# Patient Record
Sex: MALE | Race: WHITE | NOT HISPANIC OR LATINO | Employment: OTHER | URBAN - METROPOLITAN AREA
[De-identification: names, ages, dates, MRNs, and addresses within clinical notes are randomized per-mention and may not be internally consistent; named-entity substitution may affect disease eponyms.]

---

## 2018-01-01 ENCOUNTER — APPOINTMENT (OUTPATIENT)
Dept: GENERAL RADIOLOGY | Facility: HOSPITAL | Age: 63
End: 2018-01-01

## 2018-01-01 ENCOUNTER — APPOINTMENT (OUTPATIENT)
Dept: CT IMAGING | Facility: HOSPITAL | Age: 63
End: 2018-01-01

## 2018-01-01 ENCOUNTER — APPOINTMENT (OUTPATIENT)
Dept: GENERAL RADIOLOGY | Facility: HOSPITAL | Age: 63
End: 2018-01-01
Attending: INTERNAL MEDICINE

## 2018-01-01 ENCOUNTER — APPOINTMENT (OUTPATIENT)
Dept: CARDIOLOGY | Facility: HOSPITAL | Age: 63
End: 2018-01-01
Attending: INTERNAL MEDICINE

## 2018-01-01 ENCOUNTER — HOSPITAL ENCOUNTER (INPATIENT)
Facility: HOSPITAL | Age: 63
LOS: 12 days | Discharge: HOME HEALTH | End: 2018-04-25
Attending: EMERGENCY MEDICINE | Admitting: INTERNAL MEDICINE

## 2018-01-01 ENCOUNTER — HOSPITAL ENCOUNTER (INPATIENT)
Facility: HOSPITAL | Age: 63
LOS: 3 days | End: 2018-05-03
Attending: EMERGENCY MEDICINE | Admitting: INTERNAL MEDICINE

## 2018-01-01 VITALS
TEMPERATURE: 97.4 F | BODY MASS INDEX: 22.32 KG/M2 | SYSTOLIC BLOOD PRESSURE: 97 MMHG | HEIGHT: 67 IN | DIASTOLIC BLOOD PRESSURE: 48 MMHG | WEIGHT: 142.2 LBS

## 2018-01-01 VITALS
RESPIRATION RATE: 18 BRPM | BODY MASS INDEX: 19.15 KG/M2 | TEMPERATURE: 97.5 F | HEIGHT: 67 IN | WEIGHT: 122 LBS | DIASTOLIC BLOOD PRESSURE: 76 MMHG | SYSTOLIC BLOOD PRESSURE: 121 MMHG | HEART RATE: 89 BPM | OXYGEN SATURATION: 97 %

## 2018-01-01 DIAGNOSIS — I46.9 CARDIAC ARREST (HCC): Primary | ICD-10-CM

## 2018-01-01 DIAGNOSIS — T45.511A POISONING BY WARFARIN SODIUM, ACCIDENTAL OR UNINTENTIONAL, INITIAL ENCOUNTER: Primary | ICD-10-CM

## 2018-01-01 DIAGNOSIS — K66.1 RETROPERITONEAL HEMATOMA: ICD-10-CM

## 2018-01-01 DIAGNOSIS — I21.02 STEMI INVOLVING LEFT ANTERIOR DESCENDING CORONARY ARTERY (HCC): ICD-10-CM

## 2018-01-01 DIAGNOSIS — R26.2 DIFFICULTY WALKING: ICD-10-CM

## 2018-01-01 DIAGNOSIS — N17.9 ACUTE RENAL FAILURE, UNSPECIFIED ACUTE RENAL FAILURE TYPE (HCC): ICD-10-CM

## 2018-01-01 LAB
ABO + RH BLD: NORMAL
ABO GROUP BLD: NORMAL
ABO GROUP BLD: NORMAL
ACANTHOCYTES BLD QL SMEAR: ABNORMAL
ACT BLD: 257 SECONDS (ref 82–152)
ACT BLD: 340 SECONDS (ref 82–152)
ALBUMIN SERPL-MCNC: 2.4 G/DL (ref 3.5–5.2)
ALBUMIN SERPL-MCNC: 2.4 G/DL (ref 3.5–5.2)
ALBUMIN SERPL-MCNC: 2.6 G/DL (ref 3.5–5.2)
ALBUMIN SERPL-MCNC: 2.6 G/DL (ref 3.5–5.2)
ALBUMIN SERPL-MCNC: 2.7 G/DL (ref 3.5–5.2)
ALBUMIN SERPL-MCNC: 2.8 G/DL (ref 3.5–5.2)
ALBUMIN SERPL-MCNC: 2.9 G/DL (ref 3.5–5.2)
ALBUMIN SERPL-MCNC: 2.9 G/DL (ref 3.5–5.2)
ALBUMIN SERPL-MCNC: 3 G/DL (ref 3.5–5.2)
ALBUMIN SERPL-MCNC: 3.1 G/DL (ref 3.5–5.2)
ALBUMIN SERPL-MCNC: 3.2 G/DL (ref 3.5–5.2)
ALBUMIN SERPL-MCNC: 3.2 G/DL (ref 3.5–5.2)
ALBUMIN SERPL-MCNC: 3.3 G/DL (ref 3.5–5.2)
ALBUMIN SERPL-MCNC: 3.3 G/DL (ref 3.5–5.2)
ALBUMIN SERPL-MCNC: 3.4 G/DL (ref 3.5–5.2)
ALBUMIN/GLOB SERPL: 0.9 G/DL
ALBUMIN/GLOB SERPL: 1 G/DL
ALBUMIN/GLOB SERPL: 1.1 G/DL
ALBUMIN/GLOB SERPL: 1.2 G/DL
ALBUMIN/GLOB SERPL: 1.3 G/DL
ALBUMIN/GLOB SERPL: 1.4 G/DL
ALP SERPL-CCNC: 108 U/L (ref 39–117)
ALP SERPL-CCNC: 117 U/L (ref 39–117)
ALP SERPL-CCNC: 191 U/L (ref 39–117)
ALP SERPL-CCNC: 196 U/L (ref 39–117)
ALP SERPL-CCNC: 288 U/L (ref 39–117)
ALP SERPL-CCNC: 68 U/L (ref 39–117)
ALP SERPL-CCNC: 70 U/L (ref 39–117)
ALP SERPL-CCNC: 70 U/L (ref 39–117)
ALP SERPL-CCNC: 72 U/L (ref 39–117)
ALP SERPL-CCNC: 73 U/L (ref 39–117)
ALP SERPL-CCNC: 75 U/L (ref 39–117)
ALP SERPL-CCNC: 76 U/L (ref 39–117)
ALP SERPL-CCNC: 77 U/L (ref 39–117)
ALP SERPL-CCNC: 82 U/L (ref 39–117)
ALP SERPL-CCNC: 98 U/L (ref 39–117)
ALT SERPL W P-5'-P-CCNC: 1235 U/L (ref 1–41)
ALT SERPL W P-5'-P-CCNC: 153 U/L (ref 1–41)
ALT SERPL W P-5'-P-CCNC: 1842 U/L (ref 1–41)
ALT SERPL W P-5'-P-CCNC: 256 U/L (ref 1–41)
ALT SERPL W P-5'-P-CCNC: 2648 U/L (ref 1–41)
ALT SERPL W P-5'-P-CCNC: 291 U/L (ref 1–41)
ALT SERPL W P-5'-P-CCNC: 323 U/L (ref 1–41)
ALT SERPL W P-5'-P-CCNC: 3829 U/L (ref 1–41)
ALT SERPL W P-5'-P-CCNC: 4166 U/L (ref 1–41)
ALT SERPL W P-5'-P-CCNC: 4611 U/L (ref 1–41)
ALT SERPL W P-5'-P-CCNC: 4619 U/L (ref 1–41)
ALT SERPL W P-5'-P-CCNC: 4703 U/L (ref 1–41)
ALT SERPL W P-5'-P-CCNC: 481 U/L (ref 1–41)
ALT SERPL W P-5'-P-CCNC: 499 U/L (ref 1–41)
ALT SERPL W P-5'-P-CCNC: 5242 U/L (ref 1–41)
ALT SERPL W P-5'-P-CCNC: 5553 U/L (ref 1–41)
ALT SERPL W P-5'-P-CCNC: 740 U/L (ref 1–41)
AMMONIA BLD-SCNC: 45 UMOL/L (ref 16–60)
AMORPH URATE CRY URNS QL MICRO: ABNORMAL /HPF
ANION GAP SERPL CALCULATED.3IONS-SCNC: 10.9 MMOL/L
ANION GAP SERPL CALCULATED.3IONS-SCNC: 12.6 MMOL/L
ANION GAP SERPL CALCULATED.3IONS-SCNC: 12.6 MMOL/L
ANION GAP SERPL CALCULATED.3IONS-SCNC: 12.9 MMOL/L
ANION GAP SERPL CALCULATED.3IONS-SCNC: 13.2 MMOL/L
ANION GAP SERPL CALCULATED.3IONS-SCNC: 13.5 MMOL/L
ANION GAP SERPL CALCULATED.3IONS-SCNC: 15.5 MMOL/L
ANION GAP SERPL CALCULATED.3IONS-SCNC: 16.2 MMOL/L
ANION GAP SERPL CALCULATED.3IONS-SCNC: 17.1 MMOL/L
ANION GAP SERPL CALCULATED.3IONS-SCNC: 17.2 MMOL/L
ANION GAP SERPL CALCULATED.3IONS-SCNC: 17.4 MMOL/L
ANION GAP SERPL CALCULATED.3IONS-SCNC: 17.8 MMOL/L
ANION GAP SERPL CALCULATED.3IONS-SCNC: 17.8 MMOL/L
ANION GAP SERPL CALCULATED.3IONS-SCNC: 17.9 MMOL/L
ANION GAP SERPL CALCULATED.3IONS-SCNC: 19.2 MMOL/L
ANION GAP SERPL CALCULATED.3IONS-SCNC: 20.5 MMOL/L
ANION GAP SERPL CALCULATED.3IONS-SCNC: 21.4 MMOL/L
ANION GAP SERPL CALCULATED.3IONS-SCNC: 22.8 MMOL/L
ANION GAP SERPL CALCULATED.3IONS-SCNC: 24.2 MMOL/L
ANION GAP SERPL CALCULATED.3IONS-SCNC: 24.4 MMOL/L
ANION GAP SERPL CALCULATED.3IONS-SCNC: 24.9 MMOL/L
ANION GAP SERPL CALCULATED.3IONS-SCNC: 25.4 MMOL/L
ANION GAP SERPL CALCULATED.3IONS-SCNC: 25.9 MMOL/L
ANION GAP SERPL CALCULATED.3IONS-SCNC: 26.8 MMOL/L
ANION GAP SERPL CALCULATED.3IONS-SCNC: 31.9 MMOL/L
ANION GAP SERPL CALCULATED.3IONS-SCNC: 40.5 MMOL/L
ANION GAP SERPL CALCULATED.3IONS-SCNC: 42.1 MMOL/L
ANISOCYTOSIS BLD QL: ABNORMAL
ANISOCYTOSIS BLD QL: NORMAL
APTT PPP: 100 SECONDS (ref 22.7–35.4)
APTT PPP: 117.5 SECONDS (ref 22.7–35.4)
APTT PPP: 128.7 SECONDS (ref 22.7–35.4)
APTT PPP: 24.9 SECONDS (ref 22.7–35.4)
APTT PPP: 27.7 SECONDS (ref 22.7–35.4)
APTT PPP: 27.9 SECONDS (ref 22.7–35.4)
APTT PPP: 30.6 SECONDS (ref 22.7–35.4)
APTT PPP: 30.6 SECONDS (ref 22.7–35.4)
APTT PPP: 45.6 SECONDS (ref 22.7–35.4)
APTT PPP: 46.7 SECONDS (ref 22.7–35.4)
APTT PPP: 50.9 SECONDS (ref 22.7–35.4)
APTT PPP: 51.1 SECONDS (ref 22.7–35.4)
APTT PPP: 53.8 SECONDS (ref 22.7–35.4)
APTT PPP: 53.9 SECONDS (ref 22.7–35.4)
APTT PPP: 57.4 SECONDS (ref 22.7–35.4)
APTT PPP: 66.1 SECONDS (ref 22.7–35.4)
APTT PPP: 67.2 SECONDS (ref 22.7–35.4)
APTT PPP: 71.4 SECONDS (ref 22.7–35.4)
APTT PPP: 79 SECONDS (ref 22.7–35.4)
APTT PPP: 80.4 SECONDS (ref 22.7–35.4)
APTT PPP: 83.9 SECONDS (ref 22.7–35.4)
APTT PPP: 84.6 SECONDS (ref 22.7–35.4)
APTT PPP: 89.7 SECONDS (ref 22.7–35.4)
APTT PPP: >200 SECONDS (ref 22.7–35.4)
APTT PPP: >200 SECONDS (ref 22.7–35.4)
ARTERIAL PATENCY WRIST A: ABNORMAL
ARTERIAL PATENCY WRIST A: POSITIVE
AST SERPL-CCNC: 1101 U/L (ref 1–40)
AST SERPL-CCNC: 128 U/L (ref 1–40)
AST SERPL-CCNC: 144 U/L (ref 1–40)
AST SERPL-CCNC: 168 U/L (ref 1–40)
AST SERPL-CCNC: 1849 U/L (ref 1–40)
AST SERPL-CCNC: 3002 U/L (ref 1–40)
AST SERPL-CCNC: 353 U/L (ref 1–40)
AST SERPL-CCNC: 4319 U/L (ref 1–40)
AST SERPL-CCNC: 4914 U/L (ref 1–40)
AST SERPL-CCNC: 59 U/L (ref 1–40)
AST SERPL-CCNC: 6093 U/L (ref 1–40)
AST SERPL-CCNC: 613 U/L (ref 1–40)
AST SERPL-CCNC: 6560 U/L (ref 1–40)
AST SERPL-CCNC: 73 U/L (ref 1–40)
AST SERPL-CCNC: 828 U/L (ref 1–40)
AST SERPL-CCNC: >7000 U/L (ref 1–40)
AST SERPL-CCNC: >7000 U/L (ref 1–40)
ATMOSPHERIC PRESS: 743.5 MMHG
ATMOSPHERIC PRESS: 744.7 MMHG
ATMOSPHERIC PRESS: 745.7 MMHG
ATMOSPHERIC PRESS: 747.2 MMHG
ATMOSPHERIC PRESS: 747.8 MMHG
ATMOSPHERIC PRESS: 748.4 MMHG
ATMOSPHERIC PRESS: 749 MMHG
ATMOSPHERIC PRESS: 749.1 MMHG
ATMOSPHERIC PRESS: 749.5 MMHG
ATMOSPHERIC PRESS: 749.8 MMHG
ATMOSPHERIC PRESS: 750.4 MMHG
ATMOSPHERIC PRESS: 752.4 MMHG
ATMOSPHERIC PRESS: 753.2 MMHG
ATMOSPHERIC PRESS: 754.3 MMHG
ATMOSPHERIC PRESS: 755.1 MMHG
ATMOSPHERIC PRESS: 755.7 MMHG
BACTERIA SPEC AEROBE CULT: NO GROWTH
BACTERIA SPEC RESP CULT: ABNORMAL
BACTERIA UR QL AUTO: ABNORMAL /HPF
BACTERIA UR QL AUTO: ABNORMAL /HPF
BASE EXCESS BLDA CALC-SCNC: -0.2 MMOL/L (ref 0–2)
BASE EXCESS BLDA CALC-SCNC: -1.4 MMOL/L (ref 0–2)
BASE EXCESS BLDA CALC-SCNC: -1.9 MMOL/L (ref 0–2)
BASE EXCESS BLDA CALC-SCNC: -12.6 MMOL/L (ref 0–2)
BASE EXCESS BLDA CALC-SCNC: -14.6 MMOL/L (ref 0–2)
BASE EXCESS BLDA CALC-SCNC: -14.9 MMOL/L (ref 0–2)
BASE EXCESS BLDA CALC-SCNC: -18.6 MMOL/L (ref 0–2)
BASE EXCESS BLDA CALC-SCNC: -27.2 MMOL/L (ref 0–2)
BASE EXCESS BLDA CALC-SCNC: -28.2 MMOL/L (ref 0–2)
BASE EXCESS BLDA CALC-SCNC: -3.3 MMOL/L (ref 0–2)
BASE EXCESS BLDA CALC-SCNC: -6 MMOL/L (ref 0–2)
BASE EXCESS BLDA CALC-SCNC: -7.5 MMOL/L (ref 0–2)
BASE EXCESS BLDA CALC-SCNC: -8.9 MMOL/L (ref 0–2)
BASE EXCESS BLDA CALC-SCNC: -9 MMOL/L (ref 0–2)
BASE EXCESS BLDA CALC-SCNC: -9 MMOL/L (ref 0–2)
BASE EXCESS BLDA CALC-SCNC: -9.3 MMOL/L (ref 0–2)
BASOPHILS # BLD AUTO: 0.01 10*3/MM3 (ref 0–0.2)
BASOPHILS # BLD AUTO: 0.01 10*3/MM3 (ref 0–0.2)
BASOPHILS # BLD AUTO: 0.02 10*3/MM3 (ref 0–0.2)
BASOPHILS # BLD AUTO: 0.03 10*3/MM3 (ref 0–0.2)
BASOPHILS # BLD AUTO: 0.03 10*3/MM3 (ref 0–0.2)
BASOPHILS # BLD AUTO: 0.04 10*3/MM3 (ref 0–0.2)
BASOPHILS # BLD AUTO: 0.04 10*3/MM3 (ref 0–0.2)
BASOPHILS # BLD AUTO: 0.06 10*3/MM3 (ref 0–0.2)
BASOPHILS # BLD AUTO: 0.07 10*3/MM3 (ref 0–0.2)
BASOPHILS # BLD AUTO: 0.11 10*3/MM3 (ref 0–0.2)
BASOPHILS NFR BLD AUTO: 0 % (ref 0–1.5)
BASOPHILS NFR BLD AUTO: 0 % (ref 0–1.5)
BASOPHILS NFR BLD AUTO: 0.1 % (ref 0–1.5)
BASOPHILS NFR BLD AUTO: 0.2 % (ref 0–1.5)
BASOPHILS NFR BLD AUTO: 0.6 % (ref 0–1.5)
BDY SITE: ABNORMAL
BH BB BLOOD EXPIRATION DATE: NORMAL
BH BB BLOOD TYPE BARCODE: 600
BH BB BLOOD TYPE BARCODE: 6200
BH BB DISPENSE STATUS: NORMAL
BH BB PRODUCT CODE: NORMAL
BH BB UNIT NUMBER: NORMAL
BH CV ECHO MEAS - ACS: 1.5 CM
BH CV ECHO MEAS - ACS: 1.9 CM
BH CV ECHO MEAS - AO MAX PG (FULL): 1.8 MMHG
BH CV ECHO MEAS - AO MAX PG: 2.5 MMHG
BH CV ECHO MEAS - AO MEAN PG (FULL): 1 MMHG
BH CV ECHO MEAS - AO MEAN PG: 1 MMHG
BH CV ECHO MEAS - AO ROOT AREA (BSA CORRECTED): 2
BH CV ECHO MEAS - AO ROOT AREA (BSA CORRECTED): 2
BH CV ECHO MEAS - AO ROOT AREA: 9.1 CM^2
BH CV ECHO MEAS - AO ROOT AREA: 9.6 CM^2
BH CV ECHO MEAS - AO ROOT DIAM: 3.4 CM
BH CV ECHO MEAS - AO ROOT DIAM: 3.5 CM
BH CV ECHO MEAS - AO V2 MAX: 79.3 CM/SEC
BH CV ECHO MEAS - AO V2 MEAN: 55.1 CM/SEC
BH CV ECHO MEAS - AO V2 VTI: 13 CM
BH CV ECHO MEAS - AVA(I,A): 1.8 CM^2
BH CV ECHO MEAS - AVA(I,D): 1.8 CM^2
BH CV ECHO MEAS - AVA(V,A): 1.7 CM^2
BH CV ECHO MEAS - AVA(V,D): 1.7 CM^2
BH CV ECHO MEAS - BSA(HAYCOCK): 1.7 M^2
BH CV ECHO MEAS - BSA(HAYCOCK): 1.7 M^2
BH CV ECHO MEAS - BSA(HAYCOCK): 1.8 M^2
BH CV ECHO MEAS - BSA: 1.7 M^2
BH CV ECHO MEAS - BSA: 1.7 M^2
BH CV ECHO MEAS - BSA: 1.8 M^2
BH CV ECHO MEAS - BZI_BMI: 21 KILOGRAMS/M^2
BH CV ECHO MEAS - BZI_BMI: 21.6 KILOGRAMS/M^2
BH CV ECHO MEAS - BZI_BMI: 23 KILOGRAMS/M^2
BH CV ECHO MEAS - BZI_METRIC_HEIGHT: 170.2 CM
BH CV ECHO MEAS - BZI_METRIC_WEIGHT: 60.8 KG
BH CV ECHO MEAS - BZI_METRIC_WEIGHT: 62.6 KG
BH CV ECHO MEAS - BZI_METRIC_WEIGHT: 66.7 KG
BH CV ECHO MEAS - CONTRAST EF (2CH): 28.9 ML/M^2
BH CV ECHO MEAS - CONTRAST EF (2CH): 32.3 ML/M^2
BH CV ECHO MEAS - CONTRAST EF 4CH: 25.7 ML/M^2
BH CV ECHO MEAS - CONTRAST EF 4CH: 29.6 ML/M^2
BH CV ECHO MEAS - CONTRAST EF 4CH: 34.5 ML/M^2
BH CV ECHO MEAS - EDV(CUBED): 117.6 ML
BH CV ECHO MEAS - EDV(CUBED): 74.1 ML
BH CV ECHO MEAS - EDV(MOD-SP2): 114 ML
BH CV ECHO MEAS - EDV(MOD-SP2): 62 ML
BH CV ECHO MEAS - EDV(MOD-SP4): 108 ML
BH CV ECHO MEAS - EDV(MOD-SP4): 109 ML
BH CV ECHO MEAS - EDV(MOD-SP4): 139 ML
BH CV ECHO MEAS - EDV(TEICH): 112.8 ML
BH CV ECHO MEAS - EDV(TEICH): 78.6 ML
BH CV ECHO MEAS - EF(CUBED): 37 %
BH CV ECHO MEAS - EF(CUBED): 49.6 %
BH CV ECHO MEAS - EF(MOD-BP): 25 %
BH CV ECHO MEAS - EF(MOD-SP2): 28.9 %
BH CV ECHO MEAS - EF(MOD-SP2): 32.3 %
BH CV ECHO MEAS - EF(MOD-SP4): 25 %
BH CV ECHO MEAS - EF(MOD-SP4): 29.6 %
BH CV ECHO MEAS - EF(MOD-SP4): 34.5 %
BH CV ECHO MEAS - EF(TEICH): 30.7 %
BH CV ECHO MEAS - EF(TEICH): 41.6 %
BH CV ECHO MEAS - ESV(CUBED): 46.7 ML
BH CV ECHO MEAS - ESV(CUBED): 59.3 ML
BH CV ECHO MEAS - ESV(MOD-SP2): 42 ML
BH CV ECHO MEAS - ESV(MOD-SP2): 81 ML
BH CV ECHO MEAS - ESV(MOD-SP4): 76 ML
BH CV ECHO MEAS - ESV(MOD-SP4): 81 ML
BH CV ECHO MEAS - ESV(MOD-SP4): 91 ML
BH CV ECHO MEAS - ESV(TEICH): 54.4 ML
BH CV ECHO MEAS - ESV(TEICH): 65.9 ML
BH CV ECHO MEAS - FS: 14.3 %
BH CV ECHO MEAS - FS: 20.4 %
BH CV ECHO MEAS - IVS/LVPW: 1
BH CV ECHO MEAS - IVS/LVPW: 1
BH CV ECHO MEAS - IVSD: 1 CM
BH CV ECHO MEAS - IVSD: 1.2 CM
BH CV ECHO MEAS - LAT PEAK E' VEL: 7 CM/SEC
BH CV ECHO MEAS - LAT PEAK E' VEL: 8 CM/SEC
BH CV ECHO MEAS - LV DIASTOLIC VOL/BSA (35-75): 62.5 ML/M^2
BH CV ECHO MEAS - LV DIASTOLIC VOL/BSA (35-75): 63.9 ML/M^2
BH CV ECHO MEAS - LV DIASTOLIC VOL/BSA (35-75): 78.4 ML/M^2
BH CV ECHO MEAS - LV MASS(C)D: 176 GRAMS
BH CV ECHO MEAS - LV MASS(C)D: 178.2 GRAMS
BH CV ECHO MEAS - LV MASS(C)DI: 103.2 GRAMS/M^2
BH CV ECHO MEAS - LV MASS(C)DI: 103.2 GRAMS/M^2
BH CV ECHO MEAS - LV MAX PG: 0.76 MMHG
BH CV ECHO MEAS - LV MEAN PG: 0 MMHG
BH CV ECHO MEAS - LV SYSTOLIC VOL/BSA (12-30): 44 ML/M^2
BH CV ECHO MEAS - LV SYSTOLIC VOL/BSA (12-30): 47.5 ML/M^2
BH CV ECHO MEAS - LV SYSTOLIC VOL/BSA (12-30): 51.3 ML/M^2
BH CV ECHO MEAS - LV V1 MAX: 43.6 CM/SEC
BH CV ECHO MEAS - LV V1 MEAN: 30.1 CM/SEC
BH CV ECHO MEAS - LV V1 VTI: 7.6 CM
BH CV ECHO MEAS - LVIDD: 4.2 CM
BH CV ECHO MEAS - LVIDD: 4.9 CM
BH CV ECHO MEAS - LVIDS: 3.6 CM
BH CV ECHO MEAS - LVIDS: 3.9 CM
BH CV ECHO MEAS - LVLD AP2: 7.5 CM
BH CV ECHO MEAS - LVLD AP2: 9 CM
BH CV ECHO MEAS - LVLD AP4: 6.8 CM
BH CV ECHO MEAS - LVLD AP4: 8.2 CM
BH CV ECHO MEAS - LVLD AP4: 8.8 CM
BH CV ECHO MEAS - LVLS AP2: 6.3 CM
BH CV ECHO MEAS - LVLS AP2: 8.1 CM
BH CV ECHO MEAS - LVLS AP4: 6.7 CM
BH CV ECHO MEAS - LVLS AP4: 7.8 CM
BH CV ECHO MEAS - LVLS AP4: 8.2 CM
BH CV ECHO MEAS - LVOT AREA (M): 2.8 CM^2
BH CV ECHO MEAS - LVOT AREA (M): 3.1 CM^2
BH CV ECHO MEAS - LVOT AREA: 2.8 CM^2
BH CV ECHO MEAS - LVOT AREA: 3.1 CM^2
BH CV ECHO MEAS - LVOT DIAM: 1.9 CM
BH CV ECHO MEAS - LVOT DIAM: 2 CM
BH CV ECHO MEAS - LVPWD: 1 CM
BH CV ECHO MEAS - LVPWD: 1.2 CM
BH CV ECHO MEAS - MED PEAK E' VEL: 4 CM/SEC
BH CV ECHO MEAS - MED PEAK E' VEL: 5 CM/SEC
BH CV ECHO MEAS - MR MAX PG: 42.5 MMHG
BH CV ECHO MEAS - MR MAX PG: 71.2 MMHG
BH CV ECHO MEAS - MR MAX VEL: 326 CM/SEC
BH CV ECHO MEAS - MR MAX VEL: 422 CM/SEC
BH CV ECHO MEAS - MR MEAN PG: 48 MMHG
BH CV ECHO MEAS - MR MEAN VEL: 330 CM/SEC
BH CV ECHO MEAS - MR VTI: 102 CM
BH CV ECHO MEAS - MV A DUR: 0.13 SEC
BH CV ECHO MEAS - MV A DUR: 0.17 SEC
BH CV ECHO MEAS - MV A MAX VEL: 32.5 CM/SEC
BH CV ECHO MEAS - MV A MAX VEL: 42.2 CM/SEC
BH CV ECHO MEAS - MV DEC SLOPE: 158 CM/SEC^2
BH CV ECHO MEAS - MV DEC SLOPE: 436 CM/SEC^2
BH CV ECHO MEAS - MV DEC TIME: 0.16 SEC
BH CV ECHO MEAS - MV DEC TIME: 0.19 SEC
BH CV ECHO MEAS - MV E MAX VEL: 103 CM/SEC
BH CV ECHO MEAS - MV E MAX VEL: 56.8 CM/SEC
BH CV ECHO MEAS - MV E/A: 1.3
BH CV ECHO MEAS - MV E/A: 3.2
BH CV ECHO MEAS - MV MAX PG: 1.2 MMHG
BH CV ECHO MEAS - MV MAX PG: 4.8 MMHG
BH CV ECHO MEAS - MV MEAN PG: 1 MMHG
BH CV ECHO MEAS - MV MEAN PG: 2 MMHG
BH CV ECHO MEAS - MV P1/2T MAX VEL: 110 CM/SEC
BH CV ECHO MEAS - MV P1/2T MAX VEL: 58.2 CM/SEC
BH CV ECHO MEAS - MV P1/2T: 107.9 MSEC
BH CV ECHO MEAS - MV P1/2T: 73.9 MSEC
BH CV ECHO MEAS - MV V2 MAX: 110 CM/SEC
BH CV ECHO MEAS - MV V2 MAX: 54.3 CM/SEC
BH CV ECHO MEAS - MV V2 MEAN: 35.8 CM/SEC
BH CV ECHO MEAS - MV V2 MEAN: 57.3 CM/SEC
BH CV ECHO MEAS - MV V2 VTI: 14.4 CM
BH CV ECHO MEAS - MV V2 VTI: 26.8 CM
BH CV ECHO MEAS - MVA P1/2T LCG: 2 CM^2
BH CV ECHO MEAS - MVA P1/2T LCG: 3.8 CM^2
BH CV ECHO MEAS - MVA(P1/2T): 2 CM^2
BH CV ECHO MEAS - MVA(P1/2T): 3 CM^2
BH CV ECHO MEAS - MVA(VTI): 1.7 CM^2
BH CV ECHO MEAS - PA ACC TIME: 0.09 SEC
BH CV ECHO MEAS - PA ACC TIME: 0.13 SEC
BH CV ECHO MEAS - PA MAX PG (FULL): 1 MMHG
BH CV ECHO MEAS - PA MAX PG (FULL): 1.2 MMHG
BH CV ECHO MEAS - PA MAX PG: 1.6 MMHG
BH CV ECHO MEAS - PA MAX PG: 1.8 MMHG
BH CV ECHO MEAS - PA PR(ACCEL): 21.9 MMHG
BH CV ECHO MEAS - PA PR(ACCEL): 40.8 MMHG
BH CV ECHO MEAS - PA V2 MAX: 62.5 CM/SEC
BH CV ECHO MEAS - PA V2 MAX: 67.9 CM/SEC
BH CV ECHO MEAS - PULM A REVS DUR: 0.14 SEC
BH CV ECHO MEAS - PULM A REVS VEL: 25.7 CM/SEC
BH CV ECHO MEAS - PULM DIAS VEL: 24.7 CM/SEC
BH CV ECHO MEAS - PULM S/D: 1.3
BH CV ECHO MEAS - PULM SYS VEL: 32.5 CM/SEC
BH CV ECHO MEAS - PVA(V,A): 2.5 CM^2
BH CV ECHO MEAS - PVA(V,D): 2.5 CM^2
BH CV ECHO MEAS - RV MAX PG: 0.33 MMHG
BH CV ECHO MEAS - RV MAX PG: 0.81 MMHG
BH CV ECHO MEAS - RV MEAN PG: 0 MMHG
BH CV ECHO MEAS - RV MEAN PG: 0 MMHG
BH CV ECHO MEAS - RV V1 MAX: 28.8 CM/SEC
BH CV ECHO MEAS - RV V1 MAX: 45.1 CM/SEC
BH CV ECHO MEAS - RV V1 MEAN: 19.6 CM/SEC
BH CV ECHO MEAS - RV V1 MEAN: 32 CM/SEC
BH CV ECHO MEAS - RV V1 VTI: 4.6 CM
BH CV ECHO MEAS - RV V1 VTI: 6.5 CM
BH CV ECHO MEAS - RVOT AREA: 3.8 CM^2
BH CV ECHO MEAS - RVOT DIAM: 2.2 CM
BH CV ECHO MEAS - SI(AO): 72.4 ML/M^2
BH CV ECHO MEAS - SI(CUBED): 15.9 ML/M^2
BH CV ECHO MEAS - SI(CUBED): 34.2 ML/M^2
BH CV ECHO MEAS - SI(LVOT): 13.8 ML/M^2
BH CV ECHO MEAS - SI(MOD-SP2): 11.6 ML/M^2
BH CV ECHO MEAS - SI(MOD-SP2): 18.6 ML/M^2
BH CV ECHO MEAS - SI(MOD-SP4): 16.4 ML/M^2
BH CV ECHO MEAS - SI(MOD-SP4): 18.5 ML/M^2
BH CV ECHO MEAS - SI(MOD-SP4): 27.1 ML/M^2
BH CV ECHO MEAS - SI(TEICH): 14 ML/M^2
BH CV ECHO MEAS - SI(TEICH): 27.5 ML/M^2
BH CV ECHO MEAS - SV(AO): 125.1 ML
BH CV ECHO MEAS - SV(CUBED): 27.4 ML
BH CV ECHO MEAS - SV(CUBED): 58.3 ML
BH CV ECHO MEAS - SV(LVOT): 23.9 ML
BH CV ECHO MEAS - SV(MOD-SP2): 20 ML
BH CV ECHO MEAS - SV(MOD-SP2): 33 ML
BH CV ECHO MEAS - SV(MOD-SP4): 28 ML
BH CV ECHO MEAS - SV(MOD-SP4): 32 ML
BH CV ECHO MEAS - SV(MOD-SP4): 48 ML
BH CV ECHO MEAS - SV(RVOT): 24.7 ML
BH CV ECHO MEAS - SV(TEICH): 24.1 ML
BH CV ECHO MEAS - SV(TEICH): 46.9 ML
BH CV ECHO MEAS - TAPSE (>1.6): 1.4 CM2
BH CV ECHO MEAS - TAPSE (>1.6): 1.4 CM2
BH CV ECHO MEAS - TR MAX VEL: 239 CM/SEC
BH CV ECHO MEASUREMENTS AVERAGE E/E' RATIO: 17.17
BH CV LOWER ARTERIAL LEFT 4TH DIGIT SYS MAX: 52 MMHG
BH CV LOWER ARTERIAL LEFT 5TH DIGIT SYS MAX: 56 MMHG
BH CV LOWER ARTERIAL LEFT ABI RATIO: 1.03
BH CV LOWER ARTERIAL LEFT DORSALIS PEDIS SYS MAX: 97 MMHG
BH CV LOWER ARTERIAL LEFT GREAT TOE SYS MAX: 99 MMHG
BH CV LOWER ARTERIAL LEFT POST TIBIAL SYS MAX: 97 MMHG
BH CV LOWER ARTERIAL LEFT TBI RATIO: 1.05
BH CV LOWER ARTERIAL RIGHT ABI RATIO: 1.18
BH CV LOWER ARTERIAL RIGHT DORSALIS PEDIS SYS MAX: 111 MMHG
BH CV LOWER ARTERIAL RIGHT POST TIBIAL SYS MAX: 93 MMHG
BH CV LOWER ARTERIAL RIGHT TBI RATIO: 0
BH CV VAS BP RIGHT ARM: NORMAL MMHG
BH CV VAS BP RIGHT ARM: NORMAL MMHG
BH CV XLRA - RV BASE: 2.7 CM
BH CV XLRA - RV BASE: 4 CM
BH CV XLRA - RV LENGTH: 7.3 CM
BH CV XLRA - RV MID: 2.4 CM
BH CV XLRA - TDI S': 10 CM/SEC
BH CV XLRA - TDI S': 9 CM/SEC
BILIRUB CONJ SERPL-MCNC: 4.3 MG/DL (ref 0–0.3)
BILIRUB INDIRECT SERPL-MCNC: 2.8 MG/DL
BILIRUB SERPL-MCNC: 0.3 MG/DL (ref 0.1–1.2)
BILIRUB SERPL-MCNC: 0.5 MG/DL (ref 0.1–1.2)
BILIRUB SERPL-MCNC: 0.6 MG/DL (ref 0.1–1.2)
BILIRUB SERPL-MCNC: 0.7 MG/DL (ref 0.1–1.2)
BILIRUB SERPL-MCNC: 0.7 MG/DL (ref 0.1–1.2)
BILIRUB SERPL-MCNC: 0.8 MG/DL (ref 0.1–1.2)
BILIRUB SERPL-MCNC: 0.9 MG/DL (ref 0.1–1.2)
BILIRUB SERPL-MCNC: 0.9 MG/DL (ref 0.1–1.2)
BILIRUB SERPL-MCNC: 1 MG/DL (ref 0.1–1.2)
BILIRUB SERPL-MCNC: 1.6 MG/DL (ref 0.1–1.2)
BILIRUB SERPL-MCNC: 3.7 MG/DL (ref 0.1–1.2)
BILIRUB SERPL-MCNC: 7.1 MG/DL (ref 0.1–1.2)
BILIRUB SERPL-MCNC: <0.2 MG/DL (ref 0.1–1.2)
BILIRUB UR QL STRIP: NEGATIVE
BILIRUB UR QL STRIP: NEGATIVE
BLD GP AB SCN SERPL QL: NEGATIVE
BUN BLD-MCNC: 20 MG/DL (ref 8–23)
BUN BLD-MCNC: 23 MG/DL (ref 8–23)
BUN BLD-MCNC: 25 MG/DL (ref 8–23)
BUN BLD-MCNC: 26 MG/DL (ref 8–23)
BUN BLD-MCNC: 27 MG/DL (ref 8–23)
BUN BLD-MCNC: 29 MG/DL (ref 8–23)
BUN BLD-MCNC: 30 MG/DL (ref 8–23)
BUN BLD-MCNC: 31 MG/DL (ref 8–23)
BUN BLD-MCNC: 31 MG/DL (ref 8–23)
BUN BLD-MCNC: 32 MG/DL (ref 8–23)
BUN BLD-MCNC: 32 MG/DL (ref 8–23)
BUN BLD-MCNC: 36 MG/DL (ref 8–23)
BUN BLD-MCNC: 37 MG/DL (ref 8–23)
BUN BLD-MCNC: 39 MG/DL (ref 8–23)
BUN BLD-MCNC: 43 MG/DL (ref 8–23)
BUN BLD-MCNC: 43 MG/DL (ref 8–23)
BUN BLD-MCNC: 50 MG/DL (ref 8–23)
BUN BLD-MCNC: 57 MG/DL (ref 8–23)
BUN BLD-MCNC: 61 MG/DL (ref 8–23)
BUN BLD-MCNC: 62 MG/DL (ref 8–23)
BUN BLD-MCNC: 63 MG/DL (ref 8–23)
BUN BLD-MCNC: 67 MG/DL (ref 8–23)
BUN BLD-MCNC: 79 MG/DL (ref 8–23)
BUN BLD-MCNC: 86 MG/DL (ref 8–23)
BUN BLD-MCNC: 91 MG/DL (ref 8–23)
BUN/CREAT SERPL: 11.7 (ref 7–25)
BUN/CREAT SERPL: 14.9 (ref 7–25)
BUN/CREAT SERPL: 15.2 (ref 7–25)
BUN/CREAT SERPL: 15.4 (ref 7–25)
BUN/CREAT SERPL: 15.7 (ref 7–25)
BUN/CREAT SERPL: 15.7 (ref 7–25)
BUN/CREAT SERPL: 16.4 (ref 7–25)
BUN/CREAT SERPL: 16.5 (ref 7–25)
BUN/CREAT SERPL: 17.3 (ref 7–25)
BUN/CREAT SERPL: 18.7 (ref 7–25)
BUN/CREAT SERPL: 20.7 (ref 7–25)
BUN/CREAT SERPL: 22 (ref 7–25)
BUN/CREAT SERPL: 22 (ref 7–25)
BUN/CREAT SERPL: 22.2 (ref 7–25)
BUN/CREAT SERPL: 22.3 (ref 7–25)
BUN/CREAT SERPL: 23.6 (ref 7–25)
BUN/CREAT SERPL: 23.9 (ref 7–25)
BUN/CREAT SERPL: 24.6 (ref 7–25)
BUN/CREAT SERPL: 24.8 (ref 7–25)
BUN/CREAT SERPL: 27.6 (ref 7–25)
BUN/CREAT SERPL: 29.9 (ref 7–25)
BUN/CREAT SERPL: 29.9 (ref 7–25)
BUN/CREAT SERPL: 31.6 (ref 7–25)
BUN/CREAT SERPL: 34.2 (ref 7–25)
BUN/CREAT SERPL: 35.4 (ref 7–25)
BUN/CREAT SERPL: 41 (ref 7–25)
BUN/CREAT SERPL: 41.3 (ref 7–25)
CA-I BLD-MCNC: 3.4 MG/DL (ref 4.6–5.4)
CA-I BLD-MCNC: 3.7 MG/DL (ref 4.6–5.4)
CA-I BLD-MCNC: 4.3 MG/DL (ref 4.6–5.4)
CA-I BLD-MCNC: 4.4 MG/DL (ref 4.6–5.4)
CA-I BLD-MCNC: 4.4 MG/DL (ref 4.6–5.4)
CA-I BLD-MCNC: 4.5 MG/DL (ref 4.6–5.4)
CA-I BLD-MCNC: 4.6 MG/DL (ref 4.6–5.4)
CA-I BLD-MCNC: 4.6 MG/DL (ref 4.6–5.4)
CA-I SERPL ISE-MCNC: 0.84 MMOL/L (ref 1.15–1.35)
CA-I SERPL ISE-MCNC: 0.93 MMOL/L (ref 1.15–1.35)
CA-I SERPL ISE-MCNC: 1.07 MMOL/L (ref 1.15–1.35)
CA-I SERPL ISE-MCNC: 1.09 MMOL/L (ref 1.15–1.35)
CA-I SERPL ISE-MCNC: 1.09 MMOL/L (ref 1.15–1.35)
CA-I SERPL ISE-MCNC: 1.12 MMOL/L (ref 1.15–1.35)
CA-I SERPL ISE-MCNC: 1.13 MMOL/L (ref 1.15–1.35)
CA-I SERPL ISE-MCNC: 1.14 MMOL/L (ref 1.15–1.35)
CA-I SERPL ISE-MCNC: 1.14 MMOL/L (ref 1.15–1.35)
CALCIUM SPEC-SCNC: 6.9 MG/DL (ref 8.6–10.5)
CALCIUM SPEC-SCNC: 7.2 MG/DL (ref 8.6–10.5)
CALCIUM SPEC-SCNC: 7.3 MG/DL (ref 8.6–10.5)
CALCIUM SPEC-SCNC: 7.4 MG/DL (ref 8.6–10.5)
CALCIUM SPEC-SCNC: 7.5 MG/DL (ref 8.6–10.5)
CALCIUM SPEC-SCNC: 7.6 MG/DL (ref 8.6–10.5)
CALCIUM SPEC-SCNC: 7.6 MG/DL (ref 8.6–10.5)
CALCIUM SPEC-SCNC: 7.7 MG/DL (ref 8.6–10.5)
CALCIUM SPEC-SCNC: 7.8 MG/DL (ref 8.6–10.5)
CALCIUM SPEC-SCNC: 7.9 MG/DL (ref 8.6–10.5)
CALCIUM SPEC-SCNC: 8.1 MG/DL (ref 8.6–10.5)
CALCIUM SPEC-SCNC: 8.2 MG/DL (ref 8.6–10.5)
CALCIUM SPEC-SCNC: 8.2 MG/DL (ref 8.6–10.5)
CALCIUM SPEC-SCNC: 8.3 MG/DL (ref 8.6–10.5)
CHLORIDE SERPL-SCNC: 103 MMOL/L (ref 98–107)
CHLORIDE SERPL-SCNC: 104 MMOL/L (ref 98–107)
CHLORIDE SERPL-SCNC: 104 MMOL/L (ref 98–107)
CHLORIDE SERPL-SCNC: 105 MMOL/L (ref 98–107)
CHLORIDE SERPL-SCNC: 107 MMOL/L (ref 98–107)
CHLORIDE SERPL-SCNC: 108 MMOL/L (ref 98–107)
CHLORIDE SERPL-SCNC: 109 MMOL/L (ref 98–107)
CHLORIDE SERPL-SCNC: 110 MMOL/L (ref 98–107)
CHLORIDE SERPL-SCNC: 110 MMOL/L (ref 98–107)
CHLORIDE SERPL-SCNC: 111 MMOL/L (ref 98–107)
CHLORIDE SERPL-SCNC: 116 MMOL/L (ref 98–107)
CHLORIDE SERPL-SCNC: 87 MMOL/L (ref 98–107)
CHLORIDE SERPL-SCNC: 89 MMOL/L (ref 98–107)
CHLORIDE SERPL-SCNC: 89 MMOL/L (ref 98–107)
CHLORIDE SERPL-SCNC: 92 MMOL/L (ref 98–107)
CHLORIDE SERPL-SCNC: 94 MMOL/L (ref 98–107)
CHLORIDE SERPL-SCNC: 95 MMOL/L (ref 98–107)
CHLORIDE SERPL-SCNC: 95 MMOL/L (ref 98–107)
CHLORIDE SERPL-SCNC: 97 MMOL/L (ref 98–107)
CHLORIDE SERPL-SCNC: 99 MMOL/L (ref 98–107)
CHLORIDE UR-SCNC: <20 MMOL/L
CHOLEST SERPL-MCNC: 150 MG/DL (ref 0–200)
CK MB SERPL-CCNC: 3.55 NG/ML
CLARITY UR: ABNORMAL
CLARITY UR: CLEAR
CO2 SERPL-SCNC: 11.2 MMOL/L (ref 22–29)
CO2 SERPL-SCNC: 12.1 MMOL/L (ref 22–29)
CO2 SERPL-SCNC: 12.8 MMOL/L (ref 22–29)
CO2 SERPL-SCNC: 13.2 MMOL/L (ref 22–29)
CO2 SERPL-SCNC: 14.6 MMOL/L (ref 22–29)
CO2 SERPL-SCNC: 14.6 MMOL/L (ref 22–29)
CO2 SERPL-SCNC: 15.5 MMOL/L (ref 22–29)
CO2 SERPL-SCNC: 16.1 MMOL/L (ref 22–29)
CO2 SERPL-SCNC: 16.8 MMOL/L (ref 22–29)
CO2 SERPL-SCNC: 17.2 MMOL/L (ref 22–29)
CO2 SERPL-SCNC: 17.9 MMOL/L (ref 22–29)
CO2 SERPL-SCNC: 18.2 MMOL/L (ref 22–29)
CO2 SERPL-SCNC: 20.8 MMOL/L (ref 22–29)
CO2 SERPL-SCNC: 21.1 MMOL/L (ref 22–29)
CO2 SERPL-SCNC: 22.1 MMOL/L (ref 22–29)
CO2 SERPL-SCNC: 23.5 MMOL/L (ref 22–29)
CO2 SERPL-SCNC: 23.6 MMOL/L (ref 22–29)
CO2 SERPL-SCNC: 23.8 MMOL/L (ref 22–29)
CO2 SERPL-SCNC: 23.8 MMOL/L (ref 22–29)
CO2 SERPL-SCNC: 24.1 MMOL/L (ref 22–29)
CO2 SERPL-SCNC: 24.6 MMOL/L (ref 22–29)
CO2 SERPL-SCNC: 26.1 MMOL/L (ref 22–29)
CO2 SERPL-SCNC: 27.5 MMOL/L (ref 22–29)
CO2 SERPL-SCNC: 28.4 MMOL/L (ref 22–29)
CO2 SERPL-SCNC: 28.4 MMOL/L (ref 22–29)
CO2 SERPL-SCNC: 7.5 MMOL/L (ref 22–29)
CO2 SERPL-SCNC: 7.9 MMOL/L (ref 22–29)
COARSE GRAN CASTS URNS QL MICRO: ABNORMAL /LPF
COLOR UR: ABNORMAL
COLOR UR: YELLOW
CREAT BLD-MCNC: 0.78 MG/DL (ref 0.76–1.27)
CREAT BLD-MCNC: 0.97 MG/DL (ref 0.76–1.27)
CREAT BLD-MCNC: 0.97 MG/DL (ref 0.76–1.27)
CREAT BLD-MCNC: 1.03 MG/DL (ref 0.76–1.27)
CREAT BLD-MCNC: 1.04 MG/DL (ref 0.76–1.27)
CREAT BLD-MCNC: 1.09 MG/DL (ref 0.76–1.27)
CREAT BLD-MCNC: 1.14 MG/DL (ref 0.76–1.27)
CREAT BLD-MCNC: 1.18 MG/DL (ref 0.76–1.27)
CREAT BLD-MCNC: 1.23 MG/DL (ref 0.76–1.27)
CREAT BLD-MCNC: 1.27 MG/DL (ref 0.76–1.27)
CREAT BLD-MCNC: 1.34 MG/DL (ref 0.76–1.27)
CREAT BLD-MCNC: 1.66 MG/DL (ref 0.76–1.27)
CREAT BLD-MCNC: 1.83 MG/DL (ref 0.76–1.27)
CREAT BLD-MCNC: 1.89 MG/DL (ref 0.76–1.27)
CREAT BLD-MCNC: 2.08 MG/DL (ref 0.76–1.27)
CREAT BLD-MCNC: 2.11 MG/DL (ref 0.76–1.27)
CREAT BLD-MCNC: 2.14 MG/DL (ref 0.76–1.27)
CREAT BLD-MCNC: 2.3 MG/DL (ref 0.76–1.27)
CREAT BLD-MCNC: 2.5 MG/DL (ref 0.76–1.27)
CREAT BLD-MCNC: 2.5 MG/DL (ref 0.76–1.27)
CREAT BLD-MCNC: 2.61 MG/DL (ref 0.76–1.27)
CREAT BLD-MCNC: 2.87 MG/DL (ref 0.76–1.27)
CREAT BLD-MCNC: 2.95 MG/DL (ref 0.76–1.27)
CREAT BLD-MCNC: 3.25 MG/DL (ref 0.76–1.27)
CREAT BLD-MCNC: 3.3 MG/DL (ref 0.76–1.27)
CREAT BLD-MCNC: 3.85 MG/DL (ref 0.76–1.27)
CREAT BLD-MCNC: 3.87 MG/DL (ref 0.76–1.27)
CREAT UR-MCNC: 157.7 MG/DL
CREAT UR-MCNC: 66.6 MG/DL
D-LACTATE SERPL-SCNC: 1.5 MMOL/L (ref 0.5–2)
D-LACTATE SERPL-SCNC: 1.9 MMOL/L (ref 0.5–2)
D-LACTATE SERPL-SCNC: 11.1 MMOL/L (ref 0.5–2)
D-LACTATE SERPL-SCNC: 19.8 MMOL/L (ref 0.5–2)
D-LACTATE SERPL-SCNC: 2.3 MMOL/L (ref 0.5–2)
D-LACTATE SERPL-SCNC: 2.3 MMOL/L (ref 0.5–2)
D-LACTATE SERPL-SCNC: 21.8 MMOL/L (ref 0.5–2)
D-LACTATE SERPL-SCNC: 21.8 MMOL/L (ref 0.5–2)
D-LACTATE SERPL-SCNC: 3.9 MMOL/L (ref 0.5–2)
D-LACTATE SERPL-SCNC: 4.2 MMOL/L (ref 0.5–2)
D-LACTATE SERPL-SCNC: 4.4 MMOL/L (ref 0.5–2)
D-LACTATE SERPL-SCNC: 5 MMOL/L (ref 0.5–2)
D-LACTATE SERPL-SCNC: 6.2 MMOL/L (ref 0.5–2)
D-LACTATE SERPL-SCNC: 6.7 MMOL/L (ref 0.5–2)
D-LACTATE SERPL-SCNC: 8.6 MMOL/L (ref 0.5–2)
DACRYOCYTES BLD QL SMEAR: ABNORMAL
DACRYOCYTES BLD QL SMEAR: ABNORMAL
DEPRECATED RDW RBC AUTO: 46.1 FL (ref 37–54)
DEPRECATED RDW RBC AUTO: 46.3 FL (ref 37–54)
DEPRECATED RDW RBC AUTO: 46.5 FL (ref 37–54)
DEPRECATED RDW RBC AUTO: 46.6 FL (ref 37–54)
DEPRECATED RDW RBC AUTO: 47.7 FL (ref 37–54)
DEPRECATED RDW RBC AUTO: 48.4 FL (ref 37–54)
DEPRECATED RDW RBC AUTO: 48.7 FL (ref 37–54)
DEPRECATED RDW RBC AUTO: 48.9 FL (ref 37–54)
DEPRECATED RDW RBC AUTO: 49 FL (ref 37–54)
DEPRECATED RDW RBC AUTO: 49.1 FL (ref 37–54)
DEPRECATED RDW RBC AUTO: 49.1 FL (ref 37–54)
DEPRECATED RDW RBC AUTO: 49.4 FL (ref 37–54)
DEPRECATED RDW RBC AUTO: 49.4 FL (ref 37–54)
DEPRECATED RDW RBC AUTO: 50.3 FL (ref 37–54)
DEPRECATED RDW RBC AUTO: 50.4 FL (ref 37–54)
DEPRECATED RDW RBC AUTO: 51.1 FL (ref 37–54)
DEPRECATED RDW RBC AUTO: 53.2 FL (ref 37–54)
DEPRECATED RDW RBC AUTO: 56.3 FL (ref 37–54)
DEPRECATED RDW RBC AUTO: 59.9 FL (ref 37–54)
DEPRECATED RDW RBC AUTO: 60.7 FL (ref 37–54)
DEPRECATED RDW RBC AUTO: 68.1 FL (ref 37–54)
DEPRECATED RDW RBC AUTO: 69.1 FL (ref 37–54)
E/E' RATIO: 11
EOSINOPHIL # BLD AUTO: 0 10*3/MM3 (ref 0–0.7)
EOSINOPHIL # BLD AUTO: 0.01 10*3/MM3 (ref 0–0.7)
EOSINOPHIL # BLD AUTO: 0.02 10*3/MM3 (ref 0–0.7)
EOSINOPHIL # BLD AUTO: 0.03 10*3/MM3 (ref 0–0.7)
EOSINOPHIL # BLD AUTO: 0.04 10*3/MM3 (ref 0–0.7)
EOSINOPHIL # BLD AUTO: 0.18 10*3/MM3 (ref 0–0.7)
EOSINOPHIL # BLD MANUAL: 0.3 10*3/MM3 (ref 0–0.7)
EOSINOPHIL NFR BLD AUTO: 0 % (ref 0.3–6.2)
EOSINOPHIL NFR BLD AUTO: 0.1 % (ref 0.3–6.2)
EOSINOPHIL NFR BLD AUTO: 0.1 % (ref 0.3–6.2)
EOSINOPHIL NFR BLD AUTO: 0.2 % (ref 0.3–6.2)
EOSINOPHIL NFR BLD AUTO: 1 % (ref 0.3–6.2)
EOSINOPHIL NFR BLD MANUAL: 1 % (ref 0.3–6.2)
ERYTHROCYTE [DISTWIDTH] IN BLOOD BY AUTOMATED COUNT: 13.2 % (ref 11.5–14.5)
ERYTHROCYTE [DISTWIDTH] IN BLOOD BY AUTOMATED COUNT: 13.2 % (ref 11.5–14.5)
ERYTHROCYTE [DISTWIDTH] IN BLOOD BY AUTOMATED COUNT: 13.4 % (ref 11.5–14.5)
ERYTHROCYTE [DISTWIDTH] IN BLOOD BY AUTOMATED COUNT: 13.4 % (ref 11.5–14.5)
ERYTHROCYTE [DISTWIDTH] IN BLOOD BY AUTOMATED COUNT: 13.5 % (ref 11.5–14.5)
ERYTHROCYTE [DISTWIDTH] IN BLOOD BY AUTOMATED COUNT: 13.5 % (ref 11.5–14.5)
ERYTHROCYTE [DISTWIDTH] IN BLOOD BY AUTOMATED COUNT: 13.7 % (ref 11.5–14.5)
ERYTHROCYTE [DISTWIDTH] IN BLOOD BY AUTOMATED COUNT: 13.8 % (ref 11.5–14.5)
ERYTHROCYTE [DISTWIDTH] IN BLOOD BY AUTOMATED COUNT: 13.9 % (ref 11.5–14.5)
ERYTHROCYTE [DISTWIDTH] IN BLOOD BY AUTOMATED COUNT: 13.9 % (ref 11.5–14.5)
ERYTHROCYTE [DISTWIDTH] IN BLOOD BY AUTOMATED COUNT: 14.1 % (ref 11.5–14.5)
ERYTHROCYTE [DISTWIDTH] IN BLOOD BY AUTOMATED COUNT: 14.4 % (ref 11.5–14.5)
ERYTHROCYTE [DISTWIDTH] IN BLOOD BY AUTOMATED COUNT: 15.2 % (ref 11.5–14.5)
ERYTHROCYTE [DISTWIDTH] IN BLOOD BY AUTOMATED COUNT: 15.8 % (ref 11.5–14.5)
ERYTHROCYTE [DISTWIDTH] IN BLOOD BY AUTOMATED COUNT: 17.1 % (ref 11.5–14.5)
ERYTHROCYTE [DISTWIDTH] IN BLOOD BY AUTOMATED COUNT: 17.5 % (ref 11.5–14.5)
ERYTHROCYTE [DISTWIDTH] IN BLOOD BY AUTOMATED COUNT: 17.8 % (ref 11.5–14.5)
ERYTHROCYTE [DISTWIDTH] IN BLOOD BY AUTOMATED COUNT: 17.9 % (ref 11.5–14.5)
FERRITIN SERPL-MCNC: ABNORMAL NG/ML (ref 30–400)
GAS FLOW AIRWAY: 10 LPM
GAS FLOW AIRWAY: 15 LPM
GAS FLOW AIRWAY: 4 LPM
GAS FLOW AIRWAY: 6 LPM
GAS FLOW AIRWAY: 7 LPM
GAS FLOW AIRWAY: 8 LPM
GAS FLOW AIRWAY: 8 LPM
GFR SERPL CREATININE-BSD FRML MDRD: 100 ML/MIN/1.73
GFR SERPL CREATININE-BSD FRML MDRD: 16 ML/MIN/1.73
GFR SERPL CREATININE-BSD FRML MDRD: 16 ML/MIN/1.73
GFR SERPL CREATININE-BSD FRML MDRD: 19 ML/MIN/1.73
GFR SERPL CREATININE-BSD FRML MDRD: 19 ML/MIN/1.73
GFR SERPL CREATININE-BSD FRML MDRD: 22 ML/MIN/1.73
GFR SERPL CREATININE-BSD FRML MDRD: 22 ML/MIN/1.73
GFR SERPL CREATININE-BSD FRML MDRD: 25 ML/MIN/1.73
GFR SERPL CREATININE-BSD FRML MDRD: 26 ML/MIN/1.73
GFR SERPL CREATININE-BSD FRML MDRD: 26 ML/MIN/1.73
GFR SERPL CREATININE-BSD FRML MDRD: 29 ML/MIN/1.73
GFR SERPL CREATININE-BSD FRML MDRD: 31 ML/MIN/1.73
GFR SERPL CREATININE-BSD FRML MDRD: 32 ML/MIN/1.73
GFR SERPL CREATININE-BSD FRML MDRD: 32 ML/MIN/1.73
GFR SERPL CREATININE-BSD FRML MDRD: 36 ML/MIN/1.73
GFR SERPL CREATININE-BSD FRML MDRD: 37 ML/MIN/1.73
GFR SERPL CREATININE-BSD FRML MDRD: 42 ML/MIN/1.73
GFR SERPL CREATININE-BSD FRML MDRD: 54 ML/MIN/1.73
GFR SERPL CREATININE-BSD FRML MDRD: 57 ML/MIN/1.73
GFR SERPL CREATININE-BSD FRML MDRD: 59 ML/MIN/1.73
GFR SERPL CREATININE-BSD FRML MDRD: 62 ML/MIN/1.73
GFR SERPL CREATININE-BSD FRML MDRD: 65 ML/MIN/1.73
GFR SERPL CREATININE-BSD FRML MDRD: 68 ML/MIN/1.73
GFR SERPL CREATININE-BSD FRML MDRD: 72 ML/MIN/1.73
GFR SERPL CREATININE-BSD FRML MDRD: 73 ML/MIN/1.73
GFR SERPL CREATININE-BSD FRML MDRD: 78 ML/MIN/1.73
GFR SERPL CREATININE-BSD FRML MDRD: 78 ML/MIN/1.73
GLOBULIN UR ELPH-MCNC: 2.3 GM/DL
GLOBULIN UR ELPH-MCNC: 2.3 GM/DL
GLOBULIN UR ELPH-MCNC: 2.4 GM/DL
GLOBULIN UR ELPH-MCNC: 2.5 GM/DL
GLOBULIN UR ELPH-MCNC: 2.5 GM/DL
GLOBULIN UR ELPH-MCNC: 2.6 GM/DL
GLOBULIN UR ELPH-MCNC: 2.8 GM/DL
GLOBULIN UR ELPH-MCNC: 2.8 GM/DL
GLOBULIN UR ELPH-MCNC: 3 GM/DL
GLOBULIN UR ELPH-MCNC: 3.4 GM/DL
GLUCOSE BLD-MCNC: 100 MG/DL (ref 65–99)
GLUCOSE BLD-MCNC: 108 MG/DL (ref 65–99)
GLUCOSE BLD-MCNC: 111 MG/DL (ref 65–99)
GLUCOSE BLD-MCNC: 111 MG/DL (ref 65–99)
GLUCOSE BLD-MCNC: 115 MG/DL (ref 65–99)
GLUCOSE BLD-MCNC: 120 MG/DL (ref 65–99)
GLUCOSE BLD-MCNC: 120 MG/DL (ref 65–99)
GLUCOSE BLD-MCNC: 121 MG/DL (ref 65–99)
GLUCOSE BLD-MCNC: 124 MG/DL (ref 65–99)
GLUCOSE BLD-MCNC: 124 MG/DL (ref 65–99)
GLUCOSE BLD-MCNC: 126 MG/DL (ref 65–99)
GLUCOSE BLD-MCNC: 128 MG/DL (ref 65–99)
GLUCOSE BLD-MCNC: 128 MG/DL (ref 65–99)
GLUCOSE BLD-MCNC: 133 MG/DL (ref 65–99)
GLUCOSE BLD-MCNC: 144 MG/DL (ref 65–99)
GLUCOSE BLD-MCNC: 146 MG/DL (ref 65–99)
GLUCOSE BLD-MCNC: 155 MG/DL (ref 65–99)
GLUCOSE BLD-MCNC: 160 MG/DL (ref 65–99)
GLUCOSE BLD-MCNC: 171 MG/DL (ref 65–99)
GLUCOSE BLD-MCNC: 253 MG/DL (ref 65–99)
GLUCOSE BLD-MCNC: 361 MG/DL (ref 65–99)
GLUCOSE BLD-MCNC: 381 MG/DL (ref 65–99)
GLUCOSE BLD-MCNC: 82 MG/DL (ref 65–99)
GLUCOSE BLD-MCNC: 86 MG/DL (ref 65–99)
GLUCOSE BLD-MCNC: 88 MG/DL (ref 65–99)
GLUCOSE BLD-MCNC: 96 MG/DL (ref 65–99)
GLUCOSE BLD-MCNC: 99 MG/DL (ref 65–99)
GLUCOSE BLDC GLUCOMTR-MCNC: 101 MG/DL (ref 70–130)
GLUCOSE BLDC GLUCOMTR-MCNC: 101 MG/DL (ref 70–130)
GLUCOSE BLDC GLUCOMTR-MCNC: 103 MG/DL (ref 70–130)
GLUCOSE BLDC GLUCOMTR-MCNC: 104 MG/DL (ref 70–130)
GLUCOSE BLDC GLUCOMTR-MCNC: 105 MG/DL (ref 70–130)
GLUCOSE BLDC GLUCOMTR-MCNC: 109 MG/DL (ref 70–130)
GLUCOSE BLDC GLUCOMTR-MCNC: 110 MG/DL (ref 70–130)
GLUCOSE BLDC GLUCOMTR-MCNC: 110 MG/DL (ref 70–130)
GLUCOSE BLDC GLUCOMTR-MCNC: 114 MG/DL (ref 70–130)
GLUCOSE BLDC GLUCOMTR-MCNC: 115 MG/DL (ref 70–130)
GLUCOSE BLDC GLUCOMTR-MCNC: 116 MG/DL (ref 70–130)
GLUCOSE BLDC GLUCOMTR-MCNC: 117 MG/DL (ref 70–130)
GLUCOSE BLDC GLUCOMTR-MCNC: 117 MG/DL (ref 70–130)
GLUCOSE BLDC GLUCOMTR-MCNC: 119 MG/DL (ref 70–130)
GLUCOSE BLDC GLUCOMTR-MCNC: 124 MG/DL (ref 70–130)
GLUCOSE BLDC GLUCOMTR-MCNC: 130 MG/DL (ref 70–130)
GLUCOSE BLDC GLUCOMTR-MCNC: 133 MG/DL (ref 70–130)
GLUCOSE BLDC GLUCOMTR-MCNC: 135 MG/DL (ref 70–130)
GLUCOSE BLDC GLUCOMTR-MCNC: 137 MG/DL (ref 70–130)
GLUCOSE BLDC GLUCOMTR-MCNC: 139 MG/DL (ref 70–130)
GLUCOSE BLDC GLUCOMTR-MCNC: 139 MG/DL (ref 70–130)
GLUCOSE BLDC GLUCOMTR-MCNC: 140 MG/DL (ref 70–130)
GLUCOSE BLDC GLUCOMTR-MCNC: 148 MG/DL (ref 70–130)
GLUCOSE BLDC GLUCOMTR-MCNC: 154 MG/DL (ref 70–130)
GLUCOSE BLDC GLUCOMTR-MCNC: 155 MG/DL (ref 70–130)
GLUCOSE BLDC GLUCOMTR-MCNC: 158 MG/DL (ref 70–130)
GLUCOSE BLDC GLUCOMTR-MCNC: 166 MG/DL (ref 70–130)
GLUCOSE BLDC GLUCOMTR-MCNC: 169 MG/DL (ref 70–130)
GLUCOSE BLDC GLUCOMTR-MCNC: 171 MG/DL (ref 70–130)
GLUCOSE BLDC GLUCOMTR-MCNC: 177 MG/DL (ref 70–130)
GLUCOSE BLDC GLUCOMTR-MCNC: 179 MG/DL (ref 70–130)
GLUCOSE BLDC GLUCOMTR-MCNC: 183 MG/DL (ref 70–130)
GLUCOSE BLDC GLUCOMTR-MCNC: 188 MG/DL (ref 70–130)
GLUCOSE BLDC GLUCOMTR-MCNC: 193 MG/DL (ref 70–130)
GLUCOSE BLDC GLUCOMTR-MCNC: 247 MG/DL (ref 70–130)
GLUCOSE BLDC GLUCOMTR-MCNC: 25 MG/DL (ref 70–130)
GLUCOSE BLDC GLUCOMTR-MCNC: 26 MG/DL (ref 70–130)
GLUCOSE BLDC GLUCOMTR-MCNC: 267 MG/DL (ref 70–130)
GLUCOSE BLDC GLUCOMTR-MCNC: 269 MG/DL (ref 70–130)
GLUCOSE BLDC GLUCOMTR-MCNC: 293 MG/DL (ref 70–130)
GLUCOSE BLDC GLUCOMTR-MCNC: 44 MG/DL (ref 70–130)
GLUCOSE BLDC GLUCOMTR-MCNC: 46 MG/DL (ref 70–130)
GLUCOSE BLDC GLUCOMTR-MCNC: 56 MG/DL (ref 70–130)
GLUCOSE BLDC GLUCOMTR-MCNC: 64 MG/DL (ref 70–130)
GLUCOSE BLDC GLUCOMTR-MCNC: 69 MG/DL (ref 70–130)
GLUCOSE BLDC GLUCOMTR-MCNC: 75 MG/DL (ref 70–130)
GLUCOSE BLDC GLUCOMTR-MCNC: 76 MG/DL (ref 70–130)
GLUCOSE BLDC GLUCOMTR-MCNC: 77 MG/DL (ref 70–130)
GLUCOSE BLDC GLUCOMTR-MCNC: 82 MG/DL (ref 70–130)
GLUCOSE BLDC GLUCOMTR-MCNC: 94 MG/DL (ref 70–130)
GLUCOSE BLDC GLUCOMTR-MCNC: 95 MG/DL (ref 70–130)
GLUCOSE BLDC GLUCOMTR-MCNC: 97 MG/DL (ref 70–130)
GLUCOSE BLDC GLUCOMTR-MCNC: 98 MG/DL (ref 70–130)
GLUCOSE BLDC GLUCOMTR-MCNC: 99 MG/DL (ref 70–130)
GLUCOSE BLDC GLUCOMTR-MCNC: <10 MG/DL (ref 70–130)
GLUCOSE UR STRIP-MCNC: ABNORMAL MG/DL
GLUCOSE UR STRIP-MCNC: NEGATIVE MG/DL
GRAM STN SPEC: ABNORMAL
HAV IGM SERPL QL IA: NORMAL
HBA1C MFR BLD: 5.7 % (ref 4.8–5.6)
HBV CORE AB SER DONR QL IA: NEGATIVE
HBV CORE IGM SERPL QL IA: NORMAL
HBV SURFACE AB SER RIA-ACNC: REACTIVE
HBV SURFACE AG SERPL QL IA: NORMAL
HBV SURFACE AG SERPL QL IA: NORMAL
HCO3 BLDA-SCNC: 11.8 MMOL/L (ref 22–28)
HCO3 BLDA-SCNC: 12.4 MMOL/L (ref 22–28)
HCO3 BLDA-SCNC: 13.8 MMOL/L (ref 22–28)
HCO3 BLDA-SCNC: 14.3 MMOL/L (ref 22–28)
HCO3 BLDA-SCNC: 16 MMOL/L (ref 22–28)
HCO3 BLDA-SCNC: 16.9 MMOL/L (ref 22–28)
HCO3 BLDA-SCNC: 17 MMOL/L (ref 22–28)
HCO3 BLDA-SCNC: 18.7 MMOL/L (ref 22–28)
HCO3 BLDA-SCNC: 19.5 MMOL/L (ref 22–28)
HCO3 BLDA-SCNC: 20.2 MMOL/L (ref 22–28)
HCO3 BLDA-SCNC: 20.4 MMOL/L (ref 22–28)
HCO3 BLDA-SCNC: 20.6 MMOL/L (ref 22–28)
HCO3 BLDA-SCNC: 22.9 MMOL/L (ref 22–28)
HCO3 BLDA-SCNC: 3.8 MMOL/L (ref 22–28)
HCO3 BLDA-SCNC: 3.9 MMOL/L (ref 22–28)
HCO3 BLDA-SCNC: 9.7 MMOL/L (ref 22–28)
HCT VFR BLD AUTO: 24.5 % (ref 40.4–52.2)
HCT VFR BLD AUTO: 25.2 % (ref 40.4–52.2)
HCT VFR BLD AUTO: 25.2 % (ref 40.4–52.2)
HCT VFR BLD AUTO: 25.3 % (ref 40.4–52.2)
HCT VFR BLD AUTO: 25.5 % (ref 40.4–52.2)
HCT VFR BLD AUTO: 26.6 % (ref 40.4–52.2)
HCT VFR BLD AUTO: 27 % (ref 40.4–52.2)
HCT VFR BLD AUTO: 27.1 % (ref 40.4–52.2)
HCT VFR BLD AUTO: 27.3 % (ref 40.4–52.2)
HCT VFR BLD AUTO: 27.4 % (ref 40.4–52.2)
HCT VFR BLD AUTO: 27.8 % (ref 40.4–52.2)
HCT VFR BLD AUTO: 29.8 % (ref 40.4–52.2)
HCT VFR BLD AUTO: 31 % (ref 40.4–52.2)
HCT VFR BLD AUTO: 34.8 % (ref 40.4–52.2)
HCT VFR BLD AUTO: 34.9 % (ref 40.4–52.2)
HCT VFR BLD AUTO: 38.6 % (ref 40.4–52.2)
HCT VFR BLD AUTO: 39.1 % (ref 40.4–52.2)
HCT VFR BLD AUTO: 39.7 % (ref 40.4–52.2)
HCT VFR BLD AUTO: 40.9 % (ref 40.4–52.2)
HCT VFR BLD AUTO: 41.3 % (ref 40.4–52.2)
HCT VFR BLD AUTO: 43 % (ref 40.4–52.2)
HCT VFR BLD AUTO: 43.3 % (ref 40.4–52.2)
HCT VFR BLD AUTO: 44.4 % (ref 40.4–52.2)
HCT VFR BLD AUTO: 44.9 % (ref 40.4–52.2)
HCT VFR BLD AUTO: 45.1 % (ref 40.4–52.2)
HCT VFR BLD AUTO: 46.8 % (ref 40.4–52.2)
HCT VFR BLD AUTO: 46.8 % (ref 40.4–52.2)
HCT VFR BLD AUTO: 47.4 % (ref 40.4–52.2)
HCT VFR BLDA CALC: 39 % (ref 38–51)
HCV AB SER DONR QL: NORMAL
HDLC SERPL-MCNC: 40 MG/DL (ref 40–60)
HGB BLD-MCNC: 11.2 G/DL (ref 13.7–17.6)
HGB BLD-MCNC: 11.4 G/DL (ref 13.7–17.6)
HGB BLD-MCNC: 12.5 G/DL (ref 13.7–17.6)
HGB BLD-MCNC: 12.6 G/DL (ref 13.7–17.6)
HGB BLD-MCNC: 12.8 G/DL (ref 13.7–17.6)
HGB BLD-MCNC: 13.1 G/DL (ref 13.7–17.6)
HGB BLD-MCNC: 13.2 G/DL (ref 13.7–17.6)
HGB BLD-MCNC: 13.8 G/DL (ref 13.7–17.6)
HGB BLD-MCNC: 13.8 G/DL (ref 13.7–17.6)
HGB BLD-MCNC: 14 G/DL (ref 13.7–17.6)
HGB BLD-MCNC: 14.3 G/DL (ref 13.7–17.6)
HGB BLD-MCNC: 14.6 G/DL (ref 13.7–17.6)
HGB BLD-MCNC: 14.6 G/DL (ref 13.7–17.6)
HGB BLD-MCNC: 14.7 G/DL (ref 13.7–17.6)
HGB BLD-MCNC: 15 G/DL (ref 13.7–17.6)
HGB BLD-MCNC: 7.3 G/DL (ref 13.7–17.6)
HGB BLD-MCNC: 7.4 G/DL (ref 13.7–17.6)
HGB BLD-MCNC: 7.6 G/DL (ref 13.7–17.6)
HGB BLD-MCNC: 7.7 G/DL (ref 13.7–17.6)
HGB BLD-MCNC: 7.9 G/DL (ref 13.7–17.6)
HGB BLD-MCNC: 8 G/DL (ref 13.7–17.6)
HGB BLD-MCNC: 8.3 G/DL (ref 13.7–17.6)
HGB BLD-MCNC: 8.5 G/DL (ref 13.7–17.6)
HGB BLD-MCNC: 8.6 G/DL (ref 13.7–17.6)
HGB BLD-MCNC: 8.8 G/DL (ref 13.7–17.6)
HGB BLD-MCNC: 9.7 G/DL (ref 13.7–17.6)
HGB BLDA-MCNC: 13.3 G/DL (ref 12–17)
HGB UR QL STRIP.AUTO: ABNORMAL
HGB UR QL STRIP.AUTO: ABNORMAL
HOLD SPECIMEN: NORMAL
HOROWITZ INDEX BLD+IHG-RTO: 24 %
HOROWITZ INDEX BLD+IHG-RTO: 40 %
HOROWITZ INDEX BLD+IHG-RTO: 60 %
HOROWITZ INDEX BLD+IHG-RTO: 80 %
HYALINE CASTS UR QL AUTO: ABNORMAL /LPF
HYALINE CASTS UR QL AUTO: ABNORMAL /LPF
IMM GRANULOCYTES # BLD: 0.1 10*3/MM3 (ref 0–0.03)
IMM GRANULOCYTES # BLD: 0.13 10*3/MM3 (ref 0–0.03)
IMM GRANULOCYTES # BLD: 0.13 10*3/MM3 (ref 0–0.03)
IMM GRANULOCYTES # BLD: 0.14 10*3/MM3 (ref 0–0.03)
IMM GRANULOCYTES # BLD: 0.16 10*3/MM3 (ref 0–0.03)
IMM GRANULOCYTES # BLD: 0.19 10*3/MM3 (ref 0–0.03)
IMM GRANULOCYTES # BLD: 0.2 10*3/MM3 (ref 0–0.03)
IMM GRANULOCYTES # BLD: 0.34 10*3/MM3 (ref 0–0.03)
IMM GRANULOCYTES # BLD: 0.39 10*3/MM3 (ref 0–0.03)
IMM GRANULOCYTES # BLD: 0.45 10*3/MM3 (ref 0–0.03)
IMM GRANULOCYTES # BLD: 0.67 10*3/MM3 (ref 0–0.03)
IMM GRANULOCYTES # BLD: 0.75 10*3/MM3 (ref 0–0.03)
IMM GRANULOCYTES NFR BLD: 0.5 % (ref 0–0.5)
IMM GRANULOCYTES NFR BLD: 0.6 % (ref 0–0.5)
IMM GRANULOCYTES NFR BLD: 0.8 % (ref 0–0.5)
IMM GRANULOCYTES NFR BLD: 1 % (ref 0–0.5)
IMM GRANULOCYTES NFR BLD: 1.1 % (ref 0–0.5)
IMM GRANULOCYTES NFR BLD: 1.5 % (ref 0–0.5)
IMM GRANULOCYTES NFR BLD: 2.2 % (ref 0–0.5)
IMM GRANULOCYTES NFR BLD: 2.3 % (ref 0–0.5)
IMM GRANULOCYTES NFR BLD: 3.6 % (ref 0–0.5)
INR PPP: 1.19 (ref 0.9–1.1)
INR PPP: 1.25 (ref 0.9–1.1)
INR PPP: 1.29 (ref 0.9–1.1)
INR PPP: 1.3 (ref 0.9–1.1)
INR PPP: 1.49 (ref 0.9–1.1)
INR PPP: 1.61 (ref 0.9–1.1)
INR PPP: 1.76 (ref 0.9–1.1)
INR PPP: 2.03 (ref 0.9–1.1)
INR PPP: 2.17 (ref 0.9–1.1)
INR PPP: 2.33 (ref 0.9–1.1)
INR PPP: 2.53 (ref 0.9–1.1)
INR PPP: 2.55 (ref 0.9–1.1)
INR PPP: 3.15 (ref 0.9–1.1)
INR PPP: 4.3 (ref 0.9–1.1)
INR PPP: >10 (ref 0.9–1.1)
KETONES UR QL STRIP: ABNORMAL
KETONES UR QL STRIP: NEGATIVE
LARGE PLATELETS: ABNORMAL
LDLC SERPL CALC-MCNC: 88 MG/DL (ref 0–100)
LDLC/HDLC SERPL: 2.2 {RATIO}
LEFT ATRIUM VOLUME INDEX: 28 ML/M2
LEUKOCYTE ESTERASE UR QL STRIP.AUTO: ABNORMAL
LEUKOCYTE ESTERASE UR QL STRIP.AUTO: NEGATIVE
LIPASE SERPL-CCNC: 77 U/L (ref 13–60)
LV EF 2D ECHO EST: 25 %
LYMPHOCYTES # BLD AUTO: 0.74 10*3/MM3 (ref 0.9–4.8)
LYMPHOCYTES # BLD AUTO: 0.91 10*3/MM3 (ref 0.9–4.8)
LYMPHOCYTES # BLD AUTO: 0.97 10*3/MM3 (ref 0.9–4.8)
LYMPHOCYTES # BLD AUTO: 1.4 10*3/MM3 (ref 0.9–4.8)
LYMPHOCYTES # BLD AUTO: 1.4 10*3/MM3 (ref 0.9–4.8)
LYMPHOCYTES # BLD AUTO: 1.55 10*3/MM3 (ref 0.9–4.8)
LYMPHOCYTES # BLD AUTO: 1.6 10*3/MM3 (ref 0.9–4.8)
LYMPHOCYTES # BLD AUTO: 2.07 10*3/MM3 (ref 0.9–4.8)
LYMPHOCYTES # BLD AUTO: 2.36 10*3/MM3 (ref 0.9–4.8)
LYMPHOCYTES # BLD AUTO: 2.47 10*3/MM3 (ref 0.9–4.8)
LYMPHOCYTES # BLD AUTO: 2.91 10*3/MM3 (ref 0.9–4.8)
LYMPHOCYTES # BLD AUTO: 5.09 10*3/MM3 (ref 0.9–4.8)
LYMPHOCYTES # BLD MANUAL: 0.31 10*3/MM3 (ref 0.9–4.8)
LYMPHOCYTES # BLD MANUAL: 1.08 10*3/MM3 (ref 0.9–4.8)
LYMPHOCYTES # BLD MANUAL: 2.31 10*3/MM3 (ref 0.9–4.8)
LYMPHOCYTES # BLD MANUAL: 2.48 10*3/MM3 (ref 0.9–4.8)
LYMPHOCYTES # BLD MANUAL: 2.74 10*3/MM3 (ref 0.9–4.8)
LYMPHOCYTES NFR BLD AUTO: 10 % (ref 19.6–45.3)
LYMPHOCYTES NFR BLD AUTO: 29.2 % (ref 19.6–45.3)
LYMPHOCYTES NFR BLD AUTO: 3.8 % (ref 19.6–45.3)
LYMPHOCYTES NFR BLD AUTO: 4.7 % (ref 19.6–45.3)
LYMPHOCYTES NFR BLD AUTO: 4.8 % (ref 19.6–45.3)
LYMPHOCYTES NFR BLD AUTO: 5.4 % (ref 19.6–45.3)
LYMPHOCYTES NFR BLD AUTO: 6.9 % (ref 19.6–45.3)
LYMPHOCYTES NFR BLD AUTO: 7.1 % (ref 19.6–45.3)
LYMPHOCYTES NFR BLD AUTO: 7.2 % (ref 19.6–45.3)
LYMPHOCYTES NFR BLD AUTO: 7.4 % (ref 19.6–45.3)
LYMPHOCYTES NFR BLD AUTO: 8.4 % (ref 19.6–45.3)
LYMPHOCYTES NFR BLD AUTO: 9.8 % (ref 19.6–45.3)
LYMPHOCYTES NFR BLD MANUAL: 1 % (ref 19.6–45.3)
LYMPHOCYTES NFR BLD MANUAL: 3 % (ref 5–12)
LYMPHOCYTES NFR BLD MANUAL: 3 % (ref 5–12)
LYMPHOCYTES NFR BLD MANUAL: 4 % (ref 19.6–45.3)
LYMPHOCYTES NFR BLD MANUAL: 6 % (ref 19.6–45.3)
LYMPHOCYTES NFR BLD MANUAL: 7 % (ref 19.6–45.3)
LYMPHOCYTES NFR BLD MANUAL: 7 % (ref 5–12)
LYMPHOCYTES NFR BLD MANUAL: 9 % (ref 19.6–45.3)
MAGNESIUM SERPL-MCNC: 2 MG/DL (ref 1.6–2.4)
MAGNESIUM SERPL-MCNC: 2.1 MG/DL (ref 1.6–2.4)
MAGNESIUM SERPL-MCNC: 2.2 MG/DL (ref 1.6–2.4)
MAGNESIUM SERPL-MCNC: 2.3 MG/DL (ref 1.6–2.4)
MAGNESIUM SERPL-MCNC: 2.4 MG/DL (ref 1.6–2.4)
MAGNESIUM SERPL-MCNC: 2.4 MG/DL (ref 1.6–2.4)
MAGNESIUM SERPL-MCNC: 2.5 MG/DL (ref 1.6–2.4)
MAGNESIUM SERPL-MCNC: 2.6 MG/DL (ref 1.6–2.4)
MAGNESIUM SERPL-MCNC: 2.7 MG/DL (ref 1.6–2.4)
MAGNESIUM SERPL-MCNC: 3.2 MG/DL (ref 1.6–2.4)
MAXIMAL PREDICTED HEART RATE: 156 BPM
MAXIMAL PREDICTED HEART RATE: 156 BPM
MCH RBC QN AUTO: 30.1 PG (ref 27–32.7)
MCH RBC QN AUTO: 30.2 PG (ref 27–32.7)
MCH RBC QN AUTO: 30.5 PG (ref 27–32.7)
MCH RBC QN AUTO: 30.5 PG (ref 27–32.7)
MCH RBC QN AUTO: 30.7 PG (ref 27–32.7)
MCH RBC QN AUTO: 30.8 PG (ref 27–32.7)
MCH RBC QN AUTO: 30.9 PG (ref 27–32.7)
MCH RBC QN AUTO: 30.9 PG (ref 27–32.7)
MCH RBC QN AUTO: 31 PG (ref 27–32.7)
MCH RBC QN AUTO: 31 PG (ref 27–32.7)
MCH RBC QN AUTO: 31.1 PG (ref 27–32.7)
MCH RBC QN AUTO: 31.2 PG (ref 27–32.7)
MCH RBC QN AUTO: 31.4 PG (ref 27–32.7)
MCH RBC QN AUTO: 31.4 PG (ref 27–32.7)
MCH RBC QN AUTO: 31.5 PG (ref 27–32.7)
MCH RBC QN AUTO: 31.5 PG (ref 27–32.7)
MCH RBC QN AUTO: 31.7 PG (ref 27–32.7)
MCH RBC QN AUTO: 31.7 PG (ref 27–32.7)
MCH RBC QN AUTO: 31.9 PG (ref 27–32.7)
MCHC RBC AUTO-ENTMCNC: 28.8 G/DL (ref 32.6–36.4)
MCHC RBC AUTO-ENTMCNC: 29 G/DL (ref 32.6–36.4)
MCHC RBC AUTO-ENTMCNC: 30.4 G/DL (ref 32.6–36.4)
MCHC RBC AUTO-ENTMCNC: 30.8 G/DL (ref 32.6–36.4)
MCHC RBC AUTO-ENTMCNC: 31 G/DL (ref 32.6–36.4)
MCHC RBC AUTO-ENTMCNC: 31.1 G/DL (ref 32.6–36.4)
MCHC RBC AUTO-ENTMCNC: 31.2 G/DL (ref 32.6–36.4)
MCHC RBC AUTO-ENTMCNC: 31.2 G/DL (ref 32.6–36.4)
MCHC RBC AUTO-ENTMCNC: 31.3 G/DL (ref 32.6–36.4)
MCHC RBC AUTO-ENTMCNC: 31.8 G/DL (ref 32.6–36.4)
MCHC RBC AUTO-ENTMCNC: 31.9 G/DL (ref 32.6–36.4)
MCHC RBC AUTO-ENTMCNC: 31.9 G/DL (ref 32.6–36.4)
MCHC RBC AUTO-ENTMCNC: 32 G/DL (ref 32.6–36.4)
MCHC RBC AUTO-ENTMCNC: 32 G/DL (ref 32.6–36.4)
MCHC RBC AUTO-ENTMCNC: 32.1 G/DL (ref 32.6–36.4)
MCHC RBC AUTO-ENTMCNC: 32.1 G/DL (ref 32.6–36.4)
MCHC RBC AUTO-ENTMCNC: 32.2 G/DL (ref 32.6–36.4)
MCHC RBC AUTO-ENTMCNC: 32.2 G/DL (ref 32.6–36.4)
MCHC RBC AUTO-ENTMCNC: 32.4 G/DL (ref 32.6–36.4)
MCHC RBC AUTO-ENTMCNC: 32.6 G/DL (ref 32.6–36.4)
MCHC RBC AUTO-ENTMCNC: 32.6 G/DL (ref 32.6–36.4)
MCHC RBC AUTO-ENTMCNC: 32.8 G/DL (ref 32.6–36.4)
MCV RBC AUTO: 100 FL (ref 79.8–96.2)
MCV RBC AUTO: 100.2 FL (ref 79.8–96.2)
MCV RBC AUTO: 101.1 FL (ref 79.8–96.2)
MCV RBC AUTO: 105.4 FL (ref 79.8–96.2)
MCV RBC AUTO: 106.5 FL (ref 79.8–96.2)
MCV RBC AUTO: 94.1 FL (ref 79.8–96.2)
MCV RBC AUTO: 95.8 FL (ref 79.8–96.2)
MCV RBC AUTO: 96.1 FL (ref 79.8–96.2)
MCV RBC AUTO: 96.1 FL (ref 79.8–96.2)
MCV RBC AUTO: 96.2 FL (ref 79.8–96.2)
MCV RBC AUTO: 96.4 FL (ref 79.8–96.2)
MCV RBC AUTO: 96.8 FL (ref 79.8–96.2)
MCV RBC AUTO: 97.2 FL (ref 79.8–96.2)
MCV RBC AUTO: 97.4 FL (ref 79.8–96.2)
MCV RBC AUTO: 97.5 FL (ref 79.8–96.2)
MCV RBC AUTO: 98.1 FL (ref 79.8–96.2)
MCV RBC AUTO: 98.5 FL (ref 79.8–96.2)
MCV RBC AUTO: 99.3 FL (ref 79.8–96.2)
MCV RBC AUTO: 99.3 FL (ref 79.8–96.2)
MCV RBC AUTO: 99.4 FL (ref 79.8–96.2)
MCV RBC AUTO: 99.4 FL (ref 79.8–96.2)
MCV RBC AUTO: 99.6 FL (ref 79.8–96.2)
METAMYELOCYTES NFR BLD MANUAL: 13 % (ref 0–0)
METAMYELOCYTES NFR BLD MANUAL: 2 % (ref 0–0)
METAMYELOCYTES NFR BLD MANUAL: 2 % (ref 0–0)
METAMYELOCYTES NFR BLD MANUAL: 3 % (ref 0–0)
METAMYELOCYTES NFR BLD MANUAL: 3 % (ref 0–0)
MODALITY: ABNORMAL
MONOCYTES # BLD AUTO: 0.58 10*3/MM3 (ref 0.2–1.2)
MONOCYTES # BLD AUTO: 0.61 10*3/MM3 (ref 0.2–1.2)
MONOCYTES # BLD AUTO: 0.61 10*3/MM3 (ref 0.2–1.2)
MONOCYTES # BLD AUTO: 0.81 10*3/MM3 (ref 0.2–1.2)
MONOCYTES # BLD AUTO: 0.82 10*3/MM3 (ref 0.2–1.2)
MONOCYTES # BLD AUTO: 0.85 10*3/MM3 (ref 0.2–1.2)
MONOCYTES # BLD AUTO: 0.88 10*3/MM3 (ref 0.2–1.2)
MONOCYTES # BLD AUTO: 0.93 10*3/MM3 (ref 0.2–1.2)
MONOCYTES # BLD AUTO: 1.15 10*3/MM3 (ref 0.2–1.2)
MONOCYTES # BLD AUTO: 1.53 10*3/MM3 (ref 0.2–1.2)
MONOCYTES # BLD AUTO: 1.59 10*3/MM3 (ref 0.2–1.2)
MONOCYTES # BLD AUTO: 1.6 10*3/MM3 (ref 0.2–1.2)
MONOCYTES # BLD AUTO: 1.68 10*3/MM3 (ref 0.2–1.2)
MONOCYTES # BLD AUTO: 2.13 10*3/MM3 (ref 0.2–1.2)
MONOCYTES # BLD AUTO: 2.48 10*3/MM3 (ref 0.2–1.2)
MONOCYTES # BLD AUTO: 2.7 10*3/MM3 (ref 0.2–1.2)
MONOCYTES # BLD AUTO: 2.74 10*3/MM3 (ref 0.2–1.2)
MONOCYTES NFR BLD AUTO: 2.9 % (ref 5–12)
MONOCYTES NFR BLD AUTO: 3 % (ref 5–12)
MONOCYTES NFR BLD AUTO: 3.5 % (ref 5–12)
MONOCYTES NFR BLD AUTO: 3.7 % (ref 5–12)
MONOCYTES NFR BLD AUTO: 4 % (ref 5–12)
MONOCYTES NFR BLD AUTO: 4.7 % (ref 5–12)
MONOCYTES NFR BLD AUTO: 5.2 % (ref 5–12)
MONOCYTES NFR BLD AUTO: 5.4 % (ref 5–12)
MONOCYTES NFR BLD AUTO: 5.7 % (ref 5–12)
MONOCYTES NFR BLD AUTO: 6.3 % (ref 5–12)
MONOCYTES NFR BLD AUTO: 7.7 % (ref 5–12)
MONOCYTES NFR BLD AUTO: 8.2 % (ref 5–12)
MYELOCYTES NFR BLD MANUAL: 1 % (ref 0–0)
MYELOCYTES NFR BLD MANUAL: 2 % (ref 0–0)
MYELOCYTES NFR BLD MANUAL: 2 % (ref 0–0)
NEUTROPHILS # BLD AUTO: 11.06 10*3/MM3 (ref 1.9–8.1)
NEUTROPHILS # BLD AUTO: 14.3 10*3/MM3 (ref 1.9–8.1)
NEUTROPHILS # BLD AUTO: 15.94 10*3/MM3 (ref 1.9–8.1)
NEUTROPHILS # BLD AUTO: 16.12 10*3/MM3 (ref 1.9–8.1)
NEUTROPHILS # BLD AUTO: 18.01 10*3/MM3 (ref 1.9–8.1)
NEUTROPHILS # BLD AUTO: 18.69 10*3/MM3 (ref 1.9–8.1)
NEUTROPHILS # BLD AUTO: 19.54 10*3/MM3 (ref 1.9–8.1)
NEUTROPHILS # BLD AUTO: 22.33 10*3/MM3 (ref 1.9–8.1)
NEUTROPHILS # BLD AUTO: 24.37 10*3/MM3 (ref 1.9–8.1)
NEUTROPHILS # BLD AUTO: 24.59 10*3/MM3 (ref 1.9–8.1)
NEUTROPHILS # BLD AUTO: 24.61 10*3/MM3 (ref 1.9–8.1)
NEUTROPHILS # BLD AUTO: 24.67 10*3/MM3 (ref 1.9–8.1)
NEUTROPHILS # BLD AUTO: 26.09 10*3/MM3 (ref 1.9–8.1)
NEUTROPHILS # BLD AUTO: 27.74 10*3/MM3 (ref 1.9–8.1)
NEUTROPHILS # BLD AUTO: 27.82 10*3/MM3 (ref 1.9–8.1)
NEUTROPHILS # BLD AUTO: 28.71 10*3/MM3 (ref 1.9–8.1)
NEUTROPHILS # BLD AUTO: 28.9 10*3/MM3 (ref 1.9–8.1)
NEUTROPHILS NFR BLD AUTO: 63.4 % (ref 42.7–76)
NEUTROPHILS NFR BLD AUTO: 78.3 % (ref 42.7–76)
NEUTROPHILS NFR BLD AUTO: 83.2 % (ref 42.7–76)
NEUTROPHILS NFR BLD AUTO: 83.6 % (ref 42.7–76)
NEUTROPHILS NFR BLD AUTO: 83.6 % (ref 42.7–76)
NEUTROPHILS NFR BLD AUTO: 87.9 % (ref 42.7–76)
NEUTROPHILS NFR BLD AUTO: 88.1 % (ref 42.7–76)
NEUTROPHILS NFR BLD AUTO: 88.7 % (ref 42.7–76)
NEUTROPHILS NFR BLD AUTO: 88.9 % (ref 42.7–76)
NEUTROPHILS NFR BLD AUTO: 89.4 % (ref 42.7–76)
NEUTROPHILS NFR BLD AUTO: 90.7 % (ref 42.7–76)
NEUTROPHILS NFR BLD AUTO: 92.2 % (ref 42.7–76)
NEUTROPHILS NFR BLD MANUAL: 72 % (ref 42.7–76)
NEUTROPHILS NFR BLD MANUAL: 81 % (ref 42.7–76)
NEUTROPHILS NFR BLD MANUAL: 81 % (ref 42.7–76)
NEUTROPHILS NFR BLD MANUAL: 90 % (ref 42.7–76)
NEUTROPHILS NFR BLD MANUAL: 93 % (ref 42.7–76)
NITRITE UR QL STRIP: NEGATIVE
NITRITE UR QL STRIP: NEGATIVE
NRBC BLD MANUAL-RTO: 0 /100 WBC (ref 0–0)
NRBC BLD MANUAL-RTO: 0 /100 WBC (ref 0–0)
NRBC BLD MANUAL-RTO: 0.2 /100 WBC (ref 0–0)
NRBC BLD MANUAL-RTO: 0.6 /100 WBC (ref 0–0)
NRBC SPEC MANUAL: 1 /100 WBC (ref 0–0)
NRBC SPEC MANUAL: 5 /100 WBC (ref 0–0)
NRBC SPEC MANUAL: 5 /100 WBC (ref 0–0)
O2 A-A PPRESDIFF RESPIRATORY: 0.2 MMHG
O2 A-A PPRESDIFF RESPIRATORY: 0.3 MMHG
O2 A-A PPRESDIFF RESPIRATORY: 0.4 MMHG
O2 A-A PPRESDIFF RESPIRATORY: 0.5 MMHG
O2 A-A PPRESDIFF RESPIRATORY: 0.6 MMHG
O2 A-A PPRESDIFF RESPIRATORY: 0.7 MMHG
O2 A-A PPRESDIFF RESPIRATORY: 0.7 MMHG
PCO2 BLDA: 18.6 MM HG (ref 35–45)
PCO2 BLDA: 19.2 MM HG (ref 35–45)
PCO2 BLDA: 22.7 MM HG (ref 35–45)
PCO2 BLDA: 22.7 MM HG (ref 35–45)
PCO2 BLDA: 26.5 MM HG (ref 35–45)
PCO2 BLDA: 26.8 MM HG (ref 35–45)
PCO2 BLDA: 29.4 MM HG (ref 35–45)
PCO2 BLDA: 30.5 MM HG (ref 35–45)
PCO2 BLDA: 32.3 MM HG (ref 35–45)
PCO2 BLDA: 33 MM HG (ref 35–45)
PCO2 BLDA: 33.8 MM HG (ref 35–45)
PCO2 BLDA: 35.5 MM HG (ref 35–45)
PCO2 BLDA: 36.4 MM HG (ref 35–45)
PCO2 BLDA: 36.4 MM HG (ref 35–45)
PCO2 BLDA: 43.9 MM HG (ref 35–45)
PCO2 BLDA: 56.7 MM HG (ref 35–45)
PEEP RESPIRATORY: 5 CM[H2O]
PEEP RESPIRATORY: 7.5 CM[H2O]
PH BLDA: 6.84 PH UNITS (ref 7.35–7.45)
PH BLDA: 6.9 PH UNITS (ref 7.35–7.45)
PH BLDA: 6.95 PH UNITS (ref 7.35–7.45)
PH BLDA: 7.21 PH UNITS (ref 7.35–7.45)
PH BLDA: 7.21 PH UNITS (ref 7.35–7.45)
PH BLDA: 7.25 PH UNITS (ref 7.35–7.45)
PH BLDA: 7.28 PH UNITS (ref 7.35–7.45)
PH BLDA: 7.28 PH UNITS (ref 7.35–7.45)
PH BLDA: 7.29 PH UNITS (ref 7.35–7.45)
PH BLDA: 7.32 PH UNITS (ref 7.35–7.45)
PH BLDA: 7.35 PH UNITS (ref 7.35–7.45)
PH BLDA: 7.39 PH UNITS (ref 7.35–7.45)
PH BLDA: 7.41 PH UNITS (ref 7.35–7.45)
PH BLDA: 7.48 PH UNITS (ref 7.35–7.45)
PH BLDA: 7.49 PH UNITS (ref 7.35–7.45)
PH BLDA: 7.49 PH UNITS (ref 7.35–7.45)
PH UR STRIP.AUTO: 5.5 [PH] (ref 5–8)
PH UR STRIP.AUTO: <=5 [PH] (ref 5–8)
PHOSPHATE SERPL-MCNC: 1.4 MG/DL (ref 2.5–4.5)
PHOSPHATE SERPL-MCNC: 1.9 MG/DL (ref 2.5–4.5)
PHOSPHATE SERPL-MCNC: 2.9 MG/DL (ref 2.5–4.5)
PHOSPHATE SERPL-MCNC: 3 MG/DL (ref 2.5–4.5)
PHOSPHATE SERPL-MCNC: 3.3 MG/DL (ref 2.5–4.5)
PHOSPHATE SERPL-MCNC: 3.3 MG/DL (ref 2.5–4.5)
PHOSPHATE SERPL-MCNC: 3.6 MG/DL (ref 2.5–4.5)
PHOSPHATE SERPL-MCNC: 3.6 MG/DL (ref 2.5–4.5)
PHOSPHATE SERPL-MCNC: 3.7 MG/DL (ref 2.5–4.5)
PHOSPHATE SERPL-MCNC: 4.3 MG/DL (ref 2.5–4.5)
PHOSPHATE SERPL-MCNC: 4.8 MG/DL (ref 2.5–4.5)
PHOSPHATE SERPL-MCNC: 5 MG/DL (ref 2.5–4.5)
PHOSPHATE SERPL-MCNC: 5.5 MG/DL (ref 2.5–4.5)
PHOSPHATE SERPL-MCNC: 5.6 MG/DL (ref 2.5–4.5)
PHOSPHATE SERPL-MCNC: 6.2 MG/DL (ref 2.5–4.5)
PHOSPHATE SERPL-MCNC: 6.2 MG/DL (ref 2.5–4.5)
PHOSPHATE SERPL-MCNC: 6.3 MG/DL (ref 2.5–4.5)
PHOSPHATE SERPL-MCNC: 9.5 MG/DL (ref 2.5–4.5)
PLAT MORPH BLD: NORMAL
PLATELET # BLD AUTO: 107 10*3/MM3 (ref 140–500)
PLATELET # BLD AUTO: 121 10*3/MM3 (ref 140–500)
PLATELET # BLD AUTO: 130 10*3/MM3 (ref 140–500)
PLATELET # BLD AUTO: 135 10*3/MM3 (ref 140–500)
PLATELET # BLD AUTO: 141 10*3/MM3 (ref 140–500)
PLATELET # BLD AUTO: 142 10*3/MM3 (ref 140–500)
PLATELET # BLD AUTO: 142 10*3/MM3 (ref 140–500)
PLATELET # BLD AUTO: 145 10*3/MM3 (ref 140–500)
PLATELET # BLD AUTO: 147 10*3/MM3 (ref 140–500)
PLATELET # BLD AUTO: 170 10*3/MM3 (ref 140–500)
PLATELET # BLD AUTO: 171 10*3/MM3 (ref 140–500)
PLATELET # BLD AUTO: 175 10*3/MM3 (ref 140–500)
PLATELET # BLD AUTO: 185 10*3/MM3 (ref 140–500)
PLATELET # BLD AUTO: 197 10*3/MM3 (ref 140–500)
PLATELET # BLD AUTO: 200 10*3/MM3 (ref 140–500)
PLATELET # BLD AUTO: 205 10*3/MM3 (ref 140–500)
PLATELET # BLD AUTO: 217 10*3/MM3 (ref 140–500)
PLATELET # BLD AUTO: 284 10*3/MM3 (ref 140–500)
PLATELET # BLD AUTO: 344 10*3/MM3 (ref 140–500)
PLATELET # BLD AUTO: 48 10*3/MM3 (ref 140–500)
PLATELET # BLD AUTO: 517 10*3/MM3 (ref 140–500)
PLATELET # BLD AUTO: 97 10*3/MM3 (ref 140–500)
PMV BLD AUTO: 10.5 FL (ref 6–12)
PMV BLD AUTO: 10.5 FL (ref 6–12)
PMV BLD AUTO: 10.7 FL (ref 6–12)
PMV BLD AUTO: 10.7 FL (ref 6–12)
PMV BLD AUTO: 10.9 FL (ref 6–12)
PMV BLD AUTO: 11.1 FL (ref 6–12)
PMV BLD AUTO: 11.1 FL (ref 6–12)
PMV BLD AUTO: 11.2 FL (ref 6–12)
PMV BLD AUTO: 11.2 FL (ref 6–12)
PMV BLD AUTO: 11.3 FL (ref 6–12)
PMV BLD AUTO: 11.5 FL (ref 6–12)
PMV BLD AUTO: 11.7 FL (ref 6–12)
PMV BLD AUTO: 11.8 FL (ref 6–12)
PMV BLD AUTO: 12 FL (ref 6–12)
PMV BLD AUTO: 12.1 FL (ref 6–12)
PMV BLD AUTO: 9.8 FL (ref 6–12)
PO2 BLDA: 100.9 MM HG (ref 80–100)
PO2 BLDA: 102 MM HG (ref 80–100)
PO2 BLDA: 121.2 MM HG (ref 80–100)
PO2 BLDA: 151.9 MM HG (ref 80–100)
PO2 BLDA: 176.3 MM HG (ref 80–100)
PO2 BLDA: 234 MM HG (ref 80–100)
PO2 BLDA: 281.9 MM HG (ref 80–100)
PO2 BLDA: 295.4 MM HG (ref 80–100)
PO2 BLDA: 57.7 MM HG (ref 80–100)
PO2 BLDA: 74 MM HG (ref 80–100)
PO2 BLDA: 74.4 MM HG (ref 80–100)
PO2 BLDA: 75.6 MM HG (ref 80–100)
PO2 BLDA: 81.6 MM HG (ref 80–100)
PO2 BLDA: 87.5 MM HG (ref 80–100)
PO2 BLDA: 88.3 MM HG (ref 80–100)
PO2 BLDA: 96.2 MM HG (ref 80–100)
POLYCHROMASIA BLD QL SMEAR: ABNORMAL
POLYCHROMASIA BLD QL SMEAR: NORMAL
POTASSIUM BLD-SCNC: 3 MMOL/L (ref 3.5–5.2)
POTASSIUM BLD-SCNC: 3 MMOL/L (ref 3.5–5.2)
POTASSIUM BLD-SCNC: 3.1 MMOL/L (ref 3.5–5.2)
POTASSIUM BLD-SCNC: 3.1 MMOL/L (ref 3.5–5.2)
POTASSIUM BLD-SCNC: 3.2 MMOL/L (ref 3.5–5.2)
POTASSIUM BLD-SCNC: 3.2 MMOL/L (ref 3.5–5.2)
POTASSIUM BLD-SCNC: 3.3 MMOL/L (ref 3.5–5.2)
POTASSIUM BLD-SCNC: 3.6 MMOL/L (ref 3.5–5.2)
POTASSIUM BLD-SCNC: 3.6 MMOL/L (ref 3.5–5.2)
POTASSIUM BLD-SCNC: 3.8 MMOL/L (ref 3.5–5.2)
POTASSIUM BLD-SCNC: 3.9 MMOL/L (ref 3.5–5.2)
POTASSIUM BLD-SCNC: 4 MMOL/L (ref 3.5–5.2)
POTASSIUM BLD-SCNC: 4.1 MMOL/L (ref 3.5–5.2)
POTASSIUM BLD-SCNC: 4.2 MMOL/L (ref 3.5–5.2)
POTASSIUM BLD-SCNC: 4.3 MMOL/L (ref 3.5–5.2)
POTASSIUM BLD-SCNC: 4.4 MMOL/L (ref 3.5–5.2)
POTASSIUM BLD-SCNC: 4.5 MMOL/L (ref 3.5–5.2)
POTASSIUM BLD-SCNC: 4.6 MMOL/L (ref 3.5–5.2)
POTASSIUM BLD-SCNC: 4.6 MMOL/L (ref 3.5–5.2)
POTASSIUM BLD-SCNC: 4.7 MMOL/L (ref 3.5–5.2)
POTASSIUM BLD-SCNC: 4.7 MMOL/L (ref 3.5–5.2)
POTASSIUM BLD-SCNC: 5.4 MMOL/L (ref 3.5–5.2)
POTASSIUM BLD-SCNC: 6.2 MMOL/L (ref 3.5–5.2)
POTASSIUM BLD-SCNC: 6.7 MMOL/L (ref 3.5–5.2)
PROCALCITONIN SERPL-MCNC: 1.24 NG/ML (ref 0.1–0.25)
PROCALCITONIN SERPL-MCNC: 1.31 NG/ML (ref 0.1–0.25)
PROT SERPL-MCNC: 5.2 G/DL (ref 6–8.5)
PROT SERPL-MCNC: 5.2 G/DL (ref 6–8.5)
PROT SERPL-MCNC: 5.3 G/DL (ref 6–8.5)
PROT SERPL-MCNC: 5.4 G/DL (ref 6–8.5)
PROT SERPL-MCNC: 5.5 G/DL (ref 6–8.5)
PROT SERPL-MCNC: 5.6 G/DL (ref 6–8.5)
PROT SERPL-MCNC: 5.8 G/DL (ref 6–8.5)
PROT SERPL-MCNC: 5.8 G/DL (ref 6–8.5)
PROT SERPL-MCNC: 5.9 G/DL (ref 6–8.5)
PROT SERPL-MCNC: 6 G/DL (ref 6–8.5)
PROT SERPL-MCNC: 6.1 G/DL (ref 6–8.5)
PROT SERPL-MCNC: 6.4 G/DL (ref 6–8.5)
PROT SERPL-MCNC: 6.5 G/DL (ref 6–8.5)
PROT UR QL STRIP: ABNORMAL
PROT UR QL STRIP: ABNORMAL
PROT UR-MCNC: 155 MG/DL
PROT/CREAT UR: 982.9 MG/G CREA (ref 0–200)
PROTHROMBIN TIME: 14.9 SECONDS (ref 11.7–14.2)
PROTHROMBIN TIME: 15.5 SECONDS (ref 11.7–14.2)
PROTHROMBIN TIME: 15.8 SECONDS (ref 11.7–14.2)
PROTHROMBIN TIME: 16 SECONDS (ref 11.7–14.2)
PROTHROMBIN TIME: 17.7 SECONDS (ref 11.7–14.2)
PROTHROMBIN TIME: 18.9 SECONDS (ref 11.7–14.2)
PROTHROMBIN TIME: 20.2 SECONDS (ref 11.7–14.2)
PROTHROMBIN TIME: 22.6 SECONDS (ref 11.7–14.2)
PROTHROMBIN TIME: 23.8 SECONDS (ref 11.7–14.2)
PROTHROMBIN TIME: 25.2 SECONDS (ref 11.7–14.2)
PROTHROMBIN TIME: 26.9 SECONDS (ref 11.7–14.2)
PROTHROMBIN TIME: 27.1 SECONDS (ref 11.7–14.2)
PROTHROMBIN TIME: 31.9 SECONDS (ref 11.7–14.2)
PROTHROMBIN TIME: 40.7 SECONDS (ref 11.7–14.2)
PROTHROMBIN TIME: 82.5 SECONDS (ref 11.7–14.2)
PSV: 8 CMH2O
RBC # BLD AUTO: 2.39 10*6/MM3 (ref 4.6–6)
RBC # BLD AUTO: 2.61 10*6/MM3 (ref 4.6–6)
RBC # BLD AUTO: 2.67 10*6/MM3 (ref 4.6–6)
RBC # BLD AUTO: 2.7 10*6/MM3 (ref 4.6–6)
RBC # BLD AUTO: 2.75 10*6/MM3 (ref 4.6–6)
RBC # BLD AUTO: 2.82 10*6/MM3 (ref 4.6–6)
RBC # BLD AUTO: 3.12 10*6/MM3 (ref 4.6–6)
RBC # BLD AUTO: 3.62 10*6/MM3 (ref 4.6–6)
RBC # BLD AUTO: 3.62 10*6/MM3 (ref 4.6–6)
RBC # BLD AUTO: 4.08 10*6/MM3 (ref 4.6–6)
RBC # BLD AUTO: 4.1 10*6/MM3 (ref 4.6–6)
RBC # BLD AUTO: 4.13 10*6/MM3 (ref 4.6–6)
RBC # BLD AUTO: 4.16 10*6/MM3 (ref 4.6–6)
RBC # BLD AUTO: 4.25 10*6/MM3 (ref 4.6–6)
RBC # BLD AUTO: 4.43 10*6/MM3 (ref 4.6–6)
RBC # BLD AUTO: 4.44 10*6/MM3 (ref 4.6–6)
RBC # BLD AUTO: 4.44 10*6/MM3 (ref 4.6–6)
RBC # BLD AUTO: 4.56 10*6/MM3 (ref 4.6–6)
RBC # BLD AUTO: 4.63 10*6/MM3 (ref 4.6–6)
RBC # BLD AUTO: 4.69 10*6/MM3 (ref 4.6–6)
RBC # BLD AUTO: 4.71 10*6/MM3 (ref 4.6–6)
RBC # BLD AUTO: 4.77 10*6/MM3 (ref 4.6–6)
RBC # UR: ABNORMAL /HPF
RBC # UR: ABNORMAL /HPF
RBC MORPH BLD: NORMAL
RBC MORPH BLD: NORMAL
REF LAB TEST METHOD: ABNORMAL
REF LAB TEST METHOD: ABNORMAL
RH BLD: POSITIVE
RH BLD: POSITIVE
SAO2 % BLDA: 98 % (ref 95–98)
SAO2 % BLDCOA: 81.4 % (ref 92–99)
SAO2 % BLDCOA: 83.1 % (ref 92–99)
SAO2 % BLDCOA: 85.6 % (ref 92–99)
SAO2 % BLDCOA: 92.3 % (ref 92–99)
SAO2 % BLDCOA: 94.1 % (ref 92–99)
SAO2 % BLDCOA: 96.9 % (ref 92–99)
SAO2 % BLDCOA: 96.9 % (ref 92–99)
SAO2 % BLDCOA: 97.2 % (ref 92–99)
SAO2 % BLDCOA: 97.6 % (ref 92–99)
SAO2 % BLDCOA: 97.9 % (ref 92–99)
SAO2 % BLDCOA: 98.2 % (ref 92–99)
SAO2 % BLDCOA: 99 % (ref 92–99)
SAO2 % BLDCOA: 99.4 % (ref 92–99)
SAO2 % BLDCOA: 99.7 % (ref 92–99)
SAO2 % BLDCOA: 99.8 % (ref 92–99)
SAO2 % BLDCOA: 99.9 % (ref 92–99)
SCAN SLIDE: NORMAL
SET MECH RESP RATE: 10
SET MECH RESP RATE: 10
SET MECH RESP RATE: 16
SET MECH RESP RATE: 18
SET MECH RESP RATE: 20
SET MECH RESP RATE: 28
SODIUM BLD-SCNC: 133 MMOL/L (ref 136–145)
SODIUM BLD-SCNC: 135 MMOL/L (ref 136–145)
SODIUM BLD-SCNC: 136 MMOL/L (ref 136–145)
SODIUM BLD-SCNC: 137 MMOL/L (ref 136–145)
SODIUM BLD-SCNC: 138 MMOL/L (ref 136–145)
SODIUM BLD-SCNC: 139 MMOL/L (ref 136–145)
SODIUM BLD-SCNC: 142 MMOL/L (ref 136–145)
SODIUM BLD-SCNC: 143 MMOL/L (ref 136–145)
SODIUM BLD-SCNC: 144 MMOL/L (ref 136–145)
SODIUM BLD-SCNC: 145 MMOL/L (ref 136–145)
SODIUM BLD-SCNC: 146 MMOL/L (ref 136–145)
SODIUM BLD-SCNC: 146 MMOL/L (ref 136–145)
SODIUM BLD-SCNC: 147 MMOL/L (ref 136–145)
SODIUM BLD-SCNC: 148 MMOL/L (ref 136–145)
SODIUM BLD-SCNC: 149 MMOL/L (ref 136–145)
SODIUM BLD-SCNC: 153 MMOL/L (ref 136–145)
SODIUM UR-SCNC: 21 MMOL/L
SODIUM UR-SCNC: 26 MMOL/L
SP GR UR STRIP: 1.02 (ref 1–1.03)
SP GR UR STRIP: >=1.03 (ref 1–1.03)
SPHEROCYTES BLD QL SMEAR: ABNORMAL
SPHEROCYTES BLD QL SMEAR: ABNORMAL
SPHEROCYTES BLD QL SMEAR: NORMAL
SQUAMOUS #/AREA URNS HPF: ABNORMAL /HPF
SQUAMOUS #/AREA URNS HPF: ABNORMAL /HPF
STRESS TARGET HR: 133 BPM
STRESS TARGET HR: 133 BPM
T&S EXPIRATION DATE: NORMAL
TOTAL RATE: 10 BREATHS/MINUTE
TOTAL RATE: 11 BREATHS/MINUTE
TOTAL RATE: 13 BREATHS/MINUTE
TOTAL RATE: 14 BREATHS/MINUTE
TOTAL RATE: 16 BREATHS/MINUTE
TOTAL RATE: 17 BREATHS/MINUTE
TOTAL RATE: 17 BREATHS/MINUTE
TOTAL RATE: 20 BREATHS/MINUTE
TOTAL RATE: 23 BREATHS/MINUTE
TOTAL RATE: 24 BREATHS/MINUTE
TOTAL RATE: 28 BREATHS/MINUTE
TRANS CELLS #/AREA URNS HPF: ABNORMAL /HPF
TRIGL SERPL-MCNC: 111 MG/DL (ref 0–150)
TROPONIN T SERPL-MCNC: 0.11 NG/ML (ref 0–0.03)
TROPONIN T SERPL-MCNC: 4.19 NG/ML (ref 0–0.03)
TROPONIN T SERPL-MCNC: 4.26 NG/ML (ref 0–0.03)
TROPONIN T SERPL-MCNC: 5.35 NG/ML (ref 0–0.03)
TROPONIN T SERPL-MCNC: 5.4 NG/ML (ref 0–0.03)
TROPONIN T SERPL-MCNC: 6.1 NG/ML (ref 0–0.03)
TROPONIN T SERPL-MCNC: 8.29 NG/ML (ref 0–0.03)
UNIT  ABO: NORMAL
UNIT  RH: NORMAL
UPPER ARTERIAL LEFT ARM BRACHIAL SYS MAX: 93 MMHG
UPPER ARTERIAL RIGHT ARM BRACHIAL SYS MAX: 94 MMHG
URATE SERPL-MCNC: 11.4 MG/DL (ref 3.4–7)
URATE SERPL-MCNC: 5.1 MG/DL (ref 3.4–7)
URATE SERPL-MCNC: 7.4 MG/DL (ref 3.4–7)
UROBILINOGEN UR QL STRIP: ABNORMAL
UROBILINOGEN UR QL STRIP: ABNORMAL
VANCOMYCIN SERPL-MCNC: 10.9 MCG/ML (ref 5–40)
VANCOMYCIN SERPL-MCNC: 13.4 MCG/ML (ref 5–40)
VENTILATOR MODE: ABNORMAL
VLDLC SERPL-MCNC: 22.2 MG/DL (ref 5–40)
VT ON VENT VENT: 464 ML
VT ON VENT VENT: 477 ML
VT ON VENT VENT: 486 ML
VT ON VENT VENT: 650 ML
VT ON VENT VENT: 860 ML
WBC MORPH BLD: NORMAL
WBC NRBC COR # BLD: 14.24 10*3/MM3 (ref 4.5–10.7)
WBC NRBC COR # BLD: 15.77 10*3/MM3 (ref 4.5–10.7)
WBC NRBC COR # BLD: 17.46 10*3/MM3 (ref 4.5–10.7)
WBC NRBC COR # BLD: 17.53 10*3/MM3 (ref 4.5–10.7)
WBC NRBC COR # BLD: 18.12 10*3/MM3 (ref 4.5–10.7)
WBC NRBC COR # BLD: 18.62 10*3/MM3 (ref 4.5–10.7)
WBC NRBC COR # BLD: 20.17 10*3/MM3 (ref 4.5–10.7)
WBC NRBC COR # BLD: 20.61 10*3/MM3 (ref 4.5–10.7)
WBC NRBC COR # BLD: 21.06 10*3/MM3 (ref 4.5–10.7)
WBC NRBC COR # BLD: 22.12 10*3/MM3 (ref 4.5–10.7)
WBC NRBC COR # BLD: 22.19 10*3/MM3 (ref 4.5–10.7)
WBC NRBC COR # BLD: 22.48 10*3/MM3 (ref 4.5–10.7)
WBC NRBC COR # BLD: 24.23 10*3/MM3 (ref 4.5–10.7)
WBC NRBC COR # BLD: 27.08 10*3/MM3 (ref 4.5–10.7)
WBC NRBC COR # BLD: 29.38 10*3/MM3 (ref 4.5–10.7)
WBC NRBC COR # BLD: 29.43 10*3/MM3 (ref 4.5–10.7)
WBC NRBC COR # BLD: 29.58 10*3/MM3 (ref 4.5–10.7)
WBC NRBC COR # BLD: 30.46 10*3/MM3 (ref 4.5–10.7)
WBC NRBC COR # BLD: 31.07 10*3/MM3 (ref 4.5–10.7)
WBC NRBC COR # BLD: 33.29 10*3/MM3 (ref 4.5–10.7)
WBC NRBC COR # BLD: 35.45 10*3/MM3 (ref 4.5–10.7)
WBC NRBC COR # BLD: 38.53 10*3/MM3 (ref 4.5–10.7)
WBC UR QL AUTO: ABNORMAL /HPF
WBC UR QL AUTO: ABNORMAL /HPF
WHOLE BLOOD HOLD SPECIMEN: NORMAL
WHOLE BLOOD HOLD SPECIMEN: NORMAL

## 2018-01-01 PROCEDURE — 25010000002 CEFTRIAXONE PER 250 MG: Performed by: INTERNAL MEDICINE

## 2018-01-01 PROCEDURE — 87205 SMEAR GRAM STAIN: CPT | Performed by: INTERNAL MEDICINE

## 2018-01-01 PROCEDURE — C1725 CATH, TRANSLUMIN NON-LASER: HCPCS | Performed by: INTERNAL MEDICINE

## 2018-01-01 PROCEDURE — 25010000002 DOPAMINE PER 40 MG: Performed by: INTERNAL MEDICINE

## 2018-01-01 PROCEDURE — 93005 ELECTROCARDIOGRAM TRACING: CPT | Performed by: PHYSICIAN ASSISTANT

## 2018-01-01 PROCEDURE — 94799 UNLISTED PULMONARY SVC/PX: CPT

## 2018-01-01 PROCEDURE — 93010 ELECTROCARDIOGRAM REPORT: CPT | Performed by: INTERNAL MEDICINE

## 2018-01-01 PROCEDURE — 25010000002 THIAMINE PER 100 MG: Performed by: PSYCHIATRY & NEUROLOGY

## 2018-01-01 PROCEDURE — 63710000001 PREDNISONE PER 1 MG: Performed by: INTERNAL MEDICINE

## 2018-01-01 PROCEDURE — 84550 ASSAY OF BLOOD/URIC ACID: CPT | Performed by: INTERNAL MEDICINE

## 2018-01-01 PROCEDURE — 63710000001 INSULIN ASPART PER 5 UNITS: Performed by: INTERNAL MEDICINE

## 2018-01-01 PROCEDURE — 5A12012 PERFORMANCE OF CARDIAC OUTPUT, SINGLE, MANUAL: ICD-10-PCS | Performed by: EMERGENCY MEDICINE

## 2018-01-01 PROCEDURE — C9600 PERC DRUG-EL COR STENT SING: HCPCS

## 2018-01-01 PROCEDURE — C1769 GUIDE WIRE: HCPCS | Performed by: INTERNAL MEDICINE

## 2018-01-01 PROCEDURE — 93923 UPR/LXTR ART STDY 3+ LVLS: CPT

## 2018-01-01 PROCEDURE — 84145 PROCALCITONIN (PCT): CPT | Performed by: INTERNAL MEDICINE

## 2018-01-01 PROCEDURE — 99233 SBSQ HOSP IP/OBS HIGH 50: CPT | Performed by: INTERNAL MEDICINE

## 2018-01-01 PROCEDURE — 92526 ORAL FUNCTION THERAPY: CPT

## 2018-01-01 PROCEDURE — 85014 HEMATOCRIT: CPT | Performed by: INTERNAL MEDICINE

## 2018-01-01 PROCEDURE — 84100 ASSAY OF PHOSPHORUS: CPT | Performed by: INTERNAL MEDICINE

## 2018-01-01 PROCEDURE — 25010000002 PROPOFOL 1000 MG/ML EMULSION: Performed by: INTERNAL MEDICINE

## 2018-01-01 PROCEDURE — B54BZZA ULTRASONOGRAPHY OF RIGHT LOWER EXTREMITY VEINS, GUIDANCE: ICD-10-PCS | Performed by: SURGERY

## 2018-01-01 PROCEDURE — 80069 RENAL FUNCTION PANEL: CPT | Performed by: INTERNAL MEDICINE

## 2018-01-01 PROCEDURE — 70450 CT HEAD/BRAIN W/O DYE: CPT

## 2018-01-01 PROCEDURE — 85014 HEMATOCRIT: CPT | Performed by: HOSPITALIST

## 2018-01-01 PROCEDURE — 25010000002 HEPARIN (PORCINE) PER 1000 UNITS: Performed by: INTERNAL MEDICINE

## 2018-01-01 PROCEDURE — 83735 ASSAY OF MAGNESIUM: CPT | Performed by: INTERNAL MEDICINE

## 2018-01-01 PROCEDURE — 99232 SBSQ HOSP IP/OBS MODERATE 35: CPT | Performed by: INTERNAL MEDICINE

## 2018-01-01 PROCEDURE — 85730 THROMBOPLASTIN TIME PARTIAL: CPT | Performed by: INTERNAL MEDICINE

## 2018-01-01 PROCEDURE — 71045 X-RAY EXAM CHEST 1 VIEW: CPT

## 2018-01-01 PROCEDURE — 83605 ASSAY OF LACTIC ACID: CPT | Performed by: INTERNAL MEDICINE

## 2018-01-01 PROCEDURE — 82330 ASSAY OF CALCIUM: CPT | Performed by: INTERNAL MEDICINE

## 2018-01-01 PROCEDURE — 25010000002 AMIODARONE PER 30 MG

## 2018-01-01 PROCEDURE — 82436 ASSAY OF URINE CHLORIDE: CPT | Performed by: INTERNAL MEDICINE

## 2018-01-01 PROCEDURE — C1887 CATHETER, GUIDING: HCPCS | Performed by: INTERNAL MEDICINE

## 2018-01-01 PROCEDURE — 85018 HEMOGLOBIN: CPT

## 2018-01-01 PROCEDURE — 82962 GLUCOSE BLOOD TEST: CPT

## 2018-01-01 PROCEDURE — 97110 THERAPEUTIC EXERCISES: CPT

## 2018-01-01 PROCEDURE — 25010000002 VITAMIN K1 PER 1 MG: Performed by: INTERNAL MEDICINE

## 2018-01-01 PROCEDURE — 86927 PLASMA FRESH FROZEN: CPT

## 2018-01-01 PROCEDURE — 93005 ELECTROCARDIOGRAM TRACING: CPT | Performed by: INTERNAL MEDICINE

## 2018-01-01 PROCEDURE — 82803 BLOOD GASES ANY COMBINATION: CPT

## 2018-01-01 PROCEDURE — 99253 IP/OBS CNSLTJ NEW/EST LOW 45: CPT | Performed by: PSYCHIATRY & NEUROLOGY

## 2018-01-01 PROCEDURE — 99231 SBSQ HOSP IP/OBS SF/LOW 25: CPT | Performed by: PSYCHIATRY & NEUROLOGY

## 2018-01-01 PROCEDURE — 74176 CT ABD & PELVIS W/O CONTRAST: CPT

## 2018-01-01 PROCEDURE — 84484 ASSAY OF TROPONIN QUANT: CPT | Performed by: INTERNAL MEDICINE

## 2018-01-01 PROCEDURE — 92941 PRQ TRLML REVSC TOT OCCL AMI: CPT | Performed by: INTERNAL MEDICINE

## 2018-01-01 PROCEDURE — P9017 PLASMA 1 DONOR FRZ W/IN 8 HR: HCPCS

## 2018-01-01 PROCEDURE — 027136Z DILATION OF CORONARY ARTERY, TWO ARTERIES WITH THREE DRUG-ELUTING INTRALUMINAL DEVICES, PERCUTANEOUS APPROACH: ICD-10-PCS | Performed by: INTERNAL MEDICINE

## 2018-01-01 PROCEDURE — 85018 HEMOGLOBIN: CPT | Performed by: HOSPITALIST

## 2018-01-01 PROCEDURE — 80048 BASIC METABOLIC PNL TOTAL CA: CPT | Performed by: INTERNAL MEDICINE

## 2018-01-01 PROCEDURE — 97535 SELF CARE MNGMENT TRAINING: CPT

## 2018-01-01 PROCEDURE — 84132 ASSAY OF SERUM POTASSIUM: CPT | Performed by: INTERNAL MEDICINE

## 2018-01-01 PROCEDURE — 25010000002 EPINEPHRINE PF 1 MG/10ML SOLUTION PREFILLED SYRINGE

## 2018-01-01 PROCEDURE — 25010000002 PHENYLEPHRINE 10 MG/ML SOLUTION: Performed by: INTERNAL MEDICINE

## 2018-01-01 PROCEDURE — 93321 DOPPLER ECHO F-UP/LMTD STD: CPT

## 2018-01-01 PROCEDURE — 85025 COMPLETE CBC W/AUTO DIFF WBC: CPT | Performed by: INTERNAL MEDICINE

## 2018-01-01 PROCEDURE — C1751 CATH, INF, PER/CENT/MIDLINE: HCPCS

## 2018-01-01 PROCEDURE — 86901 BLOOD TYPING SEROLOGIC RH(D): CPT | Performed by: PHYSICIAN ASSISTANT

## 2018-01-01 PROCEDURE — C1874 STENT, COATED/COV W/DEL SYS: HCPCS | Performed by: INTERNAL MEDICINE

## 2018-01-01 PROCEDURE — 99291 CRITICAL CARE FIRST HOUR: CPT

## 2018-01-01 PROCEDURE — 93005 ELECTROCARDIOGRAM TRACING: CPT | Performed by: NURSE PRACTITIONER

## 2018-01-01 PROCEDURE — 93454 CORONARY ARTERY ANGIO S&I: CPT | Performed by: INTERNAL MEDICINE

## 2018-01-01 PROCEDURE — 80076 HEPATIC FUNCTION PANEL: CPT | Performed by: INTERNAL MEDICINE

## 2018-01-01 PROCEDURE — 5A1945Z RESPIRATORY VENTILATION, 24-96 CONSECUTIVE HOURS: ICD-10-PCS | Performed by: INTERNAL MEDICINE

## 2018-01-01 PROCEDURE — 93306 TTE W/DOPPLER COMPLETE: CPT

## 2018-01-01 PROCEDURE — 25010000002 PROPOFOL 10 MG/ML EMULSION

## 2018-01-01 PROCEDURE — 71046 X-RAY EXAM CHEST 2 VIEWS: CPT

## 2018-01-01 PROCEDURE — 93321 DOPPLER ECHO F-UP/LMTD STD: CPT | Performed by: INTERNAL MEDICINE

## 2018-01-01 PROCEDURE — 80053 COMPREHEN METABOLIC PANEL: CPT | Performed by: INTERNAL MEDICINE

## 2018-01-01 PROCEDURE — 25010000002 HALOPERIDOL LACTATE PER 5 MG: Performed by: INTERNAL MEDICINE

## 2018-01-01 PROCEDURE — 74230 X-RAY XM SWLNG FUNCJ C+: CPT

## 2018-01-01 PROCEDURE — 85610 PROTHROMBIN TIME: CPT | Performed by: INTERNAL MEDICINE

## 2018-01-01 PROCEDURE — 82728 ASSAY OF FERRITIN: CPT | Performed by: NURSE PRACTITIONER

## 2018-01-01 PROCEDURE — 06HM33Z INSERTION OF INFUSION DEVICE INTO RIGHT FEMORAL VEIN, PERCUTANEOUS APPROACH: ICD-10-PCS | Performed by: SURGERY

## 2018-01-01 PROCEDURE — 81001 URINALYSIS AUTO W/SCOPE: CPT | Performed by: INTERNAL MEDICINE

## 2018-01-01 PROCEDURE — 86850 RBC ANTIBODY SCREEN: CPT | Performed by: PHYSICIAN ASSISTANT

## 2018-01-01 PROCEDURE — 25010000002 HYDROMORPHONE PER 4 MG: Performed by: INTERNAL MEDICINE

## 2018-01-01 PROCEDURE — 92610 EVALUATE SWALLOWING FUNCTION: CPT

## 2018-01-01 PROCEDURE — 25010000003 POTASSIUM CHLORIDE 10 MEQ/100ML SOLUTION: Performed by: INTERNAL MEDICINE

## 2018-01-01 PROCEDURE — 93005 ELECTROCARDIOGRAM TRACING: CPT | Performed by: EMERGENCY MEDICINE

## 2018-01-01 PROCEDURE — 82570 ASSAY OF URINE CREATININE: CPT | Performed by: INTERNAL MEDICINE

## 2018-01-01 PROCEDURE — 99254 IP/OBS CNSLTJ NEW/EST MOD 60: CPT | Performed by: INTERNAL MEDICINE

## 2018-01-01 PROCEDURE — 84484 ASSAY OF TROPONIN QUANT: CPT | Performed by: PHYSICIAN ASSISTANT

## 2018-01-01 PROCEDURE — C9601 PERC DRUG-EL COR STENT BRAN: HCPCS | Performed by: INTERNAL MEDICINE

## 2018-01-01 PROCEDURE — 25010000002 DOBUTAMINE PER 250 MG: Performed by: INTERNAL MEDICINE

## 2018-01-01 PROCEDURE — 85007 BL SMEAR W/DIFF WBC COUNT: CPT | Performed by: INTERNAL MEDICINE

## 2018-01-01 PROCEDURE — 25010000002 METOCLOPRAMIDE PER 10 MG: Performed by: INTERNAL MEDICINE

## 2018-01-01 PROCEDURE — 36600 WITHDRAWAL OF ARTERIAL BLOOD: CPT

## 2018-01-01 PROCEDURE — 25010000002 CEFEPIME PER 500 MG: Performed by: INTERNAL MEDICINE

## 2018-01-01 PROCEDURE — 85027 COMPLETE CBC AUTOMATED: CPT | Performed by: INTERNAL MEDICINE

## 2018-01-01 PROCEDURE — 87086 URINE CULTURE/COLONY COUNT: CPT | Performed by: INTERNAL MEDICINE

## 2018-01-01 PROCEDURE — 93306 TTE W/DOPPLER COMPLETE: CPT | Performed by: INTERNAL MEDICINE

## 2018-01-01 PROCEDURE — P9016 RBC LEUKOCYTES REDUCED: HCPCS

## 2018-01-01 PROCEDURE — 25010000002 PIPERACILLIN SOD-TAZOBACTAM PER 1 G: Performed by: INTERNAL MEDICINE

## 2018-01-01 PROCEDURE — 82553 CREATINE MB FRACTION: CPT | Performed by: INTERNAL MEDICINE

## 2018-01-01 PROCEDURE — 84484 ASSAY OF TROPONIN QUANT: CPT | Performed by: EMERGENCY MEDICINE

## 2018-01-01 PROCEDURE — C1894 INTRO/SHEATH, NON-LASER: HCPCS | Performed by: INTERNAL MEDICINE

## 2018-01-01 PROCEDURE — 84300 ASSAY OF URINE SODIUM: CPT | Performed by: INTERNAL MEDICINE

## 2018-01-01 PROCEDURE — 03HY32Z INSERTION OF MONITORING DEVICE INTO UPPER ARTERY, PERCUTANEOUS APPROACH: ICD-10-PCS | Performed by: INTERNAL MEDICINE

## 2018-01-01 PROCEDURE — 02HV33Z INSERTION OF INFUSION DEVICE INTO SUPERIOR VENA CAVA, PERCUTANEOUS APPROACH: ICD-10-PCS | Performed by: INTERNAL MEDICINE

## 2018-01-01 PROCEDURE — 25010000002 METHYLPREDNISOLONE PER 125 MG: Performed by: INTERNAL MEDICINE

## 2018-01-01 PROCEDURE — 97116 GAIT TRAINING THERAPY: CPT | Performed by: PHYSICAL THERAPIST

## 2018-01-01 PROCEDURE — 85018 HEMOGLOBIN: CPT | Performed by: INTERNAL MEDICINE

## 2018-01-01 PROCEDURE — 86923 COMPATIBILITY TEST ELECTRIC: CPT

## 2018-01-01 PROCEDURE — 25010000002 VITAMIN K1 PER 1 MG: Performed by: PHYSICIAN ASSISTANT

## 2018-01-01 PROCEDURE — 80053 COMPREHEN METABOLIC PANEL: CPT | Performed by: EMERGENCY MEDICINE

## 2018-01-01 PROCEDURE — 25010000002 LORAZEPAM PER 2 MG

## 2018-01-01 PROCEDURE — 94640 AIRWAY INHALATION TREATMENT: CPT

## 2018-01-01 PROCEDURE — 25010000002 ONDANSETRON PER 1 MG: Performed by: INTERNAL MEDICINE

## 2018-01-01 PROCEDURE — 84156 ASSAY OF PROTEIN URINE: CPT | Performed by: INTERNAL MEDICINE

## 2018-01-01 PROCEDURE — 97162 PT EVAL MOD COMPLEX 30 MIN: CPT | Performed by: PHYSICAL THERAPIST

## 2018-01-01 PROCEDURE — 25010000002 CALCIUM GLUCONATE PER 10 ML: Performed by: INTERNAL MEDICINE

## 2018-01-01 PROCEDURE — 5A2204Z RESTORATION OF CARDIAC RHYTHM, SINGLE: ICD-10-PCS | Performed by: EMERGENCY MEDICINE

## 2018-01-01 PROCEDURE — 80202 ASSAY OF VANCOMYCIN: CPT | Performed by: INTERNAL MEDICINE

## 2018-01-01 PROCEDURE — 85610 PROTHROMBIN TIME: CPT

## 2018-01-01 PROCEDURE — 25010000002 VANCOMYCIN PER 500 MG: Performed by: INTERNAL MEDICINE

## 2018-01-01 PROCEDURE — 80074 ACUTE HEPATITIS PANEL: CPT | Performed by: HOSPITALIST

## 2018-01-01 PROCEDURE — 25010000003 POTASSIUM CHLORIDE PER 2 MEQ: Performed by: INTERNAL MEDICINE

## 2018-01-01 PROCEDURE — 25010000002 ONDANSETRON PER 1 MG: Performed by: PHYSICIAN ASSISTANT

## 2018-01-01 PROCEDURE — 83690 ASSAY OF LIPASE: CPT

## 2018-01-01 PROCEDURE — 80053 COMPREHEN METABOLIC PANEL: CPT

## 2018-01-01 PROCEDURE — 97110 THERAPEUTIC EXERCISES: CPT | Performed by: PHYSICAL THERAPIST

## 2018-01-01 PROCEDURE — 93308 TTE F-UP OR LMTD: CPT | Performed by: INTERNAL MEDICINE

## 2018-01-01 PROCEDURE — 36430 TRANSFUSION BLD/BLD COMPNT: CPT

## 2018-01-01 PROCEDURE — 25010000002 HALOPERIDOL LACTATE PER 5 MG

## 2018-01-01 PROCEDURE — 99255 IP/OBS CONSLTJ NEW/EST HI 80: CPT | Performed by: INTERNAL MEDICINE

## 2018-01-01 PROCEDURE — 4A02X4A MEASUREMENT OF CARDIAC ELECTRICAL ACTIVITY, GUIDANCE, EXTERNAL APPROACH: ICD-10-PCS | Performed by: INTERNAL MEDICINE

## 2018-01-01 PROCEDURE — 86706 HEP B SURFACE ANTIBODY: CPT | Performed by: INTERNAL MEDICINE

## 2018-01-01 PROCEDURE — 25010000002 FENTANYL CITRATE (PF) 100 MCG/2ML SOLUTION: Performed by: INTERNAL MEDICINE

## 2018-01-01 PROCEDURE — 25010000002 AMIODARONE PER 30 MG: Performed by: EMERGENCY MEDICINE

## 2018-01-01 PROCEDURE — 85347 COAGULATION TIME ACTIVATED: CPT

## 2018-01-01 PROCEDURE — 85014 HEMATOCRIT: CPT

## 2018-01-01 PROCEDURE — C9606 PERC D-E COR REVASC W AMI S: HCPCS | Performed by: INTERNAL MEDICINE

## 2018-01-01 PROCEDURE — 86900 BLOOD TYPING SEROLOGIC ABO: CPT

## 2018-01-01 PROCEDURE — 87340 HEPATITIS B SURFACE AG IA: CPT | Performed by: INTERNAL MEDICINE

## 2018-01-01 PROCEDURE — 93308 TTE F-UP OR LMTD: CPT

## 2018-01-01 PROCEDURE — 85025 COMPLETE CBC W/AUTO DIFF WBC: CPT

## 2018-01-01 PROCEDURE — 86900 BLOOD TYPING SEROLOGIC ABO: CPT | Performed by: PHYSICIAN ASSISTANT

## 2018-01-01 PROCEDURE — 93325 DOPPLER ECHO COLOR FLOW MAPG: CPT

## 2018-01-01 PROCEDURE — 99284 EMERGENCY DEPT VISIT MOD MDM: CPT

## 2018-01-01 PROCEDURE — 36620 INSERTION CATHETER ARTERY: CPT | Performed by: INTERNAL MEDICINE

## 2018-01-01 PROCEDURE — 93325 DOPPLER ECHO COLOR FLOW MAPG: CPT | Performed by: INTERNAL MEDICINE

## 2018-01-01 PROCEDURE — 25010000002 HYDROCORTISONE SODIUM SUCCINATE 100 MG RECONSTITUTED SOLUTION: Performed by: INTERNAL MEDICINE

## 2018-01-01 PROCEDURE — 5A1D70Z PERFORMANCE OF URINARY FILTRATION, INTERMITTENT, LESS THAN 6 HOURS PER DAY: ICD-10-PCS | Performed by: INTERNAL MEDICINE

## 2018-01-01 PROCEDURE — 0BH17EZ INSERTION OF ENDOTRACHEAL AIRWAY INTO TRACHEA, VIA NATURAL OR ARTIFICIAL OPENING: ICD-10-PCS | Performed by: INTERNAL MEDICINE

## 2018-01-01 PROCEDURE — 25010000002 ZIPRASIDONE MESYLATE PER 10 MG: Performed by: INTERNAL MEDICINE

## 2018-01-01 PROCEDURE — 31500 INSERT EMERGENCY AIRWAY: CPT | Performed by: EMERGENCY MEDICINE

## 2018-01-01 PROCEDURE — 92950 HEART/LUNG RESUSCITATION CPR: CPT

## 2018-01-01 PROCEDURE — 0 IOPAMIDOL PER 1 ML: Performed by: INTERNAL MEDICINE

## 2018-01-01 PROCEDURE — 25010000002 EPINEPHRINE PF 1 MG/10ML SOLUTION PREFILLED SYRINGE: Performed by: EMERGENCY MEDICINE

## 2018-01-01 PROCEDURE — 80061 LIPID PANEL: CPT | Performed by: INTERNAL MEDICINE

## 2018-01-01 PROCEDURE — 83036 HEMOGLOBIN GLYCOSYLATED A1C: CPT | Performed by: INTERNAL MEDICINE

## 2018-01-01 PROCEDURE — 83735 ASSAY OF MAGNESIUM: CPT | Performed by: NURSE PRACTITIONER

## 2018-01-01 PROCEDURE — 25010000002 NALOXONE PER 1 MG

## 2018-01-01 PROCEDURE — 92611 MOTION FLUOROSCOPY/SWALLOW: CPT | Performed by: SPEECH-LANGUAGE PATHOLOGIST

## 2018-01-01 PROCEDURE — 82140 ASSAY OF AMMONIA: CPT | Performed by: INTERNAL MEDICINE

## 2018-01-01 PROCEDURE — 94002 VENT MGMT INPAT INIT DAY: CPT

## 2018-01-01 PROCEDURE — 73502 X-RAY EXAM HIP UNI 2-3 VIEWS: CPT

## 2018-01-01 PROCEDURE — B2111ZZ FLUOROSCOPY OF MULTIPLE CORONARY ARTERIES USING LOW OSMOLAR CONTRAST: ICD-10-PCS | Performed by: INTERNAL MEDICINE

## 2018-01-01 PROCEDURE — 86901 BLOOD TYPING SEROLOGIC RH(D): CPT

## 2018-01-01 PROCEDURE — 73060 X-RAY EXAM OF HUMERUS: CPT

## 2018-01-01 PROCEDURE — 84484 ASSAY OF TROPONIN QUANT: CPT | Performed by: NURSE PRACTITIONER

## 2018-01-01 PROCEDURE — 94003 VENT MGMT INPAT SUBQ DAY: CPT

## 2018-01-01 PROCEDURE — 86704 HEP B CORE ANTIBODY TOTAL: CPT | Performed by: INTERNAL MEDICINE

## 2018-01-01 PROCEDURE — 92929 PR PRQ TRLUML CORONARY STENT W/ANGIO ADDL ART/BRNCH: CPT | Performed by: INTERNAL MEDICINE

## 2018-01-01 PROCEDURE — 87186 SC STD MICRODIL/AGAR DIL: CPT | Performed by: INTERNAL MEDICINE

## 2018-01-01 PROCEDURE — B548ZZA ULTRASONOGRAPHY OF SUPERIOR VENA CAVA, GUIDANCE: ICD-10-PCS | Performed by: INTERNAL MEDICINE

## 2018-01-01 PROCEDURE — 25010000002 PROMETHAZINE PER 50 MG: Performed by: INTERNAL MEDICINE

## 2018-01-01 PROCEDURE — 85025 COMPLETE CBC W/AUTO DIFF WBC: CPT | Performed by: EMERGENCY MEDICINE

## 2018-01-01 PROCEDURE — 87040 BLOOD CULTURE FOR BACTERIA: CPT | Performed by: INTERNAL MEDICINE

## 2018-01-01 PROCEDURE — 36415 COLL VENOUS BLD VENIPUNCTURE: CPT

## 2018-01-01 PROCEDURE — 25010000002 CALCIUM GLUCONATE PER 10 ML

## 2018-01-01 PROCEDURE — 87070 CULTURE OTHR SPECIMN AEROBIC: CPT | Performed by: INTERNAL MEDICINE

## 2018-01-01 PROCEDURE — 97165 OT EVAL LOW COMPLEX 30 MIN: CPT

## 2018-01-01 DEVICE — XIENCE ALPINE EVEROLIMUS ELUTING CORONARY STENT SYSTEM 2.75 MM X 12 MM / RAPID-EXCHANGE
Type: IMPLANTABLE DEVICE | Site: HEART | Status: FUNCTIONAL
Brand: XIENCE ALPINE

## 2018-01-01 DEVICE — XIENCE ALPINE EVEROLIMUS ELUTING CORONARY STENT SYSTEM 3.50 MM X 38 MM / RAPID-EXCHANGE
Type: IMPLANTABLE DEVICE | Site: HEART | Status: FUNCTIONAL
Brand: XIENCE ALPINE

## 2018-01-01 DEVICE — XIENCE ALPINE EVEROLIMUS ELUTING CORONARY STENT SYSTEM 2.50 MM X 15 MM / RAPID-EXCHANGE
Type: IMPLANTABLE DEVICE | Site: HEART | Status: FUNCTIONAL
Brand: XIENCE ALPINE

## 2018-01-01 RX ORDER — ONDANSETRON 2 MG/ML
4 INJECTION INTRAMUSCULAR; INTRAVENOUS ONCE
Status: COMPLETED | OUTPATIENT
Start: 2018-01-01 | End: 2018-01-01

## 2018-01-01 RX ORDER — SODIUM CHLORIDE 9 MG/ML
100 INJECTION, SOLUTION INTRAVENOUS CONTINUOUS
Status: DISCONTINUED | OUTPATIENT
Start: 2018-01-01 | End: 2018-01-01

## 2018-01-01 RX ORDER — ONDANSETRON 4 MG/1
4 TABLET, FILM COATED ORAL EVERY 6 HOURS PRN
Status: DISCONTINUED | OUTPATIENT
Start: 2018-01-01 | End: 2018-01-01 | Stop reason: HOSPADM

## 2018-01-01 RX ORDER — ONDANSETRON 4 MG/1
4 TABLET, ORALLY DISINTEGRATING ORAL EVERY 6 HOURS PRN
Status: DISCONTINUED | OUTPATIENT
Start: 2018-01-01 | End: 2018-05-04 | Stop reason: HOSPADM

## 2018-01-01 RX ORDER — MAGNESIUM SULFATE 1 G/100ML
2 INJECTION INTRAVENOUS AS NEEDED
Status: DISCONTINUED | OUTPATIENT
Start: 2018-01-01 | End: 2018-01-01

## 2018-01-01 RX ORDER — SODIUM CHLORIDE 0.9 % (FLUSH) 0.9 %
10 SYRINGE (ML) INJECTION EVERY 12 HOURS SCHEDULED
Status: DISCONTINUED | OUTPATIENT
Start: 2018-01-01 | End: 2018-01-01

## 2018-01-01 RX ORDER — CARVEDILOL 3.12 MG/1
3.12 TABLET ORAL EVERY 12 HOURS SCHEDULED
Status: DISCONTINUED | OUTPATIENT
Start: 2018-01-01 | End: 2018-01-01

## 2018-01-01 RX ORDER — HALOPERIDOL 5 MG/ML
2 INJECTION INTRAMUSCULAR
Status: DISCONTINUED | OUTPATIENT
Start: 2018-01-01 | End: 2018-01-01

## 2018-01-01 RX ORDER — LORAZEPAM 2 MG/ML
1 INJECTION INTRAMUSCULAR
Status: DISCONTINUED | OUTPATIENT
Start: 2018-01-01 | End: 2018-05-04 | Stop reason: HOSPADM

## 2018-01-01 RX ORDER — VANCOMYCIN HYDROCHLORIDE 1 G/200ML
1000 INJECTION, SOLUTION INTRAVENOUS ONCE
Status: COMPLETED | OUTPATIENT
Start: 2018-01-01 | End: 2018-01-01

## 2018-01-01 RX ORDER — MILRINONE LACTATE 0.2 MG/ML
.25-.75 INJECTION, SOLUTION INTRAVENOUS
Status: DISCONTINUED | OUTPATIENT
Start: 2018-01-01 | End: 2018-01-01

## 2018-01-01 RX ORDER — ASPIRIN 325 MG
TABLET ORAL AS NEEDED
Status: DISCONTINUED | OUTPATIENT
Start: 2018-01-01 | End: 2018-01-01 | Stop reason: HOSPADM

## 2018-01-01 RX ORDER — WARFARIN SODIUM 5 MG/1
5 TABLET ORAL
Status: DISCONTINUED | OUTPATIENT
Start: 2018-01-01 | End: 2018-01-01

## 2018-01-01 RX ORDER — CALCIUM GLUCONATE 94 MG/ML
INJECTION, SOLUTION INTRAVENOUS
Status: COMPLETED
Start: 2018-01-01 | End: 2018-01-01

## 2018-01-01 RX ORDER — ATORVASTATIN CALCIUM 20 MG/1
20 TABLET, FILM COATED ORAL DAILY
Status: DISCONTINUED | OUTPATIENT
Start: 2018-01-01 | End: 2018-01-01

## 2018-01-01 RX ORDER — ASPIRIN 325 MG
325 TABLET ORAL ONCE
Status: DISCONTINUED | OUTPATIENT
Start: 2018-01-01 | End: 2018-01-01

## 2018-01-01 RX ORDER — HEPARIN SODIUM 5000 [USP'U]/ML
60 INJECTION, SOLUTION INTRAVENOUS; SUBCUTANEOUS ONCE
Status: COMPLETED | OUTPATIENT
Start: 2018-01-01 | End: 2018-01-01

## 2018-01-01 RX ORDER — FENTANYL CITRATE 50 UG/ML
200 INJECTION, SOLUTION INTRAMUSCULAR; INTRAVENOUS
Status: DISCONTINUED | OUTPATIENT
Start: 2018-01-01 | End: 2018-01-01

## 2018-01-01 RX ORDER — CLOPIDOGREL BISULFATE 75 MG/1
600 TABLET ORAL ONCE
Status: COMPLETED | OUTPATIENT
Start: 2018-01-01 | End: 2018-01-01

## 2018-01-01 RX ORDER — FENTANYL CITRATE 50 UG/ML
50 INJECTION, SOLUTION INTRAMUSCULAR; INTRAVENOUS
Status: DISCONTINUED | OUTPATIENT
Start: 2018-01-01 | End: 2018-01-01

## 2018-01-01 RX ORDER — CISATRACURIUM BESYLATE 2 MG/ML
100 INJECTION, SOLUTION INTRAVENOUS ONCE
Status: DISCONTINUED | OUTPATIENT
Start: 2018-01-01 | End: 2018-01-01

## 2018-01-01 RX ORDER — AMIODARONE HYDROCHLORIDE 50 MG/ML
INJECTION, SOLUTION INTRAVENOUS
Status: COMPLETED | OUTPATIENT
Start: 2018-01-01 | End: 2018-01-01

## 2018-01-01 RX ORDER — HEPARIN SODIUM 5000 [USP'U]/ML
30-60 INJECTION, SOLUTION INTRAVENOUS; SUBCUTANEOUS EVERY 6 HOURS PRN
Status: DISCONTINUED | OUTPATIENT
Start: 2018-01-01 | End: 2018-01-01 | Stop reason: HOSPADM

## 2018-01-01 RX ORDER — DEXTROSE MONOHYDRATE 100 MG/ML
30 INJECTION, SOLUTION INTRAVENOUS CONTINUOUS
Status: DISCONTINUED | OUTPATIENT
Start: 2018-01-01 | End: 2018-01-01

## 2018-01-01 RX ORDER — CEFTRIAXONE SODIUM 1 G/50ML
1 INJECTION, SOLUTION INTRAVENOUS EVERY 24 HOURS
Status: COMPLETED | OUTPATIENT
Start: 2018-01-01 | End: 2018-01-01

## 2018-01-01 RX ORDER — FAMOTIDINE 20 MG/1
20 TABLET, FILM COATED ORAL 2 TIMES DAILY
Status: DISCONTINUED | OUTPATIENT
Start: 2018-01-01 | End: 2018-01-01

## 2018-01-01 RX ORDER — FENTANYL CITRATE 50 UG/ML
25 INJECTION, SOLUTION INTRAMUSCULAR; INTRAVENOUS EVERY 4 HOURS PRN
Status: DISCONTINUED | OUTPATIENT
Start: 2018-01-01 | End: 2018-05-04 | Stop reason: HOSPADM

## 2018-01-01 RX ORDER — HEPARIN SODIUM 5000 [USP'U]/ML
5000 INJECTION, SOLUTION INTRAVENOUS; SUBCUTANEOUS EVERY 8 HOURS SCHEDULED
Status: DISCONTINUED | OUTPATIENT
Start: 2018-01-01 | End: 2018-01-01 | Stop reason: SDUPTHER

## 2018-01-01 RX ORDER — SODIUM CHLORIDE 0.9 % (FLUSH) 0.9 %
10 SYRINGE (ML) INJECTION AS NEEDED
Status: DISCONTINUED | OUTPATIENT
Start: 2018-01-01 | End: 2018-01-01

## 2018-01-01 RX ORDER — HEPARIN SODIUM 1000 [USP'U]/ML
INJECTION, SOLUTION INTRAVENOUS; SUBCUTANEOUS AS NEEDED
Status: DISCONTINUED | OUTPATIENT
Start: 2018-01-01 | End: 2018-01-01

## 2018-01-01 RX ORDER — DOBUTAMINE HYDROCHLORIDE 100 MG/100ML
2.5 INJECTION INTRAVENOUS
Status: DISCONTINUED | OUTPATIENT
Start: 2018-01-01 | End: 2018-01-01

## 2018-01-01 RX ORDER — AMIODARONE HYDROCHLORIDE 200 MG/1
200 TABLET ORAL
Qty: 30 TABLET | Refills: 0 | Status: SHIPPED | OUTPATIENT
Start: 2018-01-01

## 2018-01-01 RX ORDER — BUDESONIDE AND FORMOTEROL FUMARATE DIHYDRATE 160; 4.5 UG/1; UG/1
2 AEROSOL RESPIRATORY (INHALATION)
Status: DISCONTINUED | OUTPATIENT
Start: 2018-01-01 | End: 2018-01-01 | Stop reason: HOSPADM

## 2018-01-01 RX ORDER — WARFARIN SODIUM 3 MG/1
3 TABLET ORAL
Status: DISCONTINUED | OUTPATIENT
Start: 2018-01-01 | End: 2018-01-01

## 2018-01-01 RX ORDER — HEPARIN SODIUM 1000 [USP'U]/ML
INJECTION, SOLUTION INTRAVENOUS; SUBCUTANEOUS AS NEEDED
Status: DISCONTINUED | OUTPATIENT
Start: 2018-01-01 | End: 2018-01-01 | Stop reason: HOSPADM

## 2018-01-01 RX ORDER — PANTOPRAZOLE SODIUM 40 MG/10ML
40 INJECTION, POWDER, LYOPHILIZED, FOR SOLUTION INTRAVENOUS EVERY 12 HOURS SCHEDULED
Status: DISCONTINUED | OUTPATIENT
Start: 2018-01-01 | End: 2018-01-01 | Stop reason: SDUPTHER

## 2018-01-01 RX ORDER — POTASSIUM CHLORIDE 750 MG/1
40 CAPSULE, EXTENDED RELEASE ORAL AS NEEDED
Status: DISCONTINUED | OUTPATIENT
Start: 2018-01-01 | End: 2018-01-01 | Stop reason: HOSPADM

## 2018-01-01 RX ORDER — CLOPIDOGREL BISULFATE 75 MG/1
TABLET ORAL AS NEEDED
Status: DISCONTINUED | OUTPATIENT
Start: 2018-01-01 | End: 2018-01-01 | Stop reason: HOSPADM

## 2018-01-01 RX ORDER — PHENYLEPHRINE HCL IN 0.9% NACL 0.5 MG/5ML
.5-3 SYRINGE (ML) INTRAVENOUS
Status: DISCONTINUED | OUTPATIENT
Start: 2018-01-01 | End: 2018-01-01

## 2018-01-01 RX ORDER — IPRATROPIUM BROMIDE AND ALBUTEROL SULFATE 2.5; .5 MG/3ML; MG/3ML
3 SOLUTION RESPIRATORY (INHALATION)
Status: DISPENSED | OUTPATIENT
Start: 2018-01-01 | End: 2018-01-01

## 2018-01-01 RX ORDER — ZIPRASIDONE MESYLATE 20 MG/ML
5 INJECTION, POWDER, LYOPHILIZED, FOR SOLUTION INTRAMUSCULAR EVERY 6 HOURS PRN
Status: DISCONTINUED | OUTPATIENT
Start: 2018-01-01 | End: 2018-01-01

## 2018-01-01 RX ORDER — METHYLPREDNISOLONE SODIUM SUCCINATE 125 MG/2ML
125 INJECTION, POWDER, LYOPHILIZED, FOR SOLUTION INTRAMUSCULAR; INTRAVENOUS ONCE
Status: COMPLETED | OUTPATIENT
Start: 2018-01-01 | End: 2018-01-01

## 2018-01-01 RX ORDER — DEXTROSE MONOHYDRATE 25 G/50ML
50 INJECTION, SOLUTION INTRAVENOUS
Status: DISCONTINUED | OUTPATIENT
Start: 2018-01-01 | End: 2018-01-01

## 2018-01-01 RX ORDER — WARFARIN SODIUM 7.5 MG/1
7.5 TABLET ORAL
Status: DISCONTINUED | OUTPATIENT
Start: 2018-01-01 | End: 2018-01-01 | Stop reason: HOSPADM

## 2018-01-01 RX ORDER — PREDNISONE 20 MG/1
40 TABLET ORAL
Status: DISCONTINUED | OUTPATIENT
Start: 2018-01-01 | End: 2018-01-01

## 2018-01-01 RX ORDER — METOCLOPRAMIDE HYDROCHLORIDE 5 MG/ML
5 INJECTION INTRAMUSCULAR; INTRAVENOUS EVERY 6 HOURS
Status: DISCONTINUED | OUTPATIENT
Start: 2018-01-01 | End: 2018-01-01

## 2018-01-01 RX ORDER — FAMOTIDINE 20 MG/1
20 TABLET, FILM COATED ORAL DAILY
Status: DISCONTINUED | OUTPATIENT
Start: 2018-01-01 | End: 2018-01-01 | Stop reason: HOSPADM

## 2018-01-01 RX ORDER — DEXTROSE MONOHYDRATE 50 MG/ML
125 INJECTION, SOLUTION INTRAVENOUS CONTINUOUS
Status: DISCONTINUED | OUTPATIENT
Start: 2018-01-01 | End: 2018-01-01

## 2018-01-01 RX ORDER — ATORVASTATIN CALCIUM 20 MG/1
20 TABLET, FILM COATED ORAL DAILY
Qty: 30 TABLET | Refills: 0 | Status: SHIPPED | OUTPATIENT
Start: 2018-01-01 | End: 2018-01-01 | Stop reason: SDUPTHER

## 2018-01-01 RX ORDER — POTASSIUM CHLORIDE 1.5 G/1.77G
40 POWDER, FOR SOLUTION ORAL AS NEEDED
Status: DISCONTINUED | OUTPATIENT
Start: 2018-01-01 | End: 2018-01-01 | Stop reason: HOSPADM

## 2018-01-01 RX ORDER — DEXTROSE MONOHYDRATE 25 G/50ML
INJECTION, SOLUTION INTRAVENOUS
Status: COMPLETED
Start: 2018-01-01 | End: 2018-01-01

## 2018-01-01 RX ORDER — ACETAMINOPHEN 325 MG/1
650 TABLET ORAL EVERY 4 HOURS PRN
Status: DISCONTINUED | OUTPATIENT
Start: 2018-01-01 | End: 2018-01-01

## 2018-01-01 RX ORDER — ACETAMINOPHEN 325 MG/1
650 TABLET ORAL EVERY 4 HOURS PRN
Status: DISCONTINUED | OUTPATIENT
Start: 2018-01-01 | End: 2018-01-01 | Stop reason: HOSPADM

## 2018-01-01 RX ORDER — ONDANSETRON 4 MG/1
4 TABLET, ORALLY DISINTEGRATING ORAL EVERY 6 HOURS PRN
Status: DISCONTINUED | OUTPATIENT
Start: 2018-01-01 | End: 2018-01-01 | Stop reason: HOSPADM

## 2018-01-01 RX ORDER — FENTANYL CITRATE 50 UG/ML
25 INJECTION, SOLUTION INTRAMUSCULAR; INTRAVENOUS
Status: DISCONTINUED | OUTPATIENT
Start: 2018-01-01 | End: 2018-05-04 | Stop reason: HOSPADM

## 2018-01-01 RX ORDER — DEXTROSE MONOHYDRATE 50 MG/ML
50 INJECTION, SOLUTION INTRAVENOUS CONTINUOUS
Status: ACTIVE | OUTPATIENT
Start: 2018-01-01 | End: 2018-01-01

## 2018-01-01 RX ORDER — ZIPRASIDONE MESYLATE 20 MG/ML
5 INJECTION, POWDER, LYOPHILIZED, FOR SOLUTION INTRAMUSCULAR ONCE
Status: COMPLETED | OUTPATIENT
Start: 2018-01-01 | End: 2018-01-01

## 2018-01-01 RX ORDER — BISACODYL 5 MG/1
5 TABLET, DELAYED RELEASE ORAL DAILY PRN
Status: DISCONTINUED | OUTPATIENT
Start: 2018-01-01 | End: 2018-01-01

## 2018-01-01 RX ORDER — LORAZEPAM 2 MG/ML
2 INJECTION INTRAMUSCULAR
Status: DISCONTINUED | OUTPATIENT
Start: 2018-01-01 | End: 2018-05-04 | Stop reason: HOSPADM

## 2018-01-01 RX ORDER — DEXTROSE MONOHYDRATE 25 G/50ML
25 INJECTION, SOLUTION INTRAVENOUS
Status: DISCONTINUED | OUTPATIENT
Start: 2018-01-01 | End: 2018-01-01

## 2018-01-01 RX ORDER — CALCIUM GLUCONATE 94 MG/ML
2 INJECTION, SOLUTION INTRAVENOUS ONCE
Status: DISCONTINUED | OUTPATIENT
Start: 2018-01-01 | End: 2018-01-01

## 2018-01-01 RX ORDER — PREDNISONE 20 MG/1
20 TABLET ORAL
Status: COMPLETED | OUTPATIENT
Start: 2018-01-01 | End: 2018-01-01

## 2018-01-01 RX ORDER — NICOTINE POLACRILEX 4 MG
15 LOZENGE BUCCAL
Status: DISCONTINUED | OUTPATIENT
Start: 2018-01-01 | End: 2018-01-01

## 2018-01-01 RX ORDER — TORSEMIDE 20 MG/1
20 TABLET ORAL DAILY
Status: DISCONTINUED | OUTPATIENT
Start: 2018-01-01 | End: 2018-01-01

## 2018-01-01 RX ORDER — ASPIRIN 81 MG/1
81 TABLET ORAL DAILY
Status: DISCONTINUED | OUTPATIENT
Start: 2018-01-01 | End: 2018-01-01

## 2018-01-01 RX ORDER — SODIUM CHLORIDE 9 MG/ML
INJECTION, SOLUTION INTRAVENOUS CONTINUOUS PRN
Status: DISCONTINUED | OUTPATIENT
Start: 2018-01-01 | End: 2018-01-01 | Stop reason: HOSPADM

## 2018-01-01 RX ORDER — ATORVASTATIN CALCIUM 20 MG/1
20 TABLET, FILM COATED ORAL DAILY
Qty: 30 TABLET | Refills: 12 | Status: SHIPPED | OUTPATIENT
Start: 2018-01-01

## 2018-01-01 RX ORDER — IPRATROPIUM BROMIDE AND ALBUTEROL SULFATE 2.5; .5 MG/3ML; MG/3ML
3 SOLUTION RESPIRATORY (INHALATION)
Status: DISCONTINUED | OUTPATIENT
Start: 2018-01-01 | End: 2018-01-01 | Stop reason: HOSPADM

## 2018-01-01 RX ORDER — CEFDINIR 300 MG/1
300 CAPSULE ORAL EVERY 12 HOURS SCHEDULED
Status: DISCONTINUED | OUTPATIENT
Start: 2018-01-01 | End: 2018-01-01

## 2018-01-01 RX ORDER — CISATRACURIUM BESYLATE 2 MG/ML
20 INJECTION, SOLUTION INTRAVENOUS ONCE
Status: DISCONTINUED | OUTPATIENT
Start: 2018-01-01 | End: 2018-01-01

## 2018-01-01 RX ORDER — HALOPERIDOL 5 MG/ML
INJECTION INTRAMUSCULAR
Status: COMPLETED
Start: 2018-01-01 | End: 2018-01-01

## 2018-01-01 RX ORDER — POTASSIUM CHLORIDE 750 MG/1
20 CAPSULE, EXTENDED RELEASE ORAL DAILY
Status: DISCONTINUED | OUTPATIENT
Start: 2018-01-01 | End: 2018-01-01

## 2018-01-01 RX ORDER — POTASSIUM CHLORIDE 7.45 MG/ML
10 INJECTION INTRAVENOUS
Status: DISCONTINUED | OUTPATIENT
Start: 2018-01-01 | End: 2018-01-01 | Stop reason: HOSPADM

## 2018-01-01 RX ORDER — CLINDAMYCIN PHOSPHATE 600 MG/50ML
600 INJECTION INTRAVENOUS EVERY 8 HOURS
Status: DISCONTINUED | OUTPATIENT
Start: 2018-01-01 | End: 2018-01-01

## 2018-01-01 RX ORDER — LISINOPRIL 2.5 MG/1
2.5 TABLET ORAL
Status: DISCONTINUED | OUTPATIENT
Start: 2018-01-01 | End: 2018-01-01

## 2018-01-01 RX ORDER — HYDROMORPHONE HYDROCHLORIDE 1 MG/ML
0.5 INJECTION, SOLUTION INTRAMUSCULAR; INTRAVENOUS; SUBCUTANEOUS
Status: DISCONTINUED | OUTPATIENT
Start: 2018-01-01 | End: 2018-01-01 | Stop reason: HOSPADM

## 2018-01-01 RX ORDER — HEPARIN SODIUM 1000 [USP'U]/ML
10000 INJECTION, SOLUTION INTRAVENOUS; SUBCUTANEOUS
Status: DISPENSED | OUTPATIENT
Start: 2018-01-01 | End: 2018-01-01

## 2018-01-01 RX ORDER — CLOPIDOGREL BISULFATE 75 MG/1
75 TABLET ORAL DAILY
Status: DISCONTINUED | OUTPATIENT
Start: 2018-01-01 | End: 2018-01-01

## 2018-01-01 RX ORDER — POTASSIUM CHLORIDE 29.8 MG/ML
20 INJECTION INTRAVENOUS AS NEEDED
Status: DISCONTINUED | OUTPATIENT
Start: 2018-01-01 | End: 2018-01-01

## 2018-01-01 RX ORDER — ATORVASTATIN CALCIUM 20 MG/1
40 TABLET, FILM COATED ORAL NIGHTLY
Status: DISCONTINUED | OUTPATIENT
Start: 2018-01-01 | End: 2018-01-01

## 2018-01-01 RX ORDER — PROPOFOL 10 MG/ML
VIAL (ML) INTRAVENOUS
Status: COMPLETED
Start: 2018-01-01 | End: 2018-01-01

## 2018-01-01 RX ORDER — SODIUM CHLORIDE 9 MG/ML
9 INJECTION, SOLUTION INTRAVENOUS CONTINUOUS
Status: DISCONTINUED | OUTPATIENT
Start: 2018-01-01 | End: 2018-01-01

## 2018-01-01 RX ORDER — SODIUM CHLORIDE 9 MG/ML
30 INJECTION, SOLUTION INTRAVENOUS CONTINUOUS
Status: DISCONTINUED | OUTPATIENT
Start: 2018-01-01 | End: 2018-01-01

## 2018-01-01 RX ORDER — PROPOFOL 10 MG/ML
VIAL (ML) INTRAVENOUS
Status: DISPENSED
Start: 2018-01-01 | End: 2018-01-01

## 2018-01-01 RX ORDER — POTASSIUM CHLORIDE 750 MG/1
40 CAPSULE, EXTENDED RELEASE ORAL DAILY
Status: DISCONTINUED | OUTPATIENT
Start: 2018-01-01 | End: 2018-01-01

## 2018-01-01 RX ORDER — ONDANSETRON 2 MG/ML
4 INJECTION INTRAMUSCULAR; INTRAVENOUS EVERY 6 HOURS PRN
Status: DISCONTINUED | OUTPATIENT
Start: 2018-01-01 | End: 2018-01-01 | Stop reason: HOSPADM

## 2018-01-01 RX ORDER — ALUMINA, MAGNESIA, AND SIMETHICONE 2400; 2400; 240 MG/30ML; MG/30ML; MG/30ML
15 SUSPENSION ORAL EVERY 6 HOURS PRN
Status: DISCONTINUED | OUTPATIENT
Start: 2018-01-01 | End: 2018-01-01

## 2018-01-01 RX ORDER — CLOPIDOGREL BISULFATE 75 MG/1
75 TABLET ORAL DAILY
Status: DISCONTINUED | OUTPATIENT
Start: 2018-01-01 | End: 2018-01-01 | Stop reason: HOSPADM

## 2018-01-01 RX ORDER — DOPAMINE HYDROCHLORIDE 160 MG/100ML
2-20 INJECTION, SOLUTION INTRAVENOUS
Status: DISCONTINUED | OUTPATIENT
Start: 2018-01-01 | End: 2018-01-01

## 2018-01-01 RX ORDER — DEXTROSE MONOHYDRATE 25 G/50ML
25-50 INJECTION, SOLUTION INTRAVENOUS
Status: DISCONTINUED | OUTPATIENT
Start: 2018-01-01 | End: 2018-01-01 | Stop reason: HOSPADM

## 2018-01-01 RX ORDER — WARFARIN SODIUM 7.5 MG/1
TABLET ORAL
Qty: 30 TABLET | Refills: 0 | Status: SHIPPED | OUTPATIENT
Start: 2018-01-01

## 2018-01-01 RX ORDER — SODIUM CHLORIDE 9 MG/ML
INJECTION, SOLUTION INTRAVENOUS
Status: COMPLETED | OUTPATIENT
Start: 2018-01-01 | End: 2018-01-01

## 2018-01-01 RX ORDER — ONDANSETRON 2 MG/ML
4 INJECTION INTRAMUSCULAR; INTRAVENOUS EVERY 6 HOURS PRN
Status: DISCONTINUED | OUTPATIENT
Start: 2018-01-01 | End: 2018-05-04 | Stop reason: HOSPADM

## 2018-01-01 RX ORDER — ONDANSETRON 4 MG/1
4 TABLET, FILM COATED ORAL EVERY 6 HOURS PRN
Status: DISCONTINUED | OUTPATIENT
Start: 2018-01-01 | End: 2018-05-04 | Stop reason: HOSPADM

## 2018-01-01 RX ORDER — SODIUM CHLORIDE 0.9 % (FLUSH) 0.9 %
10 SYRINGE (ML) INJECTION AS NEEDED
Status: DISCONTINUED | OUTPATIENT
Start: 2018-01-01 | End: 2018-01-01 | Stop reason: HOSPADM

## 2018-01-01 RX ORDER — FENTANYL CITRATE 50 UG/ML
50 INJECTION, SOLUTION INTRAMUSCULAR; INTRAVENOUS
Status: DISCONTINUED | OUTPATIENT
Start: 2018-01-01 | End: 2018-05-04 | Stop reason: HOSPADM

## 2018-01-01 RX ORDER — BUDESONIDE AND FORMOTEROL FUMARATE DIHYDRATE 160; 4.5 UG/1; UG/1
2 AEROSOL RESPIRATORY (INHALATION)
Status: DISCONTINUED | OUTPATIENT
Start: 2018-01-01 | End: 2018-01-01

## 2018-01-01 RX ORDER — CALCIUM GLUCONATE 94 MG/ML
1 INJECTION, SOLUTION INTRAVENOUS ONCE
Status: COMPLETED | OUTPATIENT
Start: 2018-01-01 | End: 2018-01-01

## 2018-01-01 RX ORDER — IPRATROPIUM BROMIDE AND ALBUTEROL SULFATE 2.5; .5 MG/3ML; MG/3ML
3 SOLUTION RESPIRATORY (INHALATION)
Status: DISCONTINUED | OUTPATIENT
Start: 2018-01-01 | End: 2018-01-01

## 2018-01-01 RX ORDER — ONDANSETRON 2 MG/ML
INJECTION INTRAMUSCULAR; INTRAVENOUS
Status: DISPENSED
Start: 2018-01-01 | End: 2018-01-01

## 2018-01-01 RX ORDER — FAMOTIDINE 10 MG/ML
20 INJECTION, SOLUTION INTRAVENOUS DAILY
Status: DISCONTINUED | OUTPATIENT
Start: 2018-01-01 | End: 2018-01-01

## 2018-01-01 RX ORDER — LORAZEPAM 2 MG/ML
INJECTION INTRAMUSCULAR
Status: COMPLETED
Start: 2018-01-01 | End: 2018-01-01

## 2018-01-01 RX ORDER — SODIUM CHLORIDE 0.9 % (FLUSH) 0.9 %
10 SYRINGE (ML) INJECTION AS NEEDED
Status: DISCONTINUED | OUTPATIENT
Start: 2018-01-01 | End: 2018-05-04 | Stop reason: HOSPADM

## 2018-01-01 RX ORDER — METOPROLOL TARTRATE 5 MG/5ML
INJECTION INTRAVENOUS
Status: COMPLETED
Start: 2018-01-01 | End: 2018-01-01

## 2018-01-01 RX ORDER — POTASSIUM CHLORIDE 750 MG/1
40 CAPSULE, EXTENDED RELEASE ORAL ONCE
Status: COMPLETED | OUTPATIENT
Start: 2018-01-01 | End: 2018-01-01

## 2018-01-01 RX ORDER — ATROPINE SULFATE 1 MG/ML
INJECTION, SOLUTION INTRAMUSCULAR; INTRAVENOUS; SUBCUTANEOUS
Status: COMPLETED | OUTPATIENT
Start: 2018-01-01 | End: 2018-01-01

## 2018-01-01 RX ORDER — PROMETHAZINE HYDROCHLORIDE 25 MG/ML
12.5 INJECTION, SOLUTION INTRAMUSCULAR; INTRAVENOUS ONCE
Status: COMPLETED | OUTPATIENT
Start: 2018-01-01 | End: 2018-01-01

## 2018-01-01 RX ORDER — ONDANSETRON 4 MG/1
4 TABLET, ORALLY DISINTEGRATING ORAL ONCE
Status: COMPLETED | OUTPATIENT
Start: 2018-01-01 | End: 2018-01-01

## 2018-01-01 RX ORDER — SODIUM CHLORIDE 9 MG/ML
125 INJECTION, SOLUTION INTRAVENOUS CONTINUOUS
Status: DISCONTINUED | OUTPATIENT
Start: 2018-01-01 | End: 2018-01-01

## 2018-01-01 RX ORDER — NALOXONE HYDROCHLORIDE 1 MG/ML
INJECTION INTRAMUSCULAR; INTRAVENOUS; SUBCUTANEOUS
Status: COMPLETED | OUTPATIENT
Start: 2018-01-01 | End: 2018-01-01

## 2018-01-01 RX ORDER — ZIPRASIDONE MESYLATE 20 MG/ML
10 INJECTION, POWDER, LYOPHILIZED, FOR SOLUTION INTRAMUSCULAR ONCE
Status: DISCONTINUED | OUTPATIENT
Start: 2018-01-01 | End: 2018-01-01

## 2018-01-01 RX ORDER — HYDROCODONE BITARTRATE AND ACETAMINOPHEN 5; 325 MG/1; MG/1
1 TABLET ORAL EVERY 4 HOURS PRN
Status: DISPENSED | OUTPATIENT
Start: 2018-01-01 | End: 2018-01-01

## 2018-01-01 RX ORDER — IPRATROPIUM BROMIDE AND ALBUTEROL SULFATE 2.5; .5 MG/3ML; MG/3ML
3 SOLUTION RESPIRATORY (INHALATION) EVERY 6 HOURS PRN
Status: DISCONTINUED | OUTPATIENT
Start: 2018-01-01 | End: 2018-01-01

## 2018-01-01 RX ORDER — AMIODARONE HYDROCHLORIDE 200 MG/1
200 TABLET ORAL
Status: DISCONTINUED | OUTPATIENT
Start: 2018-01-01 | End: 2018-01-01

## 2018-01-01 RX ORDER — VANCOMYCIN HYDROCHLORIDE 1 G/200ML
15 INJECTION, SOLUTION INTRAVENOUS EVERY 12 HOURS
Status: DISCONTINUED | OUTPATIENT
Start: 2018-01-01 | End: 2018-01-01

## 2018-01-01 RX ORDER — AMIODARONE HYDROCHLORIDE 200 MG/1
200 TABLET ORAL
Status: DISCONTINUED | OUTPATIENT
Start: 2018-01-01 | End: 2018-01-01 | Stop reason: HOSPADM

## 2018-01-01 RX ORDER — WARFARIN SODIUM 2 MG/1
2 TABLET ORAL
Status: DISCONTINUED | OUTPATIENT
Start: 2018-01-01 | End: 2018-01-01

## 2018-01-01 RX ORDER — BUDESONIDE AND FORMOTEROL FUMARATE DIHYDRATE 160; 4.5 UG/1; UG/1
2 AEROSOL RESPIRATORY (INHALATION)
Qty: 1 INHALER | Refills: 12 | Status: SHIPPED | OUTPATIENT
Start: 2018-01-01

## 2018-01-01 RX ORDER — CLOPIDOGREL BISULFATE 75 MG/1
75 TABLET ORAL DAILY
Qty: 30 TABLET | Refills: 0 | Status: SHIPPED | OUTPATIENT
Start: 2018-01-01 | End: 2018-01-01 | Stop reason: SDUPTHER

## 2018-01-01 RX ORDER — POTASSIUM CHLORIDE 29.8 MG/ML
20 INJECTION INTRAVENOUS
Status: DISCONTINUED | OUTPATIENT
Start: 2018-01-01 | End: 2018-01-01

## 2018-01-01 RX ORDER — PHENYLEPHRINE HCL IN 0.9% NACL 0.5 MG/5ML
SYRINGE (ML) INTRAVENOUS AS NEEDED
Status: DISCONTINUED | OUTPATIENT
Start: 2018-01-01 | End: 2018-01-01 | Stop reason: HOSPADM

## 2018-01-01 RX ORDER — CEFDINIR 300 MG/1
300 CAPSULE ORAL EVERY 12 HOURS SCHEDULED
Status: COMPLETED | OUTPATIENT
Start: 2018-01-01 | End: 2018-01-01

## 2018-01-01 RX ORDER — LORAZEPAM 2 MG/ML
4 INJECTION INTRAMUSCULAR
Status: DISCONTINUED | OUTPATIENT
Start: 2018-01-01 | End: 2018-05-04 | Stop reason: HOSPADM

## 2018-01-01 RX ORDER — LIDOCAINE HYDROCHLORIDE 20 MG/ML
INJECTION, SOLUTION INFILTRATION; PERINEURAL AS NEEDED
Status: DISCONTINUED | OUTPATIENT
Start: 2018-01-01 | End: 2018-01-01 | Stop reason: HOSPADM

## 2018-01-01 RX ORDER — HYDROCODONE BITARTRATE AND ACETAMINOPHEN 5; 325 MG/1; MG/1
1 TABLET ORAL EVERY 6 HOURS PRN
Status: DISCONTINUED | OUTPATIENT
Start: 2018-01-01 | End: 2018-01-01

## 2018-01-01 RX ORDER — AMIODARONE HYDROCHLORIDE 200 MG/1
200 TABLET ORAL
Qty: 30 TABLET | Refills: 0 | Status: SHIPPED | OUTPATIENT
Start: 2018-01-01 | End: 2018-01-01 | Stop reason: SDUPTHER

## 2018-01-01 RX ORDER — LORAZEPAM 2 MG/ML
3 INJECTION INTRAMUSCULAR
Status: DISCONTINUED | OUTPATIENT
Start: 2018-01-01 | End: 2018-05-04 | Stop reason: HOSPADM

## 2018-01-01 RX ORDER — FAMOTIDINE 10 MG/ML
20 INJECTION, SOLUTION INTRAVENOUS EVERY 12 HOURS SCHEDULED
Status: DISCONTINUED | OUTPATIENT
Start: 2018-01-01 | End: 2018-01-01

## 2018-01-01 RX ORDER — WARFARIN SODIUM 7.5 MG/1
TABLET ORAL
Qty: 30 TABLET | Refills: 0 | Status: SHIPPED | OUTPATIENT
Start: 2018-01-01 | End: 2018-01-01 | Stop reason: SDUPTHER

## 2018-01-01 RX ORDER — ATORVASTATIN CALCIUM 20 MG/1
20 TABLET, FILM COATED ORAL DAILY
Status: DISCONTINUED | OUTPATIENT
Start: 2018-01-01 | End: 2018-01-01 | Stop reason: HOSPADM

## 2018-01-01 RX ORDER — DEXMEDETOMIDINE HYDROCHLORIDE 4 UG/ML
.2-1.5 INJECTION, SOLUTION INTRAVENOUS
Status: DISCONTINUED | OUTPATIENT
Start: 2018-01-01 | End: 2018-01-01

## 2018-01-01 RX ORDER — CLOPIDOGREL BISULFATE 75 MG/1
75 TABLET ORAL DAILY
Qty: 30 TABLET | Refills: 0 | Status: SHIPPED | OUTPATIENT
Start: 2018-01-01

## 2018-01-01 RX ADMIN — CARVEDILOL 3.12 MG: 3.12 TABLET, FILM COATED ORAL at 21:12

## 2018-01-01 RX ADMIN — VANCOMYCIN HYDROCHLORIDE 1000 MG: 1 INJECTION, SOLUTION INTRAVENOUS at 02:59

## 2018-01-01 RX ADMIN — FAMOTIDINE 20 MG: 20 TABLET, FILM COATED ORAL at 10:05

## 2018-01-01 RX ADMIN — FAMOTIDINE 20 MG: 10 INJECTION INTRAVENOUS at 09:39

## 2018-01-01 RX ADMIN — DEXTROSE MONOHYDRATE 1 MG/MIN: 50 INJECTION, SOLUTION INTRAVENOUS at 23:40

## 2018-01-01 RX ADMIN — PROPOFOL 10 MCG/KG/MIN: 10 INJECTION, EMULSION INTRAVENOUS at 15:49

## 2018-01-01 RX ADMIN — FAMOTIDINE 20 MG: 20 TABLET, FILM COATED ORAL at 09:00

## 2018-01-01 RX ADMIN — TAZOBACTAM SODIUM AND PIPERACILLIN SODIUM 3.38 G: 375; 3 INJECTION, SOLUTION INTRAVENOUS at 20:42

## 2018-01-01 RX ADMIN — HALOPERIDOL LACTATE 2 MG: 5 INJECTION, SOLUTION INTRAMUSCULAR at 07:45

## 2018-01-01 RX ADMIN — METOCLOPRAMIDE 5 MG: 5 INJECTION, SOLUTION INTRAMUSCULAR; INTRAVENOUS at 16:16

## 2018-01-01 RX ADMIN — SODIUM CHLORIDE 1000 ML: 9 INJECTION, SOLUTION INTRAVENOUS at 00:45

## 2018-01-01 RX ADMIN — IPRATROPIUM BROMIDE AND ALBUTEROL SULFATE 3 ML: .5; 3 SOLUTION RESPIRATORY (INHALATION) at 21:59

## 2018-01-01 RX ADMIN — ATORVASTATIN CALCIUM 40 MG: 20 TABLET, FILM COATED ORAL at 20:48

## 2018-01-01 RX ADMIN — METOCLOPRAMIDE 5 MG: 5 INJECTION, SOLUTION INTRAMUSCULAR; INTRAVENOUS at 16:49

## 2018-01-01 RX ADMIN — HEPARIN SODIUM 3800 UNITS: 5000 INJECTION, SOLUTION INTRAVENOUS; SUBCUTANEOUS at 13:19

## 2018-01-01 RX ADMIN — DIBASIC SODIUM PHOSPHATE, MONOBASIC POTASSIUM PHOSPHATE AND MONOBASIC SODIUM PHOSPHATE 1 TABLET: 852; 155; 130 TABLET ORAL at 20:41

## 2018-01-01 RX ADMIN — MILRINONE LACTATE 0.68 MCG/KG/MIN: 200 INJECTION, SOLUTION INTRAVENOUS at 10:41

## 2018-01-01 RX ADMIN — ATORVASTATIN CALCIUM 20 MG: 20 TABLET, FILM COATED ORAL at 08:27

## 2018-01-01 RX ADMIN — IPRATROPIUM BROMIDE AND ALBUTEROL SULFATE 3 ML: .5; 3 SOLUTION RESPIRATORY (INHALATION) at 21:28

## 2018-01-01 RX ADMIN — BUDESONIDE AND FORMOTEROL FUMARATE DIHYDRATE 2 PUFF: 160; 4.5 AEROSOL RESPIRATORY (INHALATION) at 08:42

## 2018-01-01 RX ADMIN — CALCIUM GLUCONATE 1 G: 98 INJECTION, SOLUTION INTRAVENOUS at 21:17

## 2018-01-01 RX ADMIN — CEFDINIR 300 MG: 300 CAPSULE ORAL at 08:25

## 2018-01-01 RX ADMIN — CLOPIDOGREL BISULFATE 600 MG: 75 TABLET ORAL at 03:30

## 2018-01-01 RX ADMIN — ONDANSETRON 4 MG: 2 INJECTION, SOLUTION INTRAMUSCULAR; INTRAVENOUS at 20:36

## 2018-01-01 RX ADMIN — HALOPERIDOL LACTATE 2 MG: 5 INJECTION, SOLUTION INTRAMUSCULAR at 00:58

## 2018-01-01 RX ADMIN — PHENYLEPHRINE HYDROCHLORIDE 1 MCG/KG/MIN: 10 INJECTION INTRAVENOUS at 17:50

## 2018-01-01 RX ADMIN — DIBASIC SODIUM PHOSPHATE, MONOBASIC POTASSIUM PHOSPHATE AND MONOBASIC SODIUM PHOSPHATE 1 TABLET: 852; 155; 130 TABLET ORAL at 20:22

## 2018-01-01 RX ADMIN — PANTOPRAZOLE SODIUM 40 MG: 40 INJECTION, POWDER, FOR SOLUTION INTRAVENOUS at 20:06

## 2018-01-01 RX ADMIN — DOPAMINE HYDROCHLORIDE IN DEXTROSE 5 MCG/KG/MIN: 1.6 INJECTION, SOLUTION INTRAVENOUS at 01:35

## 2018-01-01 RX ADMIN — ASPIRIN 81 MG: 81 TABLET ORAL at 08:36

## 2018-01-01 RX ADMIN — ONDANSETRON 4 MG: 2 INJECTION INTRAMUSCULAR; INTRAVENOUS at 14:21

## 2018-01-01 RX ADMIN — METOCLOPRAMIDE 5 MG: 5 INJECTION, SOLUTION INTRAMUSCULAR; INTRAVENOUS at 09:39

## 2018-01-01 RX ADMIN — DEXTROSE MONOHYDRATE 25 ML: 25 INJECTION, SOLUTION INTRAVENOUS at 15:48

## 2018-01-01 RX ADMIN — POTASSIUM CHLORIDE 40 MEQ: 750 CAPSULE, EXTENDED RELEASE ORAL at 21:41

## 2018-01-01 RX ADMIN — ATORVASTATIN CALCIUM 20 MG: 20 TABLET, FILM COATED ORAL at 11:12

## 2018-01-01 RX ADMIN — EPINEPHRINE 1 MG: 0.1 INJECTION, SOLUTION ENDOTRACHEAL; INTRACARDIAC; INTRAVENOUS at 23:26

## 2018-01-01 RX ADMIN — TORSEMIDE 20 MG: 20 TABLET ORAL at 09:31

## 2018-01-01 RX ADMIN — METHYLPREDNISOLONE SODIUM SUCCINATE 125 MG: 125 INJECTION, POWDER, FOR SOLUTION INTRAMUSCULAR; INTRAVENOUS at 12:05

## 2018-01-01 RX ADMIN — NOREPINEPHRINE BITARTRATE 0.22 MCG/KG/MIN: 8 SOLUTION at 19:52

## 2018-01-01 RX ADMIN — CLOPIDOGREL 75 MG: 75 TABLET, FILM COATED ORAL at 08:27

## 2018-01-01 RX ADMIN — INSULIN ASPART 4 UNITS: 100 INJECTION, SOLUTION INTRAVENOUS; SUBCUTANEOUS at 09:08

## 2018-01-01 RX ADMIN — BUDESONIDE AND FORMOTEROL FUMARATE DIHYDRATE 2 PUFF: 160; 4.5 AEROSOL RESPIRATORY (INHALATION) at 22:03

## 2018-01-01 RX ADMIN — METOCLOPRAMIDE 5 MG: 5 INJECTION, SOLUTION INTRAMUSCULAR; INTRAVENOUS at 08:35

## 2018-01-01 RX ADMIN — HYDROCODONE BITARTRATE AND ACETAMINOPHEN 1 TABLET: 5; 325 TABLET ORAL at 21:01

## 2018-01-01 RX ADMIN — DEXTROSE MONOHYDRATE 25 ML: 25 INJECTION, SOLUTION INTRAVENOUS at 20:40

## 2018-01-01 RX ADMIN — FENTANYL CITRATE 50 MCG: 50 INJECTION, SOLUTION INTRAMUSCULAR; INTRAVENOUS at 23:34

## 2018-01-01 RX ADMIN — WARFARIN SODIUM 5 MG: 5 TABLET ORAL at 18:26

## 2018-01-01 RX ADMIN — METOCLOPRAMIDE 5 MG: 5 INJECTION, SOLUTION INTRAMUSCULAR; INTRAVENOUS at 11:12

## 2018-01-01 RX ADMIN — CALCIUM GLUCONATE 1 G: 94 INJECTION, SOLUTION INTRAVENOUS at 01:21

## 2018-01-01 RX ADMIN — HEPARIN SODIUM 17 UNITS/KG/HR: 10000 INJECTION, SOLUTION INTRAVENOUS at 13:20

## 2018-01-01 RX ADMIN — VASOPRESSIN 0.03 UNITS/MIN: 20 INJECTION, SOLUTION INTRAMUSCULAR; SUBCUTANEOUS at 06:23

## 2018-01-01 RX ADMIN — HEPARIN SODIUM 1700 UNITS: 1000 INJECTION, SOLUTION INTRAVENOUS; SUBCUTANEOUS at 09:15

## 2018-01-01 RX ADMIN — POTASSIUM CHLORIDE 10 MEQ: 10 INJECTION, SOLUTION INTRAVENOUS at 23:37

## 2018-01-01 RX ADMIN — PHYTONADIONE 5 MG: 10 INJECTION, EMULSION INTRAMUSCULAR; INTRAVENOUS; SUBCUTANEOUS at 13:20

## 2018-01-01 RX ADMIN — METOCLOPRAMIDE 5 MG: 5 INJECTION, SOLUTION INTRAMUSCULAR; INTRAVENOUS at 05:02

## 2018-01-01 RX ADMIN — IPRATROPIUM BROMIDE AND ALBUTEROL SULFATE 3 ML: .5; 3 SOLUTION RESPIRATORY (INHALATION) at 21:30

## 2018-01-01 RX ADMIN — MILRINONE LACTATE 0.75 MCG/KG/MIN: 200 INJECTION, SOLUTION INTRAVENOUS at 02:19

## 2018-01-01 RX ADMIN — VASOPRESSIN 0.03 UNITS/MIN: 20 INJECTION INTRAVENOUS at 14:41

## 2018-01-01 RX ADMIN — ONDANSETRON 4 MG: 4 TABLET, ORALLY DISINTEGRATING ORAL at 11:47

## 2018-01-01 RX ADMIN — FENTANYL CITRATE 50 MCG: 50 INJECTION, SOLUTION INTRAMUSCULAR; INTRAVENOUS at 11:22

## 2018-01-01 RX ADMIN — HYDROCODONE BITARTRATE AND ACETAMINOPHEN 1 TABLET: 5; 325 TABLET ORAL at 23:20

## 2018-01-01 RX ADMIN — POTASSIUM CHLORIDE 20 MEQ: 400 INJECTION, SOLUTION INTRAVENOUS at 08:04

## 2018-01-01 RX ADMIN — CLINDAMYCIN PHOSPHATE 600 MG: 12 INJECTION, SOLUTION INTRAMUSCULAR; INTRAVENOUS at 15:41

## 2018-01-01 RX ADMIN — HYDROCODONE BITARTRATE AND ACETAMINOPHEN 1 TABLET: 5; 325 TABLET ORAL at 08:35

## 2018-01-01 RX ADMIN — VASOPRESSIN 0.03 UNITS/MIN: 20 INJECTION INTRAVENOUS at 04:21

## 2018-01-01 RX ADMIN — POTASSIUM CHLORIDE 10 MEQ: 10 INJECTION, SOLUTION INTRAVENOUS at 05:34

## 2018-01-01 RX ADMIN — CLOPIDOGREL 75 MG: 75 TABLET, FILM COATED ORAL at 21:35

## 2018-01-01 RX ADMIN — CISATRACURIUM BESYLATE 3 MCG/KG/MIN: 10 INJECTION INTRAVENOUS at 01:22

## 2018-01-01 RX ADMIN — IPRATROPIUM BROMIDE AND ALBUTEROL SULFATE 3 ML: .5; 3 SOLUTION RESPIRATORY (INHALATION) at 07:47

## 2018-01-01 RX ADMIN — SODIUM BICARBONATE 100 ML/HR: 84 INJECTION, SOLUTION INTRAVENOUS at 09:39

## 2018-01-01 RX ADMIN — MINERAL OIL AND PETROLATUM: 150; 830 OINTMENT OPHTHALMIC at 10:06

## 2018-01-01 RX ADMIN — PHYTONADIONE 10 MG: 10 INJECTION, EMULSION INTRAMUSCULAR; INTRAVENOUS; SUBCUTANEOUS at 08:34

## 2018-01-01 RX ADMIN — POTASSIUM CHLORIDE 10 MEQ: 10 INJECTION, SOLUTION INTRAVENOUS at 16:27

## 2018-01-01 RX ADMIN — TAZOBACTAM SODIUM AND PIPERACILLIN SODIUM 3.38 G: 375; 3 INJECTION, SOLUTION INTRAVENOUS at 03:00

## 2018-01-01 RX ADMIN — CLOPIDOGREL 75 MG: 75 TABLET, FILM COATED ORAL at 09:31

## 2018-01-01 RX ADMIN — PREDNISONE 40 MG: 20 TABLET ORAL at 11:29

## 2018-01-01 RX ADMIN — FENTANYL CITRATE 50 MCG: 50 INJECTION, SOLUTION INTRAMUSCULAR; INTRAVENOUS at 09:35

## 2018-01-01 RX ADMIN — VANCOMYCIN HYDROCHLORIDE 1000 MG: 1 INJECTION, SOLUTION INTRAVENOUS at 08:40

## 2018-01-01 RX ADMIN — WARFARIN SODIUM 2 MG: 2 TABLET ORAL at 16:21

## 2018-01-01 RX ADMIN — SODIUM BICARBONATE 50 MEQ: 84 INJECTION, SOLUTION INTRAVENOUS at 01:00

## 2018-01-01 RX ADMIN — ASPIRIN 81 MG: 81 TABLET ORAL at 11:29

## 2018-01-01 RX ADMIN — FAMOTIDINE 20 MG: 20 TABLET, FILM COATED ORAL at 08:27

## 2018-01-01 RX ADMIN — SODIUM BICARBONATE 50 MEQ: 84 INJECTION, SOLUTION INTRAVENOUS at 23:38

## 2018-01-01 RX ADMIN — IPRATROPIUM BROMIDE AND ALBUTEROL SULFATE 3 ML: .5; 3 SOLUTION RESPIRATORY (INHALATION) at 20:06

## 2018-01-01 RX ADMIN — HEPARIN SODIUM 9 UNITS/KG/HR: 10000 INJECTION, SOLUTION INTRAVENOUS at 00:39

## 2018-01-01 RX ADMIN — IPRATROPIUM BROMIDE AND ALBUTEROL SULFATE 3 ML: .5; 3 SOLUTION RESPIRATORY (INHALATION) at 11:27

## 2018-01-01 RX ADMIN — PREDNISONE 20 MG: 20 TABLET ORAL at 08:25

## 2018-01-01 RX ADMIN — IPRATROPIUM BROMIDE AND ALBUTEROL SULFATE 3 ML: .5; 3 SOLUTION RESPIRATORY (INHALATION) at 16:04

## 2018-01-01 RX ADMIN — HYDROCODONE BITARTRATE AND ACETAMINOPHEN 1 TABLET: 5; 325 TABLET ORAL at 23:24

## 2018-01-01 RX ADMIN — HYDROCORTISONE SODIUM SUCCINATE 100 MG: 100 INJECTION, POWDER, FOR SOLUTION INTRAMUSCULAR; INTRAVENOUS at 16:48

## 2018-01-01 RX ADMIN — ATORVASTATIN CALCIUM 20 MG: 20 TABLET, FILM COATED ORAL at 23:13

## 2018-01-01 RX ADMIN — INSULIN ASPART 2 UNITS: 100 INJECTION, SOLUTION INTRAVENOUS; SUBCUTANEOUS at 20:44

## 2018-01-01 RX ADMIN — TORSEMIDE 20 MG: 20 TABLET ORAL at 11:29

## 2018-01-01 RX ADMIN — ASPIRIN 81 MG: 81 TABLET ORAL at 09:50

## 2018-01-01 RX ADMIN — TORSEMIDE 20 MG: 20 TABLET ORAL at 09:50

## 2018-01-01 RX ADMIN — NOREPINEPHRINE BITARTRATE 0.3 MCG/KG/MIN: 8 SOLUTION at 12:30

## 2018-01-01 RX ADMIN — CLINDAMYCIN PHOSPHATE 600 MG: 12 INJECTION, SOLUTION INTRAMUSCULAR; INTRAVENOUS at 23:30

## 2018-01-01 RX ADMIN — WARFARIN SODIUM 7.5 MG: 7.5 TABLET ORAL at 17:08

## 2018-01-01 RX ADMIN — PHENYLEPHRINE HYDROCHLORIDE 1 MCG/KG/MIN: 10 INJECTION INTRAVENOUS at 09:47

## 2018-01-01 RX ADMIN — METOCLOPRAMIDE 5 MG: 5 INJECTION, SOLUTION INTRAMUSCULAR; INTRAVENOUS at 20:46

## 2018-01-01 RX ADMIN — MILRINONE LACTATE 0.75 MCG/KG/MIN: 200 INJECTION, SOLUTION INTRAVENOUS at 19:45

## 2018-01-01 RX ADMIN — FAMOTIDINE 20 MG: 20 TABLET, FILM COATED ORAL at 08:36

## 2018-01-01 RX ADMIN — BUDESONIDE AND FORMOTEROL FUMARATE DIHYDRATE 2 PUFF: 160; 4.5 AEROSOL RESPIRATORY (INHALATION) at 07:47

## 2018-01-01 RX ADMIN — CLINDAMYCIN PHOSPHATE 600 MG: 12 INJECTION, SOLUTION INTRAMUSCULAR; INTRAVENOUS at 23:34

## 2018-01-01 RX ADMIN — PANTOPRAZOLE SODIUM 40 MG: 40 INJECTION, POWDER, FOR SOLUTION INTRAVENOUS at 20:40

## 2018-01-01 RX ADMIN — CLOPIDOGREL 75 MG: 75 TABLET, FILM COATED ORAL at 23:13

## 2018-01-01 RX ADMIN — Medication 0.12 MCG/KG/MIN: at 18:19

## 2018-01-01 RX ADMIN — ATORVASTATIN CALCIUM 40 MG: 20 TABLET, FILM COATED ORAL at 21:18

## 2018-01-01 RX ADMIN — PREDNISONE 40 MG: 20 TABLET ORAL at 09:31

## 2018-01-01 RX ADMIN — ASPIRIN 81 MG: 81 TABLET ORAL at 08:11

## 2018-01-01 RX ADMIN — TAZOBACTAM SODIUM AND PIPERACILLIN SODIUM 3.38 G: 375; 3 INJECTION, SOLUTION INTRAVENOUS at 13:20

## 2018-01-01 RX ADMIN — DEXTROSE MONOHYDRATE 25 ML: 25 INJECTION, SOLUTION INTRAVENOUS at 18:44

## 2018-01-01 RX ADMIN — SODIUM BICARBONATE 200 ML/HR: 84 INJECTION, SOLUTION INTRAVENOUS at 02:15

## 2018-01-01 RX ADMIN — PANTOPRAZOLE SODIUM 40 MG: 40 INJECTION, POWDER, FOR SOLUTION INTRAVENOUS at 09:33

## 2018-01-01 RX ADMIN — METOCLOPRAMIDE 5 MG: 5 INJECTION, SOLUTION INTRAMUSCULAR; INTRAVENOUS at 21:01

## 2018-01-01 RX ADMIN — HEPARIN SODIUM 12 UNITS/KG/HR: 10000 INJECTION, SOLUTION INTRAVENOUS at 12:11

## 2018-01-01 RX ADMIN — CLOPIDOGREL 75 MG: 75 TABLET, FILM COATED ORAL at 09:00

## 2018-01-01 RX ADMIN — CARVEDILOL 3.12 MG: 3.12 TABLET, FILM COATED ORAL at 21:09

## 2018-01-01 RX ADMIN — Medication 0.1 MCG/KG/MIN: at 01:30

## 2018-01-01 RX ADMIN — ONDANSETRON 4 MG: 2 INJECTION INTRAMUSCULAR; INTRAVENOUS at 12:47

## 2018-01-01 RX ADMIN — SODIUM CHLORIDE 100 ML/HR: 9 INJECTION, SOLUTION INTRAVENOUS at 18:45

## 2018-01-01 RX ADMIN — DEXTROSE MONOHYDRATE 30 ML/HR: 100 INJECTION, SOLUTION INTRAVENOUS at 11:50

## 2018-01-01 RX ADMIN — POTASSIUM CHLORIDE 40 MEQ: 750 CAPSULE, EXTENDED RELEASE ORAL at 14:35

## 2018-01-01 RX ADMIN — BUDESONIDE AND FORMOTEROL FUMARATE DIHYDRATE 2 PUFF: 160; 4.5 AEROSOL RESPIRATORY (INHALATION) at 09:28

## 2018-01-01 RX ADMIN — METOCLOPRAMIDE 5 MG: 5 INJECTION, SOLUTION INTRAMUSCULAR; INTRAVENOUS at 15:47

## 2018-01-01 RX ADMIN — FAMOTIDINE 20 MG: 10 INJECTION INTRAVENOUS at 11:12

## 2018-01-01 RX ADMIN — BARIUM SULFATE 65 ML: 960 POWDER, FOR SUSPENSION ORAL at 09:07

## 2018-01-01 RX ADMIN — PREDNISONE 20 MG: 20 TABLET ORAL at 08:36

## 2018-01-01 RX ADMIN — DEXMEDETOMIDINE HYDROCHLORIDE 0.2 MCG/KG/HR: 4 INJECTION, SOLUTION INTRAVENOUS at 11:34

## 2018-01-01 RX ADMIN — IPRATROPIUM BROMIDE AND ALBUTEROL SULFATE 3 ML: .5; 3 SOLUTION RESPIRATORY (INHALATION) at 12:02

## 2018-01-01 RX ADMIN — EPINEPHRINE 1 MG: 0.1 INJECTION, SOLUTION ENDOTRACHEAL; INTRACARDIAC; INTRAVENOUS at 23:32

## 2018-01-01 RX ADMIN — Medication 50 MEQ: at 23:58

## 2018-01-01 RX ADMIN — BUDESONIDE AND FORMOTEROL FUMARATE DIHYDRATE 2 PUFF: 160; 4.5 AEROSOL RESPIRATORY (INHALATION) at 03:26

## 2018-01-01 RX ADMIN — POTASSIUM CHLORIDE 40 MEQ: 750 CAPSULE, EXTENDED RELEASE ORAL at 08:35

## 2018-01-01 RX ADMIN — IPRATROPIUM BROMIDE AND ALBUTEROL SULFATE 3 ML: .5; 3 SOLUTION RESPIRATORY (INHALATION) at 14:43

## 2018-01-01 RX ADMIN — CLINDAMYCIN PHOSPHATE 600 MG: 12 INJECTION, SOLUTION INTRAMUSCULAR; INTRAVENOUS at 06:45

## 2018-01-01 RX ADMIN — PANTOPRAZOLE SODIUM 40 MG: 40 INJECTION, POWDER, FOR SOLUTION INTRAVENOUS at 22:04

## 2018-01-01 RX ADMIN — BARIUM SULFATE 4 ML: 980 POWDER, FOR SUSPENSION ORAL at 09:08

## 2018-01-01 RX ADMIN — CEFEPIME HYDROCHLORIDE 2 G: 2 INJECTION, POWDER, FOR SOLUTION INTRAVENOUS at 13:12

## 2018-01-01 RX ADMIN — CLOPIDOGREL 75 MG: 75 TABLET, FILM COATED ORAL at 09:43

## 2018-01-01 RX ADMIN — IPRATROPIUM BROMIDE AND ALBUTEROL SULFATE 3 ML: .5; 3 SOLUTION RESPIRATORY (INHALATION) at 06:59

## 2018-01-01 RX ADMIN — BUDESONIDE AND FORMOTEROL FUMARATE DIHYDRATE 2 PUFF: 160; 4.5 AEROSOL RESPIRATORY (INHALATION) at 20:39

## 2018-01-01 RX ADMIN — PROPOFOL 5 MCG/KG/MIN: 10 INJECTION, EMULSION INTRAVENOUS at 22:59

## 2018-01-01 RX ADMIN — ONDANSETRON 4 MG: 4 TABLET, ORALLY DISINTEGRATING ORAL at 21:00

## 2018-01-01 RX ADMIN — ASPIRIN 81 MG: 81 TABLET ORAL at 09:31

## 2018-01-01 RX ADMIN — DEXTROSE MONOHYDRATE 50 ML: 25 INJECTION, SOLUTION INTRAVENOUS at 07:48

## 2018-01-01 RX ADMIN — HEPARIN SODIUM 3800 UNITS: 5000 INJECTION, SOLUTION INTRAVENOUS; SUBCUTANEOUS at 14:24

## 2018-01-01 RX ADMIN — IPRATROPIUM BROMIDE AND ALBUTEROL SULFATE 3 ML: .5; 3 SOLUTION RESPIRATORY (INHALATION) at 07:38

## 2018-01-01 RX ADMIN — AMIODARONE HYDROCHLORIDE 200 MG: 200 TABLET ORAL at 20:54

## 2018-01-01 RX ADMIN — ATORVASTATIN CALCIUM 20 MG: 20 TABLET, FILM COATED ORAL at 09:43

## 2018-01-01 RX ADMIN — CLOPIDOGREL 75 MG: 75 TABLET, FILM COATED ORAL at 11:30

## 2018-01-01 RX ADMIN — FENTANYL CITRATE 25 MCG: 50 INJECTION INTRAMUSCULAR; INTRAVENOUS at 04:16

## 2018-01-01 RX ADMIN — IPRATROPIUM BROMIDE AND ALBUTEROL SULFATE 3 ML: .5; 3 SOLUTION RESPIRATORY (INHALATION) at 19:22

## 2018-01-01 RX ADMIN — ASPIRIN 81 MG: 81 TABLET ORAL at 21:35

## 2018-01-01 RX ADMIN — FAMOTIDINE 20 MG: 10 INJECTION INTRAVENOUS at 08:38

## 2018-01-01 RX ADMIN — PROPOFOL 10 MCG/KG/MIN: 10 INJECTION, EMULSION INTRAVENOUS at 01:10

## 2018-01-01 RX ADMIN — BUDESONIDE AND FORMOTEROL FUMARATE DIHYDRATE 2 PUFF: 160; 4.5 AEROSOL RESPIRATORY (INHALATION) at 21:30

## 2018-01-01 RX ADMIN — HYDROCODONE BITARTRATE AND ACETAMINOPHEN 1 TABLET: 5; 325 TABLET ORAL at 06:26

## 2018-01-01 RX ADMIN — HEPARIN SODIUM 1700 UNITS: 1000 INJECTION, SOLUTION INTRAVENOUS; SUBCUTANEOUS at 09:05

## 2018-01-01 RX ADMIN — POTASSIUM CHLORIDE 20 MEQ: 400 INJECTION, SOLUTION INTRAVENOUS at 06:47

## 2018-01-01 RX ADMIN — INSULIN ASPART 6 UNITS: 100 INJECTION, SOLUTION INTRAVENOUS; SUBCUTANEOUS at 20:06

## 2018-01-01 RX ADMIN — IPRATROPIUM BROMIDE AND ALBUTEROL SULFATE 3 ML: .5; 3 SOLUTION RESPIRATORY (INHALATION) at 10:46

## 2018-01-01 RX ADMIN — DOBUTAMINE HYDROCHLORIDE 2.5 MCG/KG/MIN: 100 INJECTION INTRAVENOUS at 08:16

## 2018-01-01 RX ADMIN — POTASSIUM CHLORIDE 10 MEQ: 10 INJECTION, SOLUTION INTRAVENOUS at 11:46

## 2018-01-01 RX ADMIN — FAMOTIDINE 20 MG: 20 TABLET, FILM COATED ORAL at 08:25

## 2018-01-01 RX ADMIN — HEPARIN SODIUM 3800 UNITS: 5000 INJECTION, SOLUTION INTRAVENOUS; SUBCUTANEOUS at 12:11

## 2018-01-01 RX ADMIN — IPRATROPIUM BROMIDE AND ALBUTEROL SULFATE 3 ML: .5; 3 SOLUTION RESPIRATORY (INHALATION) at 04:09

## 2018-01-01 RX ADMIN — DEXTROSE MONOHYDRATE 125 ML/HR: 50 INJECTION, SOLUTION INTRAVENOUS at 01:10

## 2018-01-01 RX ADMIN — HYDROCODONE BITARTRATE AND ACETAMINOPHEN 1 TABLET: 5; 325 TABLET ORAL at 00:48

## 2018-01-01 RX ADMIN — POTASSIUM CHLORIDE 10 MEQ: 10 INJECTION, SOLUTION INTRAVENOUS at 16:45

## 2018-01-01 RX ADMIN — SODIUM BICARBONATE 50 MEQ: 84 INJECTION, SOLUTION INTRAVENOUS at 23:59

## 2018-01-01 RX ADMIN — BUDESONIDE AND FORMOTEROL FUMARATE DIHYDRATE 2 PUFF: 160; 4.5 AEROSOL RESPIRATORY (INHALATION) at 20:06

## 2018-01-01 RX ADMIN — HEPARIN SODIUM 3800 UNITS: 5000 INJECTION, SOLUTION INTRAVENOUS; SUBCUTANEOUS at 13:27

## 2018-01-01 RX ADMIN — HALOPERIDOL LACTATE 2 MG: 5 INJECTION, SOLUTION INTRAMUSCULAR at 08:54

## 2018-01-01 RX ADMIN — VASOPRESSIN 0.03 UNITS/MIN: 20 INJECTION, SOLUTION INTRAMUSCULAR; SUBCUTANEOUS at 15:30

## 2018-01-01 RX ADMIN — HYDROMORPHONE HYDROCHLORIDE 0.5 MG: 1 INJECTION, SOLUTION INTRAMUSCULAR; INTRAVENOUS; SUBCUTANEOUS at 07:29

## 2018-01-01 RX ADMIN — FAMOTIDINE 20 MG: 20 TABLET, FILM COATED ORAL at 09:43

## 2018-01-01 RX ADMIN — INSULIN ASPART 5 UNITS: 100 INJECTION, SOLUTION INTRAVENOUS; SUBCUTANEOUS at 10:06

## 2018-01-01 RX ADMIN — FENTANYL CITRATE 200 MCG: 50 INJECTION INTRAMUSCULAR; INTRAVENOUS at 01:17

## 2018-01-01 RX ADMIN — HEPARIN SODIUM 1900 UNITS: 5000 INJECTION, SOLUTION INTRAVENOUS; SUBCUTANEOUS at 06:34

## 2018-01-01 RX ADMIN — Medication 0.22 MCG/KG/MIN: at 03:15

## 2018-01-01 RX ADMIN — SODIUM CHLORIDE 1000 ML: 9 INJECTION, SOLUTION INTRAVENOUS at 12:49

## 2018-01-01 RX ADMIN — CEFEPIME HYDROCHLORIDE 2 G: 2 INJECTION, POWDER, FOR SOLUTION INTRAVENOUS at 23:06

## 2018-01-01 RX ADMIN — ZIPRASIDONE MESYLATE 5 MG: 20 INJECTION, POWDER, LYOPHILIZED, FOR SOLUTION INTRAMUSCULAR at 16:23

## 2018-01-01 RX ADMIN — POTASSIUM CHLORIDE 10 MEQ: 10 INJECTION, SOLUTION INTRAVENOUS at 02:59

## 2018-01-01 RX ADMIN — CLOPIDOGREL 75 MG: 75 TABLET, FILM COATED ORAL at 11:13

## 2018-01-01 RX ADMIN — CALCIUM GLUCONATE 2 G: 98 INJECTION, SOLUTION INTRAVENOUS at 11:50

## 2018-01-01 RX ADMIN — WARFARIN SODIUM 5 MG: 5 TABLET ORAL at 18:48

## 2018-01-01 RX ADMIN — ATORVASTATIN CALCIUM 20 MG: 20 TABLET, FILM COATED ORAL at 15:18

## 2018-01-01 RX ADMIN — SODIUM CHLORIDE 100 ML/HR: 9 INJECTION, SOLUTION INTRAVENOUS at 09:41

## 2018-01-01 RX ADMIN — DEXTROSE MONOHYDRATE 125 ML/HR: 50 INJECTION, SOLUTION INTRAVENOUS at 08:29

## 2018-01-01 RX ADMIN — VASOPRESSIN 0.03 UNITS/MIN: 20 INJECTION, SOLUTION INTRAMUSCULAR; SUBCUTANEOUS at 03:14

## 2018-01-01 RX ADMIN — PROMETHAZINE HYDROCHLORIDE 12.5 MG: 25 INJECTION INTRAMUSCULAR; INTRAVENOUS at 21:52

## 2018-01-01 RX ADMIN — INSULIN ASPART 4 UNITS: 100 INJECTION, SOLUTION INTRAVENOUS; SUBCUTANEOUS at 23:05

## 2018-01-01 RX ADMIN — Medication 0.08 MCG/KG/MIN: at 16:27

## 2018-01-01 RX ADMIN — HYDROCODONE BITARTRATE AND ACETAMINOPHEN 1 TABLET: 5; 325 TABLET ORAL at 14:17

## 2018-01-01 RX ADMIN — SODIUM CHLORIDE 500 ML: 0.9 INJECTION, SOLUTION INTRAVENOUS at 23:21

## 2018-01-01 RX ADMIN — IPRATROPIUM BROMIDE AND ALBUTEROL SULFATE 3 ML: .5; 3 SOLUTION RESPIRATORY (INHALATION) at 16:05

## 2018-01-01 RX ADMIN — VANCOMYCIN HYDROCHLORIDE 1000 MG: 1 INJECTION, SOLUTION INTRAVENOUS at 17:48

## 2018-01-01 RX ADMIN — METOPROLOL TARTRATE 5 MG: 5 INJECTION, SOLUTION INTRAVENOUS at 01:05

## 2018-01-01 RX ADMIN — POTASSIUM CHLORIDE 10 MEQ: 10 INJECTION, SOLUTION INTRAVENOUS at 14:55

## 2018-01-01 RX ADMIN — POTASSIUM CHLORIDE 10 MEQ: 10 INJECTION, SOLUTION INTRAVENOUS at 09:28

## 2018-01-01 RX ADMIN — CEFEPIME HYDROCHLORIDE 2 G: 2 INJECTION, POWDER, FOR SOLUTION INTRAVENOUS at 11:21

## 2018-01-01 RX ADMIN — PREDNISONE 40 MG: 20 TABLET ORAL at 09:51

## 2018-01-01 RX ADMIN — IPRATROPIUM BROMIDE AND ALBUTEROL SULFATE 3 ML: .5; 3 SOLUTION RESPIRATORY (INHALATION) at 14:56

## 2018-01-01 RX ADMIN — CLINDAMYCIN PHOSPHATE 600 MG: 12 INJECTION, SOLUTION INTRAMUSCULAR; INTRAVENOUS at 15:48

## 2018-01-01 RX ADMIN — IPRATROPIUM BROMIDE AND ALBUTEROL SULFATE 3 ML: .5; 3 SOLUTION RESPIRATORY (INHALATION) at 20:53

## 2018-01-01 RX ADMIN — SODIUM BICARBONATE 50 MEQ: 84 INJECTION, SOLUTION INTRAVENOUS at 00:59

## 2018-01-01 RX ADMIN — CLOPIDOGREL 75 MG: 75 TABLET, FILM COATED ORAL at 08:36

## 2018-01-01 RX ADMIN — METOCLOPRAMIDE 5 MG: 5 INJECTION, SOLUTION INTRAMUSCULAR; INTRAVENOUS at 02:14

## 2018-01-01 RX ADMIN — ACETAMINOPHEN 650 MG: 325 TABLET ORAL at 17:40

## 2018-01-01 RX ADMIN — WARFARIN SODIUM 3 MG: 3 TABLET ORAL at 19:38

## 2018-01-01 RX ADMIN — ATORVASTATIN CALCIUM 20 MG: 20 TABLET, FILM COATED ORAL at 09:00

## 2018-01-01 RX ADMIN — LORAZEPAM 2 MG: 2 INJECTION INTRAMUSCULAR; INTRAVENOUS at 00:10

## 2018-01-01 RX ADMIN — AMIODARONE HYDROCHLORIDE 200 MG: 200 TABLET ORAL at 23:13

## 2018-01-01 RX ADMIN — HEPARIN SODIUM 17 UNITS/KG/HR: 10000 INJECTION, SOLUTION INTRAVENOUS at 15:12

## 2018-01-01 RX ADMIN — HALOPERIDOL LACTATE 2 MG: 5 INJECTION, SOLUTION INTRAMUSCULAR at 10:59

## 2018-01-01 RX ADMIN — NOREPINEPHRINE BITARTRATE 0.3 MCG/KG/MIN: 8 SOLUTION at 13:10

## 2018-01-01 RX ADMIN — LISINOPRIL 2.5 MG: 2.5 TABLET ORAL at 08:36

## 2018-01-01 RX ADMIN — CEFEPIME HYDROCHLORIDE 2 G: 2 INJECTION, POWDER, FOR SOLUTION INTRAVENOUS at 12:59

## 2018-01-01 RX ADMIN — CEFDINIR 300 MG: 300 CAPSULE ORAL at 21:09

## 2018-01-01 RX ADMIN — VASOPRESSIN 0.03 UNITS/MIN: 20 INJECTION INTRAVENOUS at 23:39

## 2018-01-01 RX ADMIN — TAZOBACTAM SODIUM AND PIPERACILLIN SODIUM 3.38 G: 375; 3 INJECTION, SOLUTION INTRAVENOUS at 13:19

## 2018-01-01 RX ADMIN — TORSEMIDE 20 MG: 20 TABLET ORAL at 08:36

## 2018-01-01 RX ADMIN — HYDROMORPHONE HYDROCHLORIDE 0.5 MG: 1 INJECTION, SOLUTION INTRAMUSCULAR; INTRAVENOUS; SUBCUTANEOUS at 22:10

## 2018-01-01 RX ADMIN — DEXTROSE MONOHYDRATE 50 ML: 25 INJECTION, SOLUTION INTRAVENOUS at 11:40

## 2018-01-01 RX ADMIN — ZIPRASIDONE MESYLATE 5 MG: 20 INJECTION, POWDER, LYOPHILIZED, FOR SOLUTION INTRAMUSCULAR at 02:48

## 2018-01-01 RX ADMIN — CARVEDILOL 3.12 MG: 3.12 TABLET, FILM COATED ORAL at 08:36

## 2018-01-01 RX ADMIN — ASPIRIN 81 MG: 81 TABLET ORAL at 08:25

## 2018-01-01 RX ADMIN — CLOPIDOGREL 75 MG: 75 TABLET, FILM COATED ORAL at 08:11

## 2018-01-01 RX ADMIN — POTASSIUM CHLORIDE 40 MEQ: 750 CAPSULE, EXTENDED RELEASE ORAL at 01:44

## 2018-01-01 RX ADMIN — POTASSIUM CHLORIDE 10 MEQ: 10 INJECTION, SOLUTION INTRAVENOUS at 15:00

## 2018-01-01 RX ADMIN — SODIUM BICARBONATE 50 MEQ: 84 INJECTION, SOLUTION INTRAVENOUS at 23:58

## 2018-01-01 RX ADMIN — DIBASIC SODIUM PHOSPHATE, MONOBASIC POTASSIUM PHOSPHATE AND MONOBASIC SODIUM PHOSPHATE 1 TABLET: 852; 155; 130 TABLET ORAL at 09:50

## 2018-01-01 RX ADMIN — CARVEDILOL 3.12 MG: 3.12 TABLET, FILM COATED ORAL at 12:04

## 2018-01-01 RX ADMIN — DEXTROSE MONOHYDRATE 50 ML/HR: 50 INJECTION, SOLUTION INTRAVENOUS at 13:09

## 2018-01-01 RX ADMIN — CEFTRIAXONE SODIUM 1 G: 1 INJECTION, SOLUTION INTRAVENOUS at 06:19

## 2018-01-01 RX ADMIN — AMIODARONE HYDROCHLORIDE 200 MG: 200 TABLET ORAL at 00:38

## 2018-01-01 RX ADMIN — THIAMINE HYDROCHLORIDE 100 MG: 100 INJECTION, SOLUTION INTRAMUSCULAR; INTRAVENOUS at 23:50

## 2018-01-01 RX ADMIN — SODIUM BICARBONATE 50 MEQ: 84 INJECTION, SOLUTION INTRAVENOUS at 01:01

## 2018-01-01 RX ADMIN — IPRATROPIUM BROMIDE AND ALBUTEROL SULFATE 3 ML: .5; 3 SOLUTION RESPIRATORY (INHALATION) at 21:09

## 2018-01-01 RX ADMIN — HEPARIN SODIUM 16 UNITS/KG/HR: 10000 INJECTION, SOLUTION INTRAVENOUS at 09:26

## 2018-01-01 RX ADMIN — ASPIRIN 81 MG: 81 TABLET ORAL at 10:05

## 2018-01-01 RX ADMIN — CEFDINIR 300 MG: 300 CAPSULE ORAL at 09:00

## 2018-01-01 RX ADMIN — DEXTROSE MONOHYDRATE 125 ML/HR: 50 INJECTION, SOLUTION INTRAVENOUS at 16:27

## 2018-01-01 RX ADMIN — Medication 0.3 MCG/KG/MIN: at 19:07

## 2018-01-01 RX ADMIN — POTASSIUM CHLORIDE 10 MEQ: 10 INJECTION, SOLUTION INTRAVENOUS at 01:56

## 2018-01-01 RX ADMIN — SODIUM CHLORIDE 2 UNITS/HR: 9 INJECTION, SOLUTION INTRAVENOUS at 10:22

## 2018-01-01 RX ADMIN — IPRATROPIUM BROMIDE AND ALBUTEROL SULFATE 3 ML: .5; 3 SOLUTION RESPIRATORY (INHALATION) at 11:43

## 2018-01-01 RX ADMIN — IPRATROPIUM BROMIDE AND ALBUTEROL SULFATE 3 ML: .5; 3 SOLUTION RESPIRATORY (INHALATION) at 16:27

## 2018-01-01 RX ADMIN — FAMOTIDINE 20 MG: 20 TABLET, FILM COATED ORAL at 11:19

## 2018-01-01 RX ADMIN — HEPARIN SODIUM 17 UNITS/KG/HR: 10000 INJECTION, SOLUTION INTRAVENOUS at 16:57

## 2018-01-01 RX ADMIN — AMIODARONE HYDROCHLORIDE 150 MG: 50 INJECTION, SOLUTION INTRAVENOUS at 23:32

## 2018-01-01 RX ADMIN — FAMOTIDINE 20 MG: 10 INJECTION INTRAVENOUS at 05:02

## 2018-01-01 RX ADMIN — BUDESONIDE AND FORMOTEROL FUMARATE DIHYDRATE 2 PUFF: 160; 4.5 AEROSOL RESPIRATORY (INHALATION) at 20:13

## 2018-01-01 RX ADMIN — IPRATROPIUM BROMIDE AND ALBUTEROL SULFATE 3 ML: .5; 3 SOLUTION RESPIRATORY (INHALATION) at 00:47

## 2018-01-01 RX ADMIN — SODIUM CHLORIDE 125 ML/HR: 9 INJECTION, SOLUTION INTRAVENOUS at 05:22

## 2018-01-01 RX ADMIN — IPRATROPIUM BROMIDE AND ALBUTEROL SULFATE 3 ML: .5; 3 SOLUTION RESPIRATORY (INHALATION) at 12:04

## 2018-01-01 RX ADMIN — DOBUTAMINE HYDROCHLORIDE 2.5 MCG/KG/MIN: 100 INJECTION INTRAVENOUS at 16:48

## 2018-01-01 RX ADMIN — CLOPIDOGREL 75 MG: 75 TABLET, FILM COATED ORAL at 10:10

## 2018-01-01 RX ADMIN — CEFTRIAXONE SODIUM 1 G: 1 INJECTION, SOLUTION INTRAVENOUS at 06:20

## 2018-01-01 RX ADMIN — NALOXONE HYDROCHLORIDE 2 MG: 1 INJECTION PARENTERAL at 23:34

## 2018-01-01 RX ADMIN — FENTANYL CITRATE 50 MCG: 50 INJECTION, SOLUTION INTRAMUSCULAR; INTRAVENOUS at 20:43

## 2018-01-01 RX ADMIN — FENTANYL CITRATE 25 MCG: 50 INJECTION INTRAMUSCULAR; INTRAVENOUS at 19:30

## 2018-01-01 RX ADMIN — SODIUM CHLORIDE 500 ML: 9 INJECTION, SOLUTION INTRAVENOUS at 16:21

## 2018-01-01 RX ADMIN — Medication 10 ML: at 08:27

## 2018-01-01 RX ADMIN — TAZOBACTAM SODIUM AND PIPERACILLIN SODIUM 3.38 G: 375; 3 INJECTION, SOLUTION INTRAVENOUS at 04:39

## 2018-01-01 RX ADMIN — REMIFENTANIL HYDROCHLORIDE 0.5 MCG/KG/HR: 1 INJECTION, POWDER, LYOPHILIZED, FOR SOLUTION INTRAVENOUS at 11:34

## 2018-01-01 RX ADMIN — HEPARIN SODIUM 3800 UNITS: 5000 INJECTION, SOLUTION INTRAVENOUS; SUBCUTANEOUS at 22:24

## 2018-01-01 RX ADMIN — IPRATROPIUM BROMIDE AND ALBUTEROL SULFATE 3 ML: .5; 3 SOLUTION RESPIRATORY (INHALATION) at 07:33

## 2018-01-01 RX ADMIN — DOBUTAMINE HYDROCHLORIDE 2.5 MCG/KG/MIN: 100 INJECTION INTRAVENOUS at 02:00

## 2018-01-01 RX ADMIN — PANTOPRAZOLE SODIUM 40 MG: 40 INJECTION, POWDER, FOR SOLUTION INTRAVENOUS at 10:06

## 2018-01-01 RX ADMIN — BUDESONIDE AND FORMOTEROL FUMARATE DIHYDRATE 2 PUFF: 160; 4.5 AEROSOL RESPIRATORY (INHALATION) at 08:19

## 2018-01-01 RX ADMIN — CEFDINIR 300 MG: 300 CAPSULE ORAL at 08:27

## 2018-01-01 RX ADMIN — WARFARIN SODIUM 5 MG: 5 TABLET ORAL at 18:00

## 2018-01-01 RX ADMIN — PANTOPRAZOLE SODIUM 40 MG: 40 INJECTION, POWDER, FOR SOLUTION INTRAVENOUS at 08:37

## 2018-01-01 RX ADMIN — POTASSIUM CHLORIDE 10 MEQ: 10 INJECTION, SOLUTION INTRAVENOUS at 04:44

## 2018-01-01 RX ADMIN — CALCIUM GLUCONATE 1 G: 98 INJECTION, SOLUTION INTRAVENOUS at 01:21

## 2018-01-01 RX ADMIN — Medication 0.2 MCG/KG/MIN: at 06:28

## 2018-01-01 RX ADMIN — MILRINONE LACTATE 0.25 MCG/KG/MIN: 200 INJECTION, SOLUTION INTRAVENOUS at 11:58

## 2018-01-01 RX ADMIN — DEXTROSE MONOHYDRATE 50 ML: 25 INJECTION, SOLUTION INTRAVENOUS at 12:05

## 2018-01-01 RX ADMIN — ACETAMINOPHEN 650 MG: 325 TABLET ORAL at 13:50

## 2018-01-01 RX ADMIN — POTASSIUM CHLORIDE 20 MEQ: 750 CAPSULE, EXTENDED RELEASE ORAL at 09:32

## 2018-01-01 RX ADMIN — CLOPIDOGREL 75 MG: 75 TABLET, FILM COATED ORAL at 08:25

## 2018-01-01 RX ADMIN — WARFARIN SODIUM 5 MG: 5 TABLET ORAL at 18:54

## 2018-01-01 RX ADMIN — PROPOFOL 15 MCG/KG/MIN: 10 INJECTION, EMULSION INTRAVENOUS at 04:38

## 2018-01-01 RX ADMIN — DOBUTAMINE HYDROCHLORIDE 2.5 MCG/KG/MIN: 100 INJECTION INTRAVENOUS at 12:42

## 2018-01-01 RX ADMIN — AMIODARONE HYDROCHLORIDE 200 MG: 200 TABLET ORAL at 00:55

## 2018-01-01 RX ADMIN — SODIUM BICARBONATE 50 MEQ: 84 INJECTION, SOLUTION INTRAVENOUS at 00:44

## 2018-01-01 RX ADMIN — CLOPIDOGREL 75 MG: 75 TABLET, FILM COATED ORAL at 09:51

## 2018-01-01 RX ADMIN — DIBASIC SODIUM PHOSPHATE, MONOBASIC POTASSIUM PHOSPHATE AND MONOBASIC SODIUM PHOSPHATE 1 TABLET: 852; 155; 130 TABLET ORAL at 11:29

## 2018-01-01 RX ADMIN — CEFDINIR 300 MG: 300 CAPSULE ORAL at 21:27

## 2018-01-01 RX ADMIN — IPRATROPIUM BROMIDE AND ALBUTEROL SULFATE 3 ML: .5; 3 SOLUTION RESPIRATORY (INHALATION) at 07:49

## 2018-01-01 RX ADMIN — CLOPIDOGREL 75 MG: 75 TABLET, FILM COATED ORAL at 10:05

## 2018-01-01 RX ADMIN — DEXTROSE MONOHYDRATE 25 ML: 25 INJECTION, SOLUTION INTRAVENOUS at 18:34

## 2018-01-01 RX ADMIN — FAMOTIDINE 20 MG: 20 TABLET, FILM COATED ORAL at 09:31

## 2018-01-01 RX ADMIN — BUDESONIDE AND FORMOTEROL FUMARATE DIHYDRATE 2 PUFF: 160; 4.5 AEROSOL RESPIRATORY (INHALATION) at 20:34

## 2018-01-01 RX ADMIN — HYDROCODONE BITARTRATE AND ACETAMINOPHEN 1 TABLET: 5; 325 TABLET ORAL at 21:12

## 2018-01-01 RX ADMIN — HYDROCODONE BITARTRATE AND ACETAMINOPHEN 1 TABLET: 5; 325 TABLET ORAL at 18:01

## 2018-01-01 RX ADMIN — ATROPINE SULFATE 1 MG: 1 INJECTION, SOLUTION INTRAMUSCULAR; INTRAVENOUS; SUBCUTANEOUS at 23:28

## 2018-01-01 RX ADMIN — IPRATROPIUM BROMIDE AND ALBUTEROL SULFATE 3 ML: .5; 3 SOLUTION RESPIRATORY (INHALATION) at 10:42

## 2018-01-01 RX ADMIN — POTASSIUM CHLORIDE 10 MEQ: 10 INJECTION, SOLUTION INTRAVENOUS at 10:41

## 2018-01-01 RX ADMIN — WARFARIN SODIUM 7.5 MG: 7.5 TABLET ORAL at 17:40

## 2018-01-01 RX ADMIN — CEFEPIME HYDROCHLORIDE 2 G: 2 INJECTION, POWDER, FOR SOLUTION INTRAVENOUS at 23:41

## 2018-01-01 RX ADMIN — AMIODARONE HYDROCHLORIDE 200 MG: 200 TABLET ORAL at 11:12

## 2018-01-01 RX ADMIN — PREDNISONE 20 MG: 20 TABLET ORAL at 09:00

## 2018-01-01 RX ADMIN — Medication 0.02 MCG/KG/MIN: at 04:15

## 2018-01-01 RX ADMIN — IPRATROPIUM BROMIDE AND ALBUTEROL SULFATE 3 ML: .5; 3 SOLUTION RESPIRATORY (INHALATION) at 00:15

## 2018-01-01 RX ADMIN — THIAMINE HYDROCHLORIDE 100 MG: 100 INJECTION, SOLUTION INTRAMUSCULAR; INTRAVENOUS at 18:18

## 2018-01-01 RX ADMIN — CEFDINIR 300 MG: 300 CAPSULE ORAL at 20:38

## 2018-01-01 RX ADMIN — IPRATROPIUM BROMIDE AND ALBUTEROL SULFATE 3 ML: .5; 3 SOLUTION RESPIRATORY (INHALATION) at 14:57

## 2018-04-13 PROBLEM — I46.9 CARDIAC ARREST (HCC): Status: ACTIVE | Noted: 2018-01-01

## 2018-04-13 NOTE — PAYOR COMM NOTE
"Shannan Hall (62 y.o. Male)                     ATTENTION;  LOC WRAY LPN, CLINICALS FOR YOUR REVIEW, REPLY TO UR DEPT , IRA FAROOQ  [phone] OR UR  [phone] , ADMITTED TO CRITICAL CARE UNIT,                    DX CARDIAC ARREST.         Date of Birth Social Security Number Address Home Phone MRN    [id number]  [id number]  [facility] [address] [phone] [id number]    Restoration Marital Status          None        Admission Date Admission Type Admitting Provider Attending Provider Department, Room/Bed    4/12/18 Emergency Kahlil Lake MD Jambeih, Rami, MD [facility], 324/1    Discharge Date Discharge Disposition Discharge Destination                       Attending Provider:  Kahlil Lake MD    Allergies:  Penicillins    Isolation:  None   Infection:  None   Code Status:  FULL    Ht:  170.2 cm (67\")   Wt:  62.6 kg (138 lb)    Admission Cmt:  None   Principal Problem:  None                Active Insurance as of 4/12/2018     Primary Coverage     Payor Plan Insurance Group Employer/Plan Group    [employer] [place] [employer] [place]ICA[place]      Payor Plan Address Payor Plan Phone Number Effective From Effective To    [id number] [phone] 4/12/2018     [place] [address]       Subscriber Name Subscriber Birth Date Member ID       SHANNAN HALL 1955 [id number]                 Emergency Contacts      (Rel.) Home Phone Work Phone Mobile Phone    Tere Hall (Son) [phone] -- [phone]    Eron Vernon (Other) [phone] -- [phone]    BrendonYelitza (Sister) -- -- [phone]               History & Physical      Kahlil Lake MD at 4/12/2018 11:58 PM                        Patient Care Team:  No Known Provider as PCP - General    Chief complaint:  Cardiac arrest    History of present illness:  This is a 62-year-old male patient, active smoker, actively healthy with the exception of prior history of " hypertension.  Patient did not see a physician for years.  He presented with left arm pain for 3 weeks, gradually getting worse recently.  On arrival to ED in triage, patient collapsed.  Initial rhythm with V. fib.  Code team was activated and patient was resuscitated over 15 minutes. He was intubated during the code.   Reportedly, he received 6 DC cardioversion and was started on amiodarone.  Return of circulation occurred after  ~15 minutes of resuscitation but patient had ventricular tachycardia.      Labs performed just after the arrest:  Glucose 361; sodium 147; potassium 3.1 BUN 27; creatinine 1.23; WBC 17       Review of Systems:  Cannot obtain.  Patient is unresponsive    History  Past Medical History:   Diagnosis Date   • Hypertension      History reviewed. No pertinent surgical history.  History reviewed. No pertinent family history.  Social History   Substance Use Topics   • Smoking status: Current Every Day Smoker     Packs/day: 1.00   • Smokeless tobacco: Not on file   • Alcohol use No     No prescriptions prior to admission.     Allergies:  Penicillins    Vital Signs  Temp:  [97 °F (36.1 °C)] 97 °F (36.1 °C)  Heart Rate:  [73] 73  Resp:  [18] 18  BP: (159)/(92) 159/92    Physical Exam: Performed within 30 minutes of return of circulation  Constitutional: Tachypneic on the ventilator.Shivering.  Eyes: Injected conjunctiva.  Dilated pupil, nonreactive to light  ENMT: ET tube 7.5.  Moist mucous membrane  Heart: RRR, no murmur  Lungs: Good and equal air entry bilaterally.  No crackles or wheezing        Abdomen: Soft. No tenderness or dullness.  Extremities: No cyanosis, clubbing or pitting edema. Moves all extremities.  Neuro: Unresponsive.  He postures his extremities to painful stimulus  Integumentary: No rash  Lymphatic: No palpable cervical or supraclavicular lymph nodes.            Diagnostic imaging:  I personally and independently reviewed the following images:     Chest x-ray on 8/12/18: ET tube  in good position  Chest x-ray after central line placement showed the line in good position with no pneumothorax.  Increased pulmonary vascular congestion noted  I reviewed the EKG performed after the arrest.  ST elevation noted in the anterior lateral leads      Assessment:    1. In hospital V. fib cardiac arrest s/p CPR and ROSC in 15 min  2. ST elevation MI  3. Acute hypoxic respiratory failure secondary to cardiac arrest  4. ABENA vs CKD  5. Hyperglycemia  6. Electrolyte disturbance: Hypernatremia and hypokalemia  7. Leucocytosis: No evidence of infection. Could be tress induced.   8. Hypotension, likely mycardial stunning    Plan:  · Patient was evaluated before and after cardiac catheterization.  Discussed with Dr. Max.  Vent adjustment made twice. LAD stents placed.   · Initiate hypothermia with target temperature of 36  · Sedation to control shivering  · Labs every 6 hours.  Avoid replacing potassium during the hypothermia phase unless significant hypokalemia.  · Check UA  · Insulin SS      Kahlil Lake MD  04/12/18  11:58 PM    Time: Critical care 48 min excluding procedures      This note was dictated utilizing Dragon dictation    Electronically signed by Kahlil Lake MD at 4/13/2018  5:36 AM          Emergency Department Notes      Swati Herrera RN at 4/12/2018  9:51 PM        To ER via EMS stretcher c/o left upper arm pain/shoulder pain.  Possible injury from helping friend clean out shed.  Pt took a friends [employer] and vomited x 1.       Swati Herrera RN  04/12/18 3392      Electronically signed by Swati Herrera RN at 4/12/2018  9:52 PM     Joanne Morton RN at 4/12/2018 10:33 PM        Patient complains of left upper arm pain, hurts when he lifts anything, has been going on for a week. Patient keeps nodding off, thinks he took a pain pill. Denies being homeless.     Electronically signed by Joanne Morton RN at 4/12/2018 10:35 PM     Isidro Albright MD at 4/12/2018 11:24 PM      Procedure Orders:     1. Critical Care [[id number]] ordered by Isidro Albright MD at 04/13/18 0036     2. Intubation [[id number]] ordered by Isidro Albright MD at 04/12/18 2329                 EMERGENCY DEPARTMENT ENCOUNTER    CHIEF COMPLAINT  Chief Complaint: Arm pain  History given by: triage report   History limited by: Acuity of condition.   Room Number: 16/16  PMD: No Known Provider      HPI:  Pt is a 62 y.o. male who presents to the ED for evaluation of left arm pain. HPI is obtained from the RN triage report. Per report, pt came in for left arm pain that began 1 week ago after cleaning out a shed. Pt reported that the pain was worsened by movement of the arm. He informed triage that the he took a friends [employer]. While in the ED waiting room, he began to experiencing vomiting. Per EDT, pt began to have agonal respirations and collapsed.     Duration:  1 week  Onset: unknown  Timing: unknown  Location: left upper arm  Radiation: unknown  Quality: unknown  Intensity/Severity: unknown   Progression: unknown   Associated Symptoms: agonal respirations and vomiting after arrival to the ED   Aggravating Factors: movement   Alleviating Factors: unknown   Previous Episodes: unknown   Treatment before arrival: took a friends [place].     PAST MEDICAL HISTORY  Active Ambulatory Problems     Diagnosis Date Noted   • No Active Ambulatory Problems     Resolved Ambulatory Problems     Diagnosis Date Noted   • No Resolved Ambulatory Problems     Past Medical History:   Diagnosis Date   • Hypertension        PAST SURGICAL HISTORY  History reviewed. No pertinent surgical history.    FAMILY HISTORY  History reviewed. No pertinent family history.    SOCIAL HISTORY  Social History     Social History   • Marital status:      Spouse name: N/A   • Number of children: N/A   • Years of education: N/A     Occupational History   • Not on file.     Social History Main Topics   • Smoking status: Current Every Day Smoker     Packs/day: 1.00   • Smokeless  tobacco: Not on file   • Alcohol use No   • Drug use:      Types: Marijuana   • Sexual activity: Not on file     Other Topics Concern   • Not on file     Social History Narrative   • No narrative on file       ALLERGIES  Penicillins    REVIEW OF SYSTEMS  Review of Systems   Unable to perform ROS: Acuity of condition       PHYSICAL EXAM  ED Triage Vitals   Temp Heart Rate Resp BP SpO2   04/12/18 2239 04/12/18 2153 04/12/18 2153 04/12/18 2153 04/12/18 2153   97 °F (36.1 °C) 73 18 159/92 99 %      Temp src Heart Rate Source Patient Position BP Location FiO2 (%)   04/12/18 2239 -- -- -- --   Tympanic           Physical Exam   Constitutional: He appears distressed.   HENT:   Head: Normocephalic and atraumatic.   Eyes:   Pupils are fixed and dilated.    Neck: No tracheal deviation present. No thyromegaly present.   Cardiovascular:   V-fib on monitor   Pulmonary/Chest:   No spontaneous respirations.    Abdominal: Soft.   Musculoskeletal: He exhibits no edema or deformity.   Neurological:   Unable to assess    Skin: Skin is warm and dry. No pallor.       LAB RESULTS  Lab Results (last 24 hours)     Procedure Component Value Units Date/Time    Blood Gas, Arterial [[id number]]  (Abnormal) Collected:  04/12/18 2356    Specimen:  Arterial Blood Updated:  04/13/18 0002     Site Arterial: right radial     Hermes's Test Positive     pH, Arterial 7.393 pH units      pCO2, Arterial 22.7 (L) mm Hg      pO2, Arterial 295.4 (H) mm Hg      HCO3, Arterial 13.8 (L) mmol/L      Base Excess, Arterial -8.9 (L) mmol/L      O2 Saturation Calculated 99.9 (H) %      Barometric Pressure for Blood Gas 747.2 mmHg      Modality Bagging     Flow Rate 15 lpm      Rate 20 Breaths/minute     Narrative:       sats not readable Meter: [id number] : [id number] Shandra Dominguez    CBC & Differential [[id number]] Collected:  04/12/18 2359    Specimen:  Blood Updated:  04/13/18 0021    Narrative:       The following orders were created for panel order CBC  & Differential.  Procedure                               Abnormality         Status                     ---------                               -----------         ------                     Scan Slide[[id number]]                                                                  CBC Auto Differential[[id number]]        Abnormal            Final result                 Please view results for these tests on the individual orders.    Comprehensive Metabolic Panel [[id number]]  (Abnormal) Collected:  04/12/18 2359    Specimen:  Blood Updated:  04/13/18 0040     Glucose 361 (H) mg/dL      BUN 27 (H) mg/dL      Creatinine 1.23 mg/dL      Sodium 147 (H) mmol/L      Potassium 3.1 (L) mmol/L      Chloride 99 mmol/L      CO2 23.6 mmol/L      Calcium 7.8 (L) mg/dL      Total Protein 5.8 (L) g/dL      Albumin 3.30 (L) g/dL      ALT (SGPT) 256 (H) U/L      AST (SGOT) 168 (H) U/L      Alkaline Phosphatase 70 U/L      Total Bilirubin <0.2 mg/dL      eGFR Non African Amer 59 (L) mL/min/1.73      Globulin 2.5 gm/dL      A/G Ratio 1.3 g/dL      BUN/Creatinine Ratio 22.0     Anion Gap 24.4 mmol/L     Troponin [[id number]]  (Abnormal) Collected:  04/12/18 2359    Specimen:  Blood Updated:  04/13/18 0035     Troponin T 0.113 (C) ng/mL     Narrative:       Troponin T Reference Ranges:  Less than 0.03 ng/mL:    Negative for AMI  0.03 to 0.09 ng/mL:      Indeterminant for AMI  Greater than 0.09 ng/mL: Positive for AMI    CBC Auto Differential [[id number]]  (Abnormal) Collected:  04/12/18 2359    Specimen:  Blood Updated:  04/13/18 0021     WBC 17.46 (H) 10*3/mm3      RBC 4.43 (L) 10*6/mm3      Hemoglobin 13.8 g/dL      Hematocrit 44.4 %      .2 (H) fL      MCH 31.2 pg      MCHC 31.1 (L) g/dL      RDW 13.2 %      RDW-SD 48.4 fl      MPV 10.7 fL      Platelets 197 10*3/mm3      Neutrophil % 63.4 %      Lymphocyte % 29.2 %      Monocyte % 4.7 (L) %      Eosinophil % 1.0 %      Basophil % 0.6 %      Immature Grans % 1.1 (H) %       Neutrophils, Absolute 11.06 (H) 10*3/mm3      Lymphocytes, Absolute 5.09 (H) 10*3/mm3      Monocytes, Absolute 0.82 10*3/mm3      Eosinophils, Absolute 0.18 10*3/mm3      Basophils, Absolute 0.11 10*3/mm3      Immature Grans, Absolute 0.20 (H) 10*3/mm3      nRBC 0.0 /100 WBC           I ordered the above labs and reviewed the results    RADIOLOGY  CT Head Without Contrast   Preliminary Result   No definite acute intracranial pathology.                      XR Chest 1 View   Preliminary Result   No definite acute findings.              XR Humerus Left   Preliminary Result   No acute fracture or dislocation.                   I ordered the above noted radiological studies. Interpreted by radiologist. Reviewed by me in PACS.       PROCEDURES  Intubation  Date/Time: 4/12/2018 11:27 PM  Performed by: MARIA ALEJANDRA HIGGINS  Authorized by: MARIA ALEJANDRA HIGGINS     Consent:     Consent obtained:  Emergent situation  Pre-procedure details:     Patient status:  Unresponsive  Procedure details:     Preoxygenation:  Bag valve mask    CPR in progress: yes      Intubation method:  Oral    Oral intubation technique:  Video-assisted    Laryngoscope blade:  Mac 4    Tube size (mm):  7.5    Tube type:  Cuffed    Number of attempts:  1    Tube visualized through cords: yes    Placement assessment:     ETT to lip:  25    Tube secured with:  ETT jackson    Breath sounds:  Equal    Placement verification: chest rise, CXR verification, equal breath sounds and ETCO2 detector      CXR findings:  ETT in proper place  Post-procedure details:     Patient tolerance of procedure:  Tolerated well, no immediate complications  Critical Care  Performed by: MARIA ALEJANDRA HIGGINS  Authorized by: MARIA ALEJANDRA HIGGINS     Critical care provider statement:     Critical care time (minutes):  85    Critical care time was exclusive of:  Separately billable procedures and treating other patients and teaching time    Critical care was necessary to treat or prevent imminent or life-threatening  deterioration of the following conditions:  Cardiac failure and respiratory failure    Critical care was time spent personally by me on the following activities:  Development of treatment plan with patient or surrogate, discussions with consultants, evaluation of patient's response to treatment, examination of patient, obtaining history from patient or surrogate, ordering and performing treatments and interventions, ordering and review of laboratory studies, ordering and review of radiographic studies, re-evaluation of patient's condition and ventilator management      EKG           EKG time: 23:45  Rhythm/Rate: wide complex rhythm, 146    Interpreted Contemporaneously by me, independently viewed  No prior     EKG           EKG time: 00:37  Rhythm/Rate: Sinus tachycardia, 133  P waves and WV: nml  QRS, axis: nml   ST and T waves: ST elevation in V2-V4, ST depression in 3 and AVF     Interpreted Contemporaneously by me, independently viewed  changed compared to prior     PROGRESS AND CONSULTS  ED Course   CODE TIMES ARE APPROXIMATE, SEE RN DOCUMENTATION FOR EXACT TIMES.     23:24  Rhythm check=v-fib.   Code blue called.   CPR initiated.     23:27  Epi given.   Pt intubated  V-fib on monitor. Cardioversion at 120J.     23:28  Chest compressions continued.   Atropine given.     23:29  Rhythm check=V-fib  Cardioversion attempted  Chest compressions continued.     23:30  EtCO2=9    23:30  Rhythm check=V-fib  Cardioversion attempted  Chest compressions resumed.     23:31  EtCO2=12    23:31  Rhythm check=V-fib  Cardioversion attempted    23:32  EtCO2=13  Amiodarone given.     23:34  EtCO2=13  Narcan given     23:35  EtCO2=15  Compressions held  Rhythm check=V-fib  Cardioversion attempted    23:35  Pulse palpable. JK=288's.  Amiodarone drip ordered.     23:37  Rhythm check=V-fib  Cardioversion attempted  Pulse palpable.     23:39  QRS complex on the monitor is widened. Sodium bicarb given.   EtCO2=28    23:43  EKG, CXR,  CBC, CMP, and troponin ordered. Call placed to pulmonology for admission.     23:47  BP- 159/92 HR- 73 Temp- 97 °F (36.1 °C) (Tympanic) O2 sat- 99%  Pupils are now constricting.     23:52  Sodium bicarb given.     23:54  Updated pt's family that the pt had a cardiac arrest, but after CPR and cardioversion, he has regained a pulse. Informed them of the plan for admission to the ICU. Family understands and agrees with the plan, all questions answered.    23:58  Discussed pt's case with Dr Lake (pulmonology), who agrees to admit the ICU.  Call placed to interventional cardiology    23:59   Repeat EKG ordered.     00:07  BP- 134/78 HR- 130 Temp- 97 °F (36.1 °C) (Tympanic) O2 sat- 99%  Pt is now seizing and posturing. Ativan and stat CT head ordered.     00:09  1 mg of Ativan given. Pupils are dilated and non responsive.     00:15  Discussed pt's case with Dr Max (interventional cardiology), who will review the pt's EKG and return my call.     00:26  Discussed pt's case with Dr Max who recommends a synchronized cardioversion.  He will come to the ED to evaluate pt.     00:30  GR=239/83  Pt continues to have posturing. Additional Ativan ordered.     00:32  Synchronized cardioversion attempted.   Repeat EKG ordered    00:40  Team C called    00:46  Spoke with Dr Max, updated him on pt's EKG changes.     00:50  Dr Max at bedside.     00:51  Updated the pt's family on the pt's condition, and the plan for pt to go to the cardiac cath lab. Family escorted to pt's bedside.     MEDICAL DECISION MAKING  Results were reviewed/discussed with the patient and they were also made aware of online access. Pt also made aware that some labs, such as cultures, will not be resulted during ER visit and follow up with PMD is necessary.     MDM  Number of Diagnoses or Management Options  Cardiac arrest:      Amount and/or Complexity of Data Reviewed  Clinical lab tests: ordered and reviewed (Troponin= 0.113  )  Tests in the  "radiology section of CPT®:  ordered and reviewed (CXR shows ET tube in good position, NAD. CT head shows NAD)  Tests in the medicine section of CPT®:  reviewed and ordered (See EKG procedure note. )  Discuss the patient with other providers: yes (Dr Lake, pulmonology  Dr Max, interventional cardiology)    Critical Care  Total time providing critical care:  minutes         DIAGNOSIS  Final diagnoses:   Cardiac arrest       DISPOSITION  ADMISSION    Discussed treatment plan and reason for admission with pt/family and admitting physician.  Pt/family voiced understanding of the plan for admission for further testing/treatment as needed.     Latest Documented Vital Signs:  As of 12:53 AM  BP- 134/78 HR- 73 Temp- 97 °F (36.1 °C) (Tympanic) O2 sat- 99%    --  Documentation assistance provided by neelam Morillo for Dr Albright.  Information recorded by the scribrene was done at my direction and has been verified and validated by me.     Luz Maria Morillo  04/13/18 0120       Luz Maria Morillo  04/13/18 0148       Isidro Albright MD  04/13/18 0608      Electronically signed by Isidro Albright MD at 4/13/2018  6:08 AM     Swati Herrera RN at 4/12/2018 11:25 PM        Pt in waiting room stating \"I'm going to be sick.  I'm going to be sick.\"  I gave pt an emesis bag and he started to vomit.  Feliciano ESPARZA took pt to triage room where he became unresponsive upon rolling wheelchair into room.  Pt had agonal respirations.  Pt was immediately taken to ER room where CPR was started and code called.       Swati Herrera RN  04/13/18 0135      Electronically signed by Swati Herrera RN at 4/13/2018  1:35 AM       Lines, Drains & Airways    Active LDAs     Name:   Placement date:   Placement time:   Site:   Days:    CVC Quadruple Lumen 04/13/18 Right Subclavian  04/13/18    0450    Subclavian    less than 1    Peripheral IV 04/12/18 2328 Right Antecubital  04/12/18    2328    Antecubital    less than 1    Peripheral IV 04/13/18 0112 Left " Forearm  04/13/18    0112    Forearm    less than 1    NG/OG Tube Orogastric  Center mouth  04/13/18    0255    Center mouth    less than 1    Hi-Lo Evac ETT 7.5  04/12/18    2350    Oral    less than 1    Arterial Line 04/13/18 Left Radial  04/13/18        Radial    less than 1         Inactive LDAs     Name:   Placement date:   Placement time:   Removal date:   Removal time:   Site:   Days:    [REMOVED] ETT   04/12/18 2327 04/13/18 charted in error.  ETT has subglottic        less than 1    [REMOVED] Arterial Sheath 6 Fr. Right Radial  04/13/18    0137    04/13/18    0251    Radial    less than 1                Hospital Medications (all)       Dose Frequency Start End    acetaminophen (TYLENOL) tablet 650 mg 650 mg Every 4 Hours PRN 4/13/2018     Sig - Route: Take 2 tablets by mouth Every 4 (Four) Hours As Needed for Mild Pain  or Fever (temperature greater than 101F). - Oral    amiodarone (CORDARONE) 900 mg in dextrose (D5W) 5 % 500 mL (1.8 mg/mL) infusion  Code / Trauma / Sedation Continuous Med 4/12/2018 4/12/2018    Sig - Route: Infuse  into a venous catheter Emergency Use. - Intravenous    amiodarone (CORDARONE) injection  Code / Trauma / Sedation Medication 4/12/2018 4/12/2018    Sig - Route: Infuse  into a venous catheter Emergency Use. - Intravenous    amiodarone in dextrose 5% (NEXTERONE) 360-4.14 MG/200ML-% infusion  - ADS Override Pull   4/12/2018 4/13/2018    Notes to Pharmacy: IMAN NASSAR: cabinet override    artificial tears ophthalmic ointment  Every 4 Hours 4/13/2018     Sig - Route: Administer  to both eyes Every 4 (Four) Hours. - Both Eyes    atorvastatin (LIPITOR) tablet 40 mg 40 mg Nightly 4/13/2018     Sig - Route: Take 2 tablets by mouth Every Night. - Oral    atropine injection  Code / Trauma / Sedation Medication 4/12/2018 4/12/2018    Sig: Emergency Use.    cisatracurium (NIMBEX) injection 20 mg 20 mg Once 4/13/2018     Sig - Route: Infuse 10 mL into a venous catheter 1 (One)  "Time. - Intravenous    cisatracurium (NIMBEX) injection 6,260 mcg 100 mcg/kg × 62.6 kg Once 4/13/2018     Sig - Route: Infuse 3.13 mL into a venous catheter 1 (One) Time. - Intravenous    cisatracurium besylate (NIMBEX) 200 mg in sodium chloride 0.9 % 100 mL (2 mg/mL) infusion 3 mcg/kg/min × 62.6 kg Titrated 4/13/2018     Sig - Route: Infuse 0.1878 mg/min into a venous catheter Dose Adjusted By Provider As Needed. - Intravenous    clopidogrel (PLAVIX) tablet 600 mg 600 mg Once 4/13/2018 4/13/2018    Sig - Route: Take 8 tablets by mouth 1 (One) Time. - Oral    Linked Group 1:  \"And\" Linked Group Details        clopidogrel (PLAVIX) tablet 75 mg 75 mg Daily 4/14/2018     Sig - Route: Take 1 tablet by mouth Daily. - Oral    Linked Group 1:  \"And\" Linked Group Details        EPINEPHrine PF (ADRENALIN) injection  Code / Trauma / Sedation Medication 4/12/2018 4/12/2018    Sig: Emergency Use.    fentaNYL citrate (PF) (SUBLIMAZE) injection 200 mcg 200 mcg Every 1 Hour PRN 4/13/2018 4/23/2018    Sig - Route: Infuse 4 mL into a venous catheter Every 1 (One) Hour As Needed for Severe Pain . - Intravenous    HYDROcodone-acetaminophen (NORCO) 5-325 MG per tablet 1 tablet 1 tablet Every 4 Hours PRN 4/13/2018 4/23/2018    Sig - Route: Take 1 tablet by mouth Every 4 (Four) Hours As Needed for Moderate Pain . - Oral    insulin aspart (novoLOG) injection 0-7 Units 0-7 Units 4 Times Daily With Meals & Nightly 4/13/2018     Sig - Route: Inject 0-7 Units under the skin 4 (Four) Times a Day With Meals & at Bedtime. - Subcutaneous    LORazepam (ATIVAN) 2 MG/ML injection  - ADS Override Pull   4/13/2018 4/13/2018    Notes to Pharmacy: IMAN NASSAR: cabinet override    magnesium hydroxide (MILK OF MAGNESIA) suspension 2400 mg/10mL 10 mL 10 mL Daily PRN 4/13/2018     Sig - Route: Take 10 mL by mouth Daily As Needed for Constipation. - Oral    metoprolol tartrate (LOPRESSOR) 5 MG/5ML injection  - ADS Override Pull   4/13/2018 4/13/2018    " "Notes to Pharmacy: IMAN NASSAR: cabinet override    Naloxone HCl (NARCAN) injection  Code / Trauma / Sedation Medication 4/12/2018 4/12/2018    Sig - Route: Infuse  into a venous catheter Emergency Use. - Intravenous    norepinephrine (LEVOPHED) 8 mg/250 mL (32 mcg/mL) in sodium chloride 0.9% infusion (premix) 0.02-0.3 mcg/kg/min × 62.6 kg Titrated 4/13/2018     Sig - Route: Infuse 1.252-18.78 mcg/min into a venous catheter Dose Adjusted By Provider As Needed. - Intravenous    ondansetron (ZOFRAN) injection 4 mg 4 mg Every 6 Hours PRN 4/13/2018     Sig - Route: Infuse 2 mL into a venous catheter Every 6 (Six) Hours As Needed for Nausea or Vomiting. - Intravenous    Linked Group 2:  \"Or\" Linked Group Details        ondansetron (ZOFRAN) tablet 4 mg 4 mg Every 6 Hours PRN 4/13/2018     Sig - Route: Take 1 tablet by mouth Every 6 (Six) Hours As Needed for Nausea or Vomiting. - Oral    Linked Group 2:  \"Or\" Linked Group Details        ondansetron ODT (ZOFRAN-ODT) disintegrating tablet 4 mg 4 mg Every 6 Hours PRN 4/13/2018     Sig - Route: Take 1 tablet by mouth Every 6 (Six) Hours As Needed for Nausea or Vomiting. - Oral    Linked Group 2:  \"Or\" Linked Group Details        potassium chloride 20 mEq in 50 mL IVPB 20 mEq Every 1 Hour PRN 4/13/2018 4/14/2018    Sig - Route: Infuse 50 mL into a venous catheter Every 1 (One) Hour As Needed (See admin Instructions.). - Intravenous    Propofol (DIPRIVAN) 10 MG/ML injection  - ADS Override Pull   4/13/2018 4/13/2018    Notes to Pharmacy: IMAN NASSAR: cabinet override    Propofol (DIPRIVAN) 10 MG/ML injection  - ADS Override Pull   4/13/2018 4/13/2018    Notes to Pharmacy: IMAN NASSAR: cabinet override    propofol (DIPRIVAN) infusion 10 mg/mL 100 mL 5-50 mcg/kg/min × 62.6 kg Titrated 4/13/2018     Sig - Route: Infuse 313-3,130 mcg/min into a venous catheter Dose Adjusted By Provider As Needed. - Intravenous    sodium bicarbonate injection 8.4%  Code / Trauma / " Sedation Medication 4/12/2018 4/12/2018    Sig - Route: Infuse  into a venous catheter Emergency Use. - Intravenous    sodium chloride 0.9 % flush 10 mL 10 mL As Needed 4/12/2018     Sig - Route: Infuse 10 mL into a venous catheter As Needed for Line Care. - Intravenous    sodium chloride 0.9 % infusion  Code / Trauma / Sedation Continuous Med 4/12/2018 4/12/2018    Sig - Route: Infuse  into a venous catheter Emergency Use. - Intravenous    sodium chloride 0.9 % infusion 125 mL/hr Continuous 4/13/2018 4/13/2018    Sig - Route: Infuse 125 mL/hr into a venous catheter Continuous. - Intravenous    sodium chloride 0.9 % infusion 9 mL/hr Continuous 4/13/2018     Sig - Route: Infuse 9 mL/hr into a venous catheter Continuous. - Intravenous    Cosign for Ordering: Accepted by Fide Alex MD on 4/13/2018  8:35 AM    vasopressin (PITRESSIN) 20 Units in sodium chloride 0.9 % 100 mL (0.2 Units/mL) infusion 0.03 Units/min Titrated 4/13/2018     Sig - Route: Infuse 0.03 Units/min into a venous catheter Dose Adjusted By Provider As Needed. - Intravenous    amiodarone (NEXTERONE) 360 mg/200 mL (1.8 mg/mL) infusion (Discontinued) 1 mg/min Titrated 4/12/2018 4/13/2018    Sig - Route: Infuse 1 mg/min into a venous catheter Dose Adjusted By Provider As Needed. - Intravenous    aspirin tablet 325 mg (Discontinued) 325 mg Once 4/12/2018 4/13/2018    Sig - Route: Take 1 tablet by mouth 1 (One) Time. - Oral    aspirin tablet (Discontinued)  As Needed 4/13/2018 4/13/2018    Sig: As Needed.    Reason for Discontinue: Patient Discharge    cisatracurium besylate (NIMBEX) 200 mg in sodium chloride 0.9 % 100 mL (2 mg/mL) infusion (Discontinued) 2 mcg/kg/min × 62.6 kg Titrated 4/13/2018 4/13/2018    Sig - Route: Infuse 0.1252 mg/min into a venous catheter Dose Adjusted By Provider As Needed. - Intravenous    cisatracurium besylate (NIMBEX) 200 mg in sodium chloride 0.9 % 100 mL (2 mg/mL) infusion (Discontinued) 3 mcg/kg/min × 62.6 kg  Continuous 4/13/2018 4/13/2018    Sig - Route: Infuse 0.1878 mg/min into a venous catheter Continuous. - Intravenous    clopidogrel (PLAVIX) tablet (Discontinued)  As Needed 4/13/2018 4/13/2018    Sig: As Needed.    Reason for Discontinue: Patient Discharge    fentaNYL citrate (PF) (SUBLIMAZE) injection 50 mcg (Discontinued) 50 mcg Every 1 Hour PRN 4/13/2018 4/13/2018    Sig - Route: Infuse 1 mL into a venous catheter Every 1 (One) Hour As Needed for Severe Pain . - Intravenous    heparin (porcine) injection (Discontinued)  As Needed 4/13/2018 4/13/2018    Sig: As Needed.    Reason for Discontinue: Patient Discharge    iopamidol (ISOVUE-370) 76 % injection (Discontinued)  As Needed 4/13/2018 4/13/2018    Sig: As Needed.    Reason for Discontinue: Patient Discharge    lidocaine (XYLOCAINE) 2% injection (Discontinued)  As Needed 4/13/2018 4/13/2018    Sig: As Needed.    Reason for Discontinue: Patient Discharge    norepinephrine (LEVOPHED) 8 mg/250 mL (32 mcg/mL) in sodium chloride 0.9% infusion (premix) (Discontinued)  Continuous PRN 4/13/2018 4/13/2018    Sig: Continuous As Needed.    Reason for Discontinue: Patient Discharge    phenylephrine (SOLO-SYNEPHRINE) injection solution (Discontinued)  As Needed 4/13/2018 4/13/2018    Sig: As Needed.    Reason for Discontinue: Patient Discharge    sodium chloride 0.9 % infusion (Discontinued)  Continuous PRN 4/13/2018 4/13/2018    Sig: Continuous As Needed.    Reason for Discontinue: Patient Discharge    verapamil (ISOPTIN) 500 mcg, nitroglycerin (TRIDIL) 200 mcg, heparin (porcine) 3,000 Units radial artery injection (Discontinued)  As Needed 4/13/2018 4/13/2018    Sig: As Needed.    Reason for Discontinue: Patient Discharge          Orders (last 24 hrs)     Start     Ordered    04/14/18 0900  clopidogrel (PLAVIX) tablet 75 mg  Daily      04/13/18 0253    04/14/18 0700  ECG 12 Lead  Once      04/13/18 0253    04/14/18 0600  Discontinue 1:1 Care Two (2) Hours After Warming  Target Temperature Reached  Continuous      04/13/18 0526    04/14/18 0000  Rewarming Phase Should Begin 24 Hours After Temperature Goal is Reached  Continuous      04/13/18 0526    04/14/18 0000  During Rewarming Phase Monitor for Hyperthermia, Hypertension, Hypoglycemia, Hyperkalemia & Seizures.  Anticipate Decrease in Urine Output.  Continuous      04/13/18 0526    04/13/18 2125  Discontinue Potassium Replacement 6 Hours Prior to Rewarming  Until Discontinued      04/13/18 0526    04/13/18 2100  atorvastatin (LIPITOR) tablet 40 mg  Nightly      04/13/18 0253    04/13/18 1125  CBC Auto Differential  PROCEDURE ONCE      04/13/18 0526    04/13/18 0845  sodium chloride 0.9 % infusion  Continuous      04/13/18 0802    04/13/18 0811  POC Glucose Once  Once      04/13/18 0810    04/13/18 0800  Skin Assessment  Every 4 Hours      04/13/18 0526    04/13/18 0800  Inpatient Neuro Clinical Specialist Consult  Once     Provider:  (Not yet assigned)    04/13/18 0527    04/13/18 0800  insulin aspart (novoLOG) injection 0-7 Units  4 Times Daily With Meals & Nightly      04/13/18 0537    04/13/18 0741  Urinalysis, Microscopic Only - Urine, Clean Catch  Once      04/13/18 0740    04/13/18 0730  Adult Transthoracic Echo Complete W/ Cont if Necessary Per Protocol  Once      04/13/18 0253    04/13/18 0700  POC Glucose 4x Daily AC & at Bedtime  4 Times Daily Before Meals & at Bedtime      04/13/18 0537    04/13/18 0646  POC Activated Clotting Time  Once      04/13/18 0645    04/13/18 0646  POC Panel 9, ISTAT  Once      04/13/18 0645    04/13/18 0628  Lactic Acid, Reflex Timer (This will reflex a repeat order 3-3:15 hours after ordered.)  Once      04/13/18 0627    04/13/18 0600  CBC (No Diff)  Morning Draw,   Status:  Canceled      04/13/18 0253    04/13/18 0600  Basic Metabolic Panel  Morning Draw,   Status:  Canceled      04/13/18 0253    04/13/18 0600  Hemoglobin A1c  Morning Draw      04/13/18 0253    04/13/18 0600  Lipid Panel   Morning Draw     Comments:  Fasting      04/13/18 0253    04/13/18 0600  Troponin  Morning Draw      04/13/18 0253    04/13/18 0600  artificial tears ophthalmic ointment  Every 4 Hours      04/13/18 0520    04/13/18 0600  Oral Care & Skin Care  Every 2 Hours      04/13/18 0520    04/13/18 0600  cisatracurium (NIMBEX) injection 6,260 mcg  Once      04/13/18 0520    04/13/18 0600  cisatracurium besylate (NIMBEX) 200 mg in sodium chloride 0.9 % 100 mL (2 mg/mL) infusion  Titrated      04/13/18 0520    04/13/18 0600  propofol (DIPRIVAN) infusion 10 mg/mL 100 mL  Titrated      04/13/18 0521    04/13/18 0600  Neuro Checks  Every Hour      04/13/18 0526    04/13/18 0600  Real Coma Scale  Every Hour      04/13/18 0526    04/13/18 0600  Assess pupil size and reaction & pedal pulses  Every Hour      04/13/18 0526    04/13/18 0600  Strict Intake & Output  Every Hour      04/13/18 0526    04/13/18 0600  Document Target Temperature Device Water Temperature  Every Hour      04/13/18 0526    04/13/18 0600  Bedside Shivering Assessment Scale  Every Hour     Comments:  Score 0 - No Shivering Noted on Palpation of [place] Region of the Neck & Chest Wall  Score 1 - Mild - Shivering Localized to Neck & Thorax Only  Score 2 - Moderate - Shivering Involves Gross Movement of Upper Extremities (In Addition to Neck & Thorax)  Score 3 - Severe - Shivering Involves Gross Movements of the Trunk, Upper Extremities & Lower Extremities    04/13/18 0526    04/13/18 0600  ECG 12 Lead  Once      04/13/18 0645    04/13/18 0553  Blood Gas, Arterial  Once      04/13/18 0552    04/13/18 0532  Urinalysis With / Microscopic If Indicated - Urine, Clean Catch  Once      04/13/18 0531    04/13/18 0530  norepinephrine (LEVOPHED) 8 mg/250 mL (32 mcg/mL) in sodium chloride 0.9% infusion (premix)  Titrated      04/13/18 0447    04/13/18 0530  vasopressin (PITRESSIN) 20 Units in sodium chloride 0.9 % 100 mL (0.2 Units/mL) infusion  Titrated      04/13/18 7014  "   04/13/18 0527  POC Glucose Blood, Arterial Line Q2H  Every 2 Hours     Comments:  NO FINGERSTICKS - LINE DRAW ONLYIf blood glucose >180 initiate order set \"Insulin Infusion Protocol - Target Blood Sugar 111-180\" (deselect fingersticks) per protocol cosign required by attending provider.      04/13/18 0526    04/13/18 0525  Continuous Core Temperature Monitoring  Until Discontinued      04/13/18 0526    04/13/18 0525  No SQ Injections Until Patient is Rewarmed to Normothermia  Continuous      04/13/18 0526    04/13/18 0525  Real Coma Scale  Once      04/13/18 0526    04/13/18 0525  NG / OG Tube Insertion  Once      04/13/18 0526    04/13/18 0525  NG / OG to Low Intermittent Wall Suction  Continuous      04/13/18 0526    04/13/18 0525  Insert Central Line At Bedside  Once      04/13/18 0526    04/13/18 0525  Obtain Informed Consent - Central Line Placement  Once      04/13/18 0526    04/13/18 0525  Insert Arterial Line at Bedside  Once      04/13/18 0526    04/13/18 0525  Obtain Informed Consent - Arterial Line Placement  Once      04/13/18 0526    04/13/18 0525  1:1 Patient  Continuous      04/13/18 0526    04/13/18 0525  Notify Provider (Pre-Cooling Phase)  Until Discontinued      04/13/18 0526    04/13/18 0525  Comprehensive Metabolic Panel  Now Then Every 6 Hours      04/13/18 0526    04/13/18 0525  Lactic Acid, Plasma  Now Then Every 6 Hours      04/13/18 0526    04/13/18 0525  Blood Gas, Arterial  STAT      04/13/18 0526    04/13/18 0525  CBC & Differential  Now Then Every 6 Hours      04/13/18 0526    04/13/18 0525  Protime-INR  STAT      04/13/18 0526    04/13/18 0525  aPTT  Now Then Every 6 Hours      04/13/18 0526    04/13/18 0525  Calcium, Ionized  Now Then Every 6 Hours      04/13/18 0526    04/13/18 0525  Magnesium  Now Then Every 6 Hours      04/13/18 0526    04/13/18 0525  Phosphorus  Now Then Every 6 Hours      04/13/18 0526    04/13/18 0525  Initiate Hypothermia Protocol With Target Temperature " Device - DO NOT DELAY COOLING FOR ANY PROCEDURE  Until Discontinued     Comments:  -Notify MARICEL - [phone]     Target Temperature Device to Bedside  -Attach Way Temp Probe to Target Temperature Device  -Follow  Guidelines    04/13/18 0526 04/13/18 0525  Hypothermia - Clinical Considerations & Potential Complications  Until Discontinued     Comments:  Clinical Considerations:  - Patient Will Be Intubated, Sedated & Ventilated (No Humidification), Analgesia Provided, Paralytic Provided As Needed to Prevent Shivering.  - Central Line & Arterial Line Will Be Placed  - Observe Closely for Signs of Fluid Overload.  - If Heart Rate Decreases Significantly From Pre-Cooling Rate Consider Maintaining Target Temperature Closer to 34 Degrees Celcius.  - Anticipate Diuresis & Decrease in Serum Potassium During Cooling    Potential Complications:  Hypothermia Induced Physiologic Effects (Not Limited To):  - Cardiovascular: Arrhythmias, Decrease in Heart Rate, Low Cardiac Output, Hypotension  - Renal: Diuresis (Hypothermia Causes a Decrease in Re-Absorption of Solutes)  - Hematological: Decrease Count & Function of WBC, Prolonged Clotting Times  - GI: Decrease in Motility, Hyperglycemia (Decrease Insulin Release From Pancreas)  - Electrolytes: Hypokalemia, Hypophosphatemia  Monitor For:   - Coagulopathy, Reversible Platelet Dysfunction & PTT Increase  - Pancreatitis  - Ileus    04/13/18 0526    04/13/18 0525  Notify MD of Seizure Activity  Continuous      04/13/18 0526 04/13/18 0525  Do NOT Overcool (Below 32 Degrees Celsius) - Ventricular Fibrillation Unresponsive to Cardioversion May Occur  Continuous      04/13/18 0526    04/13/18 0525  Do NOT Flush Indwelling Urinary Catheter; This Will Cause Fluctuations in Temperature  Continuous      04/13/18 0526    04/13/18 0525  Place Sequential Compression Device  Once      04/13/18 0526 04/13/18 0525  Maintain Sequential Compression Device  Continuous       04/13/18 0526    04/13/18 0525  Goal Temperature 32-36° Celsius (Cooling Phase)  Until Discontinued     Comments:  Goal Time to Target Temperature is Less Than 2 Hours, But May Take Up to 4 Hours.    04/13/18 0526    04/13/18 0525  Cooling Phase Vital Signs  Continuous      04/13/18 0526 04/13/18 0525  Record Core Temperature  Every 15 Minutes     Comments:  Until Target Temperature is Reached    04/13/18 0526 04/13/18 0525  No Sedation Vacation / Awakening Trial During Maintenance Phase  Continuous      04/13/18 0526    04/13/18 0525  Target Temperature Device Will Automatically Start Warming & Will Continue for 20 Hours  Continuous      04/13/18 0526    04/13/18 0525  Normothermia Phase- If Target Temperature Device still available, may continue use for up to 72 hours with the goal of hyperthermia prevention.  Continuous      04/13/18 0526 04/13/18 0525  CBC Auto Differential  PROCEDURE ONCE      04/13/18 0526 04/13/18 0524  potassium chloride 20 mEq in 50 mL IVPB  Every 1 Hour PRN      04/13/18 0526    04/13/18 0520  Patients Must Be Intubated & Sedated With Benzo and/or Opioid Before Starting Neuromuscular Blockade  Once      04/13/18 0520    04/13/18 0520  Titrate Neuromuscular Blockade Using Peripheral Nerve Stimulator  Until Discontinued      04/13/18 0520    04/13/18 0520  Obtain Baseline Train of Four Prior to Administration of Neuromuscular Blockade Bolus  Once      04/13/18 0520    04/13/18 0520  After Bolus Dose, Assess Train of Four Every 15 Minutes for Minimal of 2 Twitches Prior to Initiating Infusion  Every 15 Minutes      04/13/18 0520    04/13/18 0520  After Initiating Infusion, Assess Train of Four Every 1 Hour Until Stable (Desired Level is 3-4 Twittches Unless Otherwise Ordered by MD  Per Hospital Policy     Comments:  Train of Four Monitoring:  Twitches = 0 - Hold Infusion, Recheck TOF Every 30-60 Minutes Until Response Occurs.  Resume Infusion Once TOF is 3-4 Twitches  Twitches =  1-2 - Adjust Per Medication Orders  Twitches = 3-4 Or No Diaphragmatic Movement - Maintain Infusion & Assess Every 4 Hours    04/13/18 0520    04/13/18 0520  BIS Monitoring  Continuous     Comments:  With Goal of 40-60    04/13/18 0520    04/13/18 0520  Respiratory Therapist & Primary Nurse to Monitor Patient's Compliance WIth Ventilatory Goals  Continuous      04/13/18 0520    04/13/18 0520  Notify Provider if Unable to Maintain Optimal Ventilatory Parameters  Until Discontinued      04/13/18 0520    04/13/18 0520  Place Pressure Relief Mattress on Bed  Continuous      04/13/18 0520    04/13/18 0520  Elevate HOB 30 Degrees  Continuous      04/13/18 0520    04/13/18 0520  Place High Top Tennis Shoes or Lenaird Splints for Prevention of Foot Drop.  Consider Handrolls / Splints for Hands As Needed  Continuous      04/13/18 0520    04/13/18 0520  Passive Range of Motion With Repositioning  Now Then Every 4 Hours      04/13/18 0520    04/13/18 0520  Tape Eyelids Shut if Greater Than 50% of Eye Surface is Exposed  Continuous      04/13/18 0520    04/13/18 0457  XR Chest Post CVA Port  1 Time Imaging      04/13/18 0453    04/13/18 0454  XR Chest 1 View  1 Time Imaging,   Status:  Canceled      04/13/18 0453    04/13/18 0430  cisatracurium besylate (NIMBEX) 200 mg in sodium chloride 0.9 % 100 mL (2 mg/mL) infusion  Continuous,   Status:  Discontinued      04/13/18 0350    04/13/18 0401  POC Glucose Once  Once      04/13/18 0400    04/13/18 0330  sodium chloride 0.9 % infusion  Continuous      04/13/18 0253    04/13/18 0330  clopidogrel (PLAVIX) tablet 600 mg  Once      04/13/18 0253    04/13/18 0300  aspirin tablet  As Needed,   Status:  Discontinued      04/13/18 0300    04/13/18 0259  clopidogrel (PLAVIX) tablet  As Needed,   Status:  Discontinued      04/13/18 0300    04/13/18 0251  Obtain STAT EKG during chest pain. Notify MD of any change in rhythm, ST segments or complaints of chest pain.  Until Discontinued       04/13/18 0253 04/13/18 0251  Encourage fluids  Until Discontinued      04/13/18 0253 04/13/18 0251  Verify Discontinuation of enoxaparin (LOVENOX) and / or heparin  Once      04/13/18 0253 04/13/18 0251  If patient on Metformin (Glucophage) hold for 48 hours.  Until Discontinued      04/13/18 0253 04/13/18 0251  Notify MD if platelet count is less than 100,000, is less than 1/2 baseline, or if Hgb drops by more than 3mg/dl.  Until Discontinued      04/13/18 0253 04/13/18 0251  Notify MD of hypotension (SBP less than 95), bleeding, or dysrythmia and follow Sheath Removal Policy if needed.  Until Discontinued      04/13/18 0253 04/13/18 0251  Oxygen Therapy- Nasal Cannula; Titrate for SPO2: equal to or greater than, 92%, per policy  Continuous      04/13/18 0253 04/13/18 0251  Continuous Pulse Oximetry  Continuous      04/13/18 0253 04/13/18 0251  Advance diet as tolerated  Until Discontinued      04/13/18 0253 04/13/18 0251  Cardiac Rehab Evaluation and Enrollment  Once     Provider:  (Not yet assigned)    04/13/18 0253 04/13/18 0251  ECG 12 Lead  STAT,   Status:  Canceled      04/13/18 0253 04/13/18 0251  Full Code  Continuous      04/13/18 0253 04/13/18 0251  Continue Indwelling Urinary Catheter  Once      04/13/18 0253 04/13/18 0251  Assess Need for Indwelling Urinary Catheter - Follow Removal Protocol  Continuous     Comments:  Indwelling Urinary Catheter Removal Criteria  Discontinue Indwelling Urinary Catheter Unless One of the Following is Present  Urinary Retention or Obstruction  Chronic Way Catheter Use  End of Life  Critical Illness with Strict I/O   Tract or Abdominal Surgery  Stage 3/4 Sacral / Perineal Wound  Required Activity Restriction: Trauma  Required Activity Restriction: Spine Surgery  If Patient is Being Followed by Urology Contact Them PRIOR to Removal  Do Not Remove Indwelling Urinary Catheter Order is Present with a CLINICAL REASON to Maintain the  Catheter. Provider is Required to Include a Clinical Reason to Maintain a Urinary Catheter    Chronic Way Catheter Use (Present on Admission)  Assess for Continued Need & Document Medical Necessity  If Infection is Suspected, Contact the Provider        04/13/18 0253    04/13/18 0251  Catheter Care  Once      04/13/18 0253 04/13/18 0251  DC TR Band (Per Protocol)  Continuous     Comments:  If Bleeding Occurs, Reinflate 2mL & Then Continue With 2 mL Every 15 Minute Deflations  Gently Roll Band Off Insertion Site After Completely Deflated    04/13/18 0253 04/13/18 0251  Vital Signs & Reverse Hermes's Test (While Radial Compression Device in Place)  Continuous     Comments:  Every 15 Minutes x4, Every 30 Minutes x4, & Every 1 Hour x2    04/13/18 0253    04/13/18 0251  Keep Affected Arm Straight & Elevated  Continuous      04/13/18 0253    04/13/18 0251  Activity As Tolerated  Until Discontinued      04/13/18 0253    04/13/18 0250  magnesium hydroxide (MILK OF MAGNESIA) suspension 2400 mg/10mL 10 mL  Daily PRN      04/13/18 0253    04/13/18 0250  ondansetron (ZOFRAN) tablet 4 mg  Every 6 Hours PRN      04/13/18 0253    04/13/18 0250  ondansetron ODT (ZOFRAN-ODT) disintegrating tablet 4 mg  Every 6 Hours PRN      04/13/18 0253    04/13/18 0250  ondansetron (ZOFRAN) injection 4 mg  Every 6 Hours PRN      04/13/18 0253    04/13/18 0250  acetaminophen (TYLENOL) tablet 650 mg  Every 4 Hours PRN      04/13/18 0253    04/13/18 0250  HYDROcodone-acetaminophen (NORCO) 5-325 MG per tablet 1 tablet  Every 4 Hours PRN      04/13/18 0253    04/13/18 0248  iopamidol (ISOVUE-370) 76 % injection  As Needed,   Status:  Discontinued      04/13/18 0248    04/13/18 0213  norepinephrine (LEVOPHED) 8 mg/250 mL (32 mcg/mL) in sodium chloride 0.9% infusion (premix)  Continuous PRN,   Status:  Discontinued      04/13/18 0213    04/13/18 0207  phenylephrine (SOLO-SYNEPHRINE) injection solution  As Needed,   Status:  Discontinued       04/13/18 0207    04/13/18 0140  heparin (porcine) injection  As Needed,   Status:  Discontinued      04/13/18 0141    04/13/18 0137  verapamil (ISOPTIN) 500 mcg, nitroglycerin (TRIDIL) 200 mcg, heparin (porcine) 3,000 Units radial artery injection  As Needed,   Status:  Discontinued      04/13/18 0137    04/13/18 0136  lidocaine (XYLOCAINE) 2% injection  As Needed,   Status:  Discontinued      04/13/18 0136    04/13/18 0125  sodium chloride 0.9 % infusion  Continuous PRN,   Status:  Discontinued      04/13/18 0252    04/13/18 0121  cisatracurium (NIMBEX) injection 20 mg  Once      04/13/18 0119    04/13/18 0120  fentaNYL citrate (PF) (SUBLIMAZE) injection 200 mcg  Every 1 Hour PRN      04/13/18 0120    04/13/18 0111  cisatracurium besylate (NIMBEX) 200 mg in sodium chloride 0.9 % 100 mL (2 mg/mL) infusion  Titrated,   Status:  Discontinued      04/13/18 0109    04/13/18 0110  Case Request Cath Lab: Left Heart Cath  Once,   Status:  Canceled      04/13/18 0110    04/13/18 0106  Propofol (DIPRIVAN) 10 MG/ML injection  - ADS Override Pull     Comments:  IMAN NASSAR: cabinet override    04/13/18 0106    04/13/18 0104  fentaNYL citrate (PF) (SUBLIMAZE) injection 50 mcg  Every 1 Hour PRN,   Status:  Discontinued      04/13/18 0104    04/13/18 0100  metoprolol tartrate (LOPRESSOR) 5 MG/5ML injection  - ADS Override Pull     Comments:  IMAN NASSAR: cabinet override    04/13/18 0100    04/13/18 0058  Propofol (DIPRIVAN) 10 MG/ML injection  - ADS Override Pull     Comments:  IMAN NASSAR: cabinet override    04/13/18 0058    04/13/18 0045  Cardiac Catheterization/Vascular Study  Once      04/13/18 0044    04/13/18 0037  Critical Care  Once     Comments:  This order was created via procedure documentation    04/13/18 0036    04/13/18 0035  ECG 12 Lead  STAT      04/13/18 0034    04/13/18 0015  Inpatient Admission  Once      04/13/18 0016    04/13/18 0012  Scan Slide  Once,   Status:  Canceled      04/13/18 0011     04/13/18 0009  CT Head Without Contrast  1 Time Imaging      04/13/18 0009    04/13/18 0008  LORazepam (ATIVAN) 2 MG/ML injection  - ADS Override Pull     Comments:  IMAN NASSAR: cabinet override    04/13/18 0008    04/13/18 0002  Blood Gas, Arterial  Once      04/13/18 0001    04/13/18 0002  ECG 12 Lead  STAT      04/13/18 0002    04/12/18 2351  XR Chest 1 View  1 Time Imaging      04/12/18 2342    04/12/18 2347  amiodarone (NEXTERONE) 360 mg/200 mL (1.8 mg/mL) infusion  Titrated,   Status:  Discontinued      04/12/18 2345    04/12/18 2346  Pulmonology (on-call MD unless specified)  Once     Specialty:  Pulmonary Disease  Provider:  Kahlil Lake MD    04/12/18 2345    04/12/18 2345  Blood Gas, Arterial  Once      04/12/18 2345    04/12/18 2344  aspirin tablet 325 mg  Once,   Status:  Discontinued      04/12/18 2342    04/12/18 2342  NPO Diet  Diet Effective Now      04/12/18 2342    04/12/18 2342  Undress and Gown  Once      04/12/18 2342    04/12/18 2342  Cardiac monitoring  Per Hospital Policy      04/12/18 2342    04/12/18 2342  Continuous Pulse Oximetry  Per Hospital Policy,   Status:  Canceled      04/12/18 2342    04/12/18 2342  Oxygen Therapy- Nasal Cannula; 2 LPM; Titrate for SPO2: equal to or greater than, 92%  Continuous      04/12/18 2342    04/12/18 2342  ECG 12 Lead  Once      04/12/18 2342    04/12/18 2342  XR Chest 2 View  1 Time Imaging,   Status:  Canceled      04/12/18 2342    04/12/18 2342  Insert peripheral IV  Once      04/12/18 2342    04/12/18 2342  [place] Draw  Once      04/12/18 2342    04/12/18 2342  CBC & Differential  Once      04/12/18 2342    04/12/18 2342  Comprehensive Metabolic Panel  Once      04/12/18 2342    04/12/18 2342  Troponin  Once      04/12/18 2342    04/12/18 2342  Light Blue Top  PROCEDURE ONCE      04/12/18 2342    04/12/18 2342  Green Top (Gel)  PROCEDURE ONCE      04/12/18 2342    04/12/18 2342  Lavender Top  PROCEDURE ONCE      04/12/18 2342 04/12/18 2342   Gold Top - SST  PROCEDURE ONCE      04/12/18 2342    04/12/18 2342  CBC Auto Differential  PROCEDURE ONCE      04/12/18 2342    04/12/18 2341  sodium chloride 0.9 % flush 10 mL  As Needed      04/12/18 2342    04/12/18 2340  amiodarone (CORDARONE) 900 mg in dextrose (D5W) 5 % 500 mL (1.8 mg/mL) infusion  Code / Trauma / Sedation Continuous Med      04/13/18 0225    04/12/18 2338  sodium bicarbonate injection 8.4%  Code / Trauma / Sedation Medication      04/13/18 0223    04/12/18 2338  amiodarone in dextrose 5% (NEXTERONE) 360-4.14 MG/200ML-% infusion  - ADS Override Pull     Comments:  IMAN NASSAR: cabinet override    04/12/18 2338    04/12/18 2334  Naloxone HCl (NARCAN) injection  Code / Trauma / Sedation Medication      04/13/18 0215    04/12/18 2332  amiodarone (CORDARONE) injection  Code / Trauma / Sedation Medication      04/13/18 0212    04/12/18 2330  Intubation  Once     Comments:  This order was created via procedure documentation    04/12/18 2329    04/12/18 2330  Ventilator - AC/VC; (14); 100; 90%; 5; 500  Continuous      04/13/18 0308    04/12/18 2328  atropine injection  Code / Trauma / Sedation Medication      04/13/18 0203    04/12/18 2326  EPINEPHrine PF (ADRENALIN) injection  Code / Trauma / Sedation Medication      04/13/18 0154    04/12/18 2321  sodium chloride 0.9 % infusion  Code / Trauma / Sedation Continuous Med      04/13/18 0210    04/12/18 2240  XR Humerus Left  1 Time Imaging      04/12/18 2240    Unscheduled  Vital Signs  As Needed      04/13/18 0253    Unscheduled  Check distal extremity for warmth, color, sensation and pulses with each vital sign and site check.  As Needed      04/13/18 0253    Unscheduled  Change site dressing  As Needed      04/13/18 0253    Unscheduled  Blood Gas, Arterial  As Needed      04/13/18 0526    Unscheduled  Consider Counter Warming for Shivering  As Needed     Comments:  Use Socks or Blankets to Cover Head, Hands & Feet  Can Use Ciera Hugger on Medium  to High Setting    18    Unscheduled  Do NOT Bathe Patient During Cooling Phase to Prevent Temperature Fluctuation  As Needed      18    Unscheduled  Bedside Shivering Assessment Scale Treatment  As Needed     Comments:  BSAS Score 0-1 Acetaminophen, Buspar, and Magnesium  BSAS Score 1 Fentanyl Infusion  BSAS Score 2 Propofol Infusion & Fentanyl Infusion  BSAS Score 3 Propofol Infusion, Fentanyl Infusion & Vecuronium Bolus  See Medication Orders on MAR For Administration Instructions    18    Unscheduled  If Patient Begins to Awaken, Start Sedation & Notify Provider, Avoid Paralytics  As Needed      18    Unscheduled  Transport of Therapeutic Hypothermia Patient  As Needed     Comments:  Press Empty Pads Icon & Follow Instructions on Screen  Release Connectors  Take the Target Temperature Device to Any Procedure Lasting Longer Than 30 Minutes    18    Unscheduled  Blood Gas, Arterial  As Needed     Comments:  Once During the Rewarming Phase      18    Unscheduled  Discontinue Paralytics, Wean Sedation & Analgesia Once Normothermia Achieved  As Needed      18    Unscheduled  Normothermia Phase - Consider Neurology Consult For Prognosis / Seizure Activity  As Needed      18    --  SCANNED - TELEMETRY        18 0000                  Consult Notes (last 24 hours) (Notes from 2018  8:52 AM through 2018  8:52 AM)      Lennox Max MD at 2018  1:11 AM          Date of Hospital Visit: 18  Encounter Provider: Lennox Max MD  Place of Service: [facility]  Patient Name: Negro Hall  :1955  [id number]      Chief complaint: Cardiac arrest      History of Present Illness: 62-year-old gentleman sounds like previously without much medical care.  Heavy history of tobacco abuse.  He is evidently had chest pain left arm pain for the last week.  Brought here via  ambulance for this complaint and was triaged to wait in the lobby.  In the lobby collapsed after vomiting emergently brought back to the ER and found to be in ventricular fibrillation.  This.  At time of about 3 minutes or so where he did not get CPR within full resuscitation was started shocks was back in ECG the 1145 evening he was given a head CT when I was consultative.  He was brought back from CT angiogram was placed on amiodarone and was cardioverted one more time to what appeared to be sinus with now clear anterior lateral STEMI.  Not able to get much other history from his family reports no history of diabetes hypertension drug use but again he doesn't seek medical care much is not allergic to anything he is unable to give any history at this point is intubated and not responsive      Past Medical History:   Diagnosis Date   • Hypertension          History reviewed. No pertinent surgical history.      (Not in a hospital admission)    Current Meds  No current facility-administered medications on file prior to encounter.      No current outpatient prescriptions on file prior to encounter.         Social History     Social History   • Marital status:      Spouse name: N/A   • Number of children: N/A   • Years of education: N/A     Occupational History   • Not on file.     Social History Main Topics   • Smoking status: Current Every Day Smoker     Packs/day: 1.00   • Smokeless tobacco: Not on file   • Alcohol use No   • Drug use:      Types: Marijuana   • Sexual activity: Not on file     Other Topics Concern   • Not on file     Social History Narrative   • No narrative on file       Family Hx: Non-contributory      REVIEW OF SYSTEMS:   ROS was performed and is negative except as outlined in HPI     REVIEW OF SYSTEMS:   Unable to give any history       Objective:     Vitals:    04/12/18 2153 04/12/18 2239 04/12/18 2338   BP: 159/92  134/78   Pulse: 73     Resp: 18     Temp:  97 °F (36.1 °C)    TempSrc:   "Tympanic    SpO2: 99%     Weight: 62.6 kg (138 lb)     Height: 170.2 cm (67\")       Body mass index is 21.61 kg/m².  Flowsheet Rows    Flowsheet Row First Filed Value   Admission Height 170.2 cm (67\") Documented at 04/12/2018 2153   Admission Weight 62.6 kg (138 lb) Documented at 04/12/2018 2153          General Appearance:    Intubated and unresponsive    Head:    Normocephalic, without obvious abnormality, atraumatic, Pupils are dilated but mildly reactive    Ears:    Ears appear intact with no abnormalities noted   Throat:   No oral lesions, dentition good   Neck:   No adenopathy, supple, trachea midline, no thyromegaly, no carotid bruit, no JVD   Lungs:    Breath sounds are equal and  clear to auscultation    Heart:   Normal S1 and S2, RRR, no murmur/gallop or rub, tachycardic    Abdomen:    Normal bowel sounds, obese, soft non-tender, non-distended, no organomegaly, no guarding   Extremities:   Moves all extremities well, no edema, no cyanosis, no redness   Pulses:   Pulses palpable and equal bilaterally. Normal radial pulses   Skin:   No bleeding, bruising or rash   Lymph nodes:   No palpable adenopathy     I personally viewed and interpreted the patient's EKG/Telemetry data    Assessment:  Active Hospital Problems (** Indicates Principal Problem)    Diagnosis Date Noted   • Cardiac arrest [I46.9] 04/13/2018      Resolved Hospital Problems    Diagnosis Date Noted Date Resolved   No resolved problems to display.         Plan:All resuscitative VF arrest appears that he has a pretty brief no flow time does have a pressure now does appear to be having a large anterior lateral STEMI we'll start pooling of consult of the intensivist he is shivering now and posturing a little bit so we are going to sedate him and paralyze him taken to the Cath Lab as soon as lab becomes available I discussed with his family.  His prognosis is definitely guarded this is a life-threatening problem further decisions based on the " findings at the time of his cath        Electronically signed by Lennox Max MD at 2018  1:16 AM         [facility] CATH LAB  [address]  [place] [address] [phone]             Negro Hall   Cardiac Catheterization/Vascular Study   Order# [id number]   Reading physician: Lennox Max MD Ordering physician: Lennox Max MD Study date: 18    Procedures     Coronary angiography   Left Heart Cath   Stent TEJ coronary      Patient Information     Patient Name  Negro Hall MRN  [id number] Sex  Male  (Age)  1955 (62 y.o.)   Race Ethnicity Encounter Category   White or  Not  or  Emergency   Physicians     Panel Physicians Referring Physician Case Authorizing Physician   Lennox Max MD (Primary)     Pre-procedure Diagnosis     STEMI       Conclusion     CARDIAC CATHETERIZATION REPORT     Procedure:Left heart catheterization, angioplasty and drug eluting stent placement to LAD and RI, left radial art line     DATE OF PROCEDURE: 18     PROCEDURE PERFORMED BY: Lennox Max MD, [facility]     INDICATION FOR PROCEDURE:STEMI     DESCRIPTION OF PROCEDURE: After consent was obtained verbally from his family as this patient had suffered a cardiac arrest, access was gained in his right radial artery using a micropuncture technique. A 6-Latvian short sheath was placed without difficulty.  Intraarterial cocktail was given.  We went up with the diagnostic catheters and used a JL 3.5 and a JR4 5 Latvian tissue the coronaries based on that then moved on to intervention we never did cross the aortic valve and measure pressure or shoot an LV gram  FINDINGS:     LEFT VENTRICULOGRAPHY: Not performed     CORONARY ANGIOGRAPHY:  Left main: 60% distal left main  Left anterior descendin% origin  Ramus intermedius: 90% origin  Circumflex: Small but otherwise normal  RCA: Is a dominant vessel.  Large vessel free of obstructive disease very faint right to  left collaterals to the LAD     Interventional Note:  A VL3.0 guide intubated the L main coronary artery.   10,000 units of heparin was given and the ACT was 340.  A BMW wire was passed into the distal LAD.  A second BMW was placed into the distal ramus I brought a 3.5 x 20 mm trek balloon and inflated in the origin of the left anterior descending artery at 10 kayla reestablishing flow to the LAD I then brought a 2.5 x 15 mm trek balloon and inflated it in the origin of the ramus.  It was clear to me that we were in the need to do a bifurcation then to try and save both of these vessels I then brought a 3.5 x 33 mm Xience Alpine drug-eluting stent down and deployed it at 10 kayla in the distal left main into the LAD I left the wire in the ramus at that point.  The ramus was severely compromised and had really no flow and it I then brought a whisper wire through the side of the stent and with some persistence was able to get it to go into the ramus we then removed the initial ramus wire.  I then brought a 2.0 x 12 trek balloon down through the side of the stent and inflated it at 14 kayla.  We then brought a 2.5 x 15 mm trek balloon down and inflated that at 10 kayla through the side of the stent in the ramus.  At that point angiography revealed a little bit of a distal edge dissection from the LAD stents and I elected to clean that up and we put a 2 7/5/12 Xience in the mid LAD at 14 kayla there was 0% residual I also postdilated the LAD stent with a 3/5/20 NC trek balloon at 20 kayla twice.  At that point we then brought down a 2.5 x 15 mm Xience Alpine drug-eluting stent into the ramus and deployed that at 14 kayla.  At the same time we did a kissing balloon inflation with a 3/5/20 NC trek balloon in the left LAD at 20 kayla.  Following this revealed the both stents were fully deployed and looks good and at that point balloons wires and guides were removed.  We removed his sheath on the table.  During the case he was a little  hypotensive but after the vessels were open his pressure improved and we stopped his Levophed.  I also stopped his amiodarone at the end of the case.     I then used ultrasound and a micropuncture and accessed the left radial artery and placed a 20-gauge radial art line in the left radial artery and sutured it in place           SUMMARY: Cardiac arrest with ST elevation MI     EBL:                   Minimal  Specimens:        None     PCI Segment:                 Proximal LAD    proximal ramus  Pre-stenosis:                  100                    90  Post-stenosis                  0                        0  Lesion Type                    C                        C  EUGENIE Flow Pre                 0                        2  EUGENIE Flow Post  3                                     3  Dissection                       None                  none        RECOMMENDATIONS:Routine post myocardial infarction and percutaneous coronary intervention care.  Echo in 24 hours  Aggressive risk modification  Cardiac rehab referral  Hypothermia protocol     His prognosis is definitely guarded with his cardiac arrest we will make any neurologic decisions for 72 hours        Lennox Max MD  04/13/18  2:53 AM         Radiation      Event Details User   2:48 AM Radiation Tracking Cumulative Air Kerma: Total Dose (mGy) = 794.000  Physician: Lennox Max MD  Dose (mGy) = 794.000  Fluoro Time (mins) = 15.2 AG   Stent Inflated      Event Details User   1:56 AM Stent Inflated Stent Xience Alpine Willis Rx 3.03d55tr - First balloon inflation max pressure = 10 kayla. First balloon inflation duration = 10 seconds.  AG   2:13 AM Stent Inflated Stent Xience Alpine Willis Rx 2.21w15bl - First balloon inflation max pressure = 14 kayla. First balloon inflation duration = 10 seconds.  AG   2:25 AM Stent Inflated Stent Xience Alpine Willis Rx 2.18u55ay - First balloon inflation max pressure = 10 kayla. First balloon inflation duration = 10 seconds.  AG   Balloon  Inflated      Event Details User   1:48 AM Balloon Inflated Baln Trek Rx 3x15mm - First balloon inflation max pressure = 10 kayla. First balloon inflation duration = 5 seconds. Second inflation of balloon - Max pressure = 10 kayla. 2nd Inflation of balloon - Duration = 5 seconds. 2nd inflation was done at 01:48. The balloon is positioned in the Proximal segment of the vessel.  AG   1:53 AM Balloon Inflated Baln Trek Rx 2.5x15mm - First balloon inflation max pressure = 14 kayla. First balloon inflation duration = 5 seconds. Second inflation of balloon - Max pressure = 18 kayla. 2nd Inflation of balloon - Duration = 5 seconds. 2nd inflation was done at 01:54. The balloon is positioned in the Proximal segment of the vessel.  AG   2:10 AM Balloon Inflated Baln Trek Nc Rx 3.5x20mm - First balloon inflation max pressure = 20 kayla. First balloon inflation duration = 10 seconds. Second inflation of balloon - Max pressure = 20 kayla. 2nd Inflation of balloon - Duration = 5 seconds. 2nd inflation was done at 02:10. The balloon is positioned in the Proximal segment of the vessel.  AG   2:15 AM Balloon Inflated Baln Trek Mini Rx 2x15mm - First balloon inflation max pressure = 16 kayla. First balloon inflation duration = 10 seconds.  AG   2:17 AM Balloon Inflated Baln Trek Rx 2.5x15mm - First balloon inflation max pressure = 14 kayla. First balloon inflation duration = 5 seconds.  AG   2:26 AM Balloon Inflated Baln Trek Nc Rx 3.5x15mm - First balloon inflation max pressure = 20 kayla. First balloon inflation duration = 10 seconds.  AG   Medications   As of 04/13/18 0312   (Filter: BH CV CONTRAST GROUPER Medications Shown)   iopamidol (ISOVUE-370) 76 % injection (mL)   Total dose:  190 mL                       Printable Result Report     Result Report    Encounter     View Encounter          PACS Images     Show images for Cardiac Catheterization/Vascular Study   Signed     Electronically signed by Lennox Max MD on 4/13/18 at 0303 EDT    Encounter-Level Cardiology Documents:     There are no encounter-level cardiology documents.    Link to Procedure Log     Procedure Log      Order-Level Documents:     Scan on 4/13/2018 1:02 AMScan on 4/13/2018 1:02 AM          Results Routing Tracking     View Results Routing Information   Cardiac Catheterization/Vascular Study [CATH01] (Order [id number])   Order   Date: 4/13/2018 Department: [facility] CORONARY CARE Released By: Saranya Wadlington, RegSched Rep (auto-released) Authorizing: Lennox Max MD   Order-Level Documents:     Scan on 4/13/2018 1:02 AMScan on 4/13/2018 1:02 AM          Order History   Inpatient   Date/Time Action Taken User Additional Information   04/13/18 0044 Release Stone Alan (auto-released) From Order: [id number]   04/13/18 0124 Result Pema Real RN In process   04/13/18 0253 Result Lennox Max MD Preliminary   04/13/18 0313 Result Pema Real RN Final   Order Details     Frequency Duration Priority Order Class   Once 1  occurrence Routine Hospital Performed   Start Date/Time     Start Date Start Time   04/13/18 0045   Procedures Performed     Code Procedure Name   CATH27 LEFT HEART CATH   CATH03 CORONARY ANGIOGRAPHY   CATH16 STENT TEJ - CORONARY   Reprint Order Requisition     Cardiac Catheterization/Vascular Study (Order #[id number]) on 4/13/18   Encounter-Level Cardiology Documents:     There are no encounter-level cardiology documents.   Encounter     View Encounter       Appointments for this Order     4/13/2018  [facility] Cath Lab          Order Provider Info         Office phone Pager E-mail   Ordering User Stone Alan -- -- --   Authorizing Provider Lennox Max [phone] -- --   Attending Provider Kahlil Lake [phone] -- --   Billing Provider Lennox Max [phone] -- --   Linked Charges     No charges linked to this procedure   Order Transmittal Tracking     Cardiac  Catheterization/Vascular Study (Order #[id number]) on 4/13/18   Authorized by:  Lennox Max MD  (NPI: [id number])       Lab Component SmartPhrase Guide     Cardiac Catheterization/Vascular Study (Order #[id number]) on 4/13/18

## 2018-04-13 NOTE — NURSING NOTE
Pt responding to voice and opening eyes.  Withdraws from painful stimuli.  Dr Alex called and notified.  To come evaluate pt

## 2018-04-13 NOTE — PROGRESS NOTES
"  PROGRESS NOTE  Patient Name: Negro Hall  Age/Sex: 62 y.o. male  : 1955  MRN: [id number]    Date of Admission: 2018  Date of Encounter Visit: 18   LOS: 0 days   Patient Care Team:  No Known Provider as PCP - General    Chief Complaint: Therapeutic hypothermia after an hospital V. fib cardiac arrest    Interval History: Patient came in with a chest pain and had a cardiac arrest in the emergency room and was resuscitated with return of spontaneous circulation after around 15 minutes of resuscitative efforts including 6 DC cardioversions.  Patient was rushed to the cardiac Cath Lab and had 2 stents in the LAD and he is back in the CCU on therapeutic hypothermia.  Patient is on 2 pressors at this point to maintain adequate perfusion pressure  Patient is having posturing response to painful similar relation and is not on any sedation and has no sign of any shivering or spontaneous purposeful movements.      REVIEW OF SYSTEMS:   Limited system review given the patient severe post anoxic encephalopathy    Ventilator/Non-Invasive Ventilation Settings     Start     Ordered    18 2330  Ventilator - AC/VC; (14); 100; 90%; 5; 500  Continuous     Question Answer Comment   Vent Mode AC/VC    Breath rate  14   FiO2 100    FiO2 titrate for Sp02% =/> 90%    PEEP 5    Tidal Volume 500        18 0308            Vital Signs  Temp:  [97 °F (36.1 °C)] 97 °F (36.1 °C)  Heart Rate:  [] 82  Resp:  [18-27] 20  BP: ()/() 156/104  Arterial Line BP: ()/() 110/84  FiO2 (%):  [60 %-100 %] 80 %  SpO2:  [83 %-100 %] 100 %  on  Flow (L/min):  [15] 15 Device (Oxygen Therapy): (P) ventilator    Intake/Output Summary (Last 24 hours) at 18 0835  Last data filed at 18 0630   Gross per 24 hour   Intake              450 ml   Output              350 ml   Net              100 ml     Flowsheet Rows    Flowsheet Row First Filed Value   Admission Height 170.2 cm (67\") Documented at " 04/12/2018 2153   Admission Weight 62.6 kg (138 lb) Documented at 04/12/2018 2153        Body mass index is 21.61 kg/m².  1    04/12/18 2153   Weight: 62.6 kg (138 lb)       Physical Exam:  GEN:  Sickly looking, thin, poorly kept, orally intubated, on the ventilator   EYES:   Sclera clear. No icterus.  Fixed dilated pupils  ENT:   External ears/nose normal, no oral lesions, no thrush, mucous membranes moist  NECK:  Supple, midline trachea, no JVD  LUNGS: Normal chest on inspection, decreased breath sounds, no wheezes or crackles or rhonchi.   CV:  Irregular rhythm with lots of ectopy in and out of atrial fibrillation, normal S1 and S2, no murmur.  ABD:  Soft, non-tender and non-distended.  Hypoactive bowel sounds. No guarding  EXT:  No purposeful movement, he does have some faint posturing-like movement of the upper extremities.  Cyanotic distal extremities   NEURO: Posturing response to pain, fixed dilated pupils, no corneal  Skin: dry, intact, no bleeding    Results Review:        Results from last 7 days  Lab Units 04/13/18  0532 04/12/18  2359   SODIUM mmol/L 144 147*   POTASSIUM mmol/L 3.1* 3.1*   CHLORIDE mmol/L 103 99   CO2 mmol/L 16.1* 23.6   BUN mg/dL 29* 27*   CREATININE mg/dL 1.18 1.23   CALCIUM mg/dL 8.1* 7.8*   AST (SGOT) U/L 353* 168*   ALT (SGPT) U/L 291* 256*   ANION GAP mmol/L 24.9 24.4   ALBUMIN g/dL 3.40* 3.30*       Results from last 7 days  Lab Units 04/13/18  0532 04/12/18  2359   TROPONIN T ng/mL 5.400* 0.113*               Results from last 7 days  Lab Units 04/13/18  0532 04/13/18  0242 04/12/18  2359   WBC 10*3/mm3 29.58*  --  17.46*   HEMOGLOBIN g/dL 14.6  --  13.8   HEMOGLOBIN, POC g/dL  --  13.3  --    HEMATOCRIT % 46.8  --  44.4   HEMATOCRIT POC %  --  39  --    PLATELETS 10*3/mm3 217  --  197   MCV fL 99.4*  --  100.2*   NEUTROPHIL % % 83.2*  --  63.4   LYMPHOCYTE % % 9.8*  --  29.2   MONOCYTES % % 5.2  --  4.7*   EOSINOPHIL % % 0.1*  --  1.0   BASOPHIL % % 0.2  --  0.6   IMM GRAN % %  1.5*  --  1.1*       Results from last 7 days  Lab Units 04/13/18  0532   INR  1.29*   APTT seconds >200.0*       Results from last 7 days  Lab Units 04/13/18  0532   MAGNESIUM mg/dL 2.5*       Results from last 7 days  Lab Units 04/13/18  0532   CHOLESTEROL mg/dL 150   TRIGLYCERIDES mg/dL 111   HDL CHOL mg/dL 40       Results from last 7 days  Lab Units 04/13/18  0548 04/12/18  2356   PH, ARTERIAL pH units 7.255* 7.393   PCO2, ARTERIAL mm Hg 43.9 22.7*   PO2 ART mm Hg 151.9* 295.4*   HCO3 ART mmol/L 19.5* 13.8*       Results from last 7 days  Lab Units 04/13/18  0532   HEMOGLOBIN A1C % 5.70*     Glucose   Date/Time Value Ref Range Status   04/13/2018 0809 293 (H) 70 - 130 mg/dL Final   04/13/2018 0344 269 (H) 70 - 130 mg/dL Final       Results from last 7 days  Lab Units 04/13/18  0533   LACTATE mmol/L 6.7*           Results from last 7 days  Lab Units 04/13/18  0633   NITRITE UA  Negative   WBC UA /HPF None Seen   BACTERIA UA /HPF 1+*   SQUAM EPITHEL UA /HPF 0-2               Imaging:   Imaging Results (all)     Procedure Component Value Units Date/Time    XR Chest Post CVA Port [[id number]] Collected:  04/13/18 0518     Updated:  04/13/18 0518    Narrative:       X-RAY CHEST 1 VIEW.     HISTORY: Central line insertion.     COMPARISON: 4/12/2018.     FINDINGS:  Cardiomediastinal silhouette is within normal limits. ET tube tip is  approximately 7.7 cm from the irina.     Nasogastric tube tip is in the stomach. Right subclavian catheter tip is  in the superior vena cava.     Mild pulmonary congestion.              Impression:       Right subclavian catheter tip is in the superior vena cava.  Tip of the ET tube is 7.7 cm from the irina. Tip of the nasogastric  tube is in the stomach.          CT Head Without Contrast [[id number]] Collected:  04/13/18 0052     Updated:  04/13/18 0052    Narrative:       CT SCAN OF THE BRAIN WITHOUT CONTRAST.     TECHNIQUE: Radiation dose reduction techniques were utilized,  including  automated exposure control and exposure modulation based on body size.  Multiple axial images of the brain were obtained from the vertex to the  base of the brain.     HISTORY: Confusion, delirium.     COMPARISON: No prior studies for comparison.     FINDINGS: Slight limitation of the examination due to motion.     Midline structures are within normal limits, there is no hydrocephalus.  Gray-white matter differentiation is maintained. Age-appropriate changes  of chronic small vessel ischemic disease and mild cortical atrophy.     Orbits are within normal limits. 3.2 cm mucous retention cyst or polyp  of the left maxillary sinus, otherwise paranasal sinuses are  unremarkable. Mastoid air cells are well aerated.              Impression:       No definite acute intracranial pathology.                 XR Chest 1 View [[id number]] Collected:  04/13/18 0009     Updated:  04/13/18 0009    Narrative:       X-RAY CHEST 1 VIEW.     HISTORY: Chest pain.     COMPARISON: No prior studies for comparison.     FINDINGS:  Cardiomediastinal silhouette is within normal limits. ET tube is in good  position. Lung volumes are low.     There is no consolidation or effusion.              Impression:       No definite acute findings.           XR Humerus Left [[id number]] Collected:  04/12/18 2259     Updated:  04/12/18 2259    Narrative:       X-RAY LEFT HUMERUS.     HISTORY: Left arm pain, 3 weeks.     COMPARISON: No prior studies for comparison.     FINDINGS:  There is no fracture or dislocation.         Soft tissue structures are unremarkable.       Impression:       No acute fracture or dislocation.                 I reviewed the patient's new clinical results.  I personally viewed and interpreted the patient's imaging results:Patient has some hyperinflation with no cardiomegaly and some interstitial edema, he did have some right upper lung infiltrate on the initial x-ray that does not seem to be as obvious on the newer film.   ET tube is in good position, patient had right subclavian central line with the tip in the superior vena cava.  No evidence of pneumothorax        Medication Review:     amiodarone in dextrose 5%      artificial tears  Both Eyes Q4H   atorvastatin 40 mg Oral Nightly   cisatracurium 20 mg Intravenous Once   cisatracurium 100 mcg/kg Intravenous Once   [START ON 4/14/2018] clopidogrel 75 mg Oral Daily   insulin aspart 0-7 Units Subcutaneous 4x Daily With Meals & Nightly   Propofol            cisatracurium (NIMBEX) infusion 3 mcg/kg/min    norepinephrine 0.02-0.3 mcg/kg/min Last Rate: 0.28 mcg/kg/min (04/13/18 0758)   propofol 5-50 mcg/kg/min Last Rate: 5 mcg/kg/min (04/13/18 0803)   sodium chloride 125 mL/hr Last Rate: 125 mL/hr (04/13/18 0522)   sodium chloride 9 mL/hr    vasopressin 0.03 Units/min Last Rate: 0.03 Units/min (04/13/18 0623)       ASSESSMENT:   1. In hospital V. fib cardiac arrest s/p CPR and ROSC in 15 min  2. ST elevation MI  3. Acute hypoxic respiratory failure secondary to cardiac arrest  4. Severe postanoxic encephalopathy  5. Shock liver  6. ABENA vs CKD,  mild  7. Hypokalemia, per potassium protocol  8. Hyperglycemia  9. Electrolyte disturbance: Hypernatremia and hypokalemia  10. Lactic acidosis  11. Leucocytosis: No evidence of infection. Could be tress induced.   11 Hypotension, likely mycardial stunning    PLAN:  Continue with the therapeutic hypothermia, patient was already on a goal temperature of 33°C so we will continue with the same setting  Titrate pressors to maintain adequate perfusion pressure  Patient needed to be on stress ulcer prophylaxis given the fact that he is on pressors and on the ventilator  Patient is on Plavix status post stenting, patient needs to be on DVT prophylaxis pharmacological therapy with heparin  We he to optimize glycemia control and will start the patient on the insulin drip  Arrhythmia control per cardiology, patient is on amiodarone drip  Patient is  unresponsive so we will use the BIS monitor to guide our sedation therapy  Prognosis is poor, will support, will consult neurology to see in a.m., will start Keppra for seizure prophylaxis  Discussed with the nursing staff, ventilator setting adjusted, patient is on pressure controlled and he is pulling tidal volume in the 1000's range to compensate for his metabolic acidosis  Follow-up on echocardiogram, bedside review of the echogram shows poor LV function   Repeat chest x-ray in a.m.  Discussed with staff    Disposition:     Fide Alex MD  04/13/18  8:35 AM      Time: Critical care 52 min    Addendum:    Patient was switched from 33° goal to 36° after the morning evaluation and he was supposed to be warmed up gradually to 36° over 4 hours.  Around 3:30 PM I was called by the nurse because the patient was waking up to physical stimulation and opening eyes.  I reassessed the patient personally and he was obviously opening eyes for calling his name, he was able to track with his eyes especially when on the left side of his visual fields.  Patient was having pretty good strong cough, patient was withdrawing to pain on all 4 extremities.  His temperature at the time of my assessment was 35.7°C and he might have further improvement in his encephalopathy once his temperature is back to normal.  The case was discussed with Dr. Max, there is little data on the management in people who in the middle of the hypothermia protocol and are able to follow commands, if this was his initial neurological assessment upon initial presentation he would not be even started on the targeted temperature control therapy.  Weighing the risk and the benefit of further hypothermia including the need for deeper sedation to control the shivering and the problem with the hypotension and the cardiomyopathy already, the decision was made to go ahead and rewarm him all the way up to a normal body temperature of 37°C but to do it slowly over  the next 4 hours.        Dictated utilizing Dragon dictation:  Much of this encounter note is an electronic transcription/translation of spoken language to printed text. The electronic translation of spoken language may permit erroneous, or at times, nonsensical words or phrases to be inadvertently transcribed; Although I have reviewed the note for such errors, some may still exist.

## 2018-04-13 NOTE — PLAN OF CARE
Problem: Patient Care Overview  Goal: Plan of Care Review  Outcome: Ongoing (interventions implemented as appropriate)   04/13/18 1710   Coping/Psychosocial   Plan of Care Reviewed With family   Plan of Care Review   Progress improving   OTHER   Outcome Summary Pt startedon hypothermia protocol during the night after cardiac arrest (VF). Initially minimal response but this afternoon pt opened eyes and is localizing to pain. Rewarming at this time to 37. Pt echo shows EF 15-20 % withprobable thrombus in left vent per Dr Max. Onheparin. On Levophed, vasopressin and Nilay. Using remifentfor sedation due to being sensitive to propofol. Minimal urine outputand MD's aware. monitoring labs. Tom continue to monitor     Goal: Individualization and Mutuality   04/13/18 1710   Individualization   Patient Specific Preferences Everton Arce     Goal: Discharge Needs Assessment  Outcome: Ongoing (interventions implemented as appropriate)   04/13/18 1710   Discharge Needs Assessment   Concerns to be Addressed financial/insurance;substance/tobacco abuse/use     Goal: Interprofessional Rounds/Family Conf  Outcome: Ongoing (interventions implemented as appropriate)   04/13/18 1710   Interdisciplinary Rounds/Family Conf   Participants physician;;pastoral care;pharmacy;respiratory therapy;nursing       Problem: Fall Risk (Adult)  Goal: Identify Related Risk Factors and Signs and Symptoms  Outcome: Ongoing (interventions implemented as appropriate)   04/13/18 1710   Fall Risk (Adult)   Related Risk Factors (Fall Risk) homeostatic imbalance;environment unfamiliar   Signs and Symptoms (Fall Risk) presence of risk factors     Goal: Absence of Fall  Outcome: Ongoing (interventions implemented as appropriate)   04/13/18 1710   Fall Risk (Adult)   Absence of Fall achieves outcome       Problem: Skin Injury Risk (Adult)  Goal: Identify Related Risk Factors and Signs and Symptoms  Outcome: Ongoing (interventions implemented as  appropriate)   04/13/18 1710   Skin Injury Risk (Adult)   Related Risk Factors (Skin Injury Risk) critical care admission;hypothermia/hyperthermia;medical devices;medication;nutritional deficiencies;tissue perfusion altered     Goal: Skin Health and Integrity  Outcome: Ongoing (interventions implemented as appropriate)   04/13/18 1710   Skin Injury Risk (Adult)   Skin Health and Integrity achieves outcome       Problem: Ventilation, Mechanical Invasive (Adult)  Goal: Signs and Symptoms of Listed Potential Problems Will be Absent, Minimized or Managed (Ventilation, Mechanical Invasive)  Outcome: Ongoing (interventions implemented as appropriate)   04/13/18 1710   Goal/Outcome Evaluation   Problems Assessed (Mechanical Ventilation, Invasive) all   Problems Present (Mech Vent, Invasive) situational response       Problem: Cardiac Catheterization (Diagnostic/Interventional) (Adult)  Goal: Signs and Symptoms of Listed Potential Problems Will be Absent, Minimized or Managed (Cardiac Catheterization)  Outcome: Ongoing (interventions implemented as appropriate)   04/13/18 1710   Goal/Outcome Evaluation   Problems Assessed (Cardiac Catheterization) all   Problems Present (Cardiac Cath) cardiopulmonary complications;embolism;situational response     Goal: Anesthesia/Sedation Recovery  Outcome: Ongoing (interventions implemented as appropriate)   04/13/18 1710   Goal/Outcome Evaluation   Anesthesia/Sedation Recovery ongoing sedation/anesthesia       Problem: Pain, Acute (Adult)  Goal: Identify Related Risk Factors and Signs and Symptoms  Outcome: Ongoing (interventions implemented as appropriate)   04/13/18 1710   Pain, Acute (Adult)   Related Risk Factors (Acute Pain) disease process;positioning;procedure/treatment   Signs and Symptoms (Acute Pain) other (see comments)     Goal: Acceptable Pain Control/Comfort Level  Outcome: Ongoing (interventions implemented as appropriate)   04/13/18 1710   Pain, Acute (Adult)   Acceptable  Pain Control/Comfort Level achieves outcome

## 2018-04-13 NOTE — ED NOTES
To ER via EMS stretcher c/o left upper arm pain/shoulder pain.  Possible injury from helping friend clean out shed.  Pt took a friends Norco and vomited x 1.       Swati Herrera RN  04/12/18 2969

## 2018-04-13 NOTE — PROCEDURES
Central Venous Catheter Insertion Procedure Note          Indications:   1) Vascular access   2) Pressors administration      Medications:  None     Procedure Details:  Informed consent was obtained for the procedure, including sedation. Risks of lung perforation, hemorrhage, arrhythmia, and adverse drug reaction were discussed.       Maximum sterile technique was used including usual patient drapes, antiseptics and physician sterile garments.       Under sterile conditions the skin above the on the right subclavian vein was prepped with Chlorhexidine and covered with a sterile drape. Local anesthesia was applied to the skin and subcutaneous tissues with lidocaine 1%. An 18-gauge needle was then inserted into the vein. A guide wire was then passed easily through the catheter. A quad-lumen was then inserted into the vessel over the guide wire. The catheter was sutured into place and dressed following sterile protocol with Biopatch placed. Ultrasound used to place needle and visualized in vein prior to dilation      Findings:  There were no changes to vital signs. All catheter ports were flushed with saline. Patient did tolerate procedure well. CXR was obtained and confirmed good position.

## 2018-04-13 NOTE — CONSULTS
"Adult Nutrition  Assessment/PES    Patient Name:  Negro Hall  YOB: 1955  MRN: [id number]  Admit Date:  4/12/2018    Assessment Date:  4/13/2018    Comments:  Nutrition assessment completed. Pt currently intubated and NPO.          Adult Nutrition Assessment     Row Name 04/13/18 0900       Nutrition Prescription PO    Current PO Diet NPO       Propofol Considerations    Propofol (mL/hr) 5.63 mL/hr    Propofol (Kcal/day) 148 Kcal/day    Row Name 04/13/18 0859       Physical Findings    Overall Physical Appearance on ventilator support    Tubes nasogastric tube   to suction    Skin --   intact       Calculation Measurements    Weight Used For Calculations 62.6 kg (138 lb 0.1 oz)       Estimated/Assessed Needs    Additional Documentation Calorie Requirements (Group);Fluid Requirements (Group);Protein Requirements (Group)       Calorie Requirements    Estimated Calorie Requirement (kcal/day) 1880   30 kcal/kg       KCAL/KG    14 Kcal/Kg (kcal) 876.4    15 Kcal/Kg (kcal) 939    18 Kcal/Kg (kcal) 1126.8    20 Kcal/Kg (kcal) 1252    25 Kcal/Kg (kcal) 1565    30 Kcal/Kg (kcal) 1878    35 Kcal/Kg (kcal) 2191    40 Kcal/Kg (kcal) 2504    45 Kcal/Kg (kcal) 2817    50 Kcal/Kg (kcal) 3130       Greer-St. Jeor Equation    RMR (Greer-St. Jeor Equation) 1374.75       Protein Requirements    Est Protein Requirement Amount (gms/kg) 1.0 gm protein   62-75    Estimated Protein Requirements (gms/day) 62.6       Fluid Requirements    Estimated Fluid Requirements (mL/day) 1880    RDA Method (mL) 1880    [place]-Segar Method (over 20 kg) 2752    Row Name 04/13/18 0858       Labs/Procedures/Meds    Lab Results Reviewed reviewed    Lab Results Comments glu, k, bun, mg, alt, alb, na, wbc    Row Name 04/13/18 0857       Reason for Assessment    Reason For Assessment diagnosis/disease state    Diagnosis --   heart arrest, respitory failure       Anthropometrics    Height 170.2 cm (67.01\")    Weight 62.6 kg (138 lb)       " Ideal Body Weight (IBW)    Ideal Body Weight (IBW) (kg) 68.12    % Ideal Body Weight 91.89       Body Mass Index (BMI)    BMI (kg/m2) 21.65    BMI Assessment BMI 18.5-24.9: normal       IBW Adjustment, Para/Tetraplegia    5% Adjustment, Para (IBW) 64.71    10% Adjustment, Para (IBW) 61.31    10% Adjustment, Tetra (IBW) 61.31    15% Adjustment, Tetra (IBW) 57.9          Problem/Interventions:        Problem 1     Row Name 04/13/18 0918       Nutrition Diagnoses Problem 1    Problem 1 Needs Alternate Route    Etiology (related to) Medical Diagnosis    Cardiac MI    Pulmonary/Critical Care Ventilator                    Intervention Goal     Row Name 04/13/18 0918       Intervention Goal    General Maintain nutrition    TF/PN Inititiate TF/PN   if unable to extubate    Weight Maintain weight            Nutrition Intervention     Row Name 04/13/18 0919       Nutrition Intervention    RD/Tech Action Follow Tx progress;Care plan reviewd              Education/Evaluation     Row Name 04/13/18 0919       Education    Education Education not appropriate at this time    Please explain Patient intubated       Monitor/Evaluation    Monitor Per protocol        Electronically signed by:  Miryam Gomes RD  04/13/18 9:19 AM

## 2018-04-13 NOTE — H&P
Patient Care Team:  No Known Provider as PCP - General    Chief complaint:  Cardiac arrest    History of present illness:  This is a 62-year-old male patient, active smoker, actively healthy with the exception of prior history of hypertension.  Patient did not see a physician for years.  He presented with left arm pain for 3 weeks, gradually getting worse recently.  On arrival to ED in triage, patient collapsed.  Initial rhythm with V. fib.  Code team was activated and patient was resuscitated over 15 minutes. He was intubated during the code.   Reportedly, he received 6 DC cardioversion and was started on amiodarone.  Return of circulation occurred after  ~15 minutes of resuscitation but patient had ventricular tachycardia.      Labs performed just after the arrest:  Glucose 361; sodium 147; potassium 3.1 BUN 27; creatinine 1.23; WBC 17       Review of Systems:  Cannot obtain.  Patient is unresponsive    History  Past Medical History:   Diagnosis Date   • Hypertension      History reviewed. No pertinent surgical history.  History reviewed. No pertinent family history.  Social History   Substance Use Topics   • Smoking status: Current Every Day Smoker     Packs/day: 1.00   • Smokeless tobacco: Not on file   • Alcohol use No     No prescriptions prior to admission.     Allergies:  Penicillins    Vital Signs  Temp:  [97 °F (36.1 °C)] 97 °F (36.1 °C)  Heart Rate:  [73] 73  Resp:  [18] 18  BP: (159)/(92) 159/92    Physical Exam: Performed within 30 minutes of return of circulation  Constitutional: Tachypneic on the ventilator.Shivering.  Eyes: Injected conjunctiva.  Dilated pupil, nonreactive to light  ENMT: ET tube 7.5.  Moist mucous membrane  Heart: RRR, no murmur  Lungs: Good and equal air entry bilaterally.  No crackles or wheezing        Abdomen: Soft. No tenderness or dullness.  Extremities: No cyanosis, clubbing or pitting edema. Moves all extremities.  Neuro: Unresponsive.  He postures his  extremities to painful stimulus  Integumentary: No rash  Lymphatic: No palpable cervical or supraclavicular lymph nodes.            Diagnostic imaging:  I personally and independently reviewed the following images:     Chest x-ray on 8/12/18: ET tube in good position  Chest x-ray after central line placement showed the line in good position with no pneumothorax.  Increased pulmonary vascular congestion noted  I reviewed the EKG performed after the arrest.  ST elevation noted in the anterior lateral leads      Assessment:    1. In hospital V. fib cardiac arrest s/p CPR and ROSC in 15 min  2. ST elevation MI  3. Acute hypoxic respiratory failure secondary to cardiac arrest  4. ABENA vs CKD  5. Hyperglycemia  6. Electrolyte disturbance: Hypernatremia and hypokalemia  7. Leucocytosis: No evidence of infection. Could be tress induced.   8. Hypotension, likely mycardial stunning    Plan:  · Patient was evaluated before and after cardiac catheterization.  Discussed with Dr. Max.  Vent adjustment made twice. LAD stents placed.   · Initiate hypothermia with target temperature of 36  · Sedation to control shivering  · Labs every 6 hours.  Avoid replacing potassium during the hypothermia phase unless significant hypokalemia.  · Check UA  · Insulin SS      Kahlil Lake MD  04/12/18  11:58 PM    Time: Critical care 48 min excluding procedures      This note was dictated utilizing Dragon dictation

## 2018-04-13 NOTE — PROGRESS NOTES
"[place]  1955 62 y.o.  [id number]      Patient Care Team:  No Known Provider as PCP - General    CC: Anterior STEMI, cardiac arrest, tobacco abuse    Interval History: Intubated paralyzed critically ill      Objective   Vital Signs  Temp:  [97 °F (36.1 °C)] 97 °F (36.1 °C)  Heart Rate:  [] 82  Resp:  [18-27] 20  BP: ()/() 156/104  Arterial Line BP: ()/() 110/84  FiO2 (%):  [60 %-100 %] 80 %    Intake/Output Summary (Last 24 hours) at 04/13/18 1009  Last data filed at 04/13/18 0630   Gross per 24 hour   Intake              450 ml   Output              350 ml   Net              100 ml     Flowsheet Rows    Flowsheet Row First Filed Value   Admission Height 170.2 cm (67\") Documented at 04/12/2018 2153   Admission Weight 62.6 kg (138 lb) Documented at 04/12/2018 2153          Physical Exam:   General Appearance:    Intubated sedated paralyzed, pupils react to light    Lungs:     Clear to auscultation,BS are equal    Heart:    Normal S1 and S2, RRR without murmur, +w3pqsubn or rub   HEENT:    Sclera are clear, no JVD or adenopathy   Abdomen:     Normal bowel sounds, soft non-tender, non-distended, no HSM   Extremities:   Moves all extremities well, no edema, no cyanosis, no             Redness, no rash     Medication Review:        artificial tears  Both Eyes Q4H   atorvastatin 40 mg Oral Nightly   cisatracurium 20 mg Intravenous Once   cisatracurium 100 mcg/kg Intravenous Once   [START ON 4/14/2018] clopidogrel 75 mg Oral Daily   famotidine 20 mg Nasogastric BID   heparin (porcine) 5,000 Units Subcutaneous Q8H   insulin aspart 0-7 Units Subcutaneous 4x Daily With Meals & Nightly   Propofol          cisatracurium (NIMBEX) infusion 3 mcg/kg/min    insulin regular infusion 1 unit/mL 1-20 Units/hr    norepinephrine 0.02-0.3 mcg/kg/min Last Rate: 0.3 mcg/kg/min (04/13/18 0931)   phenylephrine 0.5-3 mcg/kg/min Last Rate: 1.5 mcg/kg/min (04/13/18 0957)   propofol 5-50 mcg/kg/min Last " Rate: 10 mcg/kg/min (04/13/18 0915)   sodium chloride 125 mL/hr Last Rate: 125 mL/hr (04/13/18 0522)   sodium chloride 9 mL/hr    vasopressin 0.03 Units/min Last Rate: 0.03 Units/min (04/13/18 0623)         I reviewed the patient's new clinical results.  I personally viewed and interpreted the patient's EKG/Telemetry data    Assessment/Plan  Active Hospital Problems (** Indicates Principal Problem)    Diagnosis Date Noted   • Cardiac arrest [I46.9] 04/13/2018      Resolved Hospital Problems    Diagnosis Date Noted Date Resolved   No resolved problems to display.       Critically ill gentleman with cardiac arrest due to an anterior STEMI.  Was taken on the Cath Lab and drug-eluting stenting to the LAD left main and to the ramus RCA is free of disease.  On his echo this morning EF is probably 15-20% large area of anterior akinesis I cant tell if  he's got a thrombus in his apex we'll await for the echocardiographers to look at it.  He is on 3 pressors now to maintain with a possible thrombus in his apex I don't think he is a candidate to go put a mechanical support device and we will have to hope for the best here..  He did get therapeutic hypothermia right away I going to lighten up his temperature up to 36 cc if that improves his hemodynamics any I think his prognosis is poor unfortunately due to his terrible LV function    It looks like he may have an thrombus in his LV apex and start him on heparin    Lennox Max MD  04/13/18  10:09 AM

## 2018-04-13 NOTE — ED TRIAGE NOTES
Patient complains of left upper arm pain, hurts when he lifts anything, has been going on for a week. Patient keeps nodding off, thinks he took a pain pill. Denies being homeless.

## 2018-04-13 NOTE — ED NOTES
"Pt in waiting room stating \"I'm going to be sick.  I'm going to be sick.\"  I gave pt an emesis bag and he started to vomit.  Feliciano ESPARZA took pt to triage room where he became unresponsive upon rolling wheelchair into room.  Pt had agonal respirations.  Pt was immediately taken to ER room where CPR was started and code called.       Swati Herrera RN  04/13/18 0135    "

## 2018-04-13 NOTE — ED PROVIDER NOTES
EMERGENCY DEPARTMENT ENCOUNTER    CHIEF COMPLAINT  Chief Complaint: Arm pain  History given by: triage report   History limited by: Acuity of condition.   Room Number: 16/16  PMD: No Known Provider      HPI:  Pt is a 62 y.o. male who presents to the ED for evaluation of left arm pain. HPI is obtained from the RN triage report. Per report, pt came in for left arm pain that began 1 week ago after cleaning out a shed. Pt reported that the pain was worsened by movement of the arm. He informed triage that the he took a friends Exira. While in the ED waiting room, he began to experiencing vomiting. Per EDT, pt began to have agonal respirations and collapsed.     Duration:  1 week  Onset: unknown  Timing: unknown  Location: left upper arm  Radiation: unknown  Quality: unknown  Intensity/Severity: unknown   Progression: unknown   Associated Symptoms: agonal respirations and vomiting after arrival to the ED   Aggravating Factors: movement   Alleviating Factors: unknown   Previous Episodes: unknown   Treatment before arrival: took a friends Exira.     PAST MEDICAL HISTORY  Active Ambulatory Problems     Diagnosis Date Noted   • No Active Ambulatory Problems     Resolved Ambulatory Problems     Diagnosis Date Noted   • No Resolved Ambulatory Problems     Past Medical History:   Diagnosis Date   • Hypertension        PAST SURGICAL HISTORY  History reviewed. No pertinent surgical history.    FAMILY HISTORY  History reviewed. No pertinent family history.    SOCIAL HISTORY  Social History     Social History   • Marital status:      Spouse name: N/A   • Number of children: N/A   • Years of education: N/A     Occupational History   • Not on file.     Social History Main Topics   • Smoking status: Current Every Day Smoker     Packs/day: 1.00   • Smokeless tobacco: Not on file   • Alcohol use No   • Drug use:      Types: Marijuana   • Sexual activity: Not on file     Other Topics Concern   • Not on file     Social History  Narrative   • No narrative on file       ALLERGIES  Penicillins    REVIEW OF SYSTEMS  Review of Systems   Unable to perform ROS: Acuity of condition       PHYSICAL EXAM  ED Triage Vitals   Temp Heart Rate Resp BP SpO2   04/12/18 2239 04/12/18 2153 04/12/18 2153 04/12/18 2153 04/12/18 2153   97 °F (36.1 °C) 73 18 159/92 99 %      Temp src Heart Rate Source Patient Position BP Location FiO2 (%)   04/12/18 2239 -- -- -- --   Tympanic           Physical Exam   Constitutional: He appears distressed.   HENT:   Head: Normocephalic and atraumatic.   Eyes:   Pupils are fixed and dilated.    Neck: No tracheal deviation present. No thyromegaly present.   Cardiovascular:   V-fib on monitor   Pulmonary/Chest:   No spontaneous respirations.    Abdominal: Soft.   Musculoskeletal: He exhibits no edema or deformity.   Neurological:   Unable to assess    Skin: Skin is warm and dry. No pallor.       LAB RESULTS  Lab Results (last 24 hours)     Procedure Component Value Units Date/Time    Blood Gas, Arterial [[id number]]  (Abnormal) Collected:  04/12/18 2356    Specimen:  Arterial Blood Updated:  04/13/18 0002     Site Arterial: right radial     Hermes's Test Positive     pH, Arterial 7.393 pH units      pCO2, Arterial 22.7 (L) mm Hg      pO2, Arterial 295.4 (H) mm Hg      HCO3, Arterial 13.8 (L) mmol/L      Base Excess, Arterial -8.9 (L) mmol/L      O2 Saturation Calculated 99.9 (H) %      Barometric Pressure for Blood Gas 747.2 mmHg      Modality Bagging     Flow Rate 15 lpm      Rate 20 Breaths/minute     Narrative:       sats not readable Meter: [id number] : [id number] Shandra Dominguez    CBC & Differential [[id number]] Collected:  04/12/18 2359    Specimen:  Blood Updated:  04/13/18 0021    Narrative:       The following orders were created for panel order CBC & Differential.  Procedure                               Abnormality         Status                     ---------                               -----------          ------                     Scan Slide[[id number]]                                                                  CBC Auto Differential[[id number]]        Abnormal            Final result                 Please view results for these tests on the individual orders.    Comprehensive Metabolic Panel [[id number]]  (Abnormal) Collected:  04/12/18 2359    Specimen:  Blood Updated:  04/13/18 0040     Glucose 361 (H) mg/dL      BUN 27 (H) mg/dL      Creatinine 1.23 mg/dL      Sodium 147 (H) mmol/L      Potassium 3.1 (L) mmol/L      Chloride 99 mmol/L      CO2 23.6 mmol/L      Calcium 7.8 (L) mg/dL      Total Protein 5.8 (L) g/dL      Albumin 3.30 (L) g/dL      ALT (SGPT) 256 (H) U/L      AST (SGOT) 168 (H) U/L      Alkaline Phosphatase 70 U/L      Total Bilirubin <0.2 mg/dL      eGFR Non African Amer 59 (L) mL/min/1.73      Globulin 2.5 gm/dL      A/G Ratio 1.3 g/dL      BUN/Creatinine Ratio 22.0     Anion Gap 24.4 mmol/L     Troponin [[id number]]  (Abnormal) Collected:  04/12/18 2359    Specimen:  Blood Updated:  04/13/18 0035     Troponin T 0.113 (C) ng/mL     Narrative:       Troponin T Reference Ranges:  Less than 0.03 ng/mL:    Negative for AMI  0.03 to 0.09 ng/mL:      Indeterminant for AMI  Greater than 0.09 ng/mL: Positive for AMI    CBC Auto Differential [[id number]]  (Abnormal) Collected:  04/12/18 2359    Specimen:  Blood Updated:  04/13/18 0021     WBC 17.46 (H) 10*3/mm3      RBC 4.43 (L) 10*6/mm3      Hemoglobin 13.8 g/dL      Hematocrit 44.4 %      .2 (H) fL      MCH 31.2 pg      MCHC 31.1 (L) g/dL      RDW 13.2 %      RDW-SD 48.4 fl      MPV 10.7 fL      Platelets 197 10*3/mm3      Neutrophil % 63.4 %      Lymphocyte % 29.2 %      Monocyte % 4.7 (L) %      Eosinophil % 1.0 %      Basophil % 0.6 %      Immature Grans % 1.1 (H) %      Neutrophils, Absolute 11.06 (H) 10*3/mm3      Lymphocytes, Absolute 5.09 (H) 10*3/mm3      Monocytes, Absolute 0.82 10*3/mm3      Eosinophils, Absolute 0.18 10*3/mm3       Basophils, Absolute 0.11 10*3/mm3      Immature Grans, Absolute 0.20 (H) 10*3/mm3      nRBC 0.0 /100 WBC           I ordered the above labs and reviewed the results    RADIOLOGY  CT Head Without Contrast   Preliminary Result   No definite acute intracranial pathology.                      XR Chest 1 View   Preliminary Result   No definite acute findings.              XR Humerus Left   Preliminary Result   No acute fracture or dislocation.                   I ordered the above noted radiological studies. Interpreted by radiologist. Reviewed by me in PACS.       PROCEDURES  Intubation  Date/Time: 4/12/2018 11:27 PM  Performed by: MARIA ALEJANDRA HIGGINS  Authorized by: MARIA ALEJANDRA HIGGINS     Consent:     Consent obtained:  Emergent situation  Pre-procedure details:     Patient status:  Unresponsive  Procedure details:     Preoxygenation:  Bag valve mask    CPR in progress: yes      Intubation method:  Oral    Oral intubation technique:  Video-assisted    Laryngoscope blade:  Mac 4    Tube size (mm):  7.5    Tube type:  Cuffed    Number of attempts:  1    Tube visualized through cords: yes    Placement assessment:     ETT to lip:  25    Tube secured with:  ETT jackson    Breath sounds:  Equal    Placement verification: chest rise, CXR verification, equal breath sounds and ETCO2 detector      CXR findings:  ETT in proper place  Post-procedure details:     Patient tolerance of procedure:  Tolerated well, no immediate complications  Critical Care  Performed by: MARIA ALEJANDRA HIGGINS  Authorized by: AMRIA ALEJANDRA HIGGINS     Critical care provider statement:     Critical care time (minutes):  85    Critical care time was exclusive of:  Separately billable procedures and treating other patients and teaching time    Critical care was necessary to treat or prevent imminent or life-threatening deterioration of the following conditions:  Cardiac failure and respiratory failure    Critical care was time spent personally by me on the following activities:   Development of treatment plan with patient or surrogate, discussions with consultants, evaluation of patient's response to treatment, examination of patient, obtaining history from patient or surrogate, ordering and performing treatments and interventions, ordering and review of laboratory studies, ordering and review of radiographic studies, re-evaluation of patient's condition and ventilator management      EKG           EKG time: 23:45  Rhythm/Rate: wide complex rhythm, 146    Interpreted Contemporaneously by me, independently viewed  No prior     EKG           EKG time: 00:37  Rhythm/Rate: Sinus tachycardia, 133  P waves and NC: nml  QRS, axis: nml   ST and T waves: ST elevation in V2-V4, ST depression in 3 and AVF     Interpreted Contemporaneously by me, independently viewed  changed compared to prior     PROGRESS AND CONSULTS  ED Course   CODE TIMES ARE APPROXIMATE, SEE RN DOCUMENTATION FOR EXACT TIMES.     23:24  Rhythm check=v-fib.   Code blue called.   CPR initiated.     23:27  Epi given.   Pt intubated  V-fib on monitor. Cardioversion at 120J.     23:28  Chest compressions continued.   Atropine given.     23:29  Rhythm check=V-fib  Cardioversion attempted  Chest compressions continued.     23:30  EtCO2=9    23:30  Rhythm check=V-fib  Cardioversion attempted  Chest compressions resumed.     23:31  EtCO2=12    23:31  Rhythm check=V-fib  Cardioversion attempted    23:32  EtCO2=13  Amiodarone given.     23:34  EtCO2=13  Narcan given     23:35  EtCO2=15  Compressions held  Rhythm check=V-fib  Cardioversion attempted    23:35  Pulse palpable. OV=734's.  Amiodarone drip ordered.     23:37  Rhythm check=V-fib  Cardioversion attempted  Pulse palpable.     23:39  QRS complex on the monitor is widened. Sodium bicarb given.   EtCO2=28    23:43  EKG, CXR, CBC, CMP, and troponin ordered. Call placed to pulmonology for admission.     23:47  BP- 159/92 HR- 73 Temp- 97 °F (36.1 °C) (Tympanic) O2 sat- 99%  Pupils are now  constricting.     23:52  Sodium bicarb given.     23:54  Updated pt's family that the pt had a cardiac arrest, but after CPR and cardioversion, he has regained a pulse. Informed them of the plan for admission to the ICU. Family understands and agrees with the plan, all questions answered.    23:58  Discussed pt's case with Dr Lake (pulmonology), who agrees to admit the ICU.  Call placed to interventional cardiology    23:59   Repeat EKG ordered.     00:07  BP- 134/78 HR- 130 Temp- 97 °F (36.1 °C) (Tympanic) O2 sat- 99%  Pt is now seizing and posturing. Ativan and stat CT head ordered.     00:09  1 mg of Ativan given. Pupils are dilated and non responsive.     00:15  Discussed pt's case with Dr Max (interventional cardiology), who will review the pt's EKG and return my call.     00:26  Discussed pt's case with Dr Max who recommends a synchronized cardioversion.  He will come to the ED to evaluate pt.     00:30  VZ=453/83  Pt continues to have posturing. Additional Ativan ordered.     00:32  Synchronized cardioversion attempted.   Repeat EKG ordered    00:40  Team C called    00:46  Spoke with Dr Max, updated him on pt's EKG changes.     00:50  Dr Max at bedside.     00:51  Updated the pt's family on the pt's condition, and the plan for pt to go to the cardiac cath lab. Family escorted to pt's bedside.     MEDICAL DECISION MAKING  Results were reviewed/discussed with the patient and they were also made aware of online access. Pt also made aware that some labs, such as cultures, will not be resulted during ER visit and follow up with PMD is necessary.     MDM  Number of Diagnoses or Management Options  Cardiac arrest:      Amount and/or Complexity of Data Reviewed  Clinical lab tests: ordered and reviewed (Troponin= 0.113  )  Tests in the radiology section of CPT®: ordered and reviewed (CXR shows ET tube in good position, NAD. CT head shows NAD)  Tests in the medicine section of CPT®: reviewed and  ordered (See EKG procedure note. )  Discuss the patient with other providers: yes (Dr Lake, pulmonology  Dr Max, interventional cardiology)    Critical Care  Total time providing critical care:  minutes         DIAGNOSIS  Final diagnoses:   Cardiac arrest       DISPOSITION  ADMISSION    Discussed treatment plan and reason for admission with pt/family and admitting physician.  Pt/family voiced understanding of the plan for admission for further testing/treatment as needed.     Latest Documented Vital Signs:  As of 12:53 AM  BP- 134/78 HR- 73 Temp- 97 °F (36.1 °C) (Tympanic) O2 sat- 99%    --  Documentation assistance provided by neelam Morillo for Dr Albright.  Information recorded by the neelam was done at my direction and has been verified and validated by me.     Luz Maria Morillo  04/13/18 0120       Luz Maria Morillo  04/13/18 0148       Isidro Albright MD  04/13/18 0608

## 2018-04-13 NOTE — NURSING NOTE
Dr Max at bedside.  Discussed POC.  Sister at bedside too.  Warming pt to 36 degrees. Dr Max ordered to hold potassium replacement for now

## 2018-04-13 NOTE — CONSULTS
Date of Hospital Visit: 18  Encounter Provider: Lennox Max MD  Place of Service: [facility] CARDIOLOGY  Patient Name: Negro Hall  :1955  [id number]      Chief complaint: Cardiac arrest      History of Present Illness: 62-year-old gentleman sounds like previously without much medical care.  Heavy history of tobacco abuse.  He is evidently had chest pain left arm pain for the last week.  Brought here via ambulance for this complaint and was triaged to wait in the lobby.  In the lobby collapsed after vomiting emergently brought back to the ER and found to be in ventricular fibrillation.  This.  At time of about 3 minutes or so where he did not get CPR within full resuscitation was started shocks was back in ECG the 5 evening he was given a head CT when I was consultative.  He was brought back from CT angiogram was placed on amiodarone and was cardioverted one more time to what appeared to be sinus with now clear anterior lateral STEMI.  Not able to get much other history from his family reports no history of diabetes hypertension drug use but again he doesn't seek medical care much is not allergic to anything he is unable to give any history at this point is intubated and not responsive    We are asked to see by the intensivists, Dr Bateman      Past Medical History:   Diagnosis Date   • Hypertension          History reviewed. No pertinent surgical history.      (Not in a hospital admission)    Current Meds  No current facility-administered medications on file prior to encounter.      No current outpatient prescriptions on file prior to encounter.         Social History     Social History   • Marital status:      Spouse name: N/A   • Number of children: N/A   • Years of education: N/A     Occupational History   • Not on file.     Social History Main Topics   • Smoking status: Current Every Day Smoker     Packs/day: 1.00   • Smokeless tobacco: Not on file   •  "Alcohol use No   • Drug use:      Types: Marijuana   • Sexual activity: Not on file     Other Topics Concern   • Not on file     Social History Narrative   • No narrative on file       Family Hx: Non-contributory      REVIEW OF SYSTEMS:   ROS was performed and is negative except as outlined in HPI     REVIEW OF SYSTEMS:   Unable to give any history       Objective:     Vitals:    04/12/18 2153 04/12/18 2239 04/12/18 2338   BP: 159/92  134/78   Pulse: 73     Resp: 18     Temp:  97 °F (36.1 °C)    TempSrc:  Tympanic    SpO2: 99%     Weight: 62.6 kg (138 lb)     Height: 170.2 cm (67\")       Body mass index is 21.61 kg/m².  Flowsheet Rows    Flowsheet Row First Filed Value   Admission Height 170.2 cm (67\") Documented at 04/12/2018 2153   Admission Weight 62.6 kg (138 lb) Documented at 04/12/2018 2153          General Appearance:    Intubated and unresponsive    Head:    Normocephalic, without obvious abnormality, atraumatic, Pupils are dilated but mildly reactive    Ears:    Ears appear intact with no abnormalities noted   Throat:   No oral lesions, dentition good   Neck:   No adenopathy, supple, trachea midline, no thyromegaly, no carotid bruit, no JVD   Lungs:    Breath sounds are equal and  clear to auscultation    Heart:   Normal S1 and S2, RRR, no murmur/gallop or rub, tachycardic    Abdomen:    Normal bowel sounds, obese, soft non-tender, non-distended, no organomegaly, no guarding   Extremities:   Moves all extremities well, no edema, no cyanosis, no redness   Pulses:   Pulses palpable and equal bilaterally. Normal radial pulses   Skin:   No bleeding, bruising or rash   Lymph nodes:   No palpable adenopathy     I personally viewed and interpreted the patient's EKG/Telemetry data    Assessment:  Active Hospital Problems (** Indicates Principal Problem)    Diagnosis Date Noted   • Cardiac arrest [I46.9] 04/13/2018      Resolved Hospital Problems    Diagnosis Date Noted Date Resolved   No resolved problems to " display.         Plan:All resuscitative VF arrest appears that he has a pretty brief no flow time does have a pressure now does appear to be having a large anterior lateral STEMI we'll start pooling of consult of the intensivist he is shivering now and posturing a little bit so we are going to sedate him and paralyze him taken to the Cath Lab as soon as lab becomes available I discussed with his family.  His prognosis is definitely guarded this is a life-threatening problem further decisions based on the findings at the time of his cath

## 2018-04-13 NOTE — NURSING NOTE
Dr Alex assessed pt.  Pt responsive at this time.  He called Dr Max.  Decision to rewarm pt early.  Set machine and restarted warming to 37 C

## 2018-04-14 NOTE — CONSULTS
Referring Provider: Cb Hercules MD  Reason for Consultation: Evaluation of patient with acute kidney injury    Subjective     Chief complaint   Chief Complaint   Patient presents with   • Arm Pain       History of present illness:   62-year-old  male who presented to the emergency department with the left arm pain for about 3 weeks, gradually getting worse.  On arrival to the emergency department patient collapsed and was found to have V. fib arrest he was resuscitated, intubated, the was placed on hypothermia protocol which now he is being warmed up.  Noted to have increased creatinine and, hence nephrology consult was requested.  There is reported history of hypertension, the patient has not seen a physician for many years.  From creatinine on admission was 1.23, he has a creatinine of 0.9 in 2013 but no other the lab and to compare the admission results with.  His creatinine is gradually increasing gets up to 2.61 today.  No history could be obtained from the patient.  The patient is currently on multiple vasopressors      Past Medical History:   Diagnosis Date   • Hypertension      Past Surgical History:   Procedure Laterality Date   • CARDIAC CATHETERIZATION N/A 4/13/2018    Procedure: Left Heart Cath;  Surgeon: Lennox Max MD;  Location: [facility] INVASIVE LOCATION;  Service: Cardiovascular   • CARDIAC CATHETERIZATION N/A 4/13/2018    Procedure: Coronary angiography;  Surgeon: Lennox Max MD;  Location: [facility] INVASIVE LOCATION;  Service: Cardiovascular   • CARDIAC CATHETERIZATION N/A 4/13/2018    Procedure: Stent TEJ coronary;  Surgeon: Lennox Max MD;  Location: [facility] INVASIVE LOCATION;  Service: Cardiovascular     History reviewed. No pertinent family history.  Family history is not obtainable  Social History   Substance Use Topics   • Smoking status: Current Every Day Smoker     Packs/day: 1.00   • Smokeless tobacco: Not on file   • Alcohol use No     No prescriptions  "prior to admission.     Allergies:  Penicillins    Review of Systems  Not obtainable, the patient is on the ventilator.    Objective     Vital Signs  Temp:  [97.5 °F (36.4 °C)-98.6 °F (37 °C)] 98.6 °F (37 °C)  Heart Rate:  [62-98] 80  Resp:  [16] 16  BP: ()/() 96/70  Arterial Line BP: ()/(44-91) 111/69  FiO2 (%):  [30 %-80 %] 30 %    Flowsheet Rows    Flowsheet Row First Filed Value   Admission Height 170.2 cm (67\") Documented at 04/12/2018 2153   Admission Weight 62.6 kg (138 lb) Documented at 04/12/2018 2153           No intake/output data recorded.  I/O last 3 completed shifts:  In: 3639.4 [I.V.:3379.4; Other:60; IV Piggyback:200]  Out: 760 [Urine:660; Emesis/NG output:100]    Intake/Output Summary (Last 24 hours) at 04/14/18 1046  Last data filed at 04/14/18 0537   Gross per 24 hour   Intake          1892.42 ml   Output              350 ml   Net          1542.42 ml       Physical Exam:  General Appearance: On the ventilator, appears very frail, no acute distress,   Skin: warm and dry  HEENT: pupils round and reactive to light, orally intubated,   Neck: No JVD, trachea midline  Lungs: Lateral rhonchi, unlabored breathing effort  Heart: RRR, normal S1 and S2, no S3, no rub  Abdomen: soft, non-tender,  present bowel sounds to auscultation  : no palpable bladder, recatheterization could in place  Joints: no significant deformities noted, no crepitation over the knees or the ankles.  Lymphatics : No cervical or supraclavicular lymphadenopathy   Extremities: no edema, cyanosis or clubbing  Neuro: Following very basic to simple commands, difficult to assess overall      Results Review:  Results for orders placed or performed during the hospital encounter of 04/12/18   Comprehensive Metabolic Panel   Result Value Ref Range    Glucose 361 (H) 65 - 99 mg/dL    BUN 27 (H) 8 - 23 mg/dL    Creatinine 1.23 0.76 - 1.27 mg/dL    Sodium 147 (H) 136 - 145 mmol/L    Potassium 3.1 (L) 3.5 - 5.2 mmol/L    " Chloride 99 98 - 107 mmol/L    CO2 23.6 22.0 - 29.0 mmol/L    Calcium 7.8 (L) 8.6 - 10.5 mg/dL    Total Protein 5.8 (L) 6.0 - 8.5 g/dL    Albumin 3.30 (L) 3.50 - 5.20 g/dL    ALT (SGPT) 256 (H) 1 - 41 U/L    AST (SGOT) 168 (H) 1 - 40 U/L    Alkaline Phosphatase 70 39 - 117 U/L    Total Bilirubin <0.2 0.1 - 1.2 mg/dL    eGFR Non African Amer 59 (L) >60 mL/min/1.73    Globulin 2.5 gm/dL    A/G Ratio 1.3 g/dL    BUN/Creatinine Ratio 22.0 7.0 - 25.0    Anion Gap 24.4 mmol/L   Troponin   Result Value Ref Range    Troponin T 0.113 (C) 0.000 - 0.030 ng/mL   CBC Auto Differential   Result Value Ref Range    WBC 17.46 (H) 4.50 - 10.70 10*3/mm3    RBC 4.43 (L) 4.60 - 6.00 10*6/mm3    Hemoglobin 13.8 13.7 - 17.6 g/dL    Hematocrit 44.4 40.4 - 52.2 %    .2 (H) 79.8 - 96.2 fL    MCH 31.2 27.0 - 32.7 pg    MCHC 31.1 (L) 32.6 - 36.4 g/dL    RDW 13.2 11.5 - 14.5 %    RDW-SD 48.4 37.0 - 54.0 fl    MPV 10.7 6.0 - 12.0 fL    Platelets 197 140 - 500 10*3/mm3    Neutrophil % 63.4 42.7 - 76.0 %    Lymphocyte % 29.2 19.6 - 45.3 %    Monocyte % 4.7 (L) 5.0 - 12.0 %    Eosinophil % 1.0 0.3 - 6.2 %    Basophil % 0.6 0.0 - 1.5 %    Immature Grans % 1.1 (H) 0.0 - 0.5 %    Neutrophils, Absolute 11.06 (H) 1.90 - 8.10 10*3/mm3    Lymphocytes, Absolute 5.09 (H) 0.90 - 4.80 10*3/mm3    Monocytes, Absolute 0.82 0.20 - 1.20 10*3/mm3    Eosinophils, Absolute 0.18 0.00 - 0.70 10*3/mm3    Basophils, Absolute 0.11 0.00 - 0.20 10*3/mm3    Immature Grans, Absolute 0.20 (H) 0.00 - 0.03 10*3/mm3    nRBC 0.0 0.0 - 0.0 /100 WBC   Blood Gas, Arterial   Result Value Ref Range    Site Arterial: right radial     Hermes's Test Positive     pH, Arterial 7.393 7.350 - 7.450 pH units    pCO2, Arterial 22.7 (L) 35.0 - 45.0 mm Hg    pO2, Arterial 295.4 (H) 80.0 - 100.0 mm Hg    HCO3, Arterial 13.8 (L) 22.0 - 28.0 mmol/L    Base Excess, Arterial -8.9 (L) 0.0 - 2.0 mmol/L    O2 Saturation Calculated 99.9 (H) 92.0 - 99.0 %    Barometric Pressure for Blood Gas 747.2  mmHg    Modality Bagging     Flow Rate 15 lpm    Rate 20 Breaths/minute   Hemoglobin A1c   Result Value Ref Range    Hemoglobin A1C 5.70 (H) 4.80 - 5.60 %   Lipid Panel   Result Value Ref Range    Total Cholesterol 150 0 - 200 mg/dL    Triglycerides 111 0 - 150 mg/dL    HDL Cholesterol 40 40 - 60 mg/dL    LDL Cholesterol  88 0 - 100 mg/dL    VLDL Cholesterol 22.2 5 - 40 mg/dL    LDL/HDL Ratio 2.20    Troponin   Result Value Ref Range    Troponin T 5.400 (C) 0.000 - 0.030 ng/mL   Comprehensive Metabolic Panel   Result Value Ref Range    Glucose 381 (H) 65 - 99 mg/dL    BUN 29 (H) 8 - 23 mg/dL    Creatinine 1.18 0.76 - 1.27 mg/dL    Sodium 144 136 - 145 mmol/L    Potassium 3.1 (L) 3.5 - 5.2 mmol/L    Chloride 103 98 - 107 mmol/L    CO2 16.1 (L) 22.0 - 29.0 mmol/L    Calcium 8.1 (L) 8.6 - 10.5 mg/dL    Total Protein 6.0 6.0 - 8.5 g/dL    Albumin 3.40 (L) 3.50 - 5.20 g/dL    ALT (SGPT) 291 (H) 1 - 41 U/L    AST (SGOT) 353 (H) 1 - 40 U/L    Alkaline Phosphatase 77 39 - 117 U/L    Total Bilirubin 0.3 0.1 - 1.2 mg/dL    eGFR Non African Amer 62 >60 mL/min/1.73    Globulin 2.6 gm/dL    A/G Ratio 1.3 g/dL    BUN/Creatinine Ratio 24.6 7.0 - 25.0    Anion Gap 24.9 mmol/L   Comprehensive Metabolic Panel   Result Value Ref Range    Glucose 253 (H) 65 - 99 mg/dL    BUN 30 (H) 8 - 23 mg/dL    Creatinine 1.83 (H) 0.76 - 1.27 mg/dL    Sodium 145 136 - 145 mmol/L    Potassium 4.6 3.5 - 5.2 mmol/L    Chloride 107 98 - 107 mmol/L    CO2 11.2 (L) 22.0 - 29.0 mmol/L    Calcium 7.7 (L) 8.6 - 10.5 mg/dL    Total Protein 5.8 (L) 6.0 - 8.5 g/dL    Albumin 3.40 (L) 3.50 - 5.20 g/dL    ALT (SGPT) 323 (H) 1 - 41 U/L    AST (SGOT) 613 (H) 1 - 40 U/L    Alkaline Phosphatase 73 39 - 117 U/L    Total Bilirubin 0.5 0.1 - 1.2 mg/dL    eGFR Non African Amer 37 (L) >60 mL/min/1.73    Globulin 2.4 gm/dL    A/G Ratio 1.4 g/dL    BUN/Creatinine Ratio 16.4 7.0 - 25.0    Anion Gap 26.8 mmol/L   Lactic Acid, Plasma   Result Value Ref Range    Lactate 6.7 (C)  0.5 - 2.0 mmol/L   Protime-INR   Result Value Ref Range    Protime 15.8 (H) 11.7 - 14.2 Seconds    INR 1.29 (H) 0.90 - 1.10   aPTT   Result Value Ref Range    PTT >200.0 (C) 22.7 - 35.4 seconds   aPTT   Result Value Ref Range    PTT 84.6 (H) 22.7 - 35.4 seconds   Calcium, Ionized   Result Value Ref Range    Ionized Calcium 1.12 (L) 1.15 - 1.35 mmol/L    Ionized Calcium 4.5 (L) 4.6 - 5.4 mg/dL   Calcium, Ionized   Result Value Ref Range    Ionized Calcium 1.14 (L) 1.15 - 1.35 mmol/L    Ionized Calcium 4.6 4.6 - 5.4 mg/dL   Magnesium   Result Value Ref Range    Magnesium 2.5 (H) 1.6 - 2.4 mg/dL   Magnesium   Result Value Ref Range    Magnesium 2.7 (H) 1.6 - 2.4 mg/dL   Phosphorus   Result Value Ref Range    Phosphorus 3.7 2.5 - 4.5 mg/dL   Phosphorus   Result Value Ref Range    Phosphorus 6.2 (H) 2.5 - 4.5 mg/dL   CBC Auto Differential   Result Value Ref Range    WBC 29.58 (H) 4.50 - 10.70 10*3/mm3    RBC 4.71 4.60 - 6.00 10*6/mm3    Hemoglobin 14.6 13.7 - 17.6 g/dL    Hematocrit 46.8 40.4 - 52.2 %    MCV 99.4 (H) 79.8 - 96.2 fL    MCH 31.0 27.0 - 32.7 pg    MCHC 31.2 (L) 32.6 - 36.4 g/dL    RDW 13.4 11.5 - 14.5 %    RDW-SD 48.7 37.0 - 54.0 fl    MPV 11.2 6.0 - 12.0 fL    Platelets 217 140 - 500 10*3/mm3    Neutrophil % 83.2 (H) 42.7 - 76.0 %    Lymphocyte % 9.8 (L) 19.6 - 45.3 %    Monocyte % 5.2 5.0 - 12.0 %    Eosinophil % 0.1 (L) 0.3 - 6.2 %    Basophil % 0.2 0.0 - 1.5 %    Immature Grans % 1.5 (H) 0.0 - 0.5 %    Neutrophils, Absolute 24.59 (H) 1.90 - 8.10 10*3/mm3    Lymphocytes, Absolute 2.91 0.90 - 4.80 10*3/mm3    Monocytes, Absolute 1.53 (H) 0.20 - 1.20 10*3/mm3    Eosinophils, Absolute 0.03 0.00 - 0.70 10*3/mm3    Basophils, Absolute 0.07 0.00 - 0.20 10*3/mm3    Immature Grans, Absolute 0.45 (H) 0.00 - 0.03 10*3/mm3   CBC Auto Differential   Result Value Ref Range    WBC 29.43 (H) 4.50 - 10.70 10*3/mm3    RBC 4.69 4.60 - 6.00 10*6/mm3    Hemoglobin 14.6 13.7 - 17.6 g/dL    Hematocrit 47.4 40.4 - 52.2 %     .1 (H) 79.8 - 96.2 fL    MCH 31.1 27.0 - 32.7 pg    MCHC 30.8 (L) 32.6 - 36.4 g/dL    RDW 13.7 11.5 - 14.5 %    RDW-SD 50.3 37.0 - 54.0 fl    MPV 10.7 6.0 - 12.0 fL    Platelets 175 140 - 500 10*3/mm3    Neutrophil % 83.6 (H) 42.7 - 76.0 %    Lymphocyte % 8.4 (L) 19.6 - 45.3 %    Monocyte % 5.4 5.0 - 12.0 %    Eosinophil % 0.1 (L) 0.3 - 6.2 %    Basophil % 0.2 0.0 - 1.5 %    Immature Grans % 2.3 (H) 0.0 - 0.5 %    Neutrophils, Absolute 24.61 (H) 1.90 - 8.10 10*3/mm3    Lymphocytes, Absolute 2.47 0.90 - 4.80 10*3/mm3    Monocytes, Absolute 1.60 (H) 0.20 - 1.20 10*3/mm3    Eosinophils, Absolute 0.02 0.00 - 0.70 10*3/mm3    Basophils, Absolute 0.06 0.00 - 0.20 10*3/mm3    Immature Grans, Absolute 0.67 (H) 0.00 - 0.03 10*3/mm3   Urinalysis With / Microscopic If Indicated - Urine, Clean Catch   Result Value Ref Range    Color, UA Yellow Yellow, Straw    Appearance, UA Clear Clear    pH, UA 5.5 5.0 - 8.0    Specific Gravity, UA >=1.030 1.005 - 1.030    Glucose,  mg/dL (2+) (A) Negative    Ketones, UA Negative Negative    Bilirubin, UA Negative Negative    Blood, UA Large (3+) (A) Negative    Protein, UA >=300 mg/dL (3+) (A) Negative    Leuk Esterase, UA Negative Negative    Nitrite, UA Negative Negative    Urobilinogen, UA 0.2 E.U./dL 0.2 - 1.0 E.U./dL   Blood Gas, Arterial   Result Value Ref Range    Site Arterial Line     Hermes's Test N/A     pH, Arterial 7.255 (C) 7.350 - 7.450 pH units    pCO2, Arterial 43.9 35.0 - 45.0 mm Hg    pO2, Arterial 151.9 (H) 80.0 - 100.0 mm Hg    HCO3, Arterial 19.5 (L) 22.0 - 28.0 mmol/L    Base Excess, Arterial -7.5 (L) 0.0 - 2.0 mmol/L    O2 Saturation Calculated 99.0 92.0 - 99.0 %    A-a Gradiant 0.3 mmHg    Barometric Pressure for Blood Gas 749.0 mmHg    Modality Adult Vent     FIO2 80 %    Ventilator Mode PC     Set Tidal Volume 486     Set Mech Resp Rate 20     Rate 20 Breaths/minute    PEEP 7.5    Lactic Acid, Reflex Timer (This will reflex a repeat order 3-3:15 hours  after ordered.)   Result Value Ref Range    Extra Tube Hold for add-ons.    Urinalysis, Microscopic Only - Urine, Clean Catch   Result Value Ref Range    RBC, UA 3-5 (A) None Seen, 0-2 /HPF    WBC, UA None Seen None Seen, 0-2 /HPF    Bacteria, UA 1+ (A) None Seen /HPF    Squamous Epithelial Cells, UA 0-2 None Seen, 0-2 /HPF    Hyaline Casts, UA 0-2 None Seen /LPF    Methodology Manual Light Microscopy    Lactic Acid, Reflex   Result Value Ref Range    Lactate 11.1 (C) 0.5 - 2.0 mmol/L   Blood Gas, Arterial   Result Value Ref Range    Site Arterial Line     Hermes's Test N/A     pH, Arterial 7.210 (C) 7.350 - 7.450 pH units    pCO2, Arterial 29.4 (L) 35.0 - 45.0 mm Hg    pO2, Arterial 281.9 (H) 80.0 - 100.0 mm Hg    HCO3, Arterial 11.8 (L) 22.0 - 28.0 mmol/L    Base Excess, Arterial -14.6 (L) 0.0 - 2.0 mmol/L    O2 Saturation Calculated 99.8 (H) 92.0 - 99.0 %    A-a Gradiant 0.5 mmHg    Barometric Pressure for Blood Gas 750.4 mmHg    Modality Adult Vent     FIO2 80 %    Ventilator Mode PC     Set [facility] Resp Rate 16     Rate 20 Breaths/minute    PEEP 7.5    Comprehensive Metabolic Panel   Result Value Ref Range    Glucose 82 65 - 99 mg/dL    BUN 32 (H) 8 - 23 mg/dL    Creatinine 2.11 (H) 0.76 - 1.27 mg/dL    Sodium 146 (H) 136 - 145 mmol/L    Potassium 4.1 3.5 - 5.2 mmol/L    Chloride 110 (H) 98 - 107 mmol/L    CO2 15.5 (L) 22.0 - 29.0 mmol/L    Calcium 7.6 (L) 8.6 - 10.5 mg/dL    Total Protein 5.5 (L) 6.0 - 8.5 g/dL    Albumin 3.20 (L) 3.50 - 5.20 g/dL    ALT (SGPT) 499 (H) 1 - 41 U/L    AST (SGOT) 828 (H) 1 - 40 U/L    Alkaline Phosphatase 70 39 - 117 U/L    Total Bilirubin 0.7 0.1 - 1.2 mg/dL    eGFR Non African Amer 32 (L) >60 mL/min/1.73    Globulin 2.3 gm/dL    A/G Ratio 1.4 g/dL    BUN/Creatinine Ratio 15.2 7.0 - 25.0    Anion Gap 20.5 mmol/L   Lactic Acid, Plasma   Result Value Ref Range    Lactate 5.0 (C) 0.5 - 2.0 mmol/L   aPTT   Result Value Ref Range    PTT >200.0 (C) 22.7 - 35.4 seconds   Calcium, Ionized    Result Value Ref Range    Ionized Calcium 1.14 (L) 1.15 - 1.35 mmol/L    Ionized Calcium 4.6 4.6 - 5.4 mg/dL   Magnesium   Result Value Ref Range    Magnesium 2.3 1.6 - 2.4 mg/dL   Phosphorus   Result Value Ref Range    Phosphorus 4.3 2.5 - 4.5 mg/dL   CBC Auto Differential   Result Value Ref Range    WBC 30.46 (C) 4.50 - 10.70 10*3/mm3    RBC 4.77 4.60 - 6.00 10*6/mm3    Hemoglobin 15.0 13.7 - 17.6 g/dL    Hematocrit 46.8 40.4 - 52.2 %    MCV 98.1 (H) 79.8 - 96.2 fL    MCH 31.4 27.0 - 32.7 pg    MCHC 32.1 (L) 32.6 - 36.4 g/dL    RDW 13.7 11.5 - 14.5 %    RDW-SD 48.9 37.0 - 54.0 fl    MPV 11.1 6.0 - 12.0 fL    Platelets 145 140 - 500 10*3/mm3   Basic Metabolic Panel   Result Value Ref Range    Glucose 99 65 - 99 mg/dL    BUN 31 (H) 8 - 23 mg/dL    Creatinine 2.08 (H) 0.76 - 1.27 mg/dL    Sodium 148 (H) 136 - 145 mmol/L    Potassium 3.8 3.5 - 5.2 mmol/L    Chloride 111 (H) 98 - 107 mmol/L    CO2 12.8 (L) 22.0 - 29.0 mmol/L    Calcium 8.3 (L) 8.6 - 10.5 mg/dL    eGFR Non African Amer 32 (L) >60 mL/min/1.73    BUN/Creatinine Ratio 14.9 7.0 - 25.0    Anion Gap 24.2 mmol/L   Blood Gas, Arterial   Result Value Ref Range    Site Arterial Line     Hermes's Test N/A     pH, Arterial 7.213 (C) 7.350 - 7.450 pH units    pCO2, Arterial 35.5 35.0 - 45.0 mm Hg    pO2, Arterial 121.2 (H) 80.0 - 100.0 mm Hg    HCO3, Arterial 14.3 (L) 22.0 - 28.0 mmol/L    Base Excess, Arterial -12.6 (L) 0.0 - 2.0 mmol/L    O2 Saturation Calculated 97.9 92.0 - 99.0 %    A-a Gradiant 0.2 mmHg    Barometric Pressure for Blood Gas 749.5 mmHg    Modality Adult Vent     FIO2 80 %    Ventilator Mode PC     Set [facility] Resp Rate 16     Rate 23 Breaths/minute    PEEP 7.5    Comprehensive Metabolic Panel   Result Value Ref Range    Glucose 111 (H) 65 - 99 mg/dL    BUN 36 (H) 8 - 23 mg/dL    Creatinine 2.30 (H) 0.76 - 1.27 mg/dL    Sodium 145 136 - 145 mmol/L    Potassium 4.2 3.5 - 5.2 mmol/L    Chloride 109 (H) 98 - 107 mmol/L    CO2 14.6 (L) 22.0 - 29.0  mmol/L    Calcium 7.6 (L) 8.6 - 10.5 mg/dL    Total Protein 5.5 (L) 6.0 - 8.5 g/dL    Albumin 3.20 (L) 3.50 - 5.20 g/dL    ALT (SGPT) 1,842 (H) 1 - 41 U/L    AST (SGOT) 1,849 (H) 1 - 40 U/L    Alkaline Phosphatase 68 39 - 117 U/L    Total Bilirubin 0.7 0.1 - 1.2 mg/dL    eGFR Non African Amer 29 (L) >60 mL/min/1.73    Globulin 2.3 gm/dL    A/G Ratio 1.4 g/dL    BUN/Creatinine Ratio 15.7 7.0 - 25.0    Anion Gap 21.4 mmol/L   Lactic Acid, Plasma   Result Value Ref Range    Lactate 4.4 (C) 0.5 - 2.0 mmol/L   aPTT   Result Value Ref Range    .7 (H) 22.7 - 35.4 seconds   Calcium, Ionized   Result Value Ref Range    Ionized Calcium 1.12 (L) 1.15 - 1.35 mmol/L    Ionized Calcium 4.5 (L) 4.6 - 5.4 mg/dL   Magnesium   Result Value Ref Range    Magnesium 2.1 1.6 - 2.4 mg/dL   Phosphorus   Result Value Ref Range    Phosphorus 4.8 (H) 2.5 - 4.5 mg/dL   CBC Auto Differential   Result Value Ref Range    WBC 33.29 (C) 4.50 - 10.70 10*3/mm3    RBC 4.63 4.60 - 6.00 10*6/mm3    Hemoglobin 14.7 13.7 - 17.6 g/dL    Hematocrit 45.1 40.4 - 52.2 %    MCV 97.4 (H) 79.8 - 96.2 fL    MCH 31.7 27.0 - 32.7 pg    MCHC 32.6 32.6 - 36.4 g/dL    RDW 13.7 11.5 - 14.5 %    RDW-SD 49.0 37.0 - 54.0 fl    MPV 11.3 6.0 - 12.0 fL    Platelets 121 (L) 140 - 500 10*3/mm3    Neutrophil % 83.6 (H) 42.7 - 76.0 %    Lymphocyte % 7.1 (L) 19.6 - 45.3 %    Monocyte % 8.2 5.0 - 12.0 %    Eosinophil % 0.0 (L) 0.3 - 6.2 %    Basophil % 0.1 0.0 - 1.5 %    Immature Grans % 1.0 (H) 0.0 - 0.5 %    Neutrophils, Absolute 27.82 (H) 1.90 - 8.10 10*3/mm3    Lymphocytes, Absolute 2.36 0.90 - 4.80 10*3/mm3    Monocytes, Absolute 2.74 (H) 0.20 - 1.20 10*3/mm3    Eosinophils, Absolute 0.00 0.00 - 0.70 10*3/mm3    Basophils, Absolute 0.03 0.00 - 0.20 10*3/mm3    Immature Grans, Absolute 0.34 (H) 0.00 - 0.03 10*3/mm3   Blood Gas, Arterial   Result Value Ref Range    Site Arterial Line     Hermes's Test N/A     pH, Arterial 7.277 (C) 7.350 - 7.450 pH units    pCO2, Arterial  36.4 35.0 - 45.0 mm Hg    pO2, Arterial 234.0 (H) 80.0 - 100.0 mm Hg    HCO3, Arterial 16.9 (L) 22.0 - 28.0 mmol/L    Base Excess, Arterial -9.0 (L) 0.0 - 2.0 mmol/L    O2 Saturation Calculated 99.7 (H) 92.0 - 99.0 %    A-a Gradiant 0.4 mmHg    Barometric Pressure for Blood Gas 748.4 mmHg    Modality Adult Vent     FIO2 80 %    Ventilator Mode PC     Set [facility] Resp Rate 16     Rate 16 Breaths/minute    PEEP 7.5    Scan Slide   Result Value Ref Range    Scan Slide     Comprehensive Metabolic Panel   Result Value Ref Range    Glucose 120 (H) 65 - 99 mg/dL    BUN 43 (H) 8 - 23 mg/dL    Creatinine 2.61 (H) 0.76 - 1.27 mg/dL    Sodium 144 136 - 145 mmol/L    Potassium 4.7 3.5 - 5.2 mmol/L    Chloride 108 (H) 98 - 107 mmol/L    CO2 13.2 (L) 22.0 - 29.0 mmol/L    Calcium 7.9 (L) 8.6 - 10.5 mg/dL    Total Protein 5.5 (L) 6.0 - 8.5 g/dL    Albumin 3.00 (L) 3.50 - 5.20 g/dL    ALT (SGPT) 4,703 (H) 1 - 41 U/L    AST (SGOT) 4,914 (H) 1 - 40 U/L    Alkaline Phosphatase 72 39 - 117 U/L    Total Bilirubin 0.6 0.1 - 1.2 mg/dL    eGFR Non African Amer 25 (L) >60 mL/min/1.73    Globulin 2.5 gm/dL    A/G Ratio 1.2 g/dL    BUN/Creatinine Ratio 16.5 7.0 - 25.0    Anion Gap 22.8 mmol/L   Lactic Acid, Plasma   Result Value Ref Range    Lactate 3.9 (C) 0.5 - 2.0 mmol/L   aPTT   Result Value Ref Range    PTT 53.8 (H) 22.7 - 35.4 seconds   Calcium, Ionized   Result Value Ref Range    Ionized Calcium 1.12 (L) 1.15 - 1.35 mmol/L    Ionized Calcium 4.5 (L) 4.6 - 5.4 mg/dL   Magnesium   Result Value Ref Range    Magnesium 2.1 1.6 - 2.4 mg/dL   Phosphorus   Result Value Ref Range    Phosphorus 5.6 (H) 2.5 - 4.5 mg/dL   CBC Auto Differential   Result Value Ref Range    WBC 29.38 (H) 4.50 - 10.70 10*3/mm3    RBC 4.56 (L) 4.60 - 6.00 10*6/mm3    Hemoglobin 14.3 13.7 - 17.6 g/dL    Hematocrit 44.9 40.4 - 52.2 %    MCV 98.5 (H) 79.8 - 96.2 fL    MCH 31.4 27.0 - 32.7 pg    MCHC 31.8 (L) 32.6 - 36.4 g/dL    RDW 13.8 11.5 - 14.5 %    RDW-SD 49.1 37.0 -  54.0 fl    MPV 12.1 (H) 6.0 - 12.0 fL    Platelets 135 (L) 140 - 500 10*3/mm3    Neutrophil % 88.9 (H) 42.7 - 76.0 %    Lymphocyte % 4.8 (L) 19.6 - 45.3 %    Monocyte % 5.7 5.0 - 12.0 %    Eosinophil % 0.0 (L) 0.3 - 6.2 %    Basophil % 0.0 0.0 - 1.5 %    Immature Grans % 0.6 (H) 0.0 - 0.5 %    Neutrophils, Absolute 26.09 (H) 1.90 - 8.10 10*3/mm3    Lymphocytes, Absolute 1.40 0.90 - 4.80 10*3/mm3    Monocytes, Absolute 1.68 (H) 0.20 - 1.20 10*3/mm3    Eosinophils, Absolute 0.01 0.00 - 0.70 10*3/mm3    Basophils, Absolute 0.01 0.00 - 0.20 10*3/mm3    Immature Grans, Absolute 0.19 (H) 0.00 - 0.03 10*3/mm3   Scan Slide   Result Value Ref Range    RBC Morphology Normal Normal    WBC Morphology Normal Normal    Platelet Morphology Normal Normal   Blood Gas, Arterial   Result Value Ref Range    Site Arterial Line     Hermes's Test N/A     pH, Arterial 7.278 (C) 7.350 - 7.450 pH units    pCO2, Arterial 36.4 35.0 - 45.0 mm Hg    pO2, Arterial 176.3 (H) 80.0 - 100.0 mm Hg    HCO3, Arterial 17.0 (L) 22.0 - 28.0 mmol/L    Base Excess, Arterial -9.0 (L) 0.0 - 2.0 mmol/L    O2 Saturation Calculated 99.4 (H) 92.0 - 99.0 %    A-a Gradiant 0.4 mmHg    Barometric Pressure for Blood Gas 749.8 mmHg    Modality Adult Vent     FIO2 60 %    Ventilator Mode PC     Set Tidal Volume 477     Set Mech Resp Rate 16     Rate 17 Breaths/minute    PEEP 7.5    aPTT   Result Value Ref Range    .5 (H) 22.7 - 35.4 seconds   Blood Gas, Arterial   Result Value Ref Range    Site Arterial Line     Hermes's Test N/A     pH, Arterial 7.294 (C) 7.350 - 7.450 pH units    pCO2, Arterial 33.0 (L) 35.0 - 45.0 mm Hg    pO2, Arterial 102.0 (H) 80.0 - 100.0 mm Hg    HCO3, Arterial 16.0 (L) 22.0 - 28.0 mmol/L    Base Excess, Arterial -9.3 (L) 0.0 - 2.0 mmol/L    O2 Saturation Calculated 97.2 92.0 - 99.0 %    A-a Gradiant 0.4 mmHg    Barometric Pressure for Blood Gas 745.7 mmHg    Modality Adult Vent     FIO2 40 %    Ventilator Mode PC     Set Tidal Volume 464      Set [facility] Resp Rate 16     Rate 17 Breaths/minute    PEEP 7.5    Procalcitonin   Result Value Ref Range    Procalcitonin 1.24 (C) 0.10 - 0.25 ng/mL   POC Glucose Once   Result Value Ref Range    Glucose 269 (H) 70 - 130 mg/dL   POC Activated Clotting Time   Result Value Ref Range    Activated Clotting Time  340 (H) 82 - 152 Seconds   POC Panel 9, ISTAT   Result Value Ref Range    Hemoglobin 13.3 12.0 - 17.0 g/dL    Hematocrit 39 38 - 51 %    O2 Saturation, Arterial 98 95 - 98 %   POC Glucose Once   Result Value Ref Range    Glucose 293 (H) 70 - 130 mg/dL   POC Glucose Once   Result Value Ref Range    Glucose 247 (H) 70 - 130 mg/dL   POC Glucose Once   Result Value Ref Range    Glucose 267 (H) 70 - 130 mg/dL   POC Activated Clotting Time   Result Value Ref Range    Activated Clotting Time  257 (H) 82 - 152 Seconds   POC Glucose Once   Result Value Ref Range    Glucose 183 (H) 70 - 130 mg/dL   POC Glucose Once   Result Value Ref Range    Glucose 158 (H) 70 - 130 mg/dL   POC Glucose Once   Result Value Ref Range    Glucose 103 70 - 130 mg/dL   POC Glucose Once   Result Value Ref Range    Glucose 64 (L) 70 - 130 mg/dL   POC Glucose Once   Result Value Ref Range    Glucose 119 70 - 130 mg/dL   POC Glucose Once   Result Value Ref Range    Glucose 82 70 - 130 mg/dL   POC Glucose Once   Result Value Ref Range    Glucose 64 (L) 70 - 130 mg/dL   POC Glucose Once   Result Value Ref Range    Glucose 133 (H) 70 - 130 mg/dL   POC Glucose Once   Result Value Ref Range    Glucose 26 (C) 70 - 130 mg/dL   POC Glucose Once   Result Value Ref Range    Glucose 64 (L) 70 - 130 mg/dL   POC Glucose Once   Result Value Ref Range    Glucose 124 70 - 130 mg/dL   POC Glucose Once   Result Value Ref Range    Glucose 110 70 - 130 mg/dL   POC Glucose Once   Result Value Ref Range    Glucose 69 (L) 70 - 130 mg/dL   POC Glucose Once   Result Value Ref Range    Glucose 130 70 - 130 mg/dL   POC Glucose Once   Result Value Ref Range    Glucose 115  70 - 130 mg/dL   Adult Transthoracic Echo Complete W/ Cont if Necessary Per Protocol   Result Value Ref Range    BSA 1.7 m^2    IVSd 1.2 cm    LVIDd 4.2 cm    LVIDs 3.6 cm    LVPWd 1.2 cm    IVS/LVPW 1.0     FS 14.3 %    EDV(Teich) 78.6 ml    ESV(Teich) 54.4 ml    EF(Teich) 30.7 %    EDV(cubed) 74.1 ml    ESV(cubed) 46.7 ml    EF(cubed) 37.0 %    LV mass(C)d 178.2 grams    LV mass(C)dI 103.2 grams/m^2    SV(Teich) 24.1 ml    SI(Teich) 14.0 ml/m^2    SV(cubed) 27.4 ml    SI(cubed) 15.9 ml/m^2    Ao root diam 3.5 cm    Ao root area 9.6 cm^2    ACS 1.5 cm    LVOT diam 2.0 cm    LVOT area 3.1 cm^2    LVOT area(traced) 3.1 cm^2    LVLd ap4 6.8 cm    EDV(MOD-sp4) 108.0 ml    LVLs ap4 6.7 cm    ESV(MOD-sp4) 76.0 ml    EF(MOD-sp4) 29.6 %    LVLd ap2 7.5 cm    EDV(MOD-sp2) 62.0 ml    LVLs ap2 6.3 cm    ESV(MOD-sp2) 42.0 ml    EF(MOD-sp2) 32.3 %    SV(MOD-sp4) 32.0 ml    SI(MOD-sp4) 18.5 ml/m^2    SV(MOD-sp2) 20.0 ml    SI(MOD-sp2) 11.6 ml/m^2    Ao root area (BSA corrected) 2.0     Ao root area (BSA corrected) 32.3 ml/m^2    CONTRAST EF 4CH 29.6 ml/m^2    LV Diastolic corrected for BSA 62.5 ml/m^2    LV Systolic corrected for BSA 44.0 ml/m^2    MV A dur 0.17 sec    MV E max lashay 56.8 cm/sec    MV A max lashay 42.2 cm/sec    MV E/A 1.3     MV V2 max 54.3 cm/sec    MV max PG 1.2 mmHg    MV V2 mean 35.8 cm/sec    MV mean PG 1.0 mmHg    MV V2 VTI 14.4 cm    MVA(VTI) 1.7 cm^2    MV P1/2t max lashay 58.2 cm/sec    MV P1/2t 107.9 msec    MVA(P1/2t) 2.0 cm^2    MV dec slope 158.0 cm/sec^2    MV dec time 0.19 sec    Ao pk lashay 79.3 cm/sec    Ao max PG 2.5 mmHg    Ao max PG (full) 1.8 mmHg    Ao V2 mean 55.1 cm/sec    Ao mean PG 1.0 mmHg    Ao mean PG (full) 1.0 mmHg    Ao V2 VTI 13.0 cm    ANDREA(I,A) 1.8 cm^2    ANDREA(I,D) 1.8 cm^2    ANDREA(V,A) 1.7 cm^2    ANDREA(V,D) 1.7 cm^2    LV V1 max PG 0.76 mmHg    LV V1 mean PG 0 mmHg    LV V1 max 43.6 cm/sec    LV V1 mean 30.1 cm/sec    LV V1 VTI 7.6 cm    MR max lashay 326.0 cm/sec    MR max PG 42.5 mmHg     SV(Ao) 125.1 ml    SI(Ao) 72.4 ml/m^2    SV(LVOT) 23.9 ml    SI(LVOT) 13.8 ml/m^2    PA V2 max 62.5 cm/sec    PA max PG 1.6 mmHg    PA max PG (full) 1.2 mmHg    PA acc time 0.13 sec    RV V1 max PG 0.33 mmHg    RV V1 mean PG 0 mmHg    RV V1 max 28.8 cm/sec    RV V1 mean 19.6 cm/sec    RV V1 VTI 4.6 cm    PA pr(Accel) 21.9 mmHg    Pulm Sys Rigoberto 32.5 cm/sec    Pulm [place] Rigoberto 24.7 cm/sec    Pulm S/D 1.3     Pulm A Revs Dur 0.14 sec    Pulm A Revs Rigoberto 25.7 cm/sec    MVA P1/2T LCG 3.8 cm^2     CV ECHO DOUG - BZI_BMI 21.6 kilograms/m^2     CV ECHO DOUG - BSA(HAYCOCK) 1.7 m^2     CV ECHO DOUG - BZI_METRIC_WEIGHT 62.6 kg     CV ECHO DOUG - BZI_METRIC_HEIGHT 170.2 cm    Target HR (85%) 133 bpm    Max. Pred. HR (100%) 156 bpm    TDI S' 9.00 cm/sec    RV Base 2.70 cm    RV Length 7.30 cm    RV Mid 2.40 cm    E/E' ratio 11.0     LA Volume Index 28.0 mL/m2    Lat Peak E' Rigoberto 8.0 cm/sec    Med Peak E' Rigoberto 4.00 cm/sec    TAPSE (>1.6) 1.40 cm2   Light Blue Top   Result Value Ref Range    Extra Tube hold for add-on    Green Top (Gel)   Result Value Ref Range    Extra Tube Hold for add-ons.    Lavender Top   Result Value Ref Range    Extra Tube hold for add-on    Gold Top - SST   Result Value Ref Range    Extra Tube Hold for add-ons.    Manual Differential   Result Value Ref Range    Neutrophil % 81.0 (H) 42.7 - 76.0 %    Lymphocyte % 9.0 (L) 19.6 - 45.3 %    Monocyte % 7.0 5.0 - 12.0 %    Eosinophil % 1.0 0.3 - 6.2 %    Metamyelocyte % 2.0 (H) 0.0 - 0.0 %    Neutrophils Absolute 24.67 (H) 1.90 - 8.10 10*3/mm3    Lymphocytes Absolute 2.74 0.90 - 4.80 10*3/mm3    Monocytes Absolute 2.13 (H) 0.20 - 1.20 10*3/mm3    Eosinophils Absolute 0.30 0.00 - 0.70 10*3/mm3    RBC Morphology Normal Normal    WBC Morphology Normal Normal    Platelet Morphology Normal Normal     Imaging Results (last 72 hours)     Procedure Component Value Units Date/Time    XR Chest 1 View [[id number]] Collected:  04/14/18 0504     Updated:  04/14/18 0504     Narrative:       X-RAY CHEST 1 VIEW.     HISTORY: Follow-up on respiratory failure.     COMPARISON: No prior studies for comparison.     FINDINGS:  Cardiomediastinal silhouette is within normal limits. Line and tubes are  stable.     There is no consolidation or effusion.              Impression:       No definite acute findings. Overall no significant change given  differences in technique.       XR Chest Post CVA Port [[id number]] Collected:  04/13/18 0518     Updated:  04/13/18 2204    Narrative:       X-RAY CHEST 1 VIEW.     HISTORY: Central line insertion.     COMPARISON: 4/12/2018.     FINDINGS:  Cardiomediastinal silhouette is within normal limits. ET tube tip is  approximately 7.7 cm from the irina.     Nasogastric tube tip is in the stomach. Right subclavian catheter tip is  in the superior vena cava.     Mild pulmonary congestion.              Impression:       Right subclavian catheter tip is in the superior vena cava.  Tip of the ET tube is 7.7 cm from the irina. Tip of the nasogastric  tube is in the stomach.     This report was finalized on 4/13/2018 10:01 PM by Dr. Nidia Soliman MD.       CT Head Without Contrast [[id number]] Collected:  04/13/18 0052     Updated:  04/13/18 2141    Narrative:       CT SCAN OF THE BRAIN WITHOUT CONTRAST.     TECHNIQUE: Radiation dose reduction techniques were utilized, including  automated exposure control and exposure modulation based on body size.  Multiple axial images of the brain were obtained from the vertex to the  base of the brain.     HISTORY: Confusion, delirium.     COMPARISON: No prior studies for comparison.     FINDINGS: Slight limitation of the examination due to motion.     Midline structures are within normal limits, there is no hydrocephalus.  Gray-white matter differentiation is maintained. Age-appropriate changes  of chronic small vessel ischemic disease and mild cortical atrophy.     Orbits are within normal limits. 3.2 cm mucous retention cyst  or polyp  of the left maxillary sinus, otherwise paranasal sinuses are  unremarkable. Mastoid air cells are well aerated.              Impression:       No definite acute intracranial pathology.         This report was finalized on 4/13/2018 9:38 PM by Dr. Nidia Soliman MD.       XR Chest 1 View [[id number]] Collected:  04/13/18 0009     Updated:  04/13/18 2137    Narrative:       X-RAY CHEST 1 VIEW.     HISTORY: Chest pain.     COMPARISON: No prior studies for comparison.     FINDINGS:  Cardiomediastinal silhouette is within normal limits. ET tube is in good  position. Lung volumes are low.     There is no consolidation or effusion.              Impression:       No definite acute findings.         This report was finalized on 4/13/2018 9:34 PM by Dr. Nidia Soliman MD.       XR Humerus Left [[id number]] Collected:  04/12/18 2259     Updated:  04/13/18 2126    Narrative:       X-RAY LEFT HUMERUS.     HISTORY: Left arm pain, 3 weeks.     COMPARISON: No prior studies for comparison.     FINDINGS:  There is no fracture or dislocation.         Soft tissue structures are unremarkable.       Impression:       No acute fracture or dislocation.         This report was finalized on 4/13/2018 9:23 PM by Dr. Nidia Soliman MD.                 aspirin 81 mg Oral Daily   atorvastatin 40 mg Oral Nightly   clindamycin 600 mg Intravenous Q8H   clopidogrel 75 mg Oral Daily   famotidine 20 mg Nasogastric Daily   insulin aspart 0-24 Units Subcutaneous Q4H       heparin (porcine) 12 Units/kg/hr Last Rate: 6 Units/kg/hr (04/14/18 0417)   norepinephrine 0.02-0.3 mcg/kg/min Last Rate: 0.22 mcg/kg/min (04/14/18 0803)   phenylephrine 0.5-3 mcg/kg/min Last Rate: Stopped (04/14/18 0326)   propofol 5-50 mcg/kg/min Last Rate: 15 mcg/kg/min (04/14/18 0849)   remifentanil (ULTIVA) infusion 0.5-15 mcg/kg/hr (Ideal) Last Rate: 1 mcg/kg/hr (04/14/18 1014)   sodium chloride 9 mL/hr    sodium chloride 30 mL/hr Last Rate: 30 mL/hr (04/13/18 1142)    vasopressin 0.03 Units/min Last Rate: Stopped (04/14/18 0621)       Assessment/Plan   1.  Acute kidney injury associated with the cardiac arrest,  hypotension and cardiogenic shock.  Creatinine continued to rise.  2.  Metabolic acidosis associated with the his cardiogenic shock  3.  In-hospital cardiac arrest, with anterior lateral ST MI, and cardiogenic shock.    4.  Left ventricular apical thrombus   5.  Ischemic cardiomyopathy   6.  Shock liver   7.  Respiratory failure currently on the ventilator   8.  Leukocytosis       Plan :  1.  I agree with the present treatment.    2.  Check random urine for sodium, chloride and protein to creatinine ratio.    3.  We'll monitor his renal function and electrolytes and volume status very closely also the acid base status very closely.  There is no indication for dialysis at this time we'll reassess in the next 24 hours or sooner if needed in case he needed dialysis.  4.  Surveillance labs      Thank you Dr. Hercules for asking me to see this patient in consultation         I discussed the patients findings and my recommendations with the patient's brother-in-law and the benefits    Meng Mann MD  04/14/18  10:46 AM

## 2018-04-14 NOTE — PROGRESS NOTES
"Patient Care Team:  No Known Provider as PCP - General    Chief Complaint: Follow-up in hospital cardiac arrest    Interval History:   Agitated this morning.  Not following commands    Objective   Vital Signs  Temp:  [97.5 °F (36.4 °C)-98.6 °F (37 °C)] 98.6 °F (37 °C)  Heart Rate:  [57-98] 74  Resp:  [16] 16  BP: ()/() 79/65  Arterial Line BP: ()/(44-91) 86/59  FiO2 (%):  [30 %-80 %] 30 %    Intake/Output Summary (Last 24 hours) at 04/14/18 0831  Last data filed at 04/14/18 0537   Gross per 24 hour   Intake          3189.42 ml   Output              410 ml   Net          2779.42 ml     Flowsheet Rows    Flowsheet Row First Filed Value   Admission Height 170.2 cm (67\") Documented at 04/12/2018 2153   Admission Weight 62.6 kg (138 lb) Documented at 04/12/2018 2153          General Appearance:    Intubated but awake.  Agitated    Head:    Normocephalic, without obvious abnormality, atraumatic       Neck:   No adenopathy, supple, no thyromegaly, no carotid bruit, no    JVD   Lungs:     Clear to auscultation bilaterally, no wheezes, rales, or     rhonchi    Heart:    Normal rate, regular rhythm,  No murmur, rub, or gallop   Chest Wall:    No abnormalities observed   Abdomen:     Normal bowel sounds, soft, non-tender, non-distended,            no rebound tenderness   Extremities:   No cyanosis, clubbing, or edema   Pulses:   Pulses palpable and equal bilaterally   Skin:   No bleeding or rash       Neurologic:   Cranial nerves 2 - 12 grossly intact, sensation intact             atorvastatin 40 mg Oral Nightly   clindamycin 600 mg Intravenous Q8H   clopidogrel 75 mg Oral Daily   famotidine 20 mg Nasogastric Daily   insulin aspart 0-24 Units Subcutaneous Q4H         heparin (porcine) 12 Units/kg/hr Last Rate: 6 Units/kg/hr (04/14/18 0417)   norepinephrine 0.02-0.3 mcg/kg/min Last Rate: 0.22 mcg/kg/min (04/14/18 0803)   phenylephrine 0.5-3 mcg/kg/min Last Rate: Stopped (04/14/18 0326)   propofol 5-50 " mcg/kg/min Last Rate: Stopped (04/14/18 0803)   remifentanil (ULTIVA) infusion 0.5-15 mcg/kg/hr (Ideal) Last Rate: 1.5 mcg/kg/hr (04/14/18 0805)   sodium chloride 9 mL/hr    sodium chloride 30 mL/hr Last Rate: 30 mL/hr (04/13/18 1142)   vasopressin 0.03 Units/min Last Rate: Stopped (04/14/18 0621)       Results Review:      Results from last 7 days  Lab Units 04/14/18  0535   SODIUM mmol/L 144   POTASSIUM mmol/L 4.7   CHLORIDE mmol/L 108*   CO2 mmol/L 13.2*   BUN mg/dL 43*   CREATININE mg/dL 2.61*   GLUCOSE mg/dL 120*   CALCIUM mg/dL 7.9*       Results from last 7 days  Lab Units 04/13/18  0532 04/12/18  2359   TROPONIN T ng/mL 5.400* 0.113*       Results from last 7 days  Lab Units 04/14/18  0535   WBC 10*3/mm3 29.38*   HEMOGLOBIN g/dL 14.3   HEMATOCRIT % 44.9   PLATELETS 10*3/mm3 135*       Results from last 7 days  Lab Units 04/14/18  0535 04/14/18  0151 04/13/18  2339  04/13/18  0532   INR   --   --   --   --  1.29*   APTT seconds 53.8* 117.5* 128.7*  < > >200.0*   < > = values in this interval not displayed.    Results from last 7 days  Lab Units 04/13/18  0532   CHOLESTEROL mg/dL 150       Results from last 7 days  Lab Units 04/14/18  0535   MAGNESIUM mg/dL 2.1       Results from last 7 days  Lab Units 04/13/18  0532   CHOLESTEROL mg/dL 150   TRIGLYCERIDES mg/dL 111   HDL CHOL mg/dL 40   LDL CHOL mg/dL 88     @LABRCNT(bnp)@  I reviewed the patient's new clinical results.  I personally viewed and interpreted the patient's EKG/Telemetry data        Assessment/Plan   1. In hospital cardiac arrest  2. Anterolateral STEMI  3. Acute respiratory failure  4.  Acute kidney injury: Renal consult pending  5.  Left ventricular apical thrombus  6.  Ischemic cardiomyopathy  7.  Cardiogenic shock  8.  Shock liver    -Continue heparin drip  -Rewarming  -No consolidation on chest x-ray today

## 2018-04-14 NOTE — PLAN OF CARE
Problem: Patient Care Overview  Goal: Plan of Care Review  Outcome: Ongoing (interventions implemented as appropriate)   04/14/18 5704   Coping/Psychosocial   Plan of Care Reviewed With patient   Plan of Care Review   Progress improving   OTHER   Outcome Summary Pt remains in CCU on ventilator. Purposeful motor movement and following some commands, but not squeezing hands. Weaned Fi02 from 80 to 40%. Propofol started and restraints initiated r/t patient pulling and fighting vent. Stopped jose francisco, working to wean levophed. Heparin supratherapeutic, next PTT scheduled for 1000. Insulin drip d/c'd r/t hypoglycemia, switched to SSI. CTM ABG and labs.      Goal: Individualization and Mutuality  Outcome: Ongoing (interventions implemented as appropriate)      Problem: Fall Risk (Adult)  Goal: Identify Related Risk Factors and Signs and Symptoms  Outcome: Ongoing (interventions implemented as appropriate)    Goal: Absence of Fall  Outcome: Ongoing (interventions implemented as appropriate)      Problem: Skin Injury Risk (Adult)  Goal: Identify Related Risk Factors and Signs and Symptoms  Outcome: Ongoing (interventions implemented as appropriate)    Goal: Skin Health and Integrity  Outcome: Ongoing (interventions implemented as appropriate)      Problem: Ventilation, Mechanical Invasive (Adult)  Goal: Signs and Symptoms of Listed Potential Problems Will be Absent, Minimized or Managed (Ventilation, Mechanical Invasive)  Outcome: Ongoing (interventions implemented as appropriate)      Problem: Pain, Acute (Adult)  Goal: Identify Related Risk Factors and Signs and Symptoms  Outcome: Ongoing (interventions implemented as appropriate)    Goal: Acceptable Pain Control/Comfort Level  Outcome: Ongoing (interventions implemented as appropriate)      Problem: Restraint, Nonbehavioral (Nonviolent)  Goal: Rationale and Justification  Outcome: Ongoing (interventions implemented as appropriate)    Goal: Nonbehavioral (Nonviolent)  Restraint: Absence of Injury/Harm  Outcome: Ongoing (interventions implemented as appropriate)    Goal: Nonbehavioral (Nonviolent) Restraint: Achievement of Discontinuation Criteria  Outcome: Ongoing (interventions implemented as appropriate)    Goal: Nonbehavioral (Nonviolent) Restraint: Preservation of Dignity and Wellbeing  Outcome: Ongoing (interventions implemented as appropriate)

## 2018-04-14 NOTE — CONSULTS
Patient Identification:  NAME:  Negro Hall  Age:  62 y.o.   Sex:  male   :  1955   MRN:  [id number]       Chief complaint: He does not have one, reason for consult unresponsive status post in-hospital cardiac arrest    History of present illness:  Is patient is a 62-year-old white male with a history of alcoholism coronary artery disease hypertension who had an in-house cardiac arrest he is also had an anterolateral myocardial infarction he is unresponsive at this time but he is getting sedation.  When that is cut back he seems to get agitated and move all extremities per the nurses no report of seizure activity no definite focal unilateral weakness duration is been over 24 hours location is not pertinent associated symptoms he gets agitated when the sedations put back and context the patient who has been a drinker and underwent a cardiac arrest.  He does have an acute myocardial infarction as well.  By report he had some left upper extremity pain this week but I'm not sure how that plays into everything.  He is had a CT that I independently eyeball reviewed and it is normal no acute stroke no hemorrhage and no mass.  Past medical history:  Past Medical History:   Diagnosis Date   • Hypertension        Past surgical history:  Past Surgical History:   Procedure Laterality Date   • CARDIAC CATHETERIZATION N/A 2018    Procedure: Left Heart Cath;  Surgeon: Lennox Max MD;  Location: [facility] INVASIVE LOCATION;  Service: Cardiovascular   • CARDIAC CATHETERIZATION N/A 2018    Procedure: Coronary angiography;  Surgeon: Lennox Max MD;  Location: [facility] CATH INVASIVE LOCATION;  Service: Cardiovascular   • CARDIAC CATHETERIZATION N/A 2018    Procedure: Stent TEJ coronary;  Surgeon: Lennox Max MD;  Location: [facility] CATH INVASIVE LOCATION;  Service: Cardiovascular       Allergies:  Penicillins    Home medications:  No prescriptions prior to admission.        Hospital  medications:    aspirin 81 mg Oral Daily   atorvastatin 40 mg Oral Nightly   clindamycin 600 mg Intravenous Q8H   clopidogrel 75 mg Oral Daily   famotidine 20 mg Nasogastric Daily   insulin aspart 0-24 Units Subcutaneous Q4H       heparin (porcine) 12 Units/kg/hr Last Rate: 9 Units/kg/hr (04/14/18 1328)   norepinephrine 0.02-0.3 mcg/kg/min Last Rate: 0.06 mcg/kg/min (04/14/18 1555)   phenylephrine 0.5-3 mcg/kg/min Last Rate: Stopped (04/14/18 0326)   propofol 5-50 mcg/kg/min Last Rate: 10 mcg/kg/min (04/14/18 1549)   remifentanil (ULTIVA) infusion 0.5-15 mcg/kg/hr (Ideal) Last Rate: 0.5 mcg/kg/hr (04/14/18 1345)   sodium chloride 9 mL/hr    sodium chloride 30 mL/hr Last Rate: 30 mL/hr (04/13/18 1142)   vasopressin 0.03 Units/min Last Rate: Stopped (04/14/18 0621)     •  acetaminophen  •  dextrose  •  dextrose  •  dextrose  •  fentaNYL citrate (PF)  •  glucagon (human recombinant)  •  haloperidol lactate  •  heparin (porcine)  •  HYDROcodone-acetaminophen  •  magnesium hydroxide  •  ondansetron **OR** ondansetron ODT **OR** ondansetron  •  sodium chloride    Family history:  History reviewed. No pertinent family history.    Social history:  Social History   Substance Use Topics   • Smoking status: Current Every Day Smoker     Packs/day: 1.00   • Smokeless tobacco: Not on file   • Alcohol use No       Review of systems:    He cannot give any review of systems his family is not present and I have reviewed the chart    Objective:  Vitals Ranges:   Temp:  [97.5 °F (36.4 °C)-100.4 °F (38 °C)] 100.4 °F (38 °C)  Heart Rate:  [63-90] 85  Resp:  [10-16] 10  BP: ()/(65-98) 102/75  Arterial Line BP: ()/(52-90) 109/65  FiO2 (%):  [24 %-80 %] 24 %      Physical Exam:  He is comatose and sedated he is on a ventilator he does over breathes the ventilator he cannot answer questions orientation fund of knowledge attention span and concentration recent remote memory language function I could not be performed he is in no  distress pupils small but do react to light corneals are present doll's eyes are present no other cranial nerves to be tested motor he does have withdraw to pain in the upper extremities reflexes trace throughout symmetrical toes downgoing bilaterally some distal atrophy no fasciculations or resting tremor sensation coordination station and gait obviously cannot be tested heart regular without murmur neck is rigid without bruits extremities no clubbing cyanosis edema visual acuity cannot be performed he is comatose therefore he is not awake alert and oriented     Results review:   I reviewed the patient's new clinical results.    Data review:  Lab Results (last 24 hours)     Procedure Component Value Units Date/Time    Protein / Creatinine Ratio, Urine - [[id number]]  (Abnormal) Collected:  04/14/18 1134    Specimen:  Urine Updated:  04/14/18 1428     Protein/Creatinine Ratio, Urine 982.9 (H) mg/G Crea      Creatinine, Urine 157.7 mg/dL      Total Protein, Urine 155.0 mg/dL     Comprehensive Metabolic Panel [[id number]]  (Abnormal) Collected:  04/14/18 1139    Specimen:  Blood Updated:  04/14/18 1249     Glucose 128 (H) mg/dL      BUN 50 (H) mg/dL      Creatinine 3.25 (H) mg/dL      Sodium 145 mmol/L      Potassium 4.3 mmol/L      Chloride 109 (H) mmol/L      CO2 18.2 (L) mmol/L      Calcium 7.5 (L) mg/dL      Total Protein 5.5 (L) g/dL      Albumin 3.10 (L) g/dL      ALT (SGPT) 5,553 (H) U/L      AST (SGOT) >7,000 (H) U/L      Alkaline Phosphatase 73 U/L      Total Bilirubin 0.5 mg/dL      eGFR Non African Amer 19 (L) mL/min/1.73      Globulin 2.4 gm/dL      A/G Ratio 1.3 g/dL      BUN/Creatinine Ratio 15.4     Anion Gap 17.8 mmol/L     Chloride, Urine, Random - [[id number]] Collected:  04/14/18 1134    Specimen:  Urine Updated:  04/14/18 1224     Chloride, Urine <20 mmol/L     Narrative:       Reference intervals for random urine have not been established.  Clinical usage is dependent upon physician's interpretation  in combination with other laboratory tests.     Blood Gas, Arterial [[id number]]  (Abnormal) Collected:  04/14/18 1219    Specimen:  Arterial Blood Updated:  04/14/18 1221     Site Arterial Line     Hermes's Test N/A     pH, Arterial 7.351 pH units      pCO2, Arterial 33.8 (L) mm Hg      pO2, Arterial 100.9 (H) mm Hg      HCO3, Arterial 18.7 (L) mmol/L      Base Excess, Arterial -6.0 (L) mmol/L      O2 Saturation Calculated 97.6 %      A-a Gradiant 0.7 mmHg      Barometric Pressure for Blood Gas 749.1 mmHg      Modality Adult Vent     FIO2 24 %      Ventilator Mode PC     Set Tidal Volume 860     Set Mech Resp Rate 10     Rate 10 Breaths/minute      PEEP 7.5    Narrative:       sat 98 ip 16 Meter: [id number] : [id number] Taya Chante    Magnesium [[id number]]  (Normal) Collected:  04/14/18 1139    Specimen:  Blood Updated:  04/14/18 1217     Magnesium 2.2 mg/dL     Phosphorus [[id number]]  (Abnormal) Collected:  04/14/18 1139    Specimen:  Blood Updated:  04/14/18 1217     Phosphorus 6.3 (H) mg/dL     aPTT [[id number]]  (Abnormal) Collected:  04/14/18 1139    Specimen:  Blood Updated:  04/14/18 1215     PTT 46.7 (H) seconds     Calcium, Ionized [[id number]]  (Abnormal) Collected:  04/14/18 1139    Specimen:  Blood Updated:  04/14/18 1213     Ionized Calcium 1.13 (L) mmol/L      Ionized Calcium 4.5 (L) mg/dL     Lactic Acid, Plasma [[id number]]  (Abnormal) Collected:  04/14/18 1139    Specimen:  Blood Updated:  04/14/18 1213     Lactate 2.3 (C) mmol/L     Sodium, Urine, Random - [[id number]] Collected:  04/14/18 1134    Specimen:  Urine Updated:  04/14/18 1213     Sodium, Urine 21 mmol/L     Narrative:       Reference intervals for random urine have not been established.  Clinical usage is dependent upon physician's interpretation in combination with other laboratory tests.     CBC & Differential [[id number]] Collected:  04/14/18 1139    Specimen:  Blood Updated:  04/14/18 1200    Narrative:       The following orders  were created for panel order CBC & Differential.  Procedure                               Abnormality         Status                     ---------                               -----------         ------                     CBC Auto Differential[[id number]]        Abnormal            Final result                 Please view results for these tests on the individual orders.    CBC Auto Differential [[id number]]  (Abnormal) Collected:  04/14/18 1139    Specimen:  Blood Updated:  04/14/18 1200     WBC 24.23 (H) 10*3/mm3      RBC 4.44 (L) 10*6/mm3      Hemoglobin 14.0 g/dL      Hematocrit 43.0 %      MCV 96.8 (H) fL      MCH 31.5 pg      MCHC 32.6 g/dL      RDW 13.8 %      RDW-SD 49.1 fl      MPV 11.8 fL      Platelets 141 10*3/mm3      Neutrophil % 92.2 (H) %      Lymphocyte % 3.8 (L) %      Monocyte % 3.5 (L) %      Eosinophil % 0.0 (L) %      Basophil % 0.0 %      Immature Grans % 0.5 %      Neutrophils, Absolute 22.33 (H) 10*3/mm3      Lymphocytes, Absolute 0.91 10*3/mm3      Monocytes, Absolute 0.85 10*3/mm3      Eosinophils, Absolute 0.00 10*3/mm3      Basophils, Absolute 0.01 10*3/mm3      Immature Grans, Absolute 0.13 (H) 10*3/mm3      nRBC 0.0 /100 WBC     POC Glucose Once [[id number]]  (Normal) Collected:  04/14/18 1137    Specimen:  Blood Updated:  04/14/18 1155     Glucose 105 mg/dL     Narrative:       Meter: [id number] : [id number] Toi MURILLO    POC Glucose Once [[id number]]  (Normal) Collected:  04/14/18 0939    Specimen:  Blood Updated:  04/14/18 0959     Glucose 115 mg/dL     Narrative:       Meter: [id number] : [id number] Eamon BRUNNER RN    Procalcitonin [[id number]]  (Abnormal) Collected:  04/14/18 0535    Specimen:  Blood Updated:  04/14/18 0940     Procalcitonin 1.24 (C) ng/mL     Narrative:       As a Marker for Sepsis (Non-Neonates):   1. <0.5 ng/mL represents a low risk of severe sepsis and/or septic shock.  1. >2 ng/mL represents a high risk of severe sepsis and/or septic  "shock.    As a Marker for Lower Respiratory Tract Infections that require antibiotic therapy:  PCT on Admission     Antibiotic Therapy             6-12 Hrs later  > 0.5                Strongly Recommended            >0.25 - <0.5         Recommended  0.1 - 0.25           Discouraged                   Remeasure/reassess PCT  <0.1                 Strongly Discouraged          Remeasure/reassess PCT      As 28 day mortality risk marker: \"Change in Procalcitonin Result\" (> 80 % or <=80 %) if Day 0 (or Day 1) and Day 4 values are available. Refer to [email or web address]   Change in PCT <=80 %   A decrease of PCT levels below or equal to 80 % defines a positive change in PCT test result representing a higher risk for 28-day all-cause mortality of patients diagnosed with severe sepsis or septic shock.  Change in PCT > 80 %   A decrease of PCT levels of more than 80 % defines a negative change in PCT result representing a lower risk for 28-day all-cause mortality of patients diagnosed with severe sepsis or septic shock.                POC Glucose Once [[id number]]  (Normal) Collected:  04/14/18 0747    Specimen:  Blood Updated:  04/14/18 0759     Glucose 130 mg/dL     Narrative:       Meter: [id number] : [id number] Stacey Garcia RN    POC Glucose Once [[id number]]  (Abnormal) Collected:  04/14/18 0711    Specimen:  Blood Updated:  04/14/18 0751     Glucose 69 (L) mg/dL     Narrative:       Meter: [id number] : [id number] Toi MURILLO    Comprehensive Metabolic Panel [[id number]]  (Abnormal) Collected:  04/14/18 0535    Specimen:  Blood Updated:  04/14/18 0701     Glucose 120 (H) mg/dL      BUN 43 (H) mg/dL      Creatinine 2.61 (H) mg/dL      Sodium 144 mmol/L      Potassium 4.7 mmol/L      Chloride 108 (H) mmol/L      CO2 13.2 (L) mmol/L      Calcium 7.9 (L) mg/dL      Total Protein 5.5 (L) g/dL      Albumin 3.00 (L) g/dL      ALT (SGPT) 4,703 (H) U/L      AST (SGOT) 4,914 (H) U/L      Alkaline " Phosphatase 72 U/L      Total Bilirubin 0.6 mg/dL      eGFR Non African Amer 25 (L) mL/min/1.73      Globulin 2.5 gm/dL      A/G Ratio 1.2 g/dL      BUN/Creatinine Ratio 16.5     Anion Gap 22.8 mmol/L     Calcium, Ionized [[id number]]  (Abnormal) Collected:  04/14/18 0535    Specimen:  Blood Updated:  04/14/18 0657     Ionized Calcium 1.12 (L) mmol/L      Ionized Calcium 4.5 (L) mg/dL     aPTT [[id number]]  (Abnormal) Collected:  04/14/18 0535    Specimen:  Blood Updated:  04/14/18 0648     PTT 53.8 (H) seconds      Comment: No clot detected.        Magnesium [[id number]]  (Normal) Collected:  04/14/18 0535    Specimen:  Blood Updated:  04/14/18 0646     Magnesium 2.1 mg/dL     Phosphorus [[id number]]  (Abnormal) Collected:  04/14/18 0535    Specimen:  Blood Updated:  04/14/18 0646     Phosphorus 5.6 (H) mg/dL     CBC & Differential [[id number]] Collected:  04/14/18 0535    Specimen:  Blood Updated:  04/14/18 0620    Narrative:       The following orders were created for panel order CBC & Differential.  Procedure                               Abnormality         Status                     ---------                               -----------         ------                     CBC Auto Differential[[id number]]        Abnormal            Final result                 Please view results for these tests on the individual orders.    CBC Auto Differential [[id number]]  (Abnormal) Collected:  04/14/18 0535    Specimen:  Blood Updated:  04/14/18 0620     WBC 29.38 (H) 10*3/mm3      RBC 4.56 (L) 10*6/mm3      Hemoglobin 14.3 g/dL      Hematocrit 44.9 %      MCV 98.5 (H) fL      MCH 31.4 pg      MCHC 31.8 (L) g/dL      RDW 13.8 %      RDW-SD 49.1 fl      MPV 12.1 (H) fL      Platelets 135 (L) 10*3/mm3      Neutrophil % 88.9 (H) %      Lymphocyte % 4.8 (L) %      Monocyte % 5.7 %      Eosinophil % 0.0 (L) %      Basophil % 0.0 %      Immature Grans % 0.6 (H) %      Neutrophils, Absolute 26.09 (H) 10*3/mm3      Lymphocytes, Absolute  1.40 10*3/mm3      Monocytes, Absolute 1.68 (H) 10*3/mm3      Eosinophils, Absolute 0.01 10*3/mm3      Basophils, Absolute 0.01 10*3/mm3      Immature Grans, Absolute 0.19 (H) 10*3/mm3     Lactic Acid, Plasma [[id number]]  (Abnormal) Collected:  04/14/18 0535    Specimen:  Blood Updated:  04/14/18 0619     Lactate 3.9 (C) mmol/L     Blood Gas, Arterial [[id number]]  (Abnormal) Collected:  04/14/18 0539    Specimen:  Arterial Blood Updated:  04/14/18 0542     Site Arterial Line     Hermes's Test N/A     pH, Arterial 7.294 (C) pH units      Comment: Critical:Notify BILLIE luna (14-Apr-18 05:41:23)Read back ok        pCO2, Arterial 33.0 (L) mm Hg      pO2, Arterial 102.0 (H) mm Hg      HCO3, Arterial 16.0 (L) mmol/L      Base Excess, Arterial -9.3 (L) mmol/L      O2 Saturation Calculated 97.2 %      A-a Gradiant 0.4 mmHg      Barometric Pressure for Blood Gas 745.7 mmHg      Modality Adult Vent     FIO2 40 %      Ventilator Mode PC     Set Tidal Volume 464     [facility]     Rate 17 Breaths/minute      PEEP 7.5    Narrative:       spo2 92% ip 16 Meter: [id number] : [id number] Walker Rodriguez    POC Glucose Once [[id number]]  (Normal) Collected:  04/14/18 0420    Specimen:  Blood Updated:  04/14/18 0426     Glucose 110 mg/dL     Narrative:       Meter: [id number] : [id number] Stacey Garcia RN    aPTT [[id number]]  (Abnormal) Collected:  04/14/18 0151    Specimen:  Blood Updated:  04/14/18 0258     .5 (H) seconds     CBC & Differential [[id number]] Collected:  04/13/18 2340    Specimen:  Blood Updated:  04/14/18 0109    Narrative:       The following orders were created for panel order CBC & Differential.  Procedure                               Abnormality         Status                     ---------                               -----------         ------                     Scan Slide[[id number]]                   Normal              Final result               CBC Auto  Differential[[id number]]        Abnormal            Final result                 Please view results for these tests on the individual orders.    CBC Auto Differential [[id number]]  (Abnormal) Collected:  04/13/18 2340    Specimen:  Blood Updated:  04/14/18 0109     WBC 33.29 (C) 10*3/mm3      RBC 4.63 10*6/mm3      Hemoglobin 14.7 g/dL      Hematocrit 45.1 %      MCV 97.4 (H) fL      MCH 31.7 pg      MCHC 32.6 g/dL      RDW 13.7 %      RDW-SD 49.0 fl      MPV 11.3 fL      Platelets 121 (L) 10*3/mm3      Neutrophil % 83.6 (H) %      Lymphocyte % 7.1 (L) %      Monocyte % 8.2 %      Eosinophil % 0.0 (L) %      Basophil % 0.1 %      Immature Grans % 1.0 (H) %      Neutrophils, Absolute 27.82 (H) 10*3/mm3      Lymphocytes, Absolute 2.36 10*3/mm3      Monocytes, Absolute 2.74 (H) 10*3/mm3      Eosinophils, Absolute 0.00 10*3/mm3      Basophils, Absolute 0.03 10*3/mm3      Immature Grans, Absolute 0.34 (H) 10*3/mm3     Scan Slide [[id number]]  (Normal) Collected:  04/13/18 2340    Specimen:  Blood Updated:  04/14/18 0109     RBC Morphology Normal     WBC Morphology Normal     Platelet Morphology Normal    Calcium, Ionized [[id number]]  (Abnormal) Collected:  04/13/18 2340    Specimen:  Blood Updated:  04/14/18 0059     Ionized Calcium 1.12 (L) mmol/L      Ionized Calcium 4.5 (L) mg/dL     aPTT [[id number]]  (Abnormal) Collected:  04/13/18 2339    Specimen:  Blood Updated:  04/14/18 0057     .7 (H) seconds     Comprehensive Metabolic Panel [[id number]]  (Abnormal) Collected:  04/13/18 2340    Specimen:  Blood Updated:  04/14/18 0043     Glucose 111 (H) mg/dL      BUN 36 (H) mg/dL      Creatinine 2.30 (H) mg/dL      Sodium 145 mmol/L      Potassium 4.2 mmol/L      Chloride 109 (H) mmol/L      CO2 14.6 (L) mmol/L      Calcium 7.6 (L) mg/dL      Total Protein 5.5 (L) g/dL      Albumin 3.20 (L) g/dL      ALT (SGPT) 1,842 (H) U/L      AST (SGOT) 1,849 (H) U/L      Alkaline Phosphatase 68 U/L      Total Bilirubin 0.7 mg/dL       eGFR Non African Amer 29 (L) mL/min/1.73      Globulin 2.3 gm/dL      A/G Ratio 1.4 g/dL      BUN/Creatinine Ratio 15.7     Anion Gap 21.4 mmol/L     Phosphorus [[id number]]  (Abnormal) Collected:  04/13/18 2340    Specimen:  Blood Updated:  04/14/18 0019     Phosphorus 4.8 (H) mg/dL     Magnesium [[id number]]  (Normal) Collected:  04/13/18 2340    Specimen:  Blood Updated:  04/14/18 0019     Magnesium 2.1 mg/dL     Lactic Acid, Plasma [[id number]]  (Abnormal) Collected:  04/13/18 2340    Specimen:  Blood Updated:  04/14/18 0018     Lactate 4.4 (C) mmol/L     Blood Gas, Arterial [[id number]]  (Abnormal) Collected:  04/14/18 0000    Specimen:  Arterial Blood Updated:  04/14/18 0006     Site Arterial Line     Hermes's Test N/A     pH, Arterial 7.278 (C) pH units      Comment: Critical:Notify BILLIE luna (14-Apr-18 00:04:32)Read back ok        pCO2, Arterial 36.4 mm Hg      pO2, Arterial 176.3 (H) mm Hg      HCO3, Arterial 17.0 (L) mmol/L      Base Excess, Arterial -9.0 (L) mmol/L      O2 Saturation Calculated 99.4 (H) %      A-a Gradiant 0.4 mmHg      Barometric Pressure for Blood Gas 749.8 mmHg      Modality Adult Vent     FIO2 60 %      Ventilator Mode PC     Set Tidal Volume 477     Set [facility] Resp Rate 16     Rate 17 Breaths/minute      PEEP 7.5    Narrative:       spo2 100% ip 16 Meter: [id number] : [id number] Walker Rodriguez    POC Glucose Once [[id number]]  (Normal) Collected:  04/13/18 2126    Specimen:  Blood Updated:  04/13/18 2138     Glucose 124 mg/dL     Narrative:       Meter: [id number] : [id number] Stacey Garcia RN    POC Glucose Once [[id number]]  (Abnormal) Collected:  04/13/18 2028    Specimen:  Blood Updated:  04/13/18 2030     Glucose 64 (L) mg/dL     Narrative:       Meter: [id number] : [id number] Stacey Garcia RN    POC Glucose Once [[id number]]  (Abnormal) Collected:  04/13/18 2024    Specimen:  Blood Updated:  04/13/18 2029     Glucose 26 (C) mg/dL     Narrative:       Confirmed  by Repeat Meter: [id number] : [id number] Stacey Garcia RN    POC Glucose Once [[id number]]  (Abnormal) Collected:  04/13/18 1901    Specimen:  Blood Updated:  04/13/18 1903     Glucose 133 (H) mg/dL     Narrative:       Meter: [id number] : [id number] Leesa Amaro RN    Calcium, Ionized [[id number]]  (Abnormal) Collected:  04/13/18 1739    Specimen:  Blood Updated:  04/13/18 1850     Ionized Calcium 1.14 (L) mmol/L      Ionized Calcium 4.6 mg/dL     Comprehensive Metabolic Panel [[id number]]  (Abnormal) Collected:  04/13/18 1739    Specimen:  Blood Updated:  04/13/18 1841     Glucose 82 mg/dL      BUN 32 (H) mg/dL      Creatinine 2.11 (H) mg/dL      Sodium 146 (H) mmol/L      Potassium 4.1 mmol/L      Chloride 110 (H) mmol/L      CO2 15.5 (L) mmol/L      Calcium 7.6 (L) mg/dL      Total Protein 5.5 (L) g/dL      Albumin 3.20 (L) g/dL      ALT (SGPT) 499 (H) U/L      AST (SGOT) 828 (H) U/L      Alkaline Phosphatase 70 U/L      Total Bilirubin 0.7 mg/dL      eGFR Non African Amer 32 (L) mL/min/1.73      Globulin 2.3 gm/dL      A/G Ratio 1.4 g/dL      BUN/Creatinine Ratio 15.2     Anion Gap 20.5 mmol/L     aPTT [[id number]]  (Abnormal) Collected:  04/13/18 1739    Specimen:  Blood Updated:  04/13/18 1838     PTT >200.0 (C) seconds     POC Glucose Once [[id number]]  (Abnormal) Collected:  04/13/18 1833    Specimen:  Blood Updated:  04/13/18 1835     Glucose 64 (L) mg/dL     Narrative:       Meter: [id number] : [id number] Leesa Amaro RN    CBC & Differential [[id number]] Collected:  04/13/18 1739    Specimen:  Blood Updated:  04/13/18 1834    Narrative:       The following orders were created for panel order CBC & Differential.  Procedure                               Abnormality         Status                     ---------                               -----------         ------                     Manual Differential[[id number]]          Abnormal            Final result               Scan Slide[[id number]]                                        Final result               CBC Auto Differential[[id number]]        Abnormal            Final result                 Please view results for these tests on the individual orders.    CBC Auto Differential [[id number]]  (Abnormal) Collected:  04/13/18 1739    Specimen:  Blood Updated:  04/13/18 1834     WBC 30.46 (C) 10*3/mm3      RBC 4.77 10*6/mm3      Hemoglobin 15.0 g/dL      Hematocrit 46.8 %      MCV 98.1 (H) fL      MCH 31.4 pg      MCHC 32.1 (L) g/dL      RDW 13.7 %      RDW-SD 48.9 fl      MPV 11.1 fL      Platelets 145 10*3/mm3     Scan Slide [[id number]] Collected:  04/13/18 1739    Specimen:  Blood Updated:  04/13/18 1834     Scan Slide --     Comment: See Manual Differential Results       Manual Differential [[id number]]  (Abnormal) Collected:  04/13/18 1739    Specimen:  Blood Updated:  04/13/18 1834     Neutrophil % 81.0 (H) %      Lymphocyte % 9.0 (L) %      Monocyte % 7.0 %      Eosinophil % 1.0 %      Metamyelocyte % 2.0 (H) %      Neutrophils Absolute 24.67 (H) 10*3/mm3      Lymphocytes Absolute 2.74 10*3/mm3      Monocytes Absolute 2.13 (H) 10*3/mm3      Eosinophils Absolute 0.30 10*3/mm3      RBC Morphology Normal     WBC Morphology Normal     Platelet Morphology Normal    Phosphorus [[id number]]  (Normal) Collected:  04/13/18 1739    Specimen:  Blood Updated:  04/13/18 1830     Phosphorus 4.3 mg/dL     Magnesium [[id number]]  (Normal) Collected:  04/13/18 1739    Specimen:  Blood Updated:  04/13/18 1827     Magnesium 2.3 mg/dL     Lactic Acid, Plasma [[id number]]  (Abnormal) Collected:  04/13/18 1739    Specimen:  Blood Updated:  04/13/18 1818     Lactate 5.0 (C) mmol/L     Blood Gas, Arterial [[id number]]  (Abnormal) Collected:  04/13/18 1746    Specimen:  Arterial Blood Updated:  04/13/18 1749     Site Arterial Line     Hermes's Test N/A     pH, Arterial 7.277 (C) pH units      Comment: Critical:Notify BILLIE Landers (13-Apr-18 17:48:24)Read back ok        pCO2,  Arterial 36.4 mm Hg      pO2, Arterial 234.0 (H) mm Hg      HCO3, Arterial 16.9 (L) mmol/L      Base Excess, Arterial -9.0 (L) mmol/L      O2 Saturation Calculated 99.7 (H) %      A-a Gradiant 0.4 mmHg      Barometric Pressure for Blood Gas 748.4 mmHg      Modality Adult Vent     FIO2 80 %      Ventilator Mode PC     Set [facility] Resp Rate 16     Rate 16 Breaths/minute      PEEP 7.5    Narrative:       SATS 100%  IP+16 Meter: [id number] : [id number] Jennifer Kirkpatrick    POC Glucose Once [[id number]]  (Normal) Collected:  04/13/18 1741    Specimen:  Blood Updated:  04/13/18 1742     Glucose 82 mg/dL     Narrative:       Meter: [id number] : [id number] Leesa Amaro RN    POC Glucose Once [[id number]]  (Normal) Collected:  04/13/18 1622    Specimen:  Blood Updated:  04/13/18 1624     Glucose 119 mg/dL     Narrative:       Meter: [id number] : [id number] Leesa Amaro RN           Imaging:  Imaging Results (last 24 hours)     Procedure Component Value Units Date/Time    XR Chest 1 View [[id number]] Collected:  04/14/18 0504     Updated:  04/14/18 0504    Narrative:       X-RAY CHEST 1 VIEW.     HISTORY: Follow-up on respiratory failure.     COMPARISON: No prior studies for comparison.     FINDINGS:  Cardiomediastinal silhouette is within normal limits. Line and tubes are  stable.     There is no consolidation or effusion.              Impression:       No definite acute findings. Overall no significant change given  differences in technique.       XR Chest Post CVA Port [[id number]] Collected:  04/13/18 0518     Updated:  04/13/18 2204    Narrative:       X-RAY CHEST 1 VIEW.     HISTORY: Central line insertion.     COMPARISON: 4/12/2018.     FINDINGS:  Cardiomediastinal silhouette is within normal limits. ET tube tip is  approximately 7.7 cm from the irina.     Nasogastric tube tip is in the stomach. Right subclavian catheter tip is  in the superior vena cava.     Mild pulmonary congestion.               Impression:       Right subclavian catheter tip is in the superior vena cava.  Tip of the ET tube is 7.7 cm from the irina. Tip of the nasogastric  tube is in the stomach.     This report was finalized on 4/13/2018 10:01 PM by Dr. Nidia Soliman MD.       CT Head Without Contrast [[id number]] Collected:  04/13/18 0052     Updated:  04/13/18 2141    Narrative:       CT SCAN OF THE BRAIN WITHOUT CONTRAST.     TECHNIQUE: Radiation dose reduction techniques were utilized, including  automated exposure control and exposure modulation based on body size.  Multiple axial images of the brain were obtained from the vertex to the  base of the brain.     HISTORY: Confusion, delirium.     COMPARISON: No prior studies for comparison.     FINDINGS: Slight limitation of the examination due to motion.     Midline structures are within normal limits, there is no hydrocephalus.  Gray-white matter differentiation is maintained. Age-appropriate changes  of chronic small vessel ischemic disease and mild cortical atrophy.     Orbits are within normal limits. 3.2 cm mucous retention cyst or polyp  of the left maxillary sinus, otherwise paranasal sinuses are  unremarkable. Mastoid air cells are well aerated.              Impression:       No definite acute intracranial pathology.         This report was finalized on 4/13/2018 9:38 PM by Dr. Nidia Soliman MD.       XR Chest 1 View [[id number]] Collected:  04/13/18 0009     Updated:  04/13/18 2137    Narrative:       X-RAY CHEST 1 VIEW.     HISTORY: Chest pain.     COMPARISON: No prior studies for comparison.     FINDINGS:  Cardiomediastinal silhouette is within normal limits. ET tube is in good  position. Lung volumes are low.     There is no consolidation or effusion.              Impression:       No definite acute findings.         This report was finalized on 4/13/2018 9:34 PM by Dr. Nidia Soliman MD.       XR Humerus Left [[id number]] Collected:  04/12/18 2259     Updated:  04/13/18  "2126    Narrative:       X-RAY LEFT HUMERUS.     HISTORY: Left arm pain, 3 weeks.     COMPARISON: No prior studies for comparison.     FINDINGS:  There is no fracture or dislocation.         Soft tissue structures are unremarkable.       Impression:       No acute fracture or dislocation.         This report was finalized on 4/13/2018 9:23 PM by Dr. Nidia Soliman MD.                Assessment and Plan:     Active Problems:    Cardiac arrest    An anoxic encephalopathy related to in-hospital cardiac arrest.  Acute myocardial infarction as well.  at this point there is some sedation and I will continue to follow he does not appear to be having seizure activity and I note that he is being rewarmed I will continue to follow.  I am unable to give really any prognostic indication on this patient but the nurses note that if he is not sedated he is \"agitated and moving everything\" and that certainly is a good sign.!!  I will see that he is getting daily thiamine      Wily Stewart MD  04/14/18  4:09 PM  "

## 2018-04-14 NOTE — PROGRESS NOTES
[facility]  Phone: [phone]  Cb Hercules MD    Subjective:  LOS: 1    He is waking up some. This AM low BP with propofol therefore dc. Remains on [place]. Added Clinda yest for possible aspiration.    Objective   Vital Signs past 24hrs    Temp range: Temp (24hrs), Av.1 °F (36.7 °C), Min:97.5 °F (36.4 °C), Max:98.6 °F (37 °C)    BP range: BP: ()/() 95/71  Pulse range: Heart Rate:  [57-98] 80  Resp rate range: Resp:  [16] 16    Device (Oxygen Therapy): ventilator   Oxygen range:SpO2:  [76 %-100 %] 100 %   FiO2 (%):  [40 %-80 %] 40 %  S RR:  [16] 16  PEEP/CPAP (cm H2O):  [5 cm H20-7.5 cm H20] 7.5 cm H20  ME SUP:  [0 cm H20] 0 cm H20  MAP (cm H2O):  [13-16] 13  62.6 kg (138 lb); Body mass index is 21.61 kg/m².    Intake/Output Summary (Last 24 hours) at 18 0745  Last data filed at 18 0537   Gross per 24 hour   Intake          3189.42 ml   Output              410 ml   Net          2779.42 ml       Physical Exam   Constitutional: He appears well-developed.   HENT:   Head: Normocephalic.   Eyes:   Mild responsive pupils   Cardiovascular: Normal rate and regular rhythm.    No murmur heard.  Pulmonary/Chest: Effort normal. He has no wheezes. He has no rales.   Abdominal: Soft. Bowel sounds are normal. There is no tenderness.   Musculoskeletal: He exhibits no edema.   Neurological:   Withdraws to pain  Some pupils  Spont resp   Skin: Skin is warm.     Results Review:    I have reviewed the laboratory and imaging data since the last note by [facility] physician.  My annotations are noted in assessment and plan.      Results from last 7 days  Lab Units 18  0539 18  0000 18  1746 18  1405 18  1110 18  0548 18  2356   PH, ARTERIAL pH units 7.294* 7.278* 7.277* 7.213* 7.210* 7.255* 7.393   PCO2, ARTERIAL mm Hg 33.0* 36.4 36.4 35.5 29.4* 43.9 22.7*   PO2 ART mm Hg 102.0* 176.3* 234.0* 121.2* 281.9* 151.9* 295.4*   FLOW RATE lpm  --   --   --   --   --    --  15   MODALITY  Adult Vent Adult Vent Adult Vent Adult Vent Adult Vent Adult Vent Bagging   O2 SATURATION CALC % 97.2 99.4* 99.7* 97.9 99.8* 99.0 99.9*        Medication Review:  I have reviewed the current MAR.  My annotations are noted in assessment and plan.      heparin (porcine) 12 Units/kg/hr Last Rate: 6 Units/kg/hr (04/14/18 0417)   norepinephrine 0.02-0.3 mcg/kg/min Last Rate: 0.2 mcg/kg/min (04/14/18 0740)   phenylephrine 0.5-3 mcg/kg/min Last Rate: Stopped (04/14/18 0326)   propofol 5-50 mcg/kg/min Last Rate: 15 mcg/kg/min (04/14/18 0740)   remifentanil (ULTIVA) infusion 0.5-15 mcg/kg/hr (Ideal) Last Rate: 2 mcg/kg/hr (04/13/18 2048)   sodium chloride 9 mL/hr    sodium chloride 30 mL/hr Last Rate: 30 mL/hr (04/13/18 1142)   vasopressin 0.03 Units/min Last Rate: Stopped (04/14/18 0621)     Plan   PCCM Problems  Resuscitated cardiac arrest  Acute respiratory failure, vent  Cardiogenic shock  Anoxic encephalopathy  STEMI  LV thrombus  Shock liver  ABENA  Relevant Medical Diagnoses  Current smoker    Plan of Treatment  Somewhat responsive. Await improvement in neuro status.    Keep vent, unable to wean. Push in ETT by 2cms.    Keep pressors.    On heparin for LV thrombus    Observe for shock liver.    ABENA - worse - renal consult.    Unclear if pneumonia or aspiration. Will check procalc, if normal then dc clindamycin.    Guarded prognosis.    I spent +45 mins critical care time in care of this patient outside of any procedures.     Cb Hercules MD  04/14/18  7:45 AM    Part of this note may be an electronic transcription/translation of spoken language to printed text using the Dragon Dictation System.

## 2018-04-15 NOTE — PROGRESS NOTES
"Patient Care Team:  No Known Provider as PCP - General    Chief Complaint:f/u cardiac arrest    Interval History:   Occasional PVCs and short nonsustained ventricular tachycardia last night.  Still on Levophed.    Objective   Vital Signs  Temp:  [100.4 °F (38 °C)] 100.4 °F (38 °C)  Heart Rate:  [62-90] 75  Resp:  [10-13] 13  BP: ()/(49-88) 111/80  Arterial Line BP: ()/(44-82) 128/82  FiO2 (%):  [24 %] 24 %    Intake/Output Summary (Last 24 hours) at 04/15/18 0926  Last data filed at 04/15/18 0438   Gross per 24 hour   Intake          1117.47 ml   Output              850 ml   Net           267.47 ml     Flowsheet Rows    Flowsheet Row First Filed Value   Admission Height 170.2 cm (67\") Documented at 04/12/2018 2153   Admission Weight 62.6 kg (138 lb) Documented at 04/12/2018 2153          General Appearance:    Intubated, agitated    Head:    Normocephalic, without obvious abnormality, atraumatic       Neck:   No adenopathy, supple, no thyromegaly, no carotid bruit, no    JVD   Lungs:     Clear to auscultation bilaterally, no wheezes, rales, or     rhonchi    Heart:    Normal rate, regular rhythm,  No murmur, rub, or gallop   Chest Wall:    No abnormalities observed   Abdomen:     Normal bowel sounds, soft, non-tender, non-distended,            no rebound tenderness   Extremities:   No cyanosis, clubbing, or edema   Pulses:   Pulses palpable and equal bilaterally   Skin:   No bleeding or rash               aspirin 81 mg Oral Daily   atorvastatin 40 mg Oral Nightly   clopidogrel 75 mg Oral Daily   famotidine 20 mg Nasogastric Daily   insulin aspart 0-24 Units Subcutaneous Q4H   pantoprazole 40 mg Intravenous Q12H   thiamine (VITAMIN B1) IVPB 100 mg Intravenous Daily         heparin (porcine) 12 Units/kg/hr Last Rate: 10 Units/kg/hr (04/15/18 0630)   norepinephrine 0.02-0.3 mcg/kg/min Last Rate: 0.2 mcg/kg/min (04/15/18 0628)   phenylephrine 0.5-3 mcg/kg/min Last Rate: Stopped (04/14/18 0326)   propofol " 5-50 mcg/kg/min Last Rate: 15 mcg/kg/min (04/15/18 0438)   sodium chloride 9 mL/hr    sodium chloride 30 mL/hr Last Rate: 30 mL/hr (04/13/18 1142)   vasopressin 0.03 Units/min Last Rate: Stopped (04/14/18 0621)       Results Review:      Results from last 7 days  Lab Units 04/15/18  0454   SODIUM mmol/L 146*   POTASSIUM mmol/L 3.6   CHLORIDE mmol/L 111*   CO2 mmol/L 17.2*   BUN mg/dL 62*   CREATININE mg/dL 2.50*   GLUCOSE mg/dL 121*   CALCIUM mg/dL 7.4*       Results from last 7 days  Lab Units 04/13/18  0532 04/12/18  2359   TROPONIN T ng/mL 5.400* 0.113*       Results from last 7 days  Lab Units 04/15/18  0454   WBC 10*3/mm3 21.06*   HEMOGLOBIN g/dL 13.1*   HEMATOCRIT % 40.9   PLATELETS 10*3/mm3 147       Results from last 7 days  Lab Units 04/15/18  0454 04/14/18  1751 04/14/18  1139  04/13/18  0532   INR   --   --   --   --  1.29*   APTT seconds 57.4* 79.0* 46.7*  < > >200.0*   < > = values in this interval not displayed.    Results from last 7 days  Lab Units 04/13/18  0532   CHOLESTEROL mg/dL 150       Results from last 7 days  Lab Units 04/15/18  0454   MAGNESIUM mg/dL 2.5*       Results from last 7 days  Lab Units 04/13/18  0532   CHOLESTEROL mg/dL 150   TRIGLYCERIDES mg/dL 111   HDL CHOL mg/dL 40   LDL CHOL mg/dL 88     @LABRCNT(bnp)@  I reviewed the patient's new clinical results.  I personally viewed and interpreted the patient's EKG/Telemetry data        Assessment/Plan   1. In hospital cardiac arrest  2. Anterolateral STEMI  3. Acute respiratory failure  4.  Acute kidney injury: Creatinine is improving  5.  Left ventricular apical thrombus  6.  Ischemic cardiomyopathy  7.  Cardiogenic shock  8.  Shock liver:  Improving  9.  Leukocytosis: Stable     -Continue heparin drip  -Still on Levophed.  -Possible extubation

## 2018-04-15 NOTE — PROGRESS NOTES
"   LOS: 2 days    Patient Care Team:  No Known Provider as PCP - General    Chief Complaint:    Chief Complaint   Patient presents with   • Arm Pain       Subjective  Follow UP ABENA    Interval History:   The patient is currently on the ventilator and he is sedated, he is warmed up back to normal.      Review of Systems:   Not obtainable    Objective     Vital Signs  Temp:  [100.4 °F (38 °C)] 100.4 °F (38 °C)  Heart Rate:  [62-90] 75  Resp:  [10-13] 13  BP: ()/(49-88) 111/80  Arterial Line BP: ()/(44-82) 128/82  FiO2 (%):  [24 %] 24 %    Flowsheet Rows    Flowsheet Row First Filed Value   Admission Height 170.2 cm (67\") Documented at 04/12/2018 2153   Admission Weight 62.6 kg (138 lb) Documented at 04/12/2018 2153          No intake/output data recorded.  I/O last 3 completed shifts:  In: 1982.5 [I.V.:1712.5; Other:120; IV Piggyback:150]  Out: 1150 [Urine:1050; Emesis/NG output:100]    Intake/Output Summary (Last 24 hours) at 04/15/18 0916  Last data filed at 04/15/18 0438   Gross per 24 hour   Intake          1117.47 ml   Output              850 ml   Net           267.47 ml       Physical Exam:  General Appearance: On the ventilator, appears very frail, no acute distress, Sedated  Skin: warm and dry  HEENT: Nonicteric sclerae, orally intubated,   Neck: No JVD, trachea midline  Lungs: Lateral rhonchi, unlabored breathing effort  Heart: RRR, normal S1 and S2, no S3, no rub  Abdomen: soft, non-tender,  present bowel sounds to auscultation  : no palpable bladder,  Way catheter is anchored in place  Extremities: no edema, cyanosis or clubbing  Neuro: Unable to assess today      Results Review:      Results from last 7 days  Lab Units 04/15/18  0454 04/14/18  2399 04/14/18  0361   SODIUM mmol/L 146* 144 145   POTASSIUM mmol/L 3.6 3.9 4.0   CHLORIDE mmol/L 111* 109* 111*   CO2 mmol/L 17.2* 17.9* 16.8*   BUN mg/dL 62* 61* 57*   CREATININE mg/dL 2.50* 2.95* 3.30*   CALCIUM mg/dL 7.4* 7.2* 7.2*   BILIRUBIN " mg/dL 0.6 0.6 0.5   ALK PHOS U/L 76 73 75   ALT (SGPT) U/L 4,166* 4,619* 5,242*   AST (SGOT) U/L 3,002* 4,319* 6,093*   GLUCOSE mg/dL 121* 128* 126*       Estimated Creatinine Clearance: 28.2 mL/min (by C-G formula based on SCr of 2.5 mg/dL (H)).      Results from last 7 days  Lab Units 04/15/18  0454 04/14/18  2329 04/14/18  1751   MAGNESIUM mg/dL 2.5* 2.3 2.2   PHOSPHORUS mg/dL 3.6 5.0* 6.2*         Results from last 7 days  Lab Units 04/14/18 2329   URIC ACID mg/dL 11.4*         Results from last 7 days  Lab Units 04/15/18  0454 04/14/18  2329 04/14/18  1751 04/14/18  1139 04/14/18  0535   WBC 10*3/mm3 21.06* 20.17* 22.19* 24.23* 29.38*   HEMOGLOBIN g/dL 13.1* 13.2* 13.8 14.0 14.3   PLATELETS 10*3/mm3 147 130* 142 141 135*         Results from last 7 days  Lab Units 04/13/18  0532   INR  1.29*         Imaging Results (last 24 hours)     Procedure Component Value Units Date/Time    XR Chest 1 View [[id number]] Collected:  04/15/18 0719     Updated:  04/15/18 0719    Narrative:       PORTABLE CHEST X-RAY     CLINICAL HISTORY: Followup congestive heart failure; I46.9-Cardiac  arrest, cause unspecified; I21.02-ST elevation (STEMI) myocardial  infarction involving left anterior descending coronary artery.     COMPARISON: 04/14/2018     FINDINGS: Portable AP view of the chest was obtained with overlying  monitor leads in place. Life support lines are unchanged. No  pneumothorax. Lungs well-inflated and clear. No edema or pleural fluid.  Normal heart size.           Impression:       Stable appearance of the chest by portable radiography.                      aspirin 81 mg Oral Daily   atorvastatin 40 mg Oral Nightly   clopidogrel 75 mg Oral Daily   famotidine 20 mg Nasogastric Daily   insulin aspart 0-24 Units Subcutaneous Q4H   pantoprazole 40 mg Intravenous Q12H   thiamine (VITAMIN B1) IVPB 100 mg Intravenous Daily       heparin (porcine) 12 Units/kg/hr Last Rate: 10 Units/kg/hr (04/15/18 0630)   norepinephrine  0.02-0.3 mcg/kg/min Last Rate: 0.2 mcg/kg/min (04/15/18 0628)   phenylephrine 0.5-3 mcg/kg/min Last Rate: Stopped (04/14/18 0326)   propofol 5-50 mcg/kg/min Last Rate: 15 mcg/kg/min (04/15/18 0438)   sodium chloride 9 mL/hr    sodium chloride 30 mL/hr Last Rate: 30 mL/hr (04/13/18 1142)   vasopressin 0.03 Units/min Last Rate: Stopped (04/14/18 0621)       Medication Review:   Current Facility-Administered Medications   Medication Dose Route Frequency Provider Last Rate Last Dose   • acetaminophen (TYLENOL) tablet 650 mg  650 mg Oral Q4H PRN Lennox Max MD       • aspirin EC tablet 81 mg  81 mg Oral Daily [place] Cobian MD   81 mg at 04/15/18 0811   • atorvastatin (LIPITOR) tablet 40 mg  40 mg Oral Nightly Lennox Max MD   40 mg at 04/14/18 2048   • clopidogrel (PLAVIX) tablet 75 mg  75 mg Oral Daily Lennox Max MD   75 mg at 04/15/18 0811   • dextrose (D50W) solution 25 g  25 g Intravenous Q15 Min PRN Cb Hercules MD       • dextrose (D50W) solution 25-50 mL  25-50 mL Intravenous Q30 Min PRN Fide Alex MD   25 mL at 04/13/18 2040   • dextrose (GLUTOSE) oral gel 15 g  15 g Oral Q15 Min PRN Cb Hercules MD       • famotidine (PEPCID) tablet 20 mg  20 mg Nasogastric Daily Fide Alex MD   20 mg at 04/14/18 1005   • fentaNYL citrate (PF) (SUBLIMAZE) injection 50 mcg  50 mcg Intravenous Q2H PRN Cb Hercules MD   50 mcg at 04/14/18 2043   • glucagon (human recombinant) (GLUCAGEN DIAGNOSTIC) injection 1 mg  1 mg Subcutaneous PRN Cb Hercules MD       • haloperidol lactate (HALDOL) injection 2 mg  2 mg Intravenous Q2H PRN Cb Hercules MD   2 mg at 04/15/18 0745   • heparin (porcine) 5000 UNIT/ML injection 1,900-3,800 Units  30-60 Units/kg Intravenous Q6H PRN Lennox Max MD   1,900 Units at 04/15/18 0634   • heparin 82774 units/250 mL (100 units/mL) in 0.45 % NaCl infusion  12 Units/kg/hr Intravenous Titrated Lennox Max MD 6.26 mL/hr at 04/15/18 0630 10 Units/kg/hr at 04/15/18 0630   •  HYDROcodone-acetaminophen (NORCO) 5-325 MG per tablet 1 tablet  1 tablet Oral Q4H PRN Lennox Max MD       • insulin aspart (novoLOG) injection 0-24 Units  0-24 Units Subcutaneous Q4H Cb Hercules MD       • magnesium hydroxide (MILK OF MAGNESIA) suspension 2400 mg/10mL 10 mL  10 mL Oral Daily PRN Lennox Max MD       • norepinephrine (LEVOPHED) 8 mg/250 mL (32 mcg/mL) in sodium chloride 0.9% infusion (premix)  0.02-0.3 mcg/kg/min Intravenous Titrated Kahlil Lake MD 23.5 mL/hr at 04/15/18 0628 0.2 mcg/kg/min at 04/15/18 0628   • ondansetron (ZOFRAN) tablet 4 mg  4 mg Oral Q6H PRN Lennox Max MD        Or   • ondansetron ODT (ZOFRAN-ODT) disintegrating tablet 4 mg  4 mg Oral Q6H PRN Lennox Max MD        Or   • ondansetron (ZOFRAN) injection 4 mg  4 mg Intravenous Q6H PRN Lennox Max MD   4 mg at 04/13/18 1421   • pantoprazole (PROTONIX) injection 40 mg  40 mg Intravenous Q12H Cb Hercules MD   40 mg at 04/15/18 0837   • phenylephrine (SOLO-SYNEPHRINE) 50 mg/250 mL (0.2 mg/mL) in 0.9% NS  infusion  0.5-3 mcg/kg/min Intravenous Titrated Fide Alex MD   Stopped at 04/14/18 0326   • propofol (DIPRIVAN) infusion 10 mg/mL 100 mL  5-50 mcg/kg/min Intravenous Titrated Kahlil Lake MD 5.63 mL/hr at 04/15/18 0438 15 mcg/kg/min at 04/15/18 0438   • sodium chloride 0.9 % flush 10 mL  10 mL Intravenous PRN Isidro Albright MD       • sodium chloride 0.9 % infusion  9 mL/hr Intravenous Continuous Fide Alex MD       • sodium chloride 0.9 % infusion  30 mL/hr Intravenous Continuous Fide Alex MD 30 mL/hr at 04/13/18 1142 30 mL/hr at 04/13/18 1142   • thiamine (B-1) 100 mg in sodium chloride 0.9 % 100 mL IVPB  100 mg Intravenous Daily Wily Stewart  mL/hr at 04/14/18 2350 100 mg at 04/14/18 2350   • vasopressin (PITRESSIN) 20 Units in sodium chloride 0.9 % 100 mL (0.2 Units/mL) infusion  0.03 Units/min Intravenous Titrated Kahlil Lake MD   Stopped at 04/14/18 0621       Assessment/Plan   1.   Acute kidney injury associated with the cardiac arrest,  hypotension and cardiogenic shock.  Creatinine is improved this down to 2.5 today  2.   mixed metabolic acidosis and respiratory alkalosis.  3.  In-hospital cardiac arrest, with anterior lateral ST MI, and cardiogenic shock.    4.  Left ventricular apical thrombus   5.  Ischemic cardiomyopathy   6.  Shock liver   7.  Respiratory failure currently on the ventilator   8.  Leukocytosis , improved this down to 21.06  9.  Hyperuricemia, associated with acute kidney injury, uric acid yesterday was 11.4.      Plan:  1.  We'll continue the same treatment.  2.  Surveillance labs          Meng Mann MD  04/15/18  9:16 AM

## 2018-04-15 NOTE — PROGRESS NOTES
[facility]  Phone: [phone]  Cb Hercules MD    Subjective:  LOS: 2    He awakens but not oriented. Brief episode of low BP last night. Low grade fever.    Objective   Vital Signs past 24hrs    Temp range: Temp (24hrs), Av.4 °F (38 °C), Min:100.4 °F (38 °C), Max:100.4 °F (38 °C)    BP range: BP: ()/(49-88) 111/80  Pulse range: Heart Rate:  [62-90] 75  Resp rate range: Resp:  [10-13] 13    Device (Oxygen Therapy): ventilator   Oxygen range:SpO2:  [94 %-100 %] 97 %   FiO2 (%):  [24 %] 24 %  S RR:  [10] 10  PEEP/CPAP (cm H2O):  [5 cm H20-7.5 cm H20] 7.5 cm H20  IL SUP:  [0 cm H20] 0 cm H20  MAP (cm H2O):  [11-15] 12  66.9 kg (147 lb 7.8 oz); Body mass index is 23.09 kg/m².    Intake/Output Summary (Last 24 hours) at 04/15/18 0733  Last data filed at 04/15/18 0438   Gross per 24 hour   Intake          1117.47 ml   Output              850 ml   Net           267.47 ml       Physical Exam   Constitutional: He appears well-developed.   HENT:   Head: Normocephalic.   Eyes:   Briskly responsive pupils   Cardiovascular: Normal rate and regular rhythm.    No murmur heard.  Pulmonary/Chest: Effort normal. He has no wheezes. He has no rales.   Abdominal: Soft. Bowel sounds are normal. There is no tenderness.   Musculoskeletal: He exhibits no edema.   Neurological:   Moves purposefully   Skin: Skin is warm.     Results Review:    I have reviewed the laboratory and imaging data since the last note by [facility] physician.  My annotations are noted in assessment and plan.    Medication Review:  I have reviewed the current MAR.  My annotations are noted in assessment and plan.      heparin (porcine) 12 Units/kg/hr Last Rate: 10 Units/kg/hr (04/15/18 06)   norepinephrine 0.02-0.3 mcg/kg/min Last Rate: 0.2 mcg/kg/min (04/15/18 06)   phenylephrine 0.5-3 mcg/kg/min Last Rate: Stopped (18 0326)   propofol 5-50 mcg/kg/min Last Rate: 15 mcg/kg/min (04/15/18 6588)   sodium chloride 9 mL/hr    sodium chloride  30 mL/hr Last Rate: 30 mL/hr (04/13/18 1142)   vasopressin 0.03 Units/min Last Rate: Stopped (04/14/18 0621)     Plan   PCCM Problems  Resuscitated cardiac arrest  Acute respiratory failure, vent  Cardiogenic shock  Anoxic encephalopathy  STEMI  LV thrombus  Shock liver  ABENA  Relevant Medical Diagnoses  Current smoker    Plan of Treatment  Sedation vacation.    Weaning trial.    Hemodynamics remain an issue. On levophed. Await cards input. Some ventricular arrhythmias overnight per RN    On heparin for LV thrombus    Observe for shock liver - better.    ABENA better, appreciate renal consult.    Unclear if pneumonia or aspiration. CxR clear today. Procalc high but renal. Sputum noted. Will dc abx for now.    Guarded prognosis.    I spent +35 mins critical care time in care of this patient outside of any procedures.     Cb Hercules MD  04/15/18  7:33 AM    Part of this note may be an electronic transcription/translation of spoken language to printed text using the Dragon Dictation System.

## 2018-04-15 NOTE — PLAN OF CARE
Problem: Patient Care Overview  Goal: Plan of Care Review   04/15/18 0534   Coping/Psychosocial   Plan of Care Reviewed With patient   Plan of Care Review   Progress no change   OTHER   Outcome Summary pt remains on levophed gtt, vent and propofol

## 2018-04-15 NOTE — PROGRESS NOTES
Patient Identification:  NAME:  Negro Hall  Age:  62 y.o.   Sex:  male   :  1955   MRN:  [id number]       Chief complaint: An anoxic encephalopathy    History of present illness: He is been extubated he is more alert but is still pretty lethargic no seizure activity he gets agitated and moves all extremities      Past medical history:  Past Medical History:   Diagnosis Date   • Hypertension        Allergies:  Penicillins    Home medications:  No prescriptions prior to admission.        Hospital medications:    aspirin 81 mg Oral Daily   atorvastatin 40 mg Oral Nightly   clopidogrel 75 mg Oral Daily   famotidine 20 mg Nasogastric Daily   insulin aspart 0-24 Units Subcutaneous Q4H   pantoprazole 40 mg Intravenous Q12H   thiamine (VITAMIN B1) IVPB 100 mg Intravenous Daily   ziprasidone 10 mg Intramuscular Once       dexmedetomidine 0.2-1.5 mcg/kg/hr Last Rate: 1 mcg/kg/hr (04/15/18 1234)   heparin (porcine) 12 Units/kg/hr Last Rate: 13 Units/kg/hr (04/15/18 1426)   norepinephrine 0.02-0.3 mcg/kg/min Last Rate: 0.12 mcg/kg/min (04/15/18 1159)   phenylephrine 0.5-3 mcg/kg/min Last Rate: Stopped (18 0326)   propofol 5-50 mcg/kg/min Last Rate: 15 mcg/kg/min (04/15/18 0438)   sodium chloride 9 mL/hr    sodium chloride 30 mL/hr Last Rate: 30 mL/hr (18 1142)   vasopressin 0.03 Units/min Last Rate: Stopped (18 0621)     •  acetaminophen  •  dextrose  •  dextrose  •  dextrose  •  fentaNYL citrate (PF)  •  glucagon (human recombinant)  •  haloperidol lactate  •  heparin (porcine)  •  HYDROcodone-acetaminophen  •  magnesium hydroxide  •  ondansetron **OR** ondansetron ODT **OR** ondansetron  •  sodium chloride      Objective:  Vitals Ranges:   Heart Rate:  [] 100  Resp:  [10-13] 10  BP: ()/(49-88) 112/78  Arterial Line BP: ()/(44-87) 115/65  FiO2 (%):  [24 %] 24 %      Physical Exam:  Sedated lethargic looks at me does not blink to threat does not follow commands moves all 4  extremities reflexes trace throughout symmetrical toes are now downgoing bilaterally    Results review:   I reviewed the patient's new clinical results.    Data review:  Lab Results (last 24 hours)     Procedure Component Value Units Date/Time    aPTT [[id number]]  (Abnormal) Collected:  04/15/18 1313    Specimen:  Blood Updated:  04/15/18 1337     PTT 53.9 (H) seconds     Blood Gas, Arterial [[id number]]  (Abnormal) Collected:  04/15/18 1101    Specimen:  Arterial Blood Updated:  04/15/18 1104     Site Arterial Line     Hermes's Test N/A     pH, Arterial 7.490 (H) pH units      pCO2, Arterial 26.5 (L) mm Hg      pO2, Arterial 96.2 mm Hg      HCO3, Arterial 20.2 (L) mmol/L      Base Excess, Arterial -1.9 (L) mmol/L      O2 Saturation Calculated 98.2 %      A-a Gradiant 0.7 mmHg      Barometric Pressure for Blood Gas 744.7 mmHg      Modality Adult Vent     FIO2 24 %      Ventilator Mode PS     Set Tidal Volume 650     Rate 13 Breaths/minute      PEEP 5     PSV 8 cmH2O     Narrative:       sat 100 Meter: [id number] : [id number] Taya [place]    POC Glucose Once [[id number]]  (Normal) Collected:  04/15/18 0715    Specimen:  Blood Updated:  04/15/18 0722     Glucose 95 mg/dL     Narrative:       Meter: [id number] : [id number] Elijah Harry CHIDI    Comprehensive Metabolic Panel [[id number]]  (Abnormal) Collected:  04/15/18 0454    Specimen:  Blood Updated:  04/15/18 0704     Glucose 121 (H) mg/dL      BUN 62 (H) mg/dL      Creatinine 2.50 (H) mg/dL      Sodium 146 (H) mmol/L      Potassium 3.6 mmol/L      Chloride 111 (H) mmol/L      CO2 17.2 (L) mmol/L      Calcium 7.4 (L) mg/dL      Total Protein 5.5 (L) g/dL      Albumin 2.90 (L) g/dL      ALT (SGPT) 4,166 (H) U/L      AST (SGOT) 3,002 (H) U/L      Alkaline Phosphatase 76 U/L      Total Bilirubin 0.6 mg/dL      eGFR Non African Amer 26 (L) mL/min/1.73      Globulin 2.6 gm/dL      A/G Ratio 1.1 g/dL      BUN/Creatinine Ratio 24.8     Anion Gap 17.8 mmol/L      Phosphorus [[id number]]  (Normal) Collected:  04/15/18 0454    Specimen:  Blood Updated:  04/15/18 0652     Phosphorus 3.6 mg/dL     Magnesium [[id number]]  (Abnormal) Collected:  04/15/18 0454    Specimen:  Blood Updated:  04/15/18 0648     Magnesium 2.5 (H) mg/dL     CBC & Differential [[id number]] Collected:  04/15/18 0454    Specimen:  Blood Updated:  04/15/18 0611    Narrative:       The following orders were created for panel order CBC & Differential.  Procedure                               Abnormality         Status                     ---------                               -----------         ------                     Scan Slide[[id number]]                                                                  CBC Auto Differential[[id number]]        Abnormal            Final result                 Please view results for these tests on the individual orders.    CBC Auto Differential [[id number]]  (Abnormal) Collected:  04/15/18 0454    Specimen:  Blood Updated:  04/15/18 0611     WBC 21.06 (H) 10*3/mm3      RBC 4.25 (L) 10*6/mm3      Hemoglobin 13.1 (L) g/dL      Hematocrit 40.9 %      MCV 96.2 fL      MCH 30.8 pg      MCHC 32.0 (L) g/dL      RDW 13.9 %      RDW-SD 49.4 fl      MPV 12.1 (H) fL      Platelets 147 10*3/mm3      Neutrophil % 88.7 (H) %      Lymphocyte % 7.4 (L) %      Monocyte % 2.9 (L) %      Eosinophil % 0.0 (L) %      Basophil % 0.2 %      Immature Grans % 0.8 (H) %      Neutrophils, Absolute 18.69 (H) 10*3/mm3      Lymphocytes, Absolute 1.55 10*3/mm3      Monocytes, Absolute 0.61 10*3/mm3      Eosinophils, Absolute 0.01 10*3/mm3      Basophils, Absolute 0.04 10*3/mm3      Immature Grans, Absolute 0.16 (H) 10*3/mm3      nRBC 0.2 (H) /100 WBC     aPTT [[id number]]  (Abnormal) Collected:  04/15/18 0454    Specimen:  Blood Updated:  04/15/18 0605     PTT 57.4 (H) seconds     Calcium, Ionized [[id number]]  (Abnormal) Collected:  04/15/18 0454    Specimen:  Blood Updated:  04/15/18 0556     Ionized Calcium  1.09 (L) mmol/L      Ionized Calcium 4.4 (L) mg/dL     Respiratory Culture - Aspirate, Bronchus [[id number]] Collected:  04/14/18 2050    Specimen:  Aspirate from Bronchus Updated:  04/15/18 0554     Gram Stain Result Few (2+) WBCs seen      Many (4+) Gram negative diplococci      Occasional Budding yeast    Lactic Acid, Plasma [[id number]]  (Normal) Collected:  04/15/18 0454    Specimen:  Blood Updated:  04/15/18 0541     Lactate 1.5 mmol/L     Blood Gas, Arterial [[id number]]  (Abnormal) Collected:  04/15/18 0415    Specimen:  Arterial Blood Updated:  04/15/18 0418     Site Arterial Line     Hermes's Test N/A     pH, Arterial 7.409 pH units      pCO2, Arterial 32.3 (L) mm Hg      pO2, Arterial 87.5 mm Hg      HCO3, Arterial 20.4 (L) mmol/L      Base Excess, Arterial -3.3 (L) mmol/L      O2 Saturation Calculated 96.9 %      A-a Gradiant 0.6 mmHg      Barometric Pressure for Blood Gas 743.5 mmHg      Modality Adult Vent     FIO2 24 %      Ventilator Mode PC     Set Mech Resp Rate 10     Rate 11 Breaths/minute      PEEP 7.5    Narrative:       PCV IP 16 VTPCV IP 16 VT    Calcium, Ionized [[id number]]  (Abnormal) Collected:  04/14/18 2329    Specimen:  Blood Updated:  04/15/18 0031     Ionized Calcium 1.07 (L) mmol/L      Ionized Calcium 4.3 (L) mg/dL     Comprehensive Metabolic Panel [[id number]]  (Abnormal) Collected:  04/14/18 2329    Specimen:  Blood Updated:  04/15/18 0029     Glucose 128 (H) mg/dL      BUN 61 (H) mg/dL      Creatinine 2.95 (H) mg/dL      Sodium 144 mmol/L      Potassium 3.9 mmol/L      Chloride 109 (H) mmol/L      CO2 17.9 (L) mmol/L      Calcium 7.2 (L) mg/dL      Total Protein 5.3 (L) g/dL      Albumin 2.90 (L) g/dL      ALT (SGPT) 4,619 (H) U/L      AST (SGOT) 4,319 (H) U/L      Alkaline Phosphatase 73 U/L      Total Bilirubin 0.6 mg/dL      eGFR Non African Amer 22 (L) mL/min/1.73      Globulin 2.4 gm/dL      A/G Ratio 1.2 g/dL      BUN/Creatinine Ratio 20.7     Anion Gap 17.1 mmol/L     Magnesium  [[id number]]  (Normal) Collected:  04/14/18 2329    Specimen:  Blood Updated:  04/15/18 0018     Magnesium 2.3 mg/dL     Phosphorus [[id number]]  (Abnormal) Collected:  04/14/18 2329    Specimen:  Blood Updated:  04/15/18 0018     Phosphorus 5.0 (H) mg/dL     Uric Acid [[id number]]  (Abnormal) Collected:  04/14/18 2329    Specimen:  Blood Updated:  04/15/18 0018     Uric Acid 11.4 (H) mg/dL     Lactic Acid, Plasma [[id number]]  (Abnormal) Collected:  04/14/18 2329    Specimen:  Blood Updated:  04/15/18 0012     Lactate 2.3 (C) mmol/L     CBC & Differential [[id number]] Collected:  04/14/18 2329    Specimen:  Blood Updated:  04/14/18 2355    Narrative:       The following orders were created for panel order CBC & Differential.  Procedure                               Abnormality         Status                     ---------                               -----------         ------                     CBC Auto Differential[[id number]]        Abnormal            Final result                 Please view results for these tests on the individual orders.    CBC Auto Differential [[id number]]  (Abnormal) Collected:  04/14/18 2329    Specimen:  Blood Updated:  04/14/18 2355     WBC 20.17 (H) 10*3/mm3      RBC 4.16 (L) 10*6/mm3      Hemoglobin 13.2 (L) g/dL      Hematocrit 41.3 %      MCV 99.3 (H) fL      MCH 31.7 pg      MCHC 32.0 (L) g/dL      RDW 14.1 %      RDW-SD 50.4 fl      MPV 11.5 fL      Platelets 130 (L) 10*3/mm3      Neutrophil % 89.4 (H) %      Lymphocyte % 6.9 (L) %      Monocyte % 3.0 (L) %      Eosinophil % 0.0 (L) %      Basophil % 0.1 %      Immature Grans % 0.6 (H) %      Neutrophils, Absolute 18.01 (H) 10*3/mm3      Lymphocytes, Absolute 1.40 10*3/mm3      Monocytes, Absolute 0.61 10*3/mm3      Eosinophils, Absolute 0.00 10*3/mm3      Basophils, Absolute 0.02 10*3/mm3      Immature Grans, Absolute 0.13 (H) 10*3/mm3     POC Glucose Once [[id number]]  (Normal) Collected:  04/14/18 2034    Specimen:  Blood Updated:   04/14/18 2036     Glucose 116 mg/dL     Narrative:       Meter: [id number] : [id number] Pebbles GARBER RN    Comprehensive Metabolic Panel [[id number]]  (Abnormal) Collected:  04/14/18 1751    Specimen:  Blood Updated:  04/14/18 1840     Glucose 126 (H) mg/dL      BUN 57 (H) mg/dL      Creatinine 3.30 (H) mg/dL      Sodium 145 mmol/L      Potassium 4.0 mmol/L      Chloride 111 (H) mmol/L      CO2 16.8 (L) mmol/L      Calcium 7.2 (L) mg/dL      Total Protein 5.4 (L) g/dL      Albumin 3.00 (L) g/dL      ALT (SGPT) 5,242 (H) U/L      AST (SGOT) 6,093 (H) U/L      Alkaline Phosphatase 75 U/L      Total Bilirubin 0.5 mg/dL      eGFR Non African Amer 19 (L) mL/min/1.73      Globulin 2.4 gm/dL      A/G Ratio 1.3 g/dL      BUN/Creatinine Ratio 17.3     Anion Gap 17.2 mmol/L     Calcium, Ionized [[id number]]  (Abnormal) Collected:  04/14/18 1751    Specimen:  Blood Updated:  04/14/18 1838     Ionized Calcium 1.09 (L) mmol/L      Ionized Calcium 4.4 (L) mg/dL     aPTT [[id number]]  (Abnormal) Collected:  04/14/18 1751    Specimen:  Blood Updated:  04/14/18 1832     PTT 79.0 (H) seconds     Magnesium [[id number]]  (Normal) Collected:  04/14/18 1751    Specimen:  Blood Updated:  04/14/18 1829     Magnesium 2.2 mg/dL     Phosphorus [[id number]]  (Abnormal) Collected:  04/14/18 1751    Specimen:  Blood Updated:  04/14/18 1829     Phosphorus 6.2 (H) mg/dL     Lactic Acid, Plasma [[id number]]  (Normal) Collected:  04/14/18 1753    Specimen:  Blood Updated:  04/14/18 1818     Lactate 1.9 mmol/L     CBC & Differential [[id number]] Collected:  04/14/18 1751    Specimen:  Blood Updated:  04/14/18 1810    Narrative:       The following orders were created for panel order CBC & Differential.  Procedure                               Abnormality         Status                     ---------                               -----------         ------                     CBC Auto Differential[[id number]]        Abnormal            Final result                  Please view results for these tests on the individual orders.    CBC Auto Differential [[id number]]  (Abnormal) Collected:  04/14/18 1751    Specimen:  Blood Updated:  04/14/18 1810     WBC 22.19 (H) 10*3/mm3      RBC 4.44 (L) 10*6/mm3      Hemoglobin 13.8 g/dL      Hematocrit 43.3 %      MCV 97.5 (H) fL      MCH 31.1 pg      MCHC 31.9 (L) g/dL      RDW 13.9 %      RDW-SD 49.4 fl      MPV 12.0 fL      Platelets 142 10*3/mm3      Neutrophil % 88.1 (H) %      Lymphocyte % 7.2 (L) %      Monocyte % 4.0 (L) %      Eosinophil % 0.0 (L) %      Basophil % 0.1 %      Immature Grans % 0.6 (H) %      Neutrophils, Absolute 19.54 (H) 10*3/mm3      Lymphocytes, Absolute 1.60 10*3/mm3      Monocytes, Absolute 0.88 10*3/mm3      Eosinophils, Absolute 0.01 10*3/mm3      Basophils, Absolute 0.02 10*3/mm3      Immature Grans, Absolute 0.14 (H) 10*3/mm3     POC Glucose Once [[id number]]  (Normal) Collected:  04/14/18 1632    Specimen:  Blood Updated:  04/14/18 1700     Glucose 114 mg/dL     Narrative:       Meter: [id number] : [id number] Toi MURILLO           Imaging:  Imaging Results (last 24 hours)     Procedure Component Value Units Date/Time    XR Chest 1 View [[id number]] Collected:  04/15/18 0719     Updated:  04/15/18 0719    Narrative:       PORTABLE CHEST X-RAY     CLINICAL HISTORY: Followup congestive heart failure; I46.9-Cardiac  arrest, cause unspecified; I21.02-ST elevation (STEMI) myocardial  infarction involving left anterior descending coronary artery.     COMPARISON: 04/14/2018     FINDINGS: Portable AP view of the chest was obtained with overlying  monitor leads in place. Life support lines are unchanged. No  pneumothorax. Lungs well-inflated and clear. No edema or pleural fluid.  Normal heart size.           Impression:       Stable appearance of the chest by portable radiography.                         Assessment and Plan:     Active Problems:    Cardiac arrest    Anoxic encephalopathy he is at  least extubated and moving all extremities and has been agitated I suppose all of this is a good sign and we will continue to give him time he is definitely had some bilateral cortical insult but I am hopeful that he will continue to improve.  He can still have sedation as needed for his intermittent agitation.  Per his loved 1 in the room he is not going through alcohol withdrawal at this time      Wily Stewart MD  04/15/18  3:13 PM

## 2018-04-16 NOTE — PROGRESS NOTES
"   LOS: 3 days    Patient Care Team:  No Known Provider as PCP - General    Chief Complaint:    Chief Complaint   Patient presents with   • Arm Pain       Subjective  Follow UP ABENA    Interval History:   The patient was extubated yesterday, he is awake, following simple commands, according to the nursing staff been quite confused.  At present he said he is feeling better, complaining of thirst, denies any chest pain or shortness of air, no nausea or vomiting.      Review of Systems:   Not obtainable    Objective     Vital Signs  Temp:  [98.1 °F (36.7 °C)-98.9 °F (37.2 °C)] 98.1 °F (36.7 °C)  Heart Rate:  [] 96  Resp:  [10] 10  BP: ()/(58-87) 123/80  Arterial Line BP: ()/() 136/103    Flowsheet Rows    Flowsheet Row First Filed Value   Admission Height 170.2 cm (67\") Documented at 04/12/2018 2153   Admission Weight 62.6 kg (138 lb) Documented at 04/12/2018 2153          No intake/output data recorded.  I/O last 3 completed shifts:  In: 1496.4 [I.V.:1446.4; IV Piggyback:50]  Out: 2700 [Urine:2700]    Intake/Output Summary (Last 24 hours) at 04/16/18 0739  Last data filed at 04/16/18 0558   Gross per 24 hour   Intake           999.41 ml   Output             2200 ml   Net         -1200.59 ml       Physical Exam:  General Appearance: Awake but the slightly confused, appears very frail, no acute distress,  Skin: warm and dry  HEENT: Nonicteric sclerae, oral mucosa is very dry,   Neck: No JVD, trachea midline  Lungs: Lateral rhonchi, unlabored breathing effort  Heart: RRR, normal S1 and S2, no S3, no rub  Abdomen: soft, non-tender,  present bowel sounds to auscultation  : no palpable bladder,  Way catheter is anchored in place  Extremities: no edema, cyanosis or clubbing  Neuro: His speech is weak, he is awake but the disoriented      Results Review:      Results from last 7 days  Lab Units 04/16/18  0406 04/15/18  0454 04/14/18  8249   SODIUM mmol/L 153* 146* 144   POTASSIUM mmol/L 3.3* 3.6 3.9 "   CHLORIDE mmol/L 116* 111* 109*   CO2 mmol/L 20.8* 17.2* 17.9*   BUN mg/dL 37* 62* 61*   CREATININE mg/dL 1.34* 2.50* 2.95*   CALCIUM mg/dL 7.7* 7.4* 7.2*   BILIRUBIN mg/dL 0.9 0.6 0.6   ALK PHOS U/L 82 76 73   ALT (SGPT) U/L 2,648* 4,166* 4,619*   AST (SGOT) U/L 1,101* 3,002* 4,319*   GLUCOSE mg/dL 86 121* 128*       Estimated Creatinine Clearance: 52.7 mL/min (by C-G formula based on SCr of 1.34 mg/dL (H)).      Results from last 7 days  Lab Units 04/16/18  0406 04/15/18  0454 04/14/18  2329   MAGNESIUM mg/dL 2.3 2.5* 2.3   PHOSPHORUS mg/dL 1.9* 3.6 5.0*         Results from last 7 days  Lab Units 04/16/18  0406 04/14/18  2329   URIC ACID mg/dL 7.4* 11.4*         Results from last 7 days  Lab Units 04/16/18  0406 04/15/18  0454 04/14/18  2329 04/14/18  1751 04/14/18  1139   WBC 10*3/mm3 15.77* 21.06* 20.17* 22.19* 24.23*   HEMOGLOBIN g/dL 12.6* 13.1* 13.2* 13.8 14.0   PLATELETS 10*3/mm3 97* 147 130* 142 141         Results from last 7 days  Lab Units 04/13/18  0532   INR  1.29*         Imaging Results (last 24 hours)     Procedure Component Value Units Date/Time    XR Chest 1 View [[id number]] Collected:  04/15/18 0719     Updated:  04/16/18 0554    Narrative:       PORTABLE CHEST X-RAY     CLINICAL HISTORY: Followup congestive heart failure; I46.9-Cardiac  arrest, cause unspecified; I21.02-ST elevation (STEMI) myocardial  infarction involving left anterior descending coronary artery.     COMPARISON: 04/14/2018     FINDINGS: Portable AP view of the chest was obtained with overlying  monitor leads in place. Life support lines are unchanged. No  pneumothorax. Lungs well-inflated and clear. No edema or pleural fluid.  Normal heart size.           Impression:       Stable appearance of the chest by portable radiography.        This report was finalized on 4/16/2018 5:51 AM by Emigdio Gomez MD.       XR Chest 1 View [[id number]] Collected:  04/16/18 0335     Updated:  04/16/18 0339    Narrative:       PORTABLE CHEST  X-RAY     CLINICAL HISTORY: increase in O2; I46.9-Cardiac arrest, cause  unspecified; I21.02-ST elevation (STEMI) myocardial infarction involving  left anterior descending coronary artery     COMPARISON: April 15, 2018.     FINDINGS: Portable AP view of the chest was obtained with overlying  monitor leads in place. ET tube and NG tube have been removed. Central  line remains. No pneumothorax. There are new perihilar predominant  opacities bilaterally most consistent with pulmonary edema. No  significant pleural fluid. Normal heart size.             Impression:       Interval extubation with developing opacities most  consistent with acute pulmonary edema        This report was finalized on 4/16/2018 3:36 AM by Emigdio Gomez MD.             aspirin 81 mg Oral Daily   atorvastatin 40 mg Oral Nightly   clopidogrel 75 mg Oral Daily   famotidine 20 mg Nasogastric Daily   insulin aspart 0-24 Units Subcutaneous Q4H   pantoprazole 40 mg Intravenous Q12H   thiamine (VITAMIN B1) IVPB 100 mg Intravenous Daily   ziprasidone 10 mg Intramuscular Once       dexmedetomidine 0.2-1.5 mcg/kg/hr Last Rate: Stopped (04/15/18 1617)   heparin (porcine) 12 Units/kg/hr Last Rate: 16 Units/kg/hr (04/15/18 2226)   norepinephrine 0.02-0.3 mcg/kg/min Last Rate: 0.06 mcg/kg/min (04/16/18 0604)   phenylephrine 0.5-3 mcg/kg/min Last Rate: Stopped (04/14/18 0326)   propofol 5-50 mcg/kg/min Last Rate: 15 mcg/kg/min (04/15/18 0438)   sodium chloride 9 mL/hr    sodium chloride 30 mL/hr Last Rate: 30 mL/hr (04/13/18 1142)   vasopressin 0.03 Units/min Last Rate: Stopped (04/14/18 0621)       Medication Review:   Current Facility-Administered Medications   Medication Dose Route Frequency Provider Last Rate Last Dose   • acetaminophen (TYLENOL) tablet 650 mg  650 mg Oral Q4H PRN Lennox Max MD       • aspirin EC tablet 81 mg  81 mg Oral Daily West Cobian MD   81 mg at 04/15/18 0811   • atorvastatin (LIPITOR) tablet 40 mg  40 mg Oral Nightly  Lennox Max MD   40 mg at 04/14/18 2048   • clopidogrel (PLAVIX) tablet 75 mg  75 mg Oral Daily Lennox Max MD   75 mg at 04/15/18 0811   • dexmedetomidine (PRECEDEX) 400 mcg/100 mL (4 mcg/mL) infusion  0.2-1.5 mcg/kg/hr Intravenous Titrated Cb Hercules MD   Stopped at 04/15/18 1617   • dextrose (D50W) solution 25 g  25 g Intravenous Q15 Min PRN Cb Hercules MD       • dextrose (D50W) solution 25-50 mL  25-50 mL Intravenous Q30 Min PRN Fide Alex MD   25 mL at 04/13/18 2040   • dextrose (GLUTOSE) oral gel 15 g  15 g Oral Q15 Min PRN Cb Hercules MD       • famotidine (PEPCID) tablet 20 mg  20 mg Nasogastric Daily Fide Alex MD   20 mg at 04/14/18 1005   • fentaNYL citrate (PF) (SUBLIMAZE) injection 50 mcg  50 mcg Intravenous Q2H PRN Cb Hercules MD   50 mcg at 04/15/18 2334   • glucagon (human recombinant) (GLUCAGEN DIAGNOSTIC) injection 1 mg  1 mg Subcutaneous PRN Cb Hercules MD       • haloperidol lactate (HALDOL) injection 2 mg  2 mg Intravenous Q2H PRN Cb Hercules MD   2 mg at 04/16/18 0058   • heparin (porcine) 5000 UNIT/ML injection 1,900-3,800 Units  30-60 Units/kg Intravenous Q6H PRN Lennox Max MD   3,800 Units at 04/15/18 2224   • heparin 57860 units/250 mL (100 units/mL) in 0.45 % NaCl infusion  12 Units/kg/hr Intravenous Titrated Lennox Max MD 10.01 mL/hr at 04/15/18 2226 16 Units/kg/hr at 04/15/18 2226   • HYDROcodone-acetaminophen (NORCO) 5-325 MG per tablet 1 tablet  1 tablet Oral Q4H PRN Lennox Max MD       • insulin aspart (novoLOG) injection 0-24 Units  0-24 Units Subcutaneous Q4H Cb Hercules MD       • magnesium hydroxide (MILK OF MAGNESIA) suspension 2400 mg/10mL 10 mL  10 mL Oral Daily PRN Lennox Max MD       • norepinephrine (LEVOPHED) 8 mg/250 mL (32 mcg/mL) in sodium chloride 0.9% infusion (premix)  0.02-0.3 mcg/kg/min Intravenous Titrated Kahlil Lake MD 7.04 mL/hr at 04/16/18 0604 0.06 mcg/kg/min at 04/16/18 0604   • ondansetron (ZOFRAN) tablet 4 mg  4 mg  Oral Q6H PRN Lennox Max MD        Or   • ondansetron ODT (ZOFRAN-ODT) disintegrating tablet 4 mg  4 mg Oral Q6H PRN Lennox Max MD        Or   • ondansetron (ZOFRAN) injection 4 mg  4 mg Intravenous Q6H PRN Lennox Max MD   4 mg at 04/13/18 1421   • pantoprazole (PROTONIX) injection 40 mg  40 mg Intravenous Q12H Cb Hercules MD   40 mg at 04/15/18 2204   • phenylephrine (SOLO-SYNEPHRINE) 50 mg/250 mL (0.2 mg/mL) in 0.9% NS  infusion  0.5-3 mcg/kg/min Intravenous Titrated Fide Alex MD   Stopped at 04/14/18 0326   • propofol (DIPRIVAN) infusion 10 mg/mL 100 mL  5-50 mcg/kg/min Intravenous Titrated Kahlil Lake MD 5.63 mL/hr at 04/15/18 0438 15 mcg/kg/min at 04/15/18 0438   • sodium chloride 0.9 % flush 10 mL  10 mL Intravenous PRN Isidro Albright MD       • sodium chloride 0.9 % infusion  9 mL/hr Intravenous Continuous Fide Alex MD       • sodium chloride 0.9 % infusion  30 mL/hr Intravenous Continuous Fide Alex MD 30 mL/hr at 04/13/18 1142 30 mL/hr at 04/13/18 1142   • thiamine (B-1) 100 mg in sodium chloride 0.9 % 100 mL IVPB  100 mg Intravenous Daily Wily Stewart MD   Stopped at 04/15/18 2333   • vasopressin (PITRESSIN) 20 Units in sodium chloride 0.9 % 100 mL (0.2 Units/mL) infusion  0.03 Units/min Intravenous Titrated Kahlil Lake MD   Stopped at 04/14/18 0621   • ziprasidone (GEODON) injection 10 mg  10 mg Intramuscular Once Cb Hercules MD       • ziprasidone (GEODON) injection 5 mg  5 mg Intramuscular Q6H PRN Cb Hercules MD   5 mg at 04/16/18 0248       Assessment/Plan   1.  Acute kidney injury associated with the cardiac arrest,  hypotension and cardiogenic shock.  Creatinine is improved and down to 1.34  2.   metabolic acidosis is improving  3.  In-hospital cardiac arrest, with anterior lateral ST MI, and cardiogenic shock.    4.  Left ventricular apical thrombus   5.  Ischemic cardiomyopathy   6.  Shock liver, improving   7.  Respiratory failure currently on the ventilator   8.   Leukocytosis , improved this down to 15.77  9.  Hyperuricemia, associated with acute kidney injury, uric acid is down to 7.4  10.  Hypernatremia, sodium is 153 with estimated free water deficit about 4 L  11. Hypokalemia, K 3.3      Plan:  1.  Will add the D5W to run at 125 cc per hour ×24 hours, we'll reassess tomorrow  2. Potassium will repleted by the protocol  2.  Surveillance labs          Meng Mann MD  04/16/18  7:39 AM

## 2018-04-16 NOTE — PROGRESS NOTES
[facility]  Phone: [phone]  Cb Hercules MD    Subjective:  LOS: 3    Awake and oriented (mild confusion). Denies pain. + cough. ++ smoking hx.    Objective   Vital Signs past 24hrs    Temp range: Temp (24hrs), Av.4 °F (36.9 °C), Min:98.1 °F (36.7 °C), Max:98.9 °F (37.2 °C)    BP range: BP: ()/(58-99) 121/84  Pulse range: Heart Rate:  [] 95  Resp rate range: Resp:  [10-14] 14    Device (Oxygen Therapy): heated;high-flow nasal cannulaFlow (L/min):  [4-60] 60  Oxygen range:SpO2:  [82 %-100 %] 92 %      66.9 kg (147 lb 7.8 oz); Body mass index is 23.09 kg/m².    Intake/Output Summary (Last 24 hours) at 18 1031  Last data filed at 18 0558   Gross per 24 hour   Intake           999.41 ml   Output             2200 ml   Net         -1200.59 ml       Physical Exam   Constitutional: He appears well-developed.   HENT:   Head: Normocephalic.   Eyes:   Briskly responsive pupils   Cardiovascular: Normal rate and regular rhythm.    No murmur heard.  Pulmonary/Chest: Effort normal. He has wheezes (mild). He has rales (mild bilateral diffuse).   Abdominal: Soft. Bowel sounds are normal. There is no tenderness.   Musculoskeletal: He exhibits no edema.   Neurological: He is alert.   Moves purposefully   Skin: Skin is warm.     Results Review:    I have reviewed the laboratory and imaging data since the last note by [facility] physician.  My annotations are noted in assessment and plan.    Medication Review:  I have reviewed the current MAR.  My annotations are noted in assessment and plan.      dextrose 125 mL/hr Last Rate: 125 mL/hr (18 0829)   heparin (porcine) 12 Units/kg/hr Last Rate: 16 Units/kg/hr (18 0926)   milrinone 0.25-0.75 mcg/kg/min    sodium chloride 9 mL/hr    sodium chloride 30 mL/hr Last Rate: 30 mL/hr (18 1142)     Plan   PCCM Problems  Resuscitated cardiac arrest  Acute respiratory failure, vent liberated 4/15, optiflow  Cardiogenic shock  Anoxic  encephalopathy  STEMI  LV thrombus  Shock liver  ABENA  ? Pneumonia - GNB  COPD exacerbation  Relevant Medical Diagnoses  Current smoker    Plan of Treatment  Tolerated weaning and extubation.  However requiring high flow oxygen.    Pulmonary infiltrates.  Sputum growing gram-negative bacilli.  Unsure if significant as no fever.  Will treat with cefepime for now.    Wheezing noted on exam.  Likely COPD exacerbation.  Treat with steroids and nebs.  Advised to quit smoking.    Hemodynamics improved.  On Levophed this morning which has been weaned off.  Currently on IV fluids.  Plan is to start milrinone.  Limited echo as per cardiology.    Heparin for LV thrombus.  Continue same.    Shock liver improving.    Acute kidney injury improving.  Discussed with renal and cardiology.    Not a drinker - stop thiamine.    Discussed with family at bedside.    I spent +35 mins critical care time in care of this patient outside of any procedures.     Cb Hercules MD  04/16/18  10:31 AM    Part of this note may be an electronic transcription/translation of spoken language to printed text using the Dragon Dictation System.

## 2018-04-16 NOTE — PLAN OF CARE
"Problem: Patient Care Overview  Goal: Plan of Care Review  Outcome: Ongoing (interventions implemented as appropriate)   04/15/18 1800   Coping/Psychosocial   Plan of Care Reviewed With patient;sibling;son   Plan of Care Review   Progress improving   OTHER   Outcome Summary Pt extubated today, RA most of the shift, currently on 2L N/C, levophed gtt remains on, pt calls for \"momma\", recognizes sister and calls her Hernandez, doesn't follow commands, kicks and pulls attempting to get out of bed with any stimulation, unable to do any oral care, precidex started and dc'd do to changes in HR and rhythm, geodon IM given with pt becaoming calm and resting, continue to monitor.         "

## 2018-04-16 NOTE — PROGRESS NOTES
Discharge Planning Assessment  [facility]     Patient Name: Negro Hall  MRN: [id number]  Today's Date: 4/16/2018    Admit Date: 4/12/2018          Discharge Needs Assessment     Row Name 04/16/18 0975       Living Environment    Lives With alone    Name(s) of Who Lives With Patient states that his girlfriend stays there intermittently    Current Living Arrangements home/apartment/condo    Primary Care Provided by self    Provides Primary Care For no one    Family Caregiver if Needed none    Quality of Family Relationships supportive    Able to Return to Prior Arrangements other (see comments)   NISH       Resource/Environmental Concerns    Resource/Environmental Concerns financial;environmental    Environment Concerns indoor plumbing, none    Financial Concerns medicine, unable to afford;unemployed    Transportation Concerns car, none       Transition Planning    Patient/Family Anticipates Transition to home with family    Patient/Family Anticipated Services at Transition skilled nursing;rehabilitation services    Transportation Anticipated family or friend will provide       Discharge Needs Assessment    Readmission Within the Last 30 Days no previous admission in last 30 days    Concerns to be Addressed discharge planning    Equipment Currently Used at Home none    Anticipated Changes Related to Illness inability to care for self    Discharge Facility/Level of Care Needs nursing facility, skilled;rehabilitation facility    Offered/Gave Vendor List yes    Current Discharge Risk dependent with mobility/activities of daily living            Discharge Plan     Row Name 04/16/18 7413       Plan    Plan SNF  -- Family reviewing options.    Patient/Family in Agreement with Plan yes    Plan Comments Spoke with pt and his sister Yelitza and brother-in-law at bedside.  Introduced self and explained role.  CCP contact information provided.  Face sheet and pharmacy verified.  Pt has no history of HH or SNF.  He was  "independent at home.  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  She lives two hours away.  Provided the RtoR and SNF list for review.  Pt states that he would like to go to [facility] prior to returning home.  Family will review choices with pt and call CCP with choices.        Destination     No service coordination in this encounter.      Durable Medical Equipment     No service coordination in this encounter.      Dialysis/Infusion     No service coordination in this encounter.      Home Medical Care     No service coordination in this encounter.      Social Care     No service coordination in this encounter.                Demographic Summary     Row Name 04/16/18 8276       General Information    Admission Type inpatient    Arrived From home    Referral Source admission list    Reason for Consult discharge planning    Preferred Language English     Used During This Interaction no            Functional Status     Row Name 04/16/18 9291       Functional Status    Usual Activity Tolerance good    Current Activity Tolerance fair       Functional Status, IADL    Medications independent    Meal Preparation independent    Housekeeping independent    Laundry independent    Shopping independent       Mental Status    General Appearance WDL ex       Mental Status Summary    Recent Changes in Mental Status/Cognitive Functioning mental status    Mental Status Comments s/p arrest with improving encephalopathy            Psychosocial    No documentation.           Abuse/Neglect    No documentation.           Legal    No documentation.           Substance Abuse    No documentation.           Patient Forms    No documentation.         Maria Guadalupe Ray RN    "

## 2018-04-16 NOTE — PLAN OF CARE
Problem: Patient Care Overview  Goal: Plan of Care Review  Outcome: Ongoing (interventions implemented as appropriate)   04/16/18 0525   Coping/Psychosocial   Plan of Care Reviewed With patient   Plan of Care Review   Progress declining   OTHER   Outcome Summary pt very agitated most of the night-kicking and thrashing legs and pulling at restraints and trying to sit up out of bed, haldol/fent/geodon given with geodon being the most effective in calming pt, however; pt able to follow commands throughout the night and able to answer questions more appropriately although still a bit confused to time and place, started off the night on 2LNC and had to progress to optiflow @60% to maintain sats above 95% consistantly, restraints still present, VSS stable with heparin and levo gtt continued (levo titrated down to 0.08mcg), will continue to monitor        Problem: Fall Risk (Adult)  Goal: Identify Related Risk Factors and Signs and Symptoms  Outcome: Ongoing (interventions implemented as appropriate)   04/16/18 0525   Fall Risk (Adult)   Related Risk Factors (Fall Risk) confusion/agitation;environment unfamiliar;sleep pattern alteration   Signs and Symptoms (Fall Risk) presence of risk factors     Goal: Absence of Fall  Outcome: Ongoing (interventions implemented as appropriate)   04/16/18 0525   Fall Risk (Adult)   Absence of Fall making progress toward outcome       Problem: Skin Injury Risk (Adult)  Goal: Identify Related Risk Factors and Signs and Symptoms  Outcome: Ongoing (interventions implemented as appropriate)   04/16/18 0525   Skin Injury Risk (Adult)   Related Risk Factors (Skin Injury Risk) critical care admission;mechanical forces     Goal: Skin Health and Integrity  Outcome: Ongoing (interventions implemented as appropriate)   04/16/18 0525   Skin Injury Risk (Adult)   Skin Health and Integrity making progress toward outcome       Problem: Ventilation, Mechanical Invasive (Adult)  Goal: Signs and Symptoms of  Listed Potential Problems Will be Absent, Minimized or Managed (Ventilation, Mechanical Invasive)  Outcome: Outcome(s) achieved Date Met: 04/16/18 04/16/18 0525   Goal/Outcome Evaluation   Problems Assessed (Mechanical Ventilation, Invasive) all   Problems Present (Mech Vent, Invasive) none       Problem: Cardiac Catheterization (Diagnostic/Interventional) (Adult)  Goal: Signs and Symptoms of Listed Potential Problems Will be Absent, Minimized or Managed (Cardiac Catheterization)  Outcome: Ongoing (interventions implemented as appropriate)   04/16/18 0525   Goal/Outcome Evaluation   Problems Assessed (Cardiac Catheterization) all   Problems Present (Cardiac Cath) situational response       Problem: Pain, Acute (Adult)  Goal: Identify Related Risk Factors and Signs and Symptoms  Outcome: Ongoing (interventions implemented as appropriate)   04/16/18 0525   Pain, Acute (Adult)   Related Risk Factors (Acute Pain) positioning   Signs and Symptoms (Acute Pain) pacing/restlessness     Goal: Acceptable Pain Control/Comfort Level  Outcome: Ongoing (interventions implemented as appropriate)   04/16/18 0525   Pain, Acute (Adult)   Acceptable Pain Control/Comfort Level making progress toward outcome

## 2018-04-16 NOTE — PROGRESS NOTES
"Negro Hall  1955 62 y.o.  [id number]      Patient Care Team:  No Known Provider as PCP - General    CC: Anterior STEMI, cardiac arrest    Interval History: He is extubated answering questions appropriately no chest pain is thirsty      Objective   Vital Signs  Temp:  [98.1 °F (36.7 °C)-98.9 °F (37.2 °C)] 98.1 °F (36.7 °C)  Heart Rate:  [] 95  Resp:  [10-14] 14  BP: ()/(58-99) 121/84  Arterial Line BP: (115-136)/() 136/103    Intake/Output Summary (Last 24 hours) at 04/16/18 0948  Last data filed at 04/16/18 0558   Gross per 24 hour   Intake           999.41 ml   Output             2200 ml   Net         -1200.59 ml     Flowsheet Rows    Flowsheet Row First Filed Value   Admission Height 170.2 cm (67\") Documented at 04/12/2018 2153   Admission Weight 62.6 kg (138 lb) Documented at 04/12/2018 2153          Physical Exam:   General Appearance:    Alert,oriented, in no acute distress   Lungs:     Clear to auscultation,BS are equal    Heart:    Normal S1 and S2, RRR without murmur, gallop or rub   HEENT:    Sclera are clear, no JVD or adenopathy   Abdomen:     Normal bowel sounds, soft non-tender, non-distended, no HSM   Extremities:   Moves all extremities well, no edema, no cyanosis, no             Redness, no rash     Medication Review:        aspirin 81 mg Oral Daily   atorvastatin 40 mg Oral Nightly   clopidogrel 75 mg Oral Daily   famotidine 20 mg Nasogastric Daily   insulin aspart 0-24 Units Subcutaneous Q4H   pantoprazole 40 mg Intravenous Q12H   thiamine (VITAMIN B1) IVPB 100 mg Intravenous Daily   ziprasidone 10 mg Intramuscular Once       dextrose 125 mL/hr Last Rate: 125 mL/hr (04/16/18 0829)   heparin (porcine) 12 Units/kg/hr Last Rate: 16 Units/kg/hr (04/16/18 0926)   milrinone 0.25-0.75 mcg/kg/min    sodium chloride 9 mL/hr    sodium chloride 30 mL/hr Last Rate: 30 mL/hr (04/13/18 1142)         I reviewed the patient's new clinical results.  I personally viewed and interpreted the " patient's EKG/Telemetry data    Assessment/Plan  Active Hospital Problems (** Indicates Principal Problem)    Diagnosis Date Noted   • Cardiac arrest [I46.9] 04/13/2018      Resolved Hospital Problems    Diagnosis Date Noted Date Resolved   No resolved problems to display.       2 pretty well sodium is very high chest film looks that he has pulmonary edema though talked with the intensivists and nephrology were going to give him a little bit of fluid carefully I like to get him off the Levophed and actually put him on a little Primacor really a limited echo to see what his LV functions doing and apical thrombus    Lennox Max MD  04/16/18  9:48 AM

## 2018-04-16 NOTE — PAYOR COMM NOTE
"Shannan Hall (62 y.o. Male)                                                                   CASE REF#[id number]                  ATTENTION;   LOC [facility], CONTINUED STAY CLINICALS FOR YOUR REVIEW,                      REPLY TO UR DEPT, IRA FAROOQ N  OR UR  [phone]         Date of Birth Social Security Number Address Home Phone MRN    [id number]  [id number]   [facility] [address] [phone] [id number]    [place] Marital Status          None        Admission Date Admission Type Admitting Provider Attending Provider Department, Room/Bed    4/12/18 Emergency Kahlil Lake MD Jain, Subin, MD [facility] CORONARY CARE, 324/1    Discharge Date Discharge Disposition Discharge Destination                       Attending Provider:  Cb Hercules MD    Allergies:  Penicillins    Isolation:  None   Infection:  None   Code Status:  FULL    Ht:  170.2 cm (67.01\")   Wt:  66.9 kg (147 lb 7.8 oz)    Admission Cmt:  None   Principal Problem:  None                Active Insurance as of 4/12/2018     Primary Coverage     Payor Plan Insurance Group Employer/Plan Group    [employer] [place] [employer] [place]ICA[place]      Payor Plan Address Payor Plan Phone Number Effective From Effective To    [address] [address] [phone] 4/12/2018     [place] [address]       Subscriber Name Subscriber Birth Date Member ID       SHANNAN HALL 1955 [id number]                 Emergency Contacts      (Rel.) Home Phone Work Phone Mobile Phone    Tere Hall (Son) [phone] -- [phone]    Eron Vernon (Other) [phone] -- [phone]    Yelitza Olivas (Sister) -- -- [phone]            Lines, Drains & Airways    Active LDAs     Name:   Placement date:   Placement time:   Site:   Days:    CVC Quadruple Lumen 04/13/18 Right Subclavian  04/13/18    0450    Subclavian    3    Peripheral IV 04/12/18 2328 Right Antecubital  04/12/18 2328    Antecubital    3    Peripheral IV " "04/13/18 0112 Left Forearm  04/13/18    0112    Forearm    3    Urethral Catheter Temperature probe  04/13/18          3                Hospital Medications (active)       Dose Frequency Start End    acetaminophen (TYLENOL) tablet 650 mg 650 mg Every 4 Hours PRN 4/13/2018     Sig - Route: Take 2 tablets by mouth Every 4 (Four) Hours As Needed for Mild Pain  or Fever (temperature greater than 101F). - Oral    aspirin EC tablet 81 mg 81 mg Daily 4/14/2018     Sig - Route: Take 1 tablet by mouth Daily. - Oral    atorvastatin (LIPITOR) tablet 40 mg 40 mg Nightly 4/13/2018     Sig - Route: Take 2 tablets by mouth Every Night. - Oral    cefepime (MAXIPIME) 2 g/100 mL 0.9% NS (mbp) 2 g Once 4/16/2018     Sig - Route: Infuse 100 mL into a venous catheter 1 (One) Time. - Intravenous    cefepime (MAXIPIME) 2 g/100 mL 0.9% NS (mbp) 2 g Every 12 Hours 4/17/2018 4/23/2018    Sig - Route: Infuse 100 mL into a venous catheter Every 12 (Twelve) Hours. - Intravenous    clopidogrel (PLAVIX) tablet 75 mg 75 mg Daily 4/14/2018     Sig - Route: Take 1 tablet by mouth Daily. - Oral    Linked Group 1:  \"And\" Linked Group Details        dextrose (D50W) solution 25 g 25 g Every 15 Minutes PRN 4/14/2018     Sig - Route: Infuse 50 mL into a venous catheter Every 15 (Fifteen) Minutes As Needed for Low Blood Sugar (Blood Sugar Less Than 70). - Intravenous    dextrose (D50W) solution 25-50 mL 25-50 mL Every 30 Minutes PRN 4/13/2018     Sig - Route: Infuse 25-50 mL into a venous catheter Every 30 (Thirty) Minutes As Needed for Low Blood Sugar (Blood Glucose <70 mg/dL; Per Insulin Infusion Protocol). - Intravenous    dextrose (D5W) 5 % infusion 125 mL/hr Continuous 4/16/2018 4/17/2018    Sig - Route: Infuse 125 mL/hr into a venous catheter Continuous. - Intravenous    dextrose (GLUTOSE) oral gel 15 g 15 g Every 15 Minutes PRN 4/14/2018     Sig - Route: Take 15 g by mouth Every 15 (Fifteen) Minutes As Needed for Low Blood Sugar (Blood sugar less " than 70). - Oral    famotidine (PEPCID) tablet 20 mg 20 mg Daily 4/14/2018     Sig - Route: 1 tablet by Nasogastric route Daily. - Nasogastric    fentaNYL citrate (PF) (SUBLIMAZE) injection 50 mcg 50 mcg Every 2 Hours PRN 4/14/2018 4/24/2018    Sig - Route: Infuse 1 mL into a venous catheter Every 2 (Two) Hours As Needed (moderate to severe pain). - Intravenous    glucagon (human recombinant) (GLUCAGEN DIAGNOSTIC) injection 1 mg 1 mg As Needed 4/14/2018     Sig - Route: Inject 1 mg under the skin As Needed (Blood Glucose Less Than 70). - Subcutaneous    haloperidol lactate (HALDOL) injection 2 mg 2 mg Every 2 Hours PRN 4/14/2018     Sig - Route: Infuse 0.4 mL into a venous catheter Every 2 (Two) Hours As Needed for Agitation. - Intravenous    heparin (porcine) 5000 UNIT/ML injection 1,900-3,800 Units 30-60 Units/kg × 62.6 kg Every 6 Hours PRN 4/13/2018     Sig - Route: Infuse 0.38-0.76 mL into a venous catheter Every 6 (Six) Hours As Needed (Per Heparin Nomogram). - Intravenous    heparin 49178 units/250 mL (100 units/mL) in 0.45 % NaCl infusion 12 Units/kg/hr × 62.6 kg Titrated 4/13/2018     Sig - Route: Infuse 751 Units/hr into a venous catheter Dose Adjusted By Provider As Needed. - Intravenous    HYDROcodone-acetaminophen (NORCO) 5-325 MG per tablet 1 tablet 1 tablet Every 4 Hours PRN 4/13/2018 4/23/2018    Sig - Route: Take 1 tablet by mouth Every 4 (Four) Hours As Needed for Moderate Pain . - Oral    insulin aspart (novoLOG) injection 0-24 Units 0-24 Units Every 4 Hours Scheduled 4/14/2018     Sig - Route: Inject 0-24 Units under the skin Every 4 (Four) Hours. - Subcutaneous    ipratropium-albuterol (DUO-NEB) nebulizer solution 3 mL 3 mL Every 4 Hours - RT 4/16/2018     Sig - Route: Take 3 mL by nebulization Every 4 (Four) Hours. - Nebulization    ipratropium-albuterol (DUO-NEB) nebulizer solution 3 mL 3 mL Every 2 Hours PRN 4/16/2018     Sig - Route: Take 3 mL by nebulization Every 2 (Two) Hours As Needed for  "Shortness of Air. - Nebulization    magnesium hydroxide (MILK OF MAGNESIA) suspension 2400 mg/10mL 10 mL 10 mL Daily PRN 4/13/2018     Sig - Route: Take 10 mL by mouth Daily As Needed for Constipation. - Oral    methylPREDNISolone sodium succinate (SOLU-Medrol) injection 125 mg 125 mg Once 4/16/2018     Sig - Route: Infuse 2 mL into a venous catheter 1 (One) Time. - Intravenous    milrinone 20 mg/100 mL (0.2 mg/mL) in 5 % dextrose infusion 0.25-0.75 mcg/kg/min × 66.9 kg Titrated 4/16/2018     Sig - Route: Infuse 16.725-50.175 mcg/min into a venous catheter Dose Adjusted By Provider As Needed. - Intravenous    ondansetron (ZOFRAN) injection 4 mg 4 mg Every 6 Hours PRN 4/13/2018     Sig - Route: Infuse 2 mL into a venous catheter Every 6 (Six) Hours As Needed for Nausea or Vomiting. - Intravenous    Linked Group 2:  \"Or\" Linked Group Details        ondansetron (ZOFRAN) tablet 4 mg 4 mg Every 6 Hours PRN 4/13/2018     Sig - Route: Take 1 tablet by mouth Every 6 (Six) Hours As Needed for Nausea or Vomiting. - Oral    Linked Group 2:  \"Or\" Linked Group Details        ondansetron ODT (ZOFRAN-ODT) disintegrating tablet 4 mg 4 mg Every 6 Hours PRN 4/13/2018     Sig - Route: Take 1 tablet by mouth Every 6 (Six) Hours As Needed for Nausea or Vomiting. - Oral    Linked Group 2:  \"Or\" Linked Group Details        pantoprazole (PROTONIX) injection 40 mg 40 mg Every 12 Hours Scheduled 4/15/2018     Sig - Route: Infuse 10 mL into a venous catheter Every 12 (Twelve) Hours. - Intravenous    potassium chloride (KLOR-CON) packet 40 mEq 40 mEq As Needed 4/16/2018     Sig - Route: Take 40 mEq by mouth As Needed (potassium replacement, see admin instructions). - Oral    Linked Group 3:  \"Or\" Linked Group Details        potassium chloride (MICRO-K) CR capsule 40 mEq 40 mEq As Needed 4/16/2018     Sig - Route: Take 4 capsules by mouth As Needed (potassium replacement.  see admin instructions). - Oral    Linked Group 3:  \"Or\" Linked Group " "Details        potassium chloride 10 mEq in 100 mL IVPB 10 mEq Every 1 Hour PRN 4/16/2018     Sig - Route: Infuse 100 mL into a venous catheter Every 1 (One) Hour As Needed (potassium protocol PERIPHERAL - see admin instructions). - Intravenous    Linked Group 3:  \"Or\" Linked Group Details        predniSONE (DELTASONE) tablet 40 mg 40 mg Daily With Breakfast 4/17/2018     Sig - Route: Take 2 tablets by mouth Daily With Breakfast. - Oral    sodium chloride 0.9 % flush 10 mL 10 mL As Needed 4/12/2018     Sig - Route: Infuse 10 mL into a venous catheter As Needed for Line Care. - Intravenous    sodium chloride 0.9 % infusion 9 mL/hr Continuous 4/13/2018     Sig - Route: Infuse 9 mL/hr into a venous catheter Continuous. - Intravenous    Cosign for Ordering: Accepted by Fide Alex MD on 4/13/2018  8:35 AM    sodium chloride 0.9 % infusion 30 mL/hr Continuous 4/13/2018     Sig - Route: Infuse 30 mL/hr into a venous catheter Continuous. - Intravenous    ziprasidone (GEODON) injection 5 mg 5 mg Once 4/15/2018 4/15/2018    Sig - Route: Inject 5 mg into the shoulder, thigh, or buttocks 1 (One) Time. - Intramuscular    dexmedetomidine (PRECEDEX) 400 mcg/100 mL (4 mcg/mL) infusion (Discontinued) 0.2-1.5 mcg/kg/hr × 66.9 kg Titrated 4/15/2018 4/16/2018    Sig - Route: Infuse 13..35 mcg/hr into a venous catheter Dose Adjusted By Provider As Needed. - Intravenous    norepinephrine (LEVOPHED) 8 mg/250 mL (32 mcg/mL) in sodium chloride 0.9% infusion (premix) (Discontinued) 0.02-0.3 mcg/kg/min × 62.6 kg Titrated 4/13/2018 4/16/2018    Sig - Route: Infuse 1.252-18.78 mcg/min into a venous catheter Dose Adjusted By Provider As Needed. - Intravenous    phenylephrine (SOLO-SYNEPHRINE) 50 mg/250 mL (0.2 mg/mL) in 0.9% NS  infusion (Discontinued) 0.5-3 mcg/kg/min × 62.6 kg Titrated 4/13/2018 4/16/2018    Sig - Route: Infuse 31.3-187.8 mcg/min into a venous catheter Dose Adjusted By Provider As Needed. - Intravenous    propofol " (DIPRIVAN) infusion 10 mg/mL 100 mL (Discontinued) 5-50 mcg/kg/min × 62.6 kg Titrated 2018    Sig - Route: Infuse 313-3,130 mcg/min into a venous catheter Dose Adjusted By Provider As Needed. - Intravenous    thiamine (B-1) 100 mg in sodium chloride 0.9 % 100 mL IVPB (Discontinued) 100 mg Daily 2018    Sig - Route: Infuse 100 mg into a venous catheter Daily. - Intravenous    vasopressin (PITRESSIN) 20 Units in sodium chloride 0.9 % 100 mL (0.2 Units/mL) infusion (Discontinued) 0.03 Units/min Titrated 2018    Sig - Route: Infuse 0.03 Units/min into a venous catheter Dose Adjusted By Provider As Needed. - Intravenous    ziprasidone (GEODON) injection 10 mg (Discontinued) 10 mg Once 4/15/2018 2018    Sig - Route: Inject 10 mg into the shoulder, thigh, or buttocks 1 (One) Time. - Intramuscular    ziprasidone (GEODON) injection 5 mg (Discontinued) 5 mg Every 6 Hours PRN 4/15/2018 2018    Sig - Route: Inject 5 mg into the shoulder, thigh, or buttocks Every 6 (Six) Hours As Needed for Agitation. - Intramuscular             Physician Progress Notes (last 24 hours) (Notes from 4/15/2018 11:03 AM through 2018 11:03 AM)      Cb Hercules MD at 2018 10:30 AM          [facility]  Phone: [phone]  Cb Hercules MD    Subjective:  LOS: 3    Awake and oriented (mild confusion). Denies pain. + cough. ++ smoking hx.    Objective   Vital Signs past 24hrs    Temp range: Temp (24hrs), Av.4 °F (36.9 °C), Min:98.1 °F (36.7 °C), Max:98.9 °F (37.2 °C)    BP range: BP: ()/(58-99) 121/84  Pulse range: Heart Rate:  [] 95  Resp rate range: Resp:  [10-14] 14    Device (Oxygen Therapy): heated;high-flow nasal cannulaFlow (L/min):  [4-60] 60  Oxygen range:SpO2:  [82 %-100 %] 92 %      66.9 kg (147 lb 7.8 oz); Body mass index is 23.09 kg/m².    Intake/Output Summary (Last 24 hours) at 18 1031  Last data filed at 18 0558   Gross per 24 hour    Intake           999.41 ml   Output             2200 ml   Net         -1200.59 ml       Physical Exam   Constitutional: He appears well-developed.   HENT:   Head: Normocephalic.   Eyes:   Briskly responsive pupils   Cardiovascular: Normal rate and regular rhythm.    No murmur heard.  Pulmonary/Chest: Effort normal. He has wheezes (mild). He has rales (mild bilateral diffuse).   Abdominal: Soft. Bowel sounds are normal. There is no tenderness.   Musculoskeletal: He exhibits no edema.   Neurological: He is alert.   Moves purposefully   Skin: Skin is warm.     Results Review:    I have reviewed the laboratory and imaging data since the last note by [facility] physician.  My annotations are noted in assessment and plan.    Medication Review:  I have reviewed the current MAR.  My annotations are noted in assessment and plan.      dextrose 125 mL/hr Last Rate: 125 mL/hr (04/16/18 4287)   heparin (porcine) 12 Units/kg/hr Last Rate: 16 Units/kg/hr (04/16/18 6126)   milrinone 0.25-0.75 mcg/kg/min    sodium chloride 9 mL/hr    sodium chloride 30 mL/hr Last Rate: 30 mL/hr (04/13/18 1142)     Plan   PCCM Problems  Resuscitated cardiac arrest  Acute respiratory failure, vent liberated 4/15, optiflow  Cardiogenic shock  Anoxic encephalopathy  STEMI  LV thrombus  Shock liver  ABENA  ? Pneumonia - GNB  COPD exacerbation  Relevant Medical Diagnoses  Current smoker    Plan of Treatment  Tolerated weaning and extubation.  However requiring high flow oxygen.    Pulmonary infiltrates.  Sputum growing gram-negative bacilli.  Unsure if significant as no fever.  Will treat with cefepime for now.    Wheezing noted on exam.  Likely COPD exacerbation.  Treat with steroids and nebs.  Advised to quit smoking.    Hemodynamics improved.  On Levophed this morning which has been weaned off.  Currently on IV fluids.  Plan is to start milrinone.  Limited echo as per cardiology.    Heparin for LV thrombus.  Continue same.    Shock liver improving.    Acute  "kidney injury improving.  Discussed with renal and cardiology.    Not a drinker - stop thiamine.    Discussed with family at bedside.    I spent +35 mins critical care time in care of this patient outside of any procedures.     Cb Hercules MD  04/16/18  10:31 AM    Part of this note may be an electronic transcription/translation of spoken language to printed text using the Dragon Dictation System.      Electronically signed by Cb Hercules MD at 4/16/2018 10:42 AM     Lennox Max MD at 4/16/2018  9:48 AM          Negro Hall  1955 62 y.o.  [id number]      Patient Care Team:  No Known Provider as PCP - General    CC: Anterior STEMI, cardiac arrest    Interval History: He is extubated answering questions appropriately no chest pain is thirsty      Objective   Vital Signs  Temp:  [98.1 °F (36.7 °C)-98.9 °F (37.2 °C)] 98.1 °F (36.7 °C)  Heart Rate:  [] 95  Resp:  [10-14] 14  BP: ()/(58-99) 121/84  Arterial Line BP: (115-136)/() 136/103    Intake/Output Summary (Last 24 hours) at 04/16/18 0948  Last data filed at 04/16/18 0558   Gross per 24 hour   Intake           999.41 ml   Output             2200 ml   Net         -1200.59 ml     Flowsheet Rows    Flowsheet Row First Filed Value   Admission Height 170.2 cm (67\") Documented at 04/12/2018 2153   Admission Weight 62.6 kg (138 lb) Documented at 04/12/2018 2153          Physical Exam:   General Appearance:    Alert,oriented, in no acute distress   Lungs:     Clear to auscultation,BS are equal    Heart:    Normal S1 and S2, RRR without murmur, gallop or rub   HEENT:    Sclera are clear, no JVD or adenopathy   Abdomen:     Normal bowel sounds, soft non-tender, non-distended, no HSM   Extremities:   Moves all extremities well, no edema, no cyanosis, no             Redness, no rash     Medication Review:        aspirin 81 mg Oral Daily   atorvastatin 40 mg Oral Nightly   clopidogrel 75 mg Oral Daily   famotidine 20 mg Nasogastric Daily   insulin " aspart 0-24 Units Subcutaneous Q4H   pantoprazole 40 mg Intravenous Q12H   thiamine (VITAMIN B1) IVPB 100 mg Intravenous Daily   ziprasidone 10 mg Intramuscular Once       dextrose 125 mL/hr Last Rate: 125 mL/hr (04/16/18 0829)   heparin (porcine) 12 Units/kg/hr Last Rate: 16 Units/kg/hr (04/16/18 0926)   milrinone 0.25-0.75 mcg/kg/min    sodium chloride 9 mL/hr    sodium chloride 30 mL/hr Last Rate: 30 mL/hr (04/13/18 1142)         I reviewed the patient's new clinical results.  I personally viewed and interpreted the patient's EKG/Telemetry data    Assessment/Plan  Active Hospital Problems (** Indicates Principal Problem)    Diagnosis Date Noted   • Cardiac arrest [I46.9] 04/13/2018      Resolved Hospital Problems    Diagnosis Date Noted Date Resolved   No resolved problems to display.       2 pretty well sodium is very high chest film looks that he has pulmonary edema though talked with the intensivists and nephrology were going to give him a little bit of fluid carefully I like to get him off the Levophed and actually put him on a little Primacor really a limited echo to see what his LV functions doing and apical thrombus    Lennox Max MD  04/16/18  9:48 AM              Electronically signed by Lennox Max MD at 4/16/2018  9:51 AM     Meng Mann MD at 4/16/2018  7:39 AM             LOS: 3 days    Patient Care Team:  No Known Provider as PCP - General    Chief Complaint:    Chief Complaint   Patient presents with   • Arm Pain       Subjective  Follow UP ABENA    Interval History:   The patient was extubated yesterday, he is awake, following simple commands, according to the nursing staff been quite confused.  At present he said he is feeling better, complaining of thirst, denies any chest pain or shortness of air, no nausea or vomiting.      Review of Systems:   Not obtainable    Objective     Vital Signs  Temp:  [98.1 °F (36.7 °C)-98.9 °F (37.2 °C)] 98.1 °F (36.7 °C)  Heart Rate:  []  "96  Resp:  [10] 10  BP: ()/(58-87) 123/80  Arterial Line BP: ()/() 136/103    Flowsheet Rows    Flowsheet Row First Filed Value   Admission Height 170.2 cm (67\") Documented at 04/12/2018 2153   Admission Weight 62.6 kg (138 lb) Documented at 04/12/2018 2153          No intake/output data recorded.  I/O last 3 completed shifts:  In: 1496.4 [I.V.:1446.4; IV Piggyback:50]  Out: 2700 [Urine:2700]    Intake/Output Summary (Last 24 hours) at 04/16/18 0739  Last data filed at 04/16/18 0558   Gross per 24 hour   Intake           999.41 ml   Output             2200 ml   Net         -1200.59 ml       Physical Exam:  General Appearance: Awake but the slightly confused, appears very frail, no acute distress,  Skin: warm and dry  HEENT: Nonicteric sclerae, oral mucosa is very dry,   Neck: No JVD, trachea midline  Lungs: Lateral rhonchi, unlabored breathing effort  Heart: RRR, normal S1 and S2, no S3, no rub  Abdomen: soft, non-tender,  present bowel sounds to auscultation  : no palpable bladder,  Way catheter is anchored in place  Extremities: no edema, cyanosis or clubbing  Neuro: His speech is weak, he is awake but the disoriented      Results Review:      Results from last 7 days  Lab Units 04/16/18  0406 04/15/18  0454 04/14/18  2329   SODIUM mmol/L 153* 146* 144   POTASSIUM mmol/L 3.3* 3.6 3.9   CHLORIDE mmol/L 116* 111* 109*   CO2 mmol/L 20.8* 17.2* 17.9*   BUN mg/dL 37* 62* 61*   CREATININE mg/dL 1.34* 2.50* 2.95*   CALCIUM mg/dL 7.7* 7.4* 7.2*   BILIRUBIN mg/dL 0.9 0.6 0.6   ALK PHOS U/L 82 76 73   ALT (SGPT) U/L 2,648* 4,166* 4,619*   AST (SGOT) U/L 1,101* 3,002* 4,319*   GLUCOSE mg/dL 86 121* 128*       Estimated Creatinine Clearance: 52.7 mL/min (by C-G formula based on SCr of 1.34 mg/dL (H)).      Results from last 7 days  Lab Units 04/16/18  0406 04/15/18  0454 04/14/18  2329   MAGNESIUM mg/dL 2.3 2.5* 2.3   PHOSPHORUS mg/dL 1.9* 3.6 5.0*         Results from last 7 days  Lab Units " 04/16/18  0406 04/14/18  2329   URIC ACID mg/dL 7.4* 11.4*         Results from last 7 days  Lab Units 04/16/18  0406 04/15/18  0454 04/14/18  2329 04/14/18  1751 04/14/18  1139   WBC 10*3/mm3 15.77* 21.06* 20.17* 22.19* 24.23*   HEMOGLOBIN g/dL 12.6* 13.1* 13.2* 13.8 14.0   PLATELETS 10*3/mm3 97* 147 130* 142 141         Results from last 7 days  Lab Units 04/13/18  0532   INR  1.29*         Imaging Results (last 24 hours)     Procedure Component Value Units Date/Time    XR Chest 1 View [[id number]] Collected:  04/15/18 0719     Updated:  04/16/18 0554    Narrative:       PORTABLE CHEST X-RAY     CLINICAL HISTORY: Followup congestive heart failure; I46.9-Cardiac  arrest, cause unspecified; I21.02-ST elevation (STEMI) myocardial  infarction involving left anterior descending coronary artery.     COMPARISON: 04/14/2018     FINDINGS: Portable AP view of the chest was obtained with overlying  monitor leads in place. Life support lines are unchanged. No  pneumothorax. Lungs well-inflated and clear. No edema or pleural fluid.  Normal heart size.           Impression:       Stable appearance of the chest by portable radiography.        This report was finalized on 4/16/2018 5:51 AM by Emigdio Gomez MD.       XR Chest 1 View [[id number]] Collected:  04/16/18 0335     Updated:  04/16/18 0339    Narrative:       PORTABLE CHEST X-RAY     CLINICAL HISTORY: increase in O2; I46.9-Cardiac arrest, cause  unspecified; I21.02-ST elevation (STEMI) myocardial infarction involving  left anterior descending coronary artery     COMPARISON: April 15, 2018.     FINDINGS: Portable AP view of the chest was obtained with overlying  monitor leads in place. ET tube and NG tube have been removed. Central  line remains. No pneumothorax. There are new perihilar predominant  opacities bilaterally most consistent with pulmonary edema. No  significant pleural fluid. Normal heart size.             Impression:       Interval extubation with developing  opacities most  consistent with acute pulmonary edema        This report was finalized on 4/16/2018 3:36 AM by Emigdio Gomez MD.             aspirin 81 mg Oral Daily   atorvastatin 40 mg Oral Nightly   clopidogrel 75 mg Oral Daily   famotidine 20 mg Nasogastric Daily   insulin aspart 0-24 Units Subcutaneous Q4H   pantoprazole 40 mg Intravenous Q12H   thiamine (VITAMIN B1) IVPB 100 mg Intravenous Daily   ziprasidone 10 mg Intramuscular Once       dexmedetomidine 0.2-1.5 mcg/kg/hr Last Rate: Stopped (04/15/18 1617)   heparin (porcine) 12 Units/kg/hr Last Rate: 16 Units/kg/hr (04/15/18 2226)   norepinephrine 0.02-0.3 mcg/kg/min Last Rate: 0.06 mcg/kg/min (04/16/18 0604)   phenylephrine 0.5-3 mcg/kg/min Last Rate: Stopped (04/14/18 0326)   propofol 5-50 mcg/kg/min Last Rate: 15 mcg/kg/min (04/15/18 0438)   sodium chloride 9 mL/hr    sodium chloride 30 mL/hr Last Rate: 30 mL/hr (04/13/18 1142)   vasopressin 0.03 Units/min Last Rate: Stopped (04/14/18 0621)       Medication Review:   Current Facility-Administered Medications   Medication Dose Route Frequency Provider Last Rate Last Dose   • acetaminophen (TYLENOL) tablet 650 mg  650 mg Oral Q4H PRN Lennox Max MD       • aspirin EC tablet 81 mg  81 mg Oral Daily [place] Cobian MD   81 mg at 04/15/18 0811   • atorvastatin (LIPITOR) tablet 40 mg  40 mg Oral Nightly Lennox Max MD   40 mg at 04/14/18 2048   • clopidogrel (PLAVIX) tablet 75 mg  75 mg Oral Daily Lennox Max MD   75 mg at 04/15/18 0811   • dexmedetomidine (PRECEDEX) 400 mcg/100 mL (4 mcg/mL) infusion  0.2-1.5 mcg/kg/hr Intravenous Titrated Cb Hercules MD   Stopped at 04/15/18 1617   • dextrose (D50W) solution 25 g  25 g Intravenous Q15 Min PRN Cb Hercules MD       • dextrose (D50W) solution 25-50 mL  25-50 mL Intravenous Q30 Min PRN Fide Alex MD   25 mL at 04/13/18 2040   • dextrose (GLUTOSE) oral gel 15 g  15 g Oral Q15 Min PRN Cb Hercules MD       • famotidine (PEPCID) tablet 20 mg   20 mg Nasogastric Daily Fide Alex MD   20 mg at 04/14/18 1005   • fentaNYL citrate (PF) (SUBLIMAZE) injection 50 mcg  50 mcg Intravenous Q2H PRN Cb Hercules MD   50 mcg at 04/15/18 2334   • glucagon (human recombinant) (GLUCAGEN DIAGNOSTIC) injection 1 mg  1 mg Subcutaneous PRN Cb Hercules MD       • haloperidol lactate (HALDOL) injection 2 mg  2 mg Intravenous Q2H PRN Cb Hercules MD   2 mg at 04/16/18 0058   • heparin (porcine) 5000 UNIT/ML injection 1,900-3,800 Units  30-60 Units/kg Intravenous Q6H PRN Lennox Max MD   3,800 Units at 04/15/18 2224   • heparin 11555 units/250 mL (100 units/mL) in 0.45 % NaCl infusion  12 Units/kg/hr Intravenous Titrated Lennox Max MD 10.01 mL/hr at 04/15/18 2226 16 Units/kg/hr at 04/15/18 2226   • HYDROcodone-acetaminophen (NORCO) 5-325 MG per tablet 1 tablet  1 tablet Oral Q4H PRN Lennox Max MD       • insulin aspart (novoLOG) injection 0-24 Units  0-24 Units Subcutaneous Q4H Cb Hercules MD       • magnesium hydroxide (MILK OF MAGNESIA) suspension 2400 mg/10mL 10 mL  10 mL Oral Daily PRN Lennox Max MD       • norepinephrine (LEVOPHED) 8 mg/250 mL (32 mcg/mL) in sodium chloride 0.9% infusion (premix)  0.02-0.3 mcg/kg/min Intravenous Titrated Kahlil Lake MD 7.04 mL/hr at 04/16/18 0604 0.06 mcg/kg/min at 04/16/18 0604   • ondansetron (ZOFRAN) tablet 4 mg  4 mg Oral Q6H PRN Lennox Max MD        Or   • ondansetron ODT (ZOFRAN-ODT) disintegrating tablet 4 mg  4 mg Oral Q6H PRN Lennox Max MD        Or   • ondansetron (ZOFRAN) injection 4 mg  4 mg Intravenous Q6H PRN Lennox Max MD   4 mg at 04/13/18 1421   • pantoprazole (PROTONIX) injection 40 mg  40 mg Intravenous Q12H Cb Hercules MD   40 mg at 04/15/18 2204   • phenylephrine (SOLO-SYNEPHRINE) 50 mg/250 mL (0.2 mg/mL) in 0.9% NS  infusion  0.5-3 mcg/kg/min Intravenous Titrated Fide Alex MD   Stopped at 04/14/18 0326   • propofol (DIPRIVAN) infusion 10 mg/mL 100 mL  5-50 mcg/kg/min Intravenous  Titrated Kahlil Lake MD 5.63 mL/hr at 04/15/18 0438 15 mcg/kg/min at 04/15/18 0438   • sodium chloride 0.9 % flush 10 mL  10 mL Intravenous PRN Isidro Albright MD       • sodium chloride 0.9 % infusion  9 mL/hr Intravenous Continuous Fide Alex MD       • sodium chloride 0.9 % infusion  30 mL/hr Intravenous Continuous Fide Alex MD 30 mL/hr at 04/13/18 1142 30 mL/hr at 04/13/18 1142   • thiamine (B-1) 100 mg in sodium chloride 0.9 % 100 mL IVPB  100 mg Intravenous Daily Wily Stewart MD   Stopped at 04/15/18 2333   • vasopressin (PITRESSIN) 20 Units in sodium chloride 0.9 % 100 mL (0.2 Units/mL) infusion  0.03 Units/min Intravenous Titrated Kahlil Lake MD   Stopped at 04/14/18 0621   • ziprasidone (GEODON) injection 10 mg  10 mg Intramuscular Once Cb Hercules MD       • ziprasidone (GEODON) injection 5 mg  5 mg Intramuscular Q6H PRN Cb Hercules MD   5 mg at 04/16/18 0248       Assessment/Plan   1.  Acute kidney injury associated with the cardiac arrest,  hypotension and cardiogenic shock.  Creatinine is improved and down to 1.34  2.    metabolic acidosis is improving  3.  In-hospital cardiac arrest, with anterior lateral ST MI, and cardiogenic shock.    4.  Left ventricular apical thrombus   5.  Ischemic cardiomyopathy   6.  Shock liver, improving   7.  Respiratory failure currently on the ventilator   8.  Leukocytosis , improved this down to 15.77  9.  Hyperuricemia, associated with acute kidney injury, uric acid is down to 7.4  10.  Hypernatremia, sodium is 153 with estimated free water deficit about 4 L  11. Hypokalemia, K 3.3      Plan:  1.  Will add the D5W to run at 125 cc per hour ×24 hours, we'll reassess tomorrow  2. Potassium will repleted by the protocol  2.  Surveillance labs          Meng Mann MD  04/16/18  7:39 AM      Electronically signed by Meng Mann MD at 4/16/2018  7:44 AM     Wily Stewart MD at 4/15/2018  3:13 PM            Patient Identification:  NAME:  Negro  Hall  Age:  62 y.o.   Sex:  male   :  1955   MRN:  [id number]       Chief complaint: An anoxic encephalopathy    History of present illness: He is been extubated he is more alert but is still pretty lethargic no seizure activity he gets agitated and moves all extremities      Past medical history:  Past Medical History:   Diagnosis Date   • Hypertension        Allergies:  Penicillins    Home medications:  No prescriptions prior to admission.        Hospital medications:    aspirin 81 mg Oral Daily   atorvastatin 40 mg Oral Nightly   clopidogrel 75 mg Oral Daily   famotidine 20 mg Nasogastric Daily   insulin aspart 0-24 Units Subcutaneous Q4H   pantoprazole 40 mg Intravenous Q12H   thiamine (VITAMIN B1) IVPB 100 mg Intravenous Daily   ziprasidone 10 mg Intramuscular Once       dexmedetomidine 0.2-1.5 mcg/kg/hr Last Rate: 1 mcg/kg/hr (04/15/18 1234)   heparin (porcine) 12 Units/kg/hr Last Rate: 13 Units/kg/hr (04/15/18 1426)   norepinephrine 0.02-0.3 mcg/kg/min Last Rate: 0.12 mcg/kg/min (04/15/18 1159)   phenylephrine 0.5-3 mcg/kg/min Last Rate: Stopped (18 0326)   propofol 5-50 mcg/kg/min Last Rate: 15 mcg/kg/min (04/15/18 0438)   sodium chloride 9 mL/hr    sodium chloride 30 mL/hr Last Rate: 30 mL/hr (18 1142)   vasopressin 0.03 Units/min Last Rate: Stopped (18 0621)     •  acetaminophen  •  dextrose  •  dextrose  •  dextrose  •  fentaNYL citrate (PF)  •  glucagon (human recombinant)  •  haloperidol lactate  •  heparin (porcine)  •  HYDROcodone-acetaminophen  •  magnesium hydroxide  •  ondansetron **OR** ondansetron ODT **OR** ondansetron  •  sodium chloride      Objective:  Vitals Ranges:   Heart Rate:  [] 100  Resp:  [10-13] 10  BP: ()/(49-88) 112/78  Arterial Line BP: ()/(44-87) 115/65  FiO2 (%):  [24 %] 24 %      Physical Exam:  Sedated lethargic looks at me does not blink to threat does not follow commands moves all 4 extremities reflexes trace throughout  symmetrical toes are now downgoing bilaterally    Results review:   I reviewed the patient's new clinical results.    Data review:  Lab Results (last 24 hours)     Procedure Component Value Units Date/Time    aPTT [[id number]]  (Abnormal) Collected:  04/15/18 1313    Specimen:  Blood Updated:  04/15/18 1337     PTT 53.9 (H) seconds     Blood Gas, Arterial [[id number]]  (Abnormal) Collected:  04/15/18 1101    Specimen:  Arterial Blood Updated:  04/15/18 1104     Site Arterial Line     Hermes's Test N/A     pH, Arterial 7.490 (H) pH units      pCO2, Arterial 26.5 (L) mm Hg      pO2, Arterial 96.2 mm Hg      HCO3, Arterial 20.2 (L) mmol/L      Base Excess, Arterial -1.9 (L) mmol/L      O2 Saturation Calculated 98.2 %      A-a Gradiant 0.7 mmHg      Barometric Pressure for Blood Gas 744.7 mmHg      Modality Adult Vent     FIO2 24 %      Ventilator Mode PS     Set Tidal Volume 650     Rate 13 Breaths/minute      PEEP 5     PSV 8 cmH2O     Narrative:       sat 100 Meter: [id number] : [id number] Taya Sharif    POC Glucose Once [[id number]]  (Normal) Collected:  04/15/18 0715    Specimen:  Blood Updated:  04/15/18 0722     Glucose 95 mg/dL     Narrative:       Meter: [id number] : [id number] Elijah MURILLO    Comprehensive Metabolic Panel [[id number]]  (Abnormal) Collected:  04/15/18 0454    Specimen:  Blood Updated:  04/15/18 0704     Glucose 121 (H) mg/dL      BUN 62 (H) mg/dL      Creatinine 2.50 (H) mg/dL      Sodium 146 (H) mmol/L      Potassium 3.6 mmol/L      Chloride 111 (H) mmol/L      CO2 17.2 (L) mmol/L      Calcium 7.4 (L) mg/dL      Total Protein 5.5 (L) g/dL      Albumin 2.90 (L) g/dL      ALT (SGPT) 4,166 (H) U/L      AST (SGOT) 3,002 (H) U/L      Alkaline Phosphatase 76 U/L      Total Bilirubin 0.6 mg/dL      eGFR Non African Amer 26 (L) mL/min/1.73      Globulin 2.6 gm/dL      A/G Ratio 1.1 g/dL      BUN/Creatinine Ratio 24.8     Anion Gap 17.8 mmol/L     Phosphorus [[id number]]  (Normal)  Collected:  04/15/18 0454    Specimen:  Blood Updated:  04/15/18 0652     Phosphorus 3.6 mg/dL     Magnesium [[id number]]  (Abnormal) Collected:  04/15/18 0454    Specimen:  Blood Updated:  04/15/18 0648     Magnesium 2.5 (H) mg/dL     CBC & Differential [[id number]] Collected:  04/15/18 0454    Specimen:  Blood Updated:  04/15/18 0611    Narrative:       The following orders were created for panel order CBC & Differential.  Procedure                               Abnormality         Status                     ---------                               -----------         ------                     Scan Slide[[id number]]                                                                  CBC Auto Differential[[id number]]        Abnormal            Final result                 Please view results for these tests on the individual orders.    CBC Auto Differential [[id number]]  (Abnormal) Collected:  04/15/18 0454    Specimen:  Blood Updated:  04/15/18 0611     WBC 21.06 (H) 10*3/mm3      RBC 4.25 (L) 10*6/mm3      Hemoglobin 13.1 (L) g/dL      Hematocrit 40.9 %      MCV 96.2 fL      MCH 30.8 pg      MCHC 32.0 (L) g/dL      RDW 13.9 %      RDW-SD 49.4 fl      MPV 12.1 (H) fL      Platelets 147 10*3/mm3      Neutrophil % 88.7 (H) %      Lymphocyte % 7.4 (L) %      Monocyte % 2.9 (L) %      Eosinophil % 0.0 (L) %      Basophil % 0.2 %      Immature Grans % 0.8 (H) %      Neutrophils, Absolute 18.69 (H) 10*3/mm3      Lymphocytes, Absolute 1.55 10*3/mm3      Monocytes, Absolute 0.61 10*3/mm3      Eosinophils, Absolute 0.01 10*3/mm3      Basophils, Absolute 0.04 10*3/mm3      Immature Grans, Absolute 0.16 (H) 10*3/mm3      nRBC 0.2 (H) /100 WBC     aPTT [[id number]]  (Abnormal) Collected:  04/15/18 0454    Specimen:  Blood Updated:  04/15/18 0605     PTT 57.4 (H) seconds     Calcium, Ionized [[id number]]  (Abnormal) Collected:  04/15/18 0454    Specimen:  Blood Updated:  04/15/18 0556     Ionized Calcium 1.09 (L) mmol/L      Ionized  Calcium 4.4 (L) mg/dL     Respiratory Culture - Aspirate, Bronchus [[id number]] Collected:  04/14/18 2050    Specimen:  Aspirate from Bronchus Updated:  04/15/18 0554     Gram Stain Result Few (2+) WBCs seen      Many (4+) Gram negative diplococci      Occasional Budding yeast    Lactic Acid, Plasma [[id number]]  (Normal) Collected:  04/15/18 0454    Specimen:  Blood Updated:  04/15/18 0541     Lactate 1.5 mmol/L     Blood Gas, Arterial [[id number]]  (Abnormal) Collected:  04/15/18 0415    Specimen:  Arterial Blood Updated:  04/15/18 0418     Site Arterial Line     Hermes's Test N/A     pH, Arterial 7.409 pH units      pCO2, Arterial 32.3 (L) mm Hg      pO2, Arterial 87.5 mm Hg      HCO3, Arterial 20.4 (L) mmol/L      Base Excess, Arterial -3.3 (L) mmol/L      O2 Saturation Calculated 96.9 %      A-a Gradiant 0.6 mmHg      Barometric Pressure for Blood Gas 743.5 mmHg      Modality Adult Vent     FIO2 24 %      Ventilator Mode PC     Set Mech Resp Rate 10     Rate 11 Breaths/minute      PEEP 7.5    Narrative:       PCV IP 16 VTPCV IP 16 VT    Calcium, Ionized [[id number]]  (Abnormal) Collected:  04/14/18 2329    Specimen:  Blood Updated:  04/15/18 0031     Ionized Calcium 1.07 (L) mmol/L      Ionized Calcium 4.3 (L) mg/dL     Comprehensive Metabolic Panel [[id number]]  (Abnormal) Collected:  04/14/18 2329    Specimen:  Blood Updated:  04/15/18 0029     Glucose 128 (H) mg/dL      BUN 61 (H) mg/dL      Creatinine 2.95 (H) mg/dL      Sodium 144 mmol/L      Potassium 3.9 mmol/L      Chloride 109 (H) mmol/L      CO2 17.9 (L) mmol/L      Calcium 7.2 (L) mg/dL      Total Protein 5.3 (L) g/dL      Albumin 2.90 (L) g/dL      ALT (SGPT) 4,619 (H) U/L      AST (SGOT) 4,319 (H) U/L      Alkaline Phosphatase 73 U/L      Total Bilirubin 0.6 mg/dL      eGFR Non African Amer 22 (L) mL/min/1.73      Globulin 2.4 gm/dL      A/G Ratio 1.2 g/dL      BUN/Creatinine Ratio 20.7     Anion Gap 17.1 mmol/L     Magnesium [[id number]]  (Normal)  Collected:  04/14/18 2329    Specimen:  Blood Updated:  04/15/18 0018     Magnesium 2.3 mg/dL     Phosphorus [[id number]]  (Abnormal) Collected:  04/14/18 2329    Specimen:  Blood Updated:  04/15/18 0018     Phosphorus 5.0 (H) mg/dL     Uric Acid [[id number]]  (Abnormal) Collected:  04/14/18 2329    Specimen:  Blood Updated:  04/15/18 0018     Uric Acid 11.4 (H) mg/dL     Lactic Acid, Plasma [[id number]]  (Abnormal) Collected:  04/14/18 2329    Specimen:  Blood Updated:  04/15/18 0012     Lactate 2.3 (C) mmol/L     CBC & Differential [[id number]] Collected:  04/14/18 2329    Specimen:  Blood Updated:  04/14/18 2355    Narrative:       The following orders were created for panel order CBC & Differential.  Procedure                               Abnormality         Status                     ---------                               -----------         ------                     CBC Auto Differential[[id number]]        Abnormal            Final result                 Please view results for these tests on the individual orders.    CBC Auto Differential [[id number]]  (Abnormal) Collected:  04/14/18 2329    Specimen:  Blood Updated:  04/14/18 2355     WBC 20.17 (H) 10*3/mm3      RBC 4.16 (L) 10*6/mm3      Hemoglobin 13.2 (L) g/dL      Hematocrit 41.3 %      MCV 99.3 (H) fL      MCH 31.7 pg      MCHC 32.0 (L) g/dL      RDW 14.1 %      RDW-SD 50.4 fl      MPV 11.5 fL      Platelets 130 (L) 10*3/mm3      Neutrophil % 89.4 (H) %      Lymphocyte % 6.9 (L) %      Monocyte % 3.0 (L) %      Eosinophil % 0.0 (L) %      Basophil % 0.1 %      Immature Grans % 0.6 (H) %      Neutrophils, Absolute 18.01 (H) 10*3/mm3      Lymphocytes, Absolute 1.40 10*3/mm3      Monocytes, Absolute 0.61 10*3/mm3      Eosinophils, Absolute 0.00 10*3/mm3      Basophils, Absolute 0.02 10*3/mm3      Immature Grans, Absolute 0.13 (H) 10*3/mm3     POC Glucose Once [[id number]]  (Normal) Collected:  04/14/18 2034    Specimen:  Blood Updated:  04/14/18 2036      Glucose 116 mg/dL     Narrative:       Meter: [id number] : [id number] Pebbles GARBER BILLIE    Comprehensive Metabolic Panel [[id number]]  (Abnormal) Collected:  04/14/18 1751    Specimen:  Blood Updated:  04/14/18 1840     Glucose 126 (H) mg/dL      BUN 57 (H) mg/dL      Creatinine 3.30 (H) mg/dL      Sodium 145 mmol/L      Potassium 4.0 mmol/L      Chloride 111 (H) mmol/L      CO2 16.8 (L) mmol/L      Calcium 7.2 (L) mg/dL      Total Protein 5.4 (L) g/dL      Albumin 3.00 (L) g/dL      ALT (SGPT) 5,242 (H) U/L      AST (SGOT) 6,093 (H) U/L      Alkaline Phosphatase 75 U/L      Total Bilirubin 0.5 mg/dL      eGFR Non African Amer 19 (L) mL/min/1.73      Globulin 2.4 gm/dL      A/G Ratio 1.3 g/dL      BUN/Creatinine Ratio 17.3     Anion Gap 17.2 mmol/L     Calcium, Ionized [[id number]]  (Abnormal) Collected:  04/14/18 1751    Specimen:  Blood Updated:  04/14/18 1838     Ionized Calcium 1.09 (L) mmol/L      Ionized Calcium 4.4 (L) mg/dL     aPTT [[id number]]  (Abnormal) Collected:  04/14/18 1751    Specimen:  Blood Updated:  04/14/18 1832     PTT 79.0 (H) seconds     Magnesium [[id number]]  (Normal) Collected:  04/14/18 1751    Specimen:  Blood Updated:  04/14/18 1829     Magnesium 2.2 mg/dL     Phosphorus [[id number]]  (Abnormal) Collected:  04/14/18 1751    Specimen:  Blood Updated:  04/14/18 1829     Phosphorus 6.2 (H) mg/dL     Lactic Acid, Plasma [[id number]]  (Normal) Collected:  04/14/18 1753    Specimen:  Blood Updated:  04/14/18 1818     Lactate 1.9 mmol/L     CBC & Differential [[id number]] Collected:  04/14/18 1751    Specimen:  Blood Updated:  04/14/18 1810    Narrative:       The following orders were created for panel order CBC & Differential.  Procedure                               Abnormality         Status                     ---------                               -----------         ------                     CBC Auto Differential[[id number]]        Abnormal            Final result                 Please  view results for these tests on the individual orders.    CBC Auto Differential [[id number]]  (Abnormal) Collected:  04/14/18 1751    Specimen:  Blood Updated:  04/14/18 1810     WBC 22.19 (H) 10*3/mm3      RBC 4.44 (L) 10*6/mm3      Hemoglobin 13.8 g/dL      Hematocrit 43.3 %      MCV 97.5 (H) fL      MCH 31.1 pg      MCHC 31.9 (L) g/dL      RDW 13.9 %      RDW-SD 49.4 fl      MPV 12.0 fL      Platelets 142 10*3/mm3      Neutrophil % 88.1 (H) %      Lymphocyte % 7.2 (L) %      Monocyte % 4.0 (L) %      Eosinophil % 0.0 (L) %      Basophil % 0.1 %      Immature Grans % 0.6 (H) %      Neutrophils, Absolute 19.54 (H) 10*3/mm3      Lymphocytes, Absolute 1.60 10*3/mm3      Monocytes, Absolute 0.88 10*3/mm3      Eosinophils, Absolute 0.01 10*3/mm3      Basophils, Absolute 0.02 10*3/mm3      Immature Grans, Absolute 0.14 (H) 10*3/mm3     POC Glucose Once [[id number]]  (Normal) Collected:  04/14/18 1632    Specimen:  Blood Updated:  04/14/18 1700     Glucose 114 mg/dL     Narrative:       Meter: [id number] : [id number] Toi MURILLO           Imaging:  Imaging Results (last 24 hours)     Procedure Component Value Units Date/Time    XR Chest 1 View [[id number]] Collected:  04/15/18 0719     Updated:  04/15/18 0719    Narrative:       PORTABLE CHEST X-RAY     CLINICAL HISTORY: Followup congestive heart failure; I46.9-Cardiac  arrest, cause unspecified; I21.02-ST elevation (STEMI) myocardial  infarction involving left anterior descending coronary artery.     COMPARISON: 04/14/2018     FINDINGS: Portable AP view of the chest was obtained with overlying  monitor leads in place. Life support lines are unchanged. No  pneumothorax. Lungs well-inflated and clear. No edema or pleural fluid.  Normal heart size.           Impression:       Stable appearance of the chest by portable radiography.                         Assessment and Plan:     Active Problems:    Cardiac arrest    Anoxic encephalopathy he is at least extubated  and moving all extremities and has been agitated I suppose all of this is a good sign and we will continue to give him time he is definitely had some bilateral cortical insult but I am hopeful that he will continue to improve.  He can still have sedation as needed for his intermittent agitation.  Per his loved 1 in the room he is not going through alcohol withdrawal at this time      Wily Stewart MD  04/15/18  3:13 PM    Electronically signed by Wily Stewart MD at 4/15/2018  3:14 PM

## 2018-04-16 NOTE — PROGRESS NOTES
"  Patient Identification:  NAME:  Negro Hall  Age:  62 y.o.   Sex:  male   :  1955   MRN:  [id number]       Chief complaint: An anoxic encephalopathy    History of present illness:  He is doing great amazingly awake and alert converses with me and jokes around a lot      Past medical history:  Past Medical History:   Diagnosis Date   • Hypertension        Allergies:  Penicillins    Home medications:  No prescriptions prior to admission.        Hospital medications:    aspirin 81 mg Oral Daily   atorvastatin 40 mg Oral Nightly   [START ON 2018] cefepime 2 g Intravenous Q12H   clopidogrel 75 mg Oral Daily   famotidine 20 mg Nasogastric Daily   insulin aspart 0-24 Units Subcutaneous Q4H   ipratropium-albuterol 3 mL Nebulization Q4H - RT   pantoprazole 40 mg Intravenous Q12H   [START ON 2018] predniSONE 40 mg Oral Daily With Breakfast       dextrose 125 mL/hr Last Rate: 125 mL/hr (18 0829)   heparin (porcine) 12 Units/kg/hr Last Rate: 16 Units/kg/hr (18 0926)   milrinone 0.25-0.75 mcg/kg/min Last Rate: 0.675 mcg/kg/min (18 1337)   sodium chloride 9 mL/hr    sodium chloride 30 mL/hr Last Rate: 30 mL/hr (18 1142)     •  acetaminophen  •  dextrose  •  dextrose  •  dextrose  •  fentaNYL citrate (PF)  •  glucagon (human recombinant)  •  haloperidol lactate  •  heparin (porcine)  •  HYDROcodone-acetaminophen  •  ipratropium-albuterol  •  magnesium hydroxide  •  ondansetron **OR** ondansetron ODT **OR** ondansetron  •  potassium chloride **OR** potassium chloride **OR** potassium chloride  •  sodium chloride      Objective:  Vitals Ranges:   Temp:  [98.1 °F (36.7 °C)-98.9 °F (37.2 °C)] 98.6 °F (37 °C)  Heart Rate:  [] 80  Resp:  [14-18] 18  BP: (100-148)/(55-99) 112/69      Physical Exam:  Awake alert and greets me with a handshake and repeats high Dr. Stewart\" normal cranial nerves II through VII tongue midline and great motor times for extremities reflexes trace throughout " symmetrical toes downgoing bilaterally    Results review:   I reviewed the patient's new clinical results.    Data review:  Lab Results (last 24 hours)     Procedure Component Value Units Date/Time    POC Glucose Once [[id number]]  (Normal) Collected:  04/16/18 1104    Specimen:  Blood Updated:  04/16/18 1105     Glucose 109 mg/dL     Narrative:       Meter: [id number] : [id number] Sidney MURILLO    POC Glucose Once [[id number]]  (Normal) Collected:  04/16/18 0704    Specimen:  Blood Updated:  04/16/18 0706     Glucose 75 mg/dL     Narrative:       Meter: [id number] : [id number] Sidney MURILLO    Respiratory Culture - Aspirate, Bronchus [[id number]]  (Abnormal) Collected:  04/14/18 2050    Specimen:  Aspirate from Bronchus Updated:  04/16/18 0657     Respiratory Culture --      Heavy growth (4+) Gram Negative Bacilli (A)     Gram Stain Result Few (2+) WBCs seen      Many (4+) Gram negative diplococci      Occasional Budding yeast    Comprehensive Metabolic Panel [[id number]]  (Abnormal) Collected:  04/16/18 0406    Specimen:  Blood Updated:  04/16/18 0607     Glucose 86 mg/dL      BUN 37 (H) mg/dL      Creatinine 1.34 (H) mg/dL      Sodium 153 (H) mmol/L      Potassium 3.3 (L) mmol/L      Chloride 116 (H) mmol/L      CO2 20.8 (L) mmol/L      Calcium 7.7 (L) mg/dL      Total Protein 5.6 (L) g/dL      Albumin 3.00 (L) g/dL      ALT (SGPT) 2,648 (H) U/L      AST (SGOT) 1,101 (H) U/L      Alkaline Phosphatase 82 U/L      Total Bilirubin 0.9 mg/dL      eGFR Non African Amer 54 (L) mL/min/1.73      Globulin 2.6 gm/dL      A/G Ratio 1.2 g/dL      BUN/Creatinine Ratio 27.6 (H)     Anion Gap 16.2 mmol/L     Phosphorus [[id number]]  (Abnormal) Collected:  04/16/18 0406    Specimen:  Blood Updated:  04/16/18 0552     Phosphorus 1.9 (L) mg/dL     aPTT [[id number]]  (Abnormal) Collected:  04/16/18 0406    Specimen:  Blood Updated:  04/16/18 0546     PTT 83.9 (H) seconds     Uric Acid [[id number]]  (Abnormal) Collected:   04/16/18 0406    Specimen:  Blood Updated:  04/16/18 0545     Uric Acid 7.4 (H) mg/dL     Magnesium [[id number]]  (Normal) Collected:  04/16/18 0406    Specimen:  Blood Updated:  04/16/18 0545     Magnesium 2.3 mg/dL     CBC & Differential [[id number]] Collected:  04/16/18 0406    Specimen:  Blood Updated:  04/16/18 0529    Narrative:       The following orders were created for panel order CBC & Differential.  Procedure                               Abnormality         Status                     ---------                               -----------         ------                     CBC Auto Differential[[id number]]        Abnormal            Final result                 Please view results for these tests on the individual orders.    CBC Auto Differential [[id number]]  (Abnormal) Collected:  04/16/18 0406    Specimen:  Blood Updated:  04/16/18 0529     WBC 15.77 (H) 10*3/mm3      RBC 4.08 (L) 10*6/mm3      Hemoglobin 12.6 (L) g/dL      Hematocrit 39.1 (L) %      MCV 95.8 fL      MCH 30.9 pg      MCHC 32.2 (L) g/dL      RDW 13.4 %      RDW-SD 46.5 fl      MPV 12.1 (H) fL      Platelets 97 (L) 10*3/mm3      Neutrophil % 90.7 (H) %      Lymphocyte % 4.7 (L) %      Monocyte % 3.7 (L) %      Eosinophil % 0.2 (L) %      Basophil % 0.1 %      Immature Grans % 0.6 (H) %      Neutrophils, Absolute 14.30 (H) 10*3/mm3      Lymphocytes, Absolute 0.74 (L) 10*3/mm3      Monocytes, Absolute 0.58 10*3/mm3      Eosinophils, Absolute 0.03 10*3/mm3      Basophils, Absolute 0.02 10*3/mm3      Immature Grans, Absolute 0.10 (H) 10*3/mm3     POC Glucose Once [[id number]]  (Normal) Collected:  04/16/18 0356    Specimen:  Blood Updated:  04/16/18 0357     Glucose 98 mg/dL     Narrative:       Meter: [id number] : [id number] Michael Pabon RN    Blood Gas, Arterial [[id number]]  (Abnormal) Collected:  04/16/18 0305    Specimen:  Arterial Blood Updated:  04/16/18 0307     Site Arterial: right radial     Hermes's Test Positive     pH, Arterial  7.493 (H) pH units      pCO2, Arterial 26.8 (L) mm Hg      pO2, Arterial 57.7 (L) mm Hg      HCO3, Arterial 20.6 (L) mmol/L      Base Excess, Arterial -1.4 (L) mmol/L      O2 Saturation Calculated 92.3 %      Barometric Pressure for Blood Gas 747.8 mmHg      Modality HFNC     Flow Rate 10 lpm      Rate 20 Breaths/minute     Narrative:       sat 88 Meter: [id number] : [id number] Topher Kline    POC Glucose Once [[id number]]  (Normal) Collected:  04/16/18 0019    Specimen:  Blood Updated:  04/16/18 0020     Glucose 95 mg/dL     Narrative:       Meter: [id number] : [id number] Michael Pabon RN    aPTT [[id number]]  (Abnormal) Collected:  04/15/18 2104    Specimen:  Blood Updated:  04/15/18 2152     PTT 51.1 (H) seconds     POC Glucose Once [[id number]]  (Normal) Collected:  04/15/18 2056    Specimen:  Blood Updated:  04/15/18 2058     Glucose 101 mg/dL     Narrative:       Meter: [id number] : [id number] Michael Pabon RN    POC Glucose Once [[id number]]  (Abnormal) Collected:  04/15/18 2053    Specimen:  Blood Updated:  04/15/18 2054     Glucose 25 (C) mg/dL     Narrative:       Repeat Test Meter: [id number] : [id number] Michael Pabon RN    POC Glucose Once [[id number]]  (Abnormal) Collected:  04/15/18 2050    Specimen:  Blood Updated:  04/15/18 2054     Glucose 44 (C) mg/dL     Narrative:       Repeat Test Meter: [id number] : [id number] Michael Pabon RN    POC Glucose Once [[id number]]  (Normal) Collected:  04/15/18 1631    Specimen:  Blood Updated:  04/15/18 1633     Glucose 99 mg/dL     Narrative:       Meter: [id number] : [id number] Elijah MURILLO           Imaging:  Imaging Results (last 24 hours)     Procedure Component Value Units Date/Time    XR Chest 1 View [[id number]] Collected:  04/15/18 0719     Updated:  04/16/18 0554    Narrative:       PORTABLE CHEST X-RAY     CLINICAL HISTORY: Followup congestive heart failure; I46.9-Cardiac  arrest, cause unspecified;  I21.02-ST elevation (STEMI) myocardial  infarction involving left anterior descending coronary artery.     COMPARISON: 04/14/2018     FINDINGS: Portable AP view of the chest was obtained with overlying  monitor leads in place. Life support lines are unchanged. No  pneumothorax. Lungs well-inflated and clear. No edema or pleural fluid.  Normal heart size.           Impression:       Stable appearance of the chest by portable radiography.        This report was finalized on 4/16/2018 5:51 AM by Emigdio Gomez MD.       XR Chest 1 View [[id number]] Collected:  04/16/18 0335     Updated:  04/16/18 0339    Narrative:       PORTABLE CHEST X-RAY     CLINICAL HISTORY: increase in O2; I46.9-Cardiac arrest, cause  unspecified; I21.02-ST elevation (STEMI) myocardial infarction involving  left anterior descending coronary artery     COMPARISON: April 15, 2018.     FINDINGS: Portable AP view of the chest was obtained with overlying  monitor leads in place. ET tube and NG tube have been removed. Central  line remains. No pneumothorax. There are new perihilar predominant  opacities bilaterally most consistent with pulmonary edema. No  significant pleural fluid. Normal heart size.             Impression:       Interval extubation with developing opacities most  consistent with acute pulmonary edema        This report was finalized on 4/16/2018 3:36 AM by Emigdio Gomez MD.                Assessment and Plan:     Active Problems:    Cardiac arrest    He has had almost a full and miraculous recovery from his an anoxic encephalopathy!  He is built to last!  I will sign off in follow-up when necessary reconsult thanks      Wily Stewart MD  04/16/18  2:54 PM

## 2018-04-16 NOTE — SIGNIFICANT NOTE
04/16/18 1332   Rehab Time/Intention   Evaluation Not Performed other (see comments)  (Discussed with CCU RN, pt not appropriate for bedside swallow due to current respiratory status.  )   Rehab Treatment   Discipline speech language pathologist

## 2018-04-17 NOTE — PROGRESS NOTES
[facility]  Phone: [phone]  Cb Hercules MD    Subjective:  LOS: 4    Awake and oriented. Denies pain. Decreased cough.    Objective   Vital Signs past 24hrs    Temp range: Temp (24hrs), Av.4 °F (36.9 °C), Min:98.1 °F (36.7 °C), Max:98.6 °F (37 °C)    BP range: BP: ()/(48-99) 109/57  Pulse range: Heart Rate:  [] 96  Resp rate range: Resp:  [16-18] 16    Device (Oxygen Therapy): high-flow nasal cannula;humidifiedFlow (L/min):  [15-60] 15  Oxygen range:SpO2:  [89 %-100 %] 96 %      66.9 kg (147 lb 7.8 oz); Body mass index is 23.09 kg/m².    Intake/Output Summary (Last 24 hours) at 18 0810  Last data filed at 18 05   Gross per 24 hour   Intake           3972.4 ml   Output             2795 ml   Net           1177.4 ml       Physical Exam   Constitutional: He is oriented to person, place, and time. He appears well-developed.   HENT:   Head: Normocephalic.   Eyes:   Briskly responsive pupils   Cardiovascular: Normal rate and regular rhythm.    No murmur heard.  Pulmonary/Chest: Effort normal. He has no wheezes. He has rales (minimal scattered post bases).   Abdominal: Soft. Bowel sounds are normal. There is no tenderness.   Musculoskeletal: He exhibits no edema.   Neurological: He is alert and oriented to person, place, and time.   Skin: Skin is warm.     Results Review:    I have reviewed the laboratory and imaging data since the last note by [facility] physician.  My annotations are noted in assessment and plan.    Medication Review:  I have reviewed the current MAR.  My annotations are noted in assessment and plan.      dextrose 125 mL/hr Last Rate: 125 mL/hr (18 0110)   heparin (porcine) 12 Units/kg/hr Last Rate: 14 Units/kg/hr (18 0535)   milrinone 0.25-0.75 mcg/kg/min Last Rate: 0.75 mcg/kg/min (18 0219)   sodium chloride 9 mL/hr    sodium chloride 30 mL/hr Last Rate: 30 mL/hr (18 1142)     Plan   PCCM Problems  Resuscitated cardiac arrest  Acute  respiratory failure, vent liberated 4/15, optiflow, HF cannula  Cardiogenic shock  Anoxic encephalopathy  STEMI  LV thrombus  Shock liver  ABENA  ? Pneumonia - GNB  COPD exacerbation  Relevant Medical Diagnoses  Current smoker    Plan of Treatment  Tolerated weaning and extubation.  However requiring high flow oxygen - now on high flow cannula.    Pulmonary infiltrates.  Sputum growing gram-negative bacilli.  Unsure if significant as no fever.  Continue with cefepime for now. CxR again today.    Wheezing resolved.  Likely COPD exacerbation.  Treat with steroids and nebs.  Advised to quit smoking.    Hemodynamics improved.  Now on milrinone.  Limited echo as per cardiology - EF 35%.    Heparin for LV thrombus.  Continue same. ? Warfarin.    Shock liver improving.    Acute kidney injury improving.  ? Replace PO4.    Allow diet today.    Cb Hercules MD  04/17/18  8:10 AM    Part of this note may be an electronic transcription/translation of spoken language to printed text using the Dragon Dictation System.

## 2018-04-17 NOTE — THERAPY EVALUATION
Acute Care - Speech Language Pathology   Swallow Initial Evaluation [facility]     Patient Name: Negro Hall  : 1955  MRN: [id number]  Today's Date: 2018               Admit Date: 2018    Visit Dx:     ICD-10-CM ICD-9-CM   1. Cardiac arrest I46.9 427.5   2. STEMI involving left anterior descending coronary artery I21.02 410.10     Patient Active Problem List   Diagnosis   • Cardiac arrest     Past Medical History:   Diagnosis Date   • Hypertension      Past Surgical History:   Procedure Laterality Date   • CARDIAC CATHETERIZATION N/A 2018    Procedure: Left Heart Cath;  Surgeon: Lennox Max MD;  Location: [facility] CATH INVASIVE LOCATION;  Service: Cardiovascular   • CARDIAC CATHETERIZATION N/A 2018    Procedure: Coronary angiography;  Surgeon: Lennox Max MD;  [facility]AS[facility] LOC[facility];  Service: Cardiovascular   • CARDIAC CATHETERIZATION N/A 2018    Procedure: Stent TEJ coronary;  Surgeon: Lennox Max MD;  [facility] C[facility]AS[facility] LOC[facility];  Service: Cardiovascular          SWALLOW EVALUATION (last 72 hours)      SLP Adult Swallow Evaluation     Row Name 18 1345       Rehab Evaluation    Document Type evaluation  -OC    Subjective Information no complaints  -OC    Patient Observations alert;cooperative;agree to therapy  -OC    Patient Effort good  -OC    Symptoms Noted During/After Treatment none  -OC  O2 needs fluctuating, 12 L hi-flow to 40 L hi-flow       General Information    Patient Profile Reviewed yes  -OC    Precautions/Limitations, Vision WFL  -OC    Precautions/Limitations, Hearing WFL  -OC    Prior Level of Function-Swallowing no diet consistency restrictions  -OC    Plans/Goals Discussed with patient;agreed upon  -OC    Barriers to Rehab none identified  -OC    Patient's Goals for Discharge return to PO diet  -OC       Pain Assessment    Additional Documentation Pain Scale: Numbers Pre/Post-Treatment (Group)  -OC       Pain  Scale: Numbers Pre/Post-Treatment    Pain Scale: Numbers, Pretreatment 0/10 - no pain  -OC    Pain Scale: Numbers, Post-Treatment 0/10 - no pain  -OC       Oral Motor and Function    Dentition Assessment edentulous, does not have dentures  -OC    Secretion Management WNL/WFL  -OC    Mucosal Quality moist, healthy  -OC    Volitional Swallow weak  -OC    Volitional Cough weak  -OC       Oral Musculature and Cranial Nerve Assessment    Oral Motor General Assessment generalized oral motor weakness  -OC       Clinical Swallow Eval    Oral Prep Phase impaired  -OC    Oral Transit WFL  -OC    Oral Residue WFL  -OC    Clinical Swallow Evaluation Summary Pt demonstrated delayed weak cough with thin and nectar thick liquids via cup. No overt s/s aspiration honey thick, puree, and mech soft. Prolonged, but functional mastication mech soft. Pt with inconsistent double swallow throughout eval. No change in vocal quality.   -OC       Oral Prep Concerns    Oral Prep Concerns increased prep time;prolonged mastication  -OC    Prolonged Mastication mixed consistencies  -OC    Increased Prep Time mechanical soft  -OC       Clinical Impression    SLP Swallowing Diagnosis oral dysfunction;suspected pharyngeal dysfunction  -OC    Functional Impact risk of aspiration/pneumonia  -OC    Rehab Potential/Prognosis, Swallowing adequate, monitor progress closely  -OC    Criteria for Skilled Therapeutic Interventions Met demonstrates skilled criteria  -OC       Recommendations    Therapy Frequency (Swallow) PRN  -OC    Predicted Duration Therapy Intervention (Days) until discharge  -OC    SLP Diet Recommendation mechanical soft with no mixed consistencies;honey thick liquids  -OC    Recommended Diagnostics reassess via VFSS (MBS);reassess via FEES  -OC    Recommended Precautions and Strategies upright posture during/after eating;small bites of food and sips of liquid  -OC    SLP Rec. for Method of Medication Administration meds crushed;with  pudding or applesauce  -OC    Monitor for Signs of Aspiration yes;notify SLP if any concerns;cough;elevated WBC count;gurgly voice;throat clearing;fever;upper respiratory;pneumonia;right lower lobe infiltrates  -OC    Anticipated Dischage Disposition unknown  -OC      User Key  (r) = Recorded By, (t) = Taken By, (c) = Cosigned By    Initials Name Effective Dates    OC Clarisa Peterson MA,[facility]-SLP 04/05/17 -         EDUCATION  The patient has been educated in the following areas:   Dysphagia (Swallowing Impairment).    SLP Recommendation and Plan  SLP Swallowing Diagnosis: oral dysfunction, suspected pharyngeal dysfunction  SLP Diet Recommendation: mechanical soft with no mixed consistencies, honey thick liquids  Recommended Precautions and Strategies: upright posture during/after eating, small bites of food and sips of liquid     Monitor for Signs of Aspiration: yes, notify SLP if any concerns, cough, elevated WBC count, gurgly voice, throat clearing, fever, upper respiratory, pneumonia, right lower lobe infiltrates  Recommended Diagnostics: reassess via VFSS (MBS), reassess via FEES  Criteria for Skilled Therapeutic Interventions Met: demonstrates skilled criteria  Anticipated Dischage Disposition: unknown  Rehab Potential/Prognosis, Swallowing: adequate, monitor progress closely  Therapy Frequency (Swallow): PRN  Predicted Duration Therapy Intervention (Days): until discharge       Plan of Care Reviewed With: patient  Plan of Care Review  Plan of Care Reviewed With: patient  Outcome Summary: Bedside swallow eval completed. Recommend honey thick liquids with mech soft no mixed consistencies. Meds criushed with puree.  Water protocol ok, no straw. If O2 needs exceed 40L hi-flow recommend NPO.            SLP Outcome Measures (last 72 hours)      SLP Outcome Measures     Row Name 04/17/18 5562             SLP Outcome Measures    Outcome Measure Used? Adult NOMS  -OC         FCM Scores    FCM Chosen Swallowing  -OC       Swallowing FCM Score 4  -OC        User Key  (r) = Recorded By, (t) = Taken By, (c) = Cosigned By    Initials Name Effective Dates    OC Clarisa Peterson MA,[facility]-SLP 04/05/17 -            Time Calculation:         Time Calculation- SLP     Row Name 04/17/18 1433             Time Calculation- SLP    SLP Start Time 1300  -OC      SLP Received On 04/17/18  -OC        User Key  (r) = Recorded By, (t) = Taken By, (c) = Cosigned By    Initials Name Provider Type    OC Clarisa Peterson MA,[facility]-SLP Speech and Language Pathologist          Therapy Charges for Today     Code Description Service Date Service Provider Modifiers Qty    84375301805 HC ST EVAL ORAL PHARYNG SWALLOW 4 4/17/2018 Clarisa Peterson MA,[facility]-SLP GN 1               Clarisa Peterson MA,CARL  4/17/2018

## 2018-04-17 NOTE — PLAN OF CARE
Problem: Patient Care Overview  Goal: Plan of Care Review   04/17/18 7645   Coping/Psychosocial   Plan of Care Reviewed With patient   Plan of Care Review   Progress improving   OTHER   Outcome Summary Pt stable and of milrinone. Oral meds restarted. coumadin therapy started

## 2018-04-17 NOTE — PLAN OF CARE
Problem: Patient Care Overview  Goal: Plan of Care Review  Outcome: Ongoing (interventions implemented as appropriate)   04/17/18 0509   Coping/Psychosocial   Plan of Care Reviewed With patient   Plan of Care Review   Progress improving   OTHER   Outcome Summary pt remains in ccu. A&O x4. moments of slight confusion. VSS. UOP great. Still on milirinon at max dose and IVFs. Weaning O2, started ay 100%, currently at 55% fio2 and 50 lpm per optiflow. awaiting swallow eval. will continue to monitor.        Problem: Fall Risk (Adult)  Goal: Identify Related Risk Factors and Signs and Symptoms  Outcome: Ongoing (interventions implemented as appropriate)   04/17/18 0509   Fall Risk (Adult)   Related Risk Factors (Fall Risk) fatigue/slow reaction;gait/mobility problems;inadequate lighting;impaired vision;polypharmacy;sleep pattern alteration;slippery/uneven surfaces;environment unfamiliar   Signs and Symptoms (Fall Risk) presence of risk factors     Goal: Absence of Fall  Outcome: Ongoing (interventions implemented as appropriate)   04/17/18 0509   Fall Risk (Adult)   Absence of Fall making progress toward outcome       Problem: Skin Injury Risk (Adult)  Goal: Identify Related Risk Factors and Signs and Symptoms  Outcome: Ongoing (interventions implemented as appropriate)   04/17/18 0509   Skin Injury Risk (Adult)   Related Risk Factors (Skin Injury Risk) cognitive impairment;critical care admission;fluid intake inadequate;hospitalization prolonged;medication;mobility impaired;infection;nutritional deficiencies     Goal: Skin Health and Integrity  Outcome: Ongoing (interventions implemented as appropriate)   04/17/18 0509   Skin Injury Risk (Adult)   Skin Health and Integrity making progress toward outcome       Problem: Cardiac Catheterization (Diagnostic/Interventional) (Adult)  Goal: Signs and Symptoms of Listed Potential Problems Will be Absent, Minimized or Managed (Cardiac Catheterization)  Outcome: Ongoing  (interventions implemented as appropriate)   04/17/18 0505   Goal/Outcome Evaluation   Problems Assessed (Cardiac Catheterization) all   Problems Present (Cardiac Cath) situational response;cardiopulmonary complications;infection;embolism       Problem: Pain, Acute (Adult)  Goal: Identify Related Risk Factors and Signs and Symptoms  Outcome: Ongoing (interventions implemented as appropriate)   04/17/18 0509   Pain, Acute (Adult)   Related Risk Factors (Acute Pain) knowledge deficit;patient perception;psychosocial factor     Goal: Acceptable Pain Control/Comfort Level  Outcome: Ongoing (interventions implemented as appropriate)   04/17/18 0509   Pain, Acute (Adult)   Acceptable Pain Control/Comfort Level making progress toward outcome

## 2018-04-17 NOTE — CONSULTS
Met with pt, his son, and his good friend.  Provided outpatient cardiac rehab information and handout.  Pt lives closer to either [facility] or [facility].  Both have cardiac rehab programs and their contact information has been provided.  Discussed importance of stopping smoking and avoiding smoke.  Pt says he has quit.  Also discussed importance of compliance with all medication.  Friend and son say they will help pt to get to cardiac rehab.

## 2018-04-17 NOTE — PLAN OF CARE
Problem: Patient Care Overview  Goal: Plan of Care Review  Outcome: Ongoing (interventions implemented as appropriate)   04/17/18 1867   Coping/Psychosocial   Plan of Care Reviewed With patient   OTHER   Outcome Summary Bedside swallow eval completed. Recommend honey thick liquids with mech soft no mixed consistencies. Meds criushed with puree. Water protocol ok, no straw. If O2 needs exceed 40L hi-flow recommend NPO. ST to complete instrumental eval next date.

## 2018-04-17 NOTE — PROGRESS NOTES
"   LOS: 4 days    Patient Care Team:  No Known Provider as PCP - General    Chief Complaint:    Chief Complaint   Patient presents with   • Arm Pain       Subjective  Follow UP ABENA    Interval History:   Oriented, but says some inappropriate things. Hungry and thirsty. Denies soa.  Pain with coughing.          Objective     Vital Signs  Temp:  [98.1 °F (36.7 °C)-98.6 °F (37 °C)] 98.2 °F (36.8 °C)  Heart Rate:  [] 88  Resp:  [16-18] 16  BP: ()/(48-98) 99/64    Flowsheet Rows    Flowsheet Row First Filed Value   Admission Height 170.2 cm (67\") Documented at 04/12/2018 2153   Admission Weight 62.6 kg (138 lb) Documented at 04/12/2018 2153          No intake/output data recorded.  I/O last 3 completed shifts:  In: 4433.8 [P.O.:60; I.V.:3773.8; IV Piggyback:600]  Out: 3895 [Urine:3895]    Intake/Output Summary (Last 24 hours) at 04/17/18 0945  Last data filed at 04/17/18 0535   Gross per 24 hour   Intake           3972.4 ml   Output             2795 ml   Net           1177.4 ml       Physical Exam:  General Appearance :Oriented, but says some inappropriate things.   Skin: warm and dry  HEENT: Nonicteric sclerae, oral mucosa dry  Neck: No JVD, trachea midline  Lungs: Crackles left base, no wheezing.   Heart: RRR, normal S1 and S2, no S3, no rub  Abdomen: soft, non-tender, +bs  : Way  Extremities: no edema, cyanosis or clubbing       Results Review:      Results from last 7 days  Lab Units 04/17/18  0350 04/16/18  1959 04/16/18  0406 04/15/18  0454 04/14/18  2329   SODIUM mmol/L 144  --  153* 146* 144   POTASSIUM mmol/L 3.6 3.8 3.3* 3.6 3.9   CHLORIDE mmol/L 109*  --  116* 111* 109*   CO2 mmol/L 24.1  --  20.8* 17.2* 17.9*   BUN mg/dL 23  --  37* 62* 61*   CREATININE mg/dL 1.03  --  1.34* 2.50* 2.95*   CALCIUM mg/dL 7.7*  --  7.7* 7.4* 7.2*   BILIRUBIN mg/dL  --   --  0.9 0.6 0.6   ALK PHOS U/L  --   --  82 76 73   ALT (SGPT) U/L  --   --  2,648* 4,166* 4,619*   AST (SGOT) U/L  --   --  1,101* 3,002* 4,319* "   GLUCOSE mg/dL 160*  --  86 121* 128*       Estimated Creatinine Clearance: 68.6 mL/min (by C-G formula based on SCr of 1.03 mg/dL).      Results from last 7 days  Lab Units 04/17/18  0350 04/16/18  0406 04/15/18  0454   MAGNESIUM mg/dL 2.5* 2.3 2.5*   PHOSPHORUS mg/dL 1.4* 1.9* 3.6         Results from last 7 days  Lab Units 04/17/18  0350 04/16/18  0406 04/14/18  2329   URIC ACID mg/dL 5.1 7.4* 11.4*         Results from last 7 days  Lab Units 04/17/18  0350 04/16/18  0406 04/15/18  0454 04/14/18 2329 04/14/18  1751   WBC 10*3/mm3 14.24* 15.77* 21.06* 20.17* 22.19*   HEMOGLOBIN g/dL 11.4* 12.6* 13.1* 13.2* 13.8   PLATELETS 10*3/mm3 107* 97* 147 130* 142         Results from last 7 days  Lab Units 04/13/18  0532   INR  1.29*         Imaging Results (last 24 hours)     Procedure Component Value Units Date/Time    XR Chest 1 View [[id number]] Collected:  04/14/18 0504     Updated:  04/16/18 2159    Narrative:       X-RAY CHEST 1 VIEW.     HISTORY: Follow-up on respiratory failure.     COMPARISON: No prior studies for comparison.     FINDINGS:  Cardiomediastinal silhouette is within normal limits. Line and tubes are  stable.     There is no consolidation or effusion.              Impression:       No definite acute findings. Overall no significant change given  differences in technique.     This report was finalized on 4/16/2018 9:55 PM by Dr. Nidia Soliman MD.             aspirin 81 mg Oral Daily   atorvastatin 40 mg Oral Nightly   cefepime 2 g Intravenous Q12H   clopidogrel 75 mg Oral Daily   famotidine 20 mg Nasogastric Daily   insulin aspart 0-24 Units Subcutaneous Q4H   ipratropium-albuterol 3 mL Nebulization Q4H - RT   pantoprazole 40 mg Intravenous Q12H   phosphorus 250 mg Oral BID   predniSONE 40 mg Oral Daily With Breakfast       heparin (porcine) 12 Units/kg/hr Last Rate: 14 Units/kg/hr (04/17/18 0535)   milrinone 0.25-0.75 mcg/kg/min Last Rate: 0.75 mcg/kg/min (04/17/18 0219)   sodium chloride 9 mL/hr     sodium chloride 30 mL/hr Last Rate: 30 mL/hr (04/13/18 1142)       Medication Review:   Current Facility-Administered Medications   Medication Dose Route Frequency Provider Last Rate Last Dose   • acetaminophen (TYLENOL) tablet 650 mg  650 mg Oral Q4H PRN Lennox Max MD       • aspirin EC tablet 81 mg  81 mg Oral Daily [place] Cobian MD   81 mg at 04/16/18 2135   • atorvastatin (LIPITOR) tablet 40 mg  40 mg Oral Nightly Lennox Max MD   40 mg at 04/14/18 2048   • cefepime (MAXIPIME) 2 g/100 mL 0.9% NS (mbp)  2 g Intravenous Q12H Cb Hercules MD   2 g at 04/16/18 2306   • clopidogrel (PLAVIX) tablet 75 mg  75 mg Oral Daily Lennox Max MD   75 mg at 04/16/18 2135   • dextrose (D50W) solution 25 g  25 g Intravenous Q15 Min PRN Cb Hercules MD       • dextrose (D50W) solution 25-50 mL  25-50 mL Intravenous Q30 Min PRN Fide Alex MD   25 mL at 04/13/18 2040   • dextrose (GLUTOSE) oral gel 15 g  15 g Oral Q15 Min PRN Cb Hercules MD       • famotidine (PEPCID) tablet 20 mg  20 mg Nasogastric Daily Fide Alex MD   20 mg at 04/14/18 1005   • glucagon (human recombinant) (GLUCAGEN DIAGNOSTIC) injection 1 mg  1 mg Subcutaneous PRN Cb Hercules MD       • heparin (porcine) 5000 UNIT/ML injection 1,900-3,800 Units  30-60 Units/kg Intravenous Q6H PRN Lennox Max MD   3,800 Units at 04/15/18 2224   • heparin 81251 units/250 mL (100 units/mL) in 0.45 % NaCl infusion  12 Units/kg/hr Intravenous Titrated Lennox Max MD 8.76 mL/hr at 04/17/18 0535 14 Units/kg/hr at 04/17/18 0535   • HYDROcodone-acetaminophen (NORCO) 5-325 MG per tablet 1 tablet  1 tablet Oral Q4H PRN Lennox Max MD       • insulin aspart (novoLOG) injection 0-24 Units  0-24 Units Subcutaneous Q4H Cb Hercules MD   4 Units at 04/16/18 2304   • ipratropium-albuterol (DUO-NEB) nebulizer solution 3 mL  3 mL Nebulization Q4H - RT Cb Hercules MD   3 mL at 04/17/18 0788   • ipratropium-albuterol (DUO-NEB) nebulizer solution 3 mL  3 mL  Nebulization Q2H PRN Cb Hercules MD       • magnesium hydroxide (MILK OF MAGNESIA) suspension 2400 mg/10mL 10 mL  10 mL Oral Daily PRN Lennox Max MD       • milrinone 20 mg/100 mL (0.2 mg/mL) in 5 % dextrose infusion  0.25-0.75 mcg/kg/min Intravenous Titrated Lennox Max MD 15.05 mL/hr at 04/17/18 0219 0.75 mcg/kg/min at 04/17/18 0219   • ondansetron (ZOFRAN) tablet 4 mg  4 mg Oral Q6H PRN Lennox Max MD        Or   • ondansetron ODT (ZOFRAN-ODT) disintegrating tablet 4 mg  4 mg Oral Q6H PRN Lennox Max MD        Or   • ondansetron (ZOFRAN) injection 4 mg  4 mg Intravenous Q6H PRN Lennox Max MD   4 mg at 04/13/18 1421   • pantoprazole (PROTONIX) injection 40 mg  40 mg Intravenous Q12H Cb Hercules MD   40 mg at 04/16/18 2006   • phosphorus (K PHOS NEUTRAL) tablet 1 tablet  250 mg Oral BID Kimberly Perez MD       • potassium chloride (MICRO-K) CR capsule 40 mEq  40 mEq Oral PRN Meng Mnan MD        Or   • potassium chloride (KLOR-CON) packet 40 mEq  40 mEq Oral PRN Meng Mann MD        Or   • potassium chloride 10 mEq in 100 mL IVPB  10 mEq Intravenous Q1H PRN Meng Mann  mL/hr at 04/17/18 0534 10 mEq at 04/17/18 0534   • predniSONE (DELTASONE) tablet 40 mg  40 mg Oral Daily With Breakfast Cb Hercules MD       • sodium chloride 0.9 % flush 10 mL  10 mL Intravenous PRN Isidro Albright MD       • sodium chloride 0.9 % infusion  9 mL/hr Intravenous Continuous Fide Alex MD       • sodium chloride 0.9 % infusion  30 mL/hr Intravenous Continuous Fide Alex MD 30 mL/hr at 04/13/18 1142 30 mL/hr at 04/13/18 1142       Assessment/Plan   1.  Acute kidney injury associated  cardiac arrest,  hypotension and cardiogenic shock.  Creatinine continues to fall.  Replace Phos. Sodium better. Po diuretic today.   2.   Metabolic acidosis, resolved.   3.  In-hospital cardiac arrest, with anterolateral STEMI, and cardiogenic shock.    4.  Left ventricular apical thrombus  on IV heparin  5.  Ischemic cardiomyopathy.  6.  Shock liver, improving . Should probably hold statin for now.   7.  Respiratory failure extubated.  8.  Leukocytosis , slowly improving.   9. TCP.  Platelets have stabilized.   10. COPD exacerbation. Steroids, mininebs. PNA, GN bacilli.     Plan:  1.  Dc D5W  2. Replace Phos orally if passes swallow.   3. Dc staples.   4. PO diuretic.          Kimberly Perez MD  04/17/18  9:45 AM

## 2018-04-17 NOTE — PROGRESS NOTES
"Negro Hall  1955 62 y.o.  [id number]      Patient Care Team:  No Known Provider as PCP - General    CC: Anterior STEMI, cardiac arrest    Interval History: Doing better he's more clear mentally I would like to advance his cares    Objective   Vital Signs  Temp:  [98.1 °F (36.7 °C)-98.7 °F (37.1 °C)] 98.7 °F (37.1 °C)  Heart Rate:  [] 92  Resp:  [15-18] 16  BP: ()/(48-98) 111/77    Intake/Output Summary (Last 24 hours) at 04/17/18 1431  Last data filed at 04/17/18 0535   Gross per 24 hour   Intake             3949 ml   Output             2795 ml   Net             1154 ml     Flowsheet Rows    Flowsheet Row First Filed Value   Admission Height 170.2 cm (67\") Documented at 04/12/2018 2153   Admission Weight 62.6 kg (138 lb) Documented at 04/12/2018 2153          Physical Exam:   General Appearance:    Alert,oriented, in no acute distress   Lungs:     Clear to auscultation,BS are equal    Heart:    Normal S1 and S2, RRR without murmur, gallop or rub   HEENT:    Sclera are clear, no JVD or adenopathy   Abdomen:     Normal bowel sounds, soft non-tender, non-distended, no HSM   Extremities:   Moves all extremities well, no edema, no cyanosis, no             Redness, no rash     Medication Review:        aspirin 81 mg Oral Daily   cefepime 2 g Intravenous Q12H   clopidogrel 75 mg Oral Daily   famotidine 20 mg Nasogastric Daily   insulin aspart 0-24 Units Subcutaneous Q4H   ipratropium-albuterol 3 mL Nebulization Q4H - RT   pantoprazole 40 mg Intravenous Q12H   phosphorus 250 mg Oral BID   predniSONE 40 mg Oral Daily With Breakfast   torsemide 20 mg Oral Daily   warfarin 5 mg Oral Daily       heparin (porcine) 12 Units/kg/hr Last Rate: 17 Units/kg/hr (04/17/18 1320)   sodium chloride 9 mL/hr    sodium chloride 30 mL/hr Last Rate: 30 mL/hr (04/13/18 1142)         I reviewed the patient's new clinical results.  I personally viewed and interpreted the patient's EKG/Telemetry data    Assessment/Plan  Active " Hospital Problems (** Indicates Principal Problem)    Diagnosis Date Noted   • Cardiac arrest [I46.9] 04/13/2018      Resolved Hospital Problems    Diagnosis Date Noted Date Resolved   No resolved problems to display.       Doing better I would like to be in the [facility] off I'm and a start warfarin and like him increase his activity his electrolytes are improved we'll hold off on statin until his LFTs normalize pleased without is doing overall LV function is improved up to 35%      Lennox Max MD  04/17/18  2:31 PM

## 2018-04-18 NOTE — PROGRESS NOTES
"Continued Stay Note  [facility]     Patient Name: Negro Hall  MRN: [id number]  Today's Date: 4/18/2018    Admit Date: 4/12/2018          Discharge Plan     Row Name 04/18/18 1248       Plan    Plan SNF vs home with friend    Patient/Family in Agreement with Plan yes    Plan Comments Spoke with pt and his friends, including Emigdio Becerraman [phone], with pt's permission at bedside.  Discussed again pt's possible discharge needs.  They would like a referral at this time to [facility] and also to [facility] for SHAREE.  If insurance will not approve pt to go to inpatient rehab, he will go to stay with his friend Emigdio who lives near him.  Per Emigdio, pt lives in a \"shed\" and they are currently working on getting pt into a rental controlled apartment.  Referrals called.  CCP will follow.              Discharge Codes    No documentation.           Maria Guadalupe Ray RN    "

## 2018-04-18 NOTE — MBS/VFSS/FEES
Acute Care - Speech Language Pathology   Swallow Initial Evaluation [facility]     Patient Name: Negro Hall  : 1955  MRN: [id number]  Today's Date: 2018               Admit Date: 2018    Visit Dx:     ICD-10-CM ICD-9-CM   1. Cardiac arrest I46.9 427.5   2. STEMI involving left anterior descending coronary artery I21.02 410.10     Patient Active Problem List   Diagnosis   • Cardiac arrest     Past Medical History:   Diagnosis Date   • Hypertension      Past Surgical History:   Procedure Laterality Date   • CARDIAC CATHETERIZATION N/A 2018    Procedure: Left Heart Cath;  Surgeon: Lennox Max MD;  Location: [facility] CATH INVASIVE LOCATION;  Service: Cardiovascular   • CARDIAC CATHETERIZATION N/A 2018    Procedure: Coronary angiography;  Surgeon: Lennox Max MD;  Location: [facility] CATH INVASIVE LOCATION;  Service: Cardiovascular   • CARDIAC CATHETERIZATION N/A 2018    Procedure: Stent TEJ coronary;  Surgeon: Lennox Max MD;  [facility];  Service: Cardiovascular          SWALLOW EVALUATION (last 72 hours)      SLP Adult Swallow Evaluation     Row Name 18 0900 18 1345                Rehab Evaluation    Document Type evaluation  -SA evaluation  -OC       Subjective Information no complaints  -SA no complaints  -OC       Patient Observations alert;cooperative  -SA alert;cooperative;agree to therapy  -OC       Patient Effort good  -SA good  -OC       Symptoms Noted During/After Treatment none  -SA none  -OC          General Information    Patient Profile Reviewed  -- yes  -OC       Current Method of Nutrition mechanical soft, no mixed consistencies;honey-thick liquids  -SA  --       Precautions/Limitations, Vision  -- WFL  -OC       Precautions/Limitations, Hearing  -- WFL  -OC       Prior Level of Function-Swallowing no diet consistency restrictions  -SA no diet consistency restrictions  -OC       Plans/Goals Discussed with  patient;agreed upon  -SA patient;agreed upon  -OC       Barriers to Rehab none identified  -SA none identified  -OC       Patient's Goals for Discharge return to regular diet  -SA return to PO diet  -OC          Pain Assessment    Additional Documentation  -- Pain Scale: Numbers Pre/Post-Treatment (Group)  -OC          Pain Scale: Numbers Pre/Post-Treatment    Pain Scale: Numbers, Pretreatment 0/10 - no pain  -SA 0/10 - no pain  -OC       Pain Scale: Numbers, Post-Treatment 0/10 - no pain  -SA 0/10 - no pain  -OC          Oral Motor and Function    Dentition Assessment edentulous, does not have dentures  -SA edentulous, does not have dentures  -OC       Secretion Management  -- WNL/WFL  -OC       Mucosal Quality  -- moist, healthy  -OC       Volitional Swallow  -- weak  -OC       Volitional Cough  -- weak  -OC          Oral Musculature and Cranial Nerve Assessment    Oral Motor General Assessment  -- generalized oral motor weakness  -OC          Clinical Swallow Eval    Oral Prep Phase  -- impaired  -OC       Oral Transit  -- WFL  -OC       Oral Residue  -- WFL  -OC       Clinical Swallow Evaluation Summary  -- Pt demonstrated delayed weak cough with thin and nectar thick liquids via cup. No overt s/s aspiration honey thick, puree, and mech soft. Prolonged, but functional mastication mech soft. Pt with inconsistent double swallow throughout eval. No change in vocal quality.   -OC          Oral Prep Concerns    Oral Prep Concerns  -- increased prep time;prolonged mastication  -OC       Prolonged Mastication  -- mixed consistencies  -OC       Increased Prep Time  -- mechanical soft  -OC          MBS/VFSS    Utensils Used spoon;cup;straw  -SA  --       Consistencies Trialed regular textures;soft textures;thin liquids  -SA  --          MBS/VFSS Interpretation    Oral Prep Phase impaired oral phase of swallowing  -SA  --       Oral Residue impaired  -SA  --          Oral Preparatory Phase    Oral Preparatory Phase  prolonged manipulation  -SA  --       Prolonged Manipulation regular textures;mechanical soft  -SA  --          Oral Residue    Impaired Oral Residue diffuse residue throughout oral cavity  -SA  --       Diffuse Residue throughout Oral Cavity mechanical soft;regular textures  -SA  --       Response to Oral Residue cleared residue;with spontaneous subsequent swallow;with liquid wash  -SA  --          Initiation of Pharyngeal Swallow    Pharyngeal Phase impaired pharyngeal phase of swallowing  -SA  --       Pharyngeal Residue thin liquids;pudding/puree;mechanical soft;regular textures;mixed consistency  -SA  --       Response to Residue cleared residue with spontaneous subsequent swallow;cleared residue with liquid wash  -SA  --          SLP Communication to Radiology    Summary Statement Pt demonstrated a mild oropharyngeal dysphagia.  No penetration or aspiration with any consistency.  Slow mastication with piecemeal deglutition and oral residue.  Min to mild pharyngeal residue with thin and mixed consistencies.  Mild to moderate residue, focused in valleculae, with puree and dry solids.  Pt was able to clear oral and pharyngeal residue with spontaneous multiple swallows and liquid wash.    -SA  --          Clinical Impression    SLP Swallowing Diagnosis oral dysfunction;pharyngeal dysfunction  -SA oral dysfunction;suspected pharyngeal dysfunction  -OC       Functional Impact risk of aspiration/pneumonia  -SA risk of aspiration/pneumonia  -OC       Rehab Potential/Prognosis, Swallowing adequate, monitor progress closely  -SA adequate, monitor progress closely  -OC       Criteria for Skilled Therapeutic Interventions Met demonstrates skilled criteria  -SA demonstrates skilled criteria  -OC          Recommendations    Therapy Frequency (Swallow) PRN  -SA PRN  -OC       Predicted Duration Therapy Intervention (Days) until discharge  -SA until discharge  -OC       SLP Diet Recommendation soft textures;ground;thin  liquids  -SA mechanical soft with no mixed consistencies;honey thick liquids  -OC       Recommended Diagnostics  -- reassess via VFSS (MBS);reassess via FEES  -OC       Recommended Precautions and Strategies upright posture during/after eating;small bites of food and sips of liquid  -SA upright posture during/after eating;small bites of food and sips of liquid  -OC       SLP Rec. for Method of Medication Administration as tolerated  -SA meds crushed;with pudding or applesauce  -OC       Monitor for Signs of Aspiration cough;gurgly voice;pneumonia;right lower lobe infiltrates  -SA yes;notify SLP if any concerns;cough;elevated WBC count;gurgly voice;throat clearing;fever;upper respiratory;pneumonia;right lower lobe infiltrates  -OC       Anticipated Dischage Disposition unknown  -SA unknown  -OC          Swallow Goals (SLP)    Oral Nutrition/Hydration Goal Selection (SLP) oral nutrition/hydration, SLP goal 1  -SA  --          Oral Nutrition/Hydration Goal 1 (SLP)    Oral Nutrition/Hydration Goal 1, SLP Pt will tolerate recommended diet.  -SA  --       Time Frame (Oral Nutrition/Hydration Goal 1, SLP) by discharge  -SA  --         User Key  (r) = Recorded By, (t) = Taken By, (c) = Cosigned By    Initials Name Effective Dates    SA Ignacia Willett MS CCC-SLP 03/07/18 -     OC Clarisa Peterson MA,[facility]-SLP 04/05/17 -         EDUCATION  The patient has been educated in the following areas:   Dysphagia (Swallowing Impairment) Modified Diet Instruction.    SLP Recommendation and Plan  SLP Swallowing Diagnosis: oral dysfunction, pharyngeal dysfunction  SLP Diet Recommendation: soft textures, ground, thin liquids  Recommended Precautions and Strategies: upright posture during/after eating, small bites of food and sips of liquid     Monitor for Signs of Aspiration: cough, gurgly voice, pneumonia, right lower lobe infiltrates     Criteria for Skilled Therapeutic Interventions Met: demonstrates skilled criteria  Anticipated Dischage  Disposition: unknown  Rehab Potential/Prognosis, Swallowing: adequate, monitor progress closely  Therapy Frequency (Swallow): PRN  Predicted Duration Therapy Intervention (Days): until discharge       Plan of Care Reviewed With: patient  Plan of Care Review  Plan of Care Reviewed With: patient  Outcome Summary: Pt demonstrated a mild oropharyngeal dysphagia.  No penetration or aspiration with any consistency.  Slow mastication with piecemeal deglutition and oral residue.  Min to mild pharyngeal residue with thin and mixed consistencies.  Mild to moderate residue, focused in valleculae, with puree and dry solids.  Pt was able to clear oral and pharyngeal residue with spontaneous multiple swallows and liquid wash.  REC soft , ground diet with thin liquids          SLP GOALS     Row Name 04/18/18 0900             Oral Nutrition/Hydration Goal 1 (SLP)    Oral Nutrition/Hydration Goal 1, SLP Pt will tolerate recommended diet.  -SA      Time Frame (Oral Nutrition/Hydration Goal 1, SLP) by discharge  -        User Key  (r) = Recorded By, (t) = Taken By, (c) = Cosigned By    Initials Name Provider Type    SA Ignacia Willett MS CCC-SLP Speech and Language Pathologist             SLP Outcome Measures (last 72 hours)      SLP Outcome Measures     Row Name 04/18/18 0900 04/17/18 1345          SLP Outcome Measures    Outcome Measure Used? Adult NOMS  -SA Adult NOMS  -OC        FCM Scores    FCM Chosen  -- Swallowing  -OC     Swallowing FCM Score 4  -SA 4  -OC       User Key  (r) = Recorded By, (t) = Taken By, (c) = Cosigned By    Initials Name Effective Dates    SA Ignacia Willett MS CCC-SLP 03/07/18 -     OC Clarisa Peterson MA,[facility]-SLP 04/05/17 -            Time Calculation:         Time Calculation- SLP     Row Name 04/18/18 0924             Time Calculation- SLP    SLP Start Time 0830  -      SLP Stop Time 0930  -      SLP Time Calculation (min) 60 min  -      SLP Received On 04/18/18  -        User Key  (r) = Recorded  By, (t) = Taken By, (c) = Cosigned By    Initials Name Provider Type    SA Ignacia Wlilett MS CCC-SLP Speech and Language Pathologist          Therapy Charges for Today     Code Description Service Date Service Provider Modifiers Qty    [id number] HC ST MOTION FLUORO EVAL SWALLOW 4 4/18/2018 Ignacia Willett MS CCC-SLP GN 1               Ignacia Willett MS CCC-RAYSA  4/18/2018

## 2018-04-18 NOTE — PROGRESS NOTES
"Continued Stay Note  [facility]     Patient Name: Negro Hall  MRN: [id number]  Today's Date: 4/18/2018    Admit Date: 4/12/2018          Discharge Plan     Row Name 04/18/18 8922       Plan    Plan Comments Spoke with pt and family again.  [facility] does not accept pt's insurance.  Their next choice is [facility].  Referral called to Joanne Ding.  She will follow.    Row Name 04/18/18 8862       Plan    Plan SNF vs home with friend    Patient/Family in Agreement with Plan yes    Plan Comments Spoke with pt and his friends, including Emigdio Quesada [phone], with pt's permission at bedside.  Discussed again pt's possible discharge needs.  They would like a referral at this time to [facility] and also to [facility] for SHAREE.  If insurance will not approve pt to go to inpatient rehab, he will go to stay with his friend Emigdio who lives near him.  Per Emigdio, pt lives in a \"shed\" and they are currently working on getting pt into a rental controlled apartment.  Referrals called.  CCP will follow.              Discharge Codes    No documentation.           Maria Guadalupe Ray RN    "

## 2018-04-18 NOTE — PROGRESS NOTES
"   LOS: 5 days    Patient Care Team:  No Known Provider as PCP - General    Chief Complaint:    Chief Complaint   Patient presents with   • Arm Pain       Subjective  Follow UP ABENA    Interval History:   Confused this am.  Friends at bedside.  Passed swallow eval.           Objective     Vital Signs  Temp:  [98.4 °F (36.9 °C)-98.7 °F (37.1 °C)] 98.4 °F (36.9 °C)  Heart Rate:  [] 86  Resp:  [16-20] 20  BP: ()/() 120/85    Flowsheet Rows    Flowsheet Row First Filed Value   Admission Height 170.2 cm (67\") Documented at 04/12/2018 2153   Admission Weight 62.6 kg (138 lb) Documented at 04/12/2018 2153          I/O this shift:  In: 360 [P.O.:360]  Out: -   I/O last 3 completed shifts:  In: 3827 [P.O.:60; I.V.:2767; IV Piggyback:1000]  Out: 5445 [Urine:5445]    Intake/Output Summary (Last 24 hours) at 04/18/18 1124  Last data filed at 04/18/18 0900   Gross per 24 hour   Intake             2508 ml   Output             3950 ml   Net            -1442 ml       Physical Exam:  General Appearance :Confused.  Skin: warm and dry  HEENT: Nonicteric sclerae, oral mucosa dry  Neck: No JVD, trachea midline  Lungs: Crackles left base, no wheezing.   Heart: RRR, normal S1 and S2, no S3, no rub  Abdomen: soft, non-tender, +bs  Extremities: no edema, cyanosis or clubbing       Results Review:      Results from last 7 days  Lab Units 04/18/18  0410 04/17/18 2000 04/17/18  0350  04/16/18  0406 04/15/18  0454   SODIUM mmol/L 149*  --  144  --  153* 146*   POTASSIUM mmol/L 3.8 3.2* 3.6  < > 3.3* 3.6   CHLORIDE mmol/L 110*  --  109*  --  116* 111*   CO2 mmol/L 23.5  --  24.1  --  20.8* 17.2*   BUN mg/dL 26*  --  23  --  37* 62*   CREATININE mg/dL 1.09  --  1.03  --  1.34* 2.50*   CALCIUM mg/dL 7.8*  --  7.7*  --  7.7* 7.4*   BILIRUBIN mg/dL 0.9  --   --   --  0.9 0.6   ALK PHOS U/L 98  --   --   --  82 76   ALT (SGPT) U/L 1,235*  --   --   --  2,648* 4,166*   AST (SGOT) U/L 144*  --   --   --  1,101* 3,002*   GLUCOSE mg/dL " 115*  --  160*  --  86 121*   < > = values in this interval not displayed.    Estimated Creatinine Clearance: 59.3 mL/min (by C-G formula based on SCr of 1.09 mg/dL).      Results from last 7 days  Lab Units 04/18/18  0410 04/17/18 2000 04/17/18  0350   MAGNESIUM mg/dL 2.2 2.2 2.5*   PHOSPHORUS mg/dL 3.0 2.9 1.4*         Results from last 7 days  Lab Units 04/17/18  0350 04/16/18  0406 04/14/18  2329   URIC ACID mg/dL 5.1 7.4* 11.4*         Results from last 7 days  Lab Units 04/18/18  0410 04/17/18  0350 04/16/18  0406 04/15/18  0454 04/14/18  2329   WBC 10*3/mm3 17.53* 14.24* 15.77* 21.06* 20.17*   HEMOGLOBIN g/dL 12.8* 11.4* 12.6* 13.1* 13.2*   PLATELETS 10*3/mm3 142 107* 97* 147 130*         Results from last 7 days  Lab Units 04/18/18  0410 04/17/18  1645 04/13/18  0532   INR  1.25* 1.19* 1.29*         Imaging Results (last 24 hours)     Procedure Component Value Units Date/Time    FL Video Swallow [[id number]] Updated:  04/18/18 0908    XR Chest 1 View [[id number]] Collected:  04/17/18 1216     Updated:  04/17/18 1300    Narrative:       ONE VIEW PORTABLE CHEST     HISTORY: Follow-up of pneumonia.     FINDINGS:  The lungs are well-expanded with scattered areas of  interstitial and alveolar consolidation most consistent with areas of  pneumonia and showing slight worsening from yesterday's exam. I cannot  completely exclude a component of CHF and there appears to be a very  small left pleural effusion on the current exam. The heart remains  borderline enlarged. A right subclavian catheter ends in the SVC without  change.     This report was finalized on 4/17/2018 12:57 PM by Dr. Howard Witt MD.             aspirin 81 mg Oral Daily   cefepime 2 g Intravenous Q12H   clopidogrel 75 mg Oral Daily   famotidine 20 mg Nasogastric Daily   ipratropium-albuterol 3 mL Nebulization Q4H - RT   phosphorus 250 mg Oral BID   predniSONE 40 mg Oral Daily With Breakfast   torsemide 20 mg Oral Daily   warfarin 5 mg Oral Daily        heparin (porcine) 12 Units/kg/hr Last Rate: 17 Units/kg/hr (04/17/18 1320)   sodium chloride 9 mL/hr        Medication Review:   Current Facility-Administered Medications   Medication Dose Route Frequency Provider Last Rate Last Dose   • acetaminophen (TYLENOL) tablet 650 mg  650 mg Oral Q4H PRN Lennox Max MD       • aspirin EC tablet 81 mg  81 mg Oral Daily [place] Cobian MD   81 mg at 04/18/18 0950   • cefepime (MAXIPIME) 2 g/100 mL 0.9% NS (mbp)  2 g Intravenous Q12H Cb Hercules MD   2 g at 04/18/18 1121   • clopidogrel (PLAVIX) tablet 75 mg  75 mg Oral Daily Lennox Max MD   75 mg at 04/18/18 0951   • dextrose (D50W) solution 25-50 mL  25-50 mL Intravenous Q30 Min PRN Fide Alex MD   25 mL at 04/13/18 2040   • famotidine (PEPCID) tablet 20 mg  20 mg Nasogastric Daily Fide Alex MD   20 mg at 04/18/18 1119   • heparin (porcine) 5000 UNIT/ML injection 1,900-3,800 Units  30-60 Units/kg Intravenous Q6H PRN Lennox Max MD   3,800 Units at 04/17/18 1319   • heparin 55065 units/250 mL (100 units/mL) in 0.45 % NaCl infusion  12 Units/kg/hr Intravenous Titrated Lennox Max MD 10.64 mL/hr at 04/17/18 1320 17 Units/kg/hr at 04/17/18 1320   • HYDROcodone-acetaminophen (NORCO) 5-325 MG per tablet 1 tablet  1 tablet Oral Q4H PRN Lennox Max MD       • ipratropium-albuterol (DUO-NEB) nebulizer solution 3 mL  3 mL Nebulization Q4H - RT Cb Hercules MD   3 mL at 04/18/18 1042   • ipratropium-albuterol (DUO-NEB) nebulizer solution 3 mL  3 mL Nebulization Q2H PRN Cb Hercules MD       • magnesium hydroxide (MILK OF MAGNESIA) suspension 2400 mg/10mL 10 mL  10 mL Oral Daily PRN Lennox Max MD       • ondansetron (ZOFRAN) tablet 4 mg  4 mg Oral Q6H PRN Lennox Max MD        Or   • ondansetron ODT (ZOFRAN-ODT) disintegrating tablet 4 mg  4 mg Oral Q6H PRN Lennox Max MD        Or   • ondansetron (ZOFRAN) injection 4 mg  4 mg Intravenous Q6H PRN Lennox Max MD   4 mg at 04/13/18  1421   • phosphorus (K PHOS NEUTRAL) tablet 1 tablet  250 mg Oral BID Kimberly Perez MD   1 tablet at 04/18/18 0950   • potassium chloride (MICRO-K) CR capsule 40 mEq  40 mEq Oral PRN Meng Mann MD        Or   • potassium chloride (KLOR-CON) packet 40 mEq  40 mEq Oral PRN Meng Mann MD        Or   • potassium chloride 10 mEq in 100 mL IVPB  10 mEq Intravenous Q1H PRN Meng Mann  mL/hr at 04/18/18 0444 10 mEq at 04/18/18 0444   • predniSONE (DELTASONE) tablet 40 mg  40 mg Oral Daily With Breakfast Cb Hercules MD   40 mg at 04/18/18 0951   • sodium chloride 0.9 % flush 10 mL  10 mL Intravenous PRN Isidro Albright MD       • sodium chloride 0.9 % infusion  9 mL/hr Intravenous Continuous Fide Alex MD       • torsemide (DEMADEX) tablet 20 mg  20 mg Oral Daily Kimberly Perez MD   20 mg at 04/18/18 0950   • warfarin (COUMADIN) tablet 5 mg  5 mg Oral Daily Lennox Max MD   5 mg at 04/17/18 1826       Assessment/Plan   1.  Acute kidney injury associated  cardiac arrest,  hypotension and cardiogenic shock.  Creatinine stable.   Hypernatremic.  D5W for a liter today.   2.   Metabolic acidosis, resolved.   3.  In-hospital cardiac arrest, with anterolateral STEMI, and cardiogenic shock.    4.  Left ventricular apical thrombus on IV heparin  5.  Ischemic cardiomyopathy.  6.  Shock liver, improving . Should probably hold statin for now.   7.  Respiratory failure extubated.  8.  Leukocytosis , WBC up some today.  9. TCP.  Platelets have stabilized.   10. COPD exacerbation. Steroids, mininebs. PNA, GN bacilli.     Plan:  1.  Resume D5W for another liter.            Kimberly Perez MD  04/18/18  11:24 AM

## 2018-04-18 NOTE — THERAPY EVALUATION
Acute Care - Physical Therapy Initial Evaluation  [facility]     Patient Name: Negro Hall  : 1955  MRN: [id number]  Today's Date: 2018   Onset of Illness/Injury or Date of Surgery: (P) 18            Admit Date: 2018    Visit Dx:     ICD-10-CM ICD-9-CM   1. Cardiac arrest I46.9 427.5   2. STEMI involving left anterior descending coronary artery I21.02 410.10   3. Difficulty walking R26.2 719.7     Patient Active Problem List   Diagnosis   • Cardiac arrest     Past Medical History:   Diagnosis Date   • Hypertension      Past Surgical History:   Procedure Laterality Date   • CARDIAC CATHETERIZATION N/A 2018    Procedure: Left Heart Cath;  Surgeon: Lennox Max MD;  Location: [facility] CATH INVASIVE LOCATION;  Service: Cardiovascular   • CARDIAC CATHETERIZATION N/A 2018    Procedure: Coronary angiography;  Surgeon: Lennox Max MD;  Location: [facility] CATH INVASIVE LOCATION;  Service: Cardiovascular   • CARDIAC CATHETERIZATION N/A 2018    Procedure: Stent TEJ coronary;  Surgeon: Lennox Max MD;  Location: [facility] CATH INVASIVE LOCATION;  Service: Cardiovascular        PT ASSESSMENT (last 12 hours)      Physical Therapy Evaluation     Row Name 18 1609          PT Evaluation Time/Intention    Subjective Information (P)  complains of;pain  -PV     Document Type (P)  evaluation  -PV     Mode of Treatment (P)  physical therapy   Student  -PV     Patient Effort (P)  good  -PV     Symptoms Noted During/After Treatment (P)  none  -PV     Row Name 18 1609          General Information    Onset of Illness/Injury or Date of Surgery (P)  18  -PV     Patient Observations (P)  agree to therapy  -PV     General Observations of Patient (P)  Pt found supine in bed with no acute distress noted at rest  -PV     Prior Level of Function (P)  independent:  -PV     Equipment Currently Used at Home (P)  none  -PV     Pertinent History of Current Functional Problem (P)  Pt with  cardiac arrest on 4/12, resuscitated and intubated.   -PV     Existing Precautions/Restrictions (P)  fall  -PV     Barriers to Rehab (P)  medically complex  -PV     Row Name 04/18/18 1609          Relationship/Environment    Lives With (P)  alone  -PV     Row Name 04/18/18 1609          Resource/Environmental Concerns    Current Living Arrangements (P)  home/apartment/condo  -PV     Row Name 04/18/18 1609          Cognitive Assessment/Interventions    Additional Documentation (P)  Cognitive Assessment/Intervention (Group)  -PV     Row Name 04/18/18 1609          Cognitive Assessment/Intervention- PT/OT    Affect/Mental Status (Cognitive) (P)  WFL  -PV     Orientation Status (Cognition) (P)  oriented to;person;place;situation  -PV     Cognitive Function (Cognitive) (P)  WFL  -PV     Safety Deficit (Cognitive) (P)  judgment  -PV     Personal Safety Interventions (P)  fall prevention program maintained;gait belt;nonskid shoes/slippers when out of bed  -PV     Row Name 04/18/18 1609          Safety Issues, Functional Mobility    Safety Issues Affecting Function (Mobility) (P)  judgment  -PV     Impairments Affecting Function (Mobility) (P)  balance;coordination;endurance/activity tolerance  -PV     Row Name 04/18/18 1609          Bed Mobility Assessment/Treatment    Bed Mobility Assessment/Treatment (P)  supine-sit;sit-supine  -PV     Supine-Sit [place] (Bed Mobility) (P)  minimum assist (75% patient effort)   HHA to EOB  -PV     Sit-Supine [place] (Bed Mobility) (P)  supervision  -PV     Row Name 04/18/18 1609          Transfer Assessment/Treatment    Transfer Assessment/Treatment (P)  sit-stand transfer;stand-sit transfer  -PV     Sit-Stand [place] (Transfers) (P)  contact guard  -PV     Stand-Sit [place] (Transfers) (P)  contact guard  -PV     Row Name 04/18/18 1609          Sit-Stand Transfer    Assistive Device (Sit-Stand Transfers) (P)  walker, front-wheeled  -PV     Row Name 04/18/18 1609           Stand-Sit Transfer    Assistive Device (Stand-Sit Transfers) (P)  walker, front-wheeled  -PV     Row Name 04/18/18 1609          Gait/Stairs Assessment/Training    Gait/Stairs Assessment/Training (P)  gait/ambulation independence  -PV     [place] Level (Gait) (P)  contact guard  -PV     Assistive Device (Gait) (P)  walker, front-wheeled  -PV     Distance in Feet (Gait) (P)  150  -PV     Pattern (Gait) (P)  step-through  -PV     Deviations/Abnormal Patterns (Gait) (P)  gait speed decreased  -PV     Comment (Gait/Stairs) (P)  Pt with LOB x2 but able to correct himself without assistance. Pt able to ambulate 150 ft with today being his first day out of bed during his hospital stay.   -PV     Row Name 04/18/18 1609          General ROM    GENERAL ROM COMMENTS (P)  WFL  -PV     Row Name 04/18/18 1609          General Assessment (Manual Muscle Testing)    General Manual Muscle Testing (MMT) Assessment (P)  lower extremity strength deficits identified  -PV     Row Name 04/18/18 1609          Lower Extremity (Manual Muscle Testing)    Lower Extremity: Manual Muscle Testing (MMT) (P)  right hip strength deficit  -PV     Row Name 04/18/18 1609          Right Hip (Manual Muscle Testing)    Right Hip Manual Muscle Testing (MMT) (P)  flexion  -PV     MMT: Flexion, Right Hip (P)  flexion  -PV     MMT, Gross Movement: Right Hip Flexion (P)  (3+/5) fair plus  -PV     MMT, Right Hip: Manual Muscle Testing (MMT) (P)  Pt reports pain when pressure applied in the R hip  -PV     Row Name 04/18/18 1609          Motor Assessment/Intervention    Additional Documentation (P)  Balance (Group)  -PV     Row Name 04/18/18 1609          Balance    Balance (P)  static standing balance;dynamic standing balance  -PV     [facility] Name 04/18/18 1609          Static Standing Balance    Level of [place] (Supported Standing, Static Balance) (P)  supervision  -PV     Assistive Device Utilized (Supported Standing, Static Balance) (P)  rolling  walker  -PV     [facility] Name 04/18/18 1609          Dynamic Standing Balance    Level of [place], Reaches Outside Midline (Standing, Dynamic Balance) (P)  contact guard assist   w/rolling walker  -PV     Row Name 04/18/18 1609          Pain Assessment    Additional Documentation (P)  Pain Scale: Numbers Pre/Post-Treatment (Group)  -PV     Row Name 04/18/18 1609          Pain Scale: Numbers Pre/Post-Treatment    Pain Scale: Numbers, Pretreatment (P)  0/10 - no pain  -PV     Pre/Post Treatment Pain Comment (P)  no pain at rest. R hip pain reported with activity but none noted with ambulation  -PV     [facility] Name 04/18/18 1609          Plan of Care Review    Plan of Care Reviewed With (P)  patient  -PV     Row Name 04/18/18 1609          Physical Therapy Clinical Impression    Patient/Family Goals Statement (PT Clinical Impression) (P)  Pt to return home at prior level of function  -PV     Criteria for Skilled Interventions Met (PT Clinical Impression) (P)  treatment indicated  -PV     Impairments Found (describe specific impairments) (P)  aerobic capacity/endurance;gait, locomotion, and balance  -PV     Rehab Potential (PT Clinical Summary) (P)  good, to achieve stated therapy goals  -PV     Row Name 04/18/18 1609          Vital Signs    Pre Systolic BP Rehab (P)  144  -PV     Pre Treatment Diastolic BP (P)  91  -PV     Pretreatment Heart Rate (beats/min) (P)  79  -PV     Posttreatment Heart Rate (beats/min) (P)  96  -PV     Pre SpO2 (%) (P)  94  -PV     O2 Delivery Pre Treatment (P)  supplemental O2   4L  -PV     Post SpO2 (%) (P)  98  -PV     O2 Delivery Post Treatment (P)  supplemental O2   4L  -PV     Row Name 04/18/18 1609          Physical Therapy Goals    Bed Mobility Goal Selection (PT) (P)  bed mobility, PT goal 1  -PV     Transfer Goal Selection (PT) (P)  transfer, PT goal 1  -PV     Gait Training Goal Selection (PT) (P)  gait training, PT goal 1  -PV     Row Name 04/18/18 1609          Bed Mobility Goal 1  (PT)    Activity/Assistive Device (Bed Mobility Goal 1, PT) (P)  bed mobility activities, all  -PV     [place] Level/Cues Needed (Bed Mobility Goal 1, PT) (P)  independent  -PV     Time Frame (Bed Mobility Goal 1, PT) (P)  1 week  -PV     Row Name 04/18/18 1609          Transfer Goal 1 (PT)    Activity/Assistive Device (Transfer Goal 1, PT) (P)  transfers, all  -PV     [place] Level/Cues Needed (Transfer Goal 1, PT) (P)  independent  -PV     Time Frame (Transfer Goal 1, PT) (P)  1 week  -PV     Row Name 04/18/18 1609          Gait Training Goal 1 (PT)    Activity/Assistive Device (Gait Training Goal 1, PT) (P)  gait (walking locomotion)  -PV     [place] Level (Gait Training Goal 1, PT) (P)  supervision required  -PV     Distance (Gait Goal 1, PT) (P)  250  -PV     Time Frame (Gait Training Goal 1, PT) (P)  1 week  -PV     Row Name 04/18/18 1602          Positioning and Restraints    Pre-Treatment Position (P)  in bed  -PV     Post Treatment Position (P)  bed  -PV     In Bed (P)  supine;call light within reach;encouraged to call for assist  -PV       User Key  (r) = Recorded By, (t) = Taken By, (c) = Cosigned By    Initials Name Provider Type    RODRIGO Shea, PT Student PT Student          Physical Therapy Education     Title: PT OT SLP Therapies (Done)     Topic: Physical Therapy (Done)     Point: Mobility training (Done)    Learning Progress Summary     Learner Status Readiness Method Response Comment Documented by    Patient Done Acceptance E,MANISHA RICHARDSON,NR  PV 04/18/18 1625          Point: Home exercise program (Done)    Learning Progress Summary     Learner Status Readiness Method Response Comment Documented by    Patient Done Acceptance E,TB MARIIA,MANISHA,NR  PV 04/18/18 1625          Point: Body mechanics (Done)    Learning Progress Summary     Learner Status Readiness Method Response Comment Documented by    Patient Done Acceptance E,TB MARIIA,MANISHA,NR  PV 04/18/18 1625          Point: Precautions (Done)     Learning Progress Summary     Learner Status Readiness Method Response Comment Documented by    Patient Done Acceptance E,TB VU,DU,NR  PV 04/18/18 7503                      User Key     Initials Effective Dates Name Provider Type Discipline     03/07/18 -  eTrrance Shea, PT Student PT Student PT                PT Recommendation and Plan  Anticipated Discharge Disposition (PT): (P) other health care institution not elsewhere defined (Cardiac Rehab)  Planned Therapy Interventions (PT Eval): (P) balance training, bed mobility training, gait training, home exercise program, patient/family education, strengthening  Therapy Frequency (PT Clinical Impression): (P) daily  Outcome Summary/Treatment Plan (PT)  Anticipated Discharge Disposition (PT): (P) other health care institution not elsewhere defined (Cardiac Rehab)  Plan of Care Reviewed With: (P) patient  Outcome Summary: (P) Pt is a 62 y.o. male that presents to the hospital with R arm pain and cardiac arrest. After cardiac arrest, pt resuscitated and intubated. Pt presents to PT after being extubated. Pt with good strength and mobility requiring contact guard to minimal assistance for bed mobility and transfers. Pt able to ambulat 150 ft with rolling walker and contact guard. Pt may benefit from PT to help increase balance, activity tolerance, and mobility.           Outcome Measures     Row Name 04/18/18 1600             How much help from another person do you currently need...    Turning from your back to your side while in flat bed without using bedrails? (P)  4  -PV      Moving from lying on back to sitting on the side of a flat bed without bedrails? (P)  3  -PV      Moving to and from a bed to a chair (including a wheelchair)? (P)  3  -PV      Standing up from a chair using your arms (e.g., wheelchair, bedside chair)? (P)  3  -PV      Climbing 3-5 steps with a railing? (P)  3  -PV      To walk in hospital room? (P)  3  -PV      AM-PAC 6 Clicks Score (P)  19   -PV         Functional Assessment    Outcome Measure Options (P)  AM-PAC 6 Clicks Basic Mobility (PT)  -PV        User Key  (r) = Recorded By, (t) = Taken By, (c) = Cosigned By    Initials Name Provider Type    PV Terrance Shea PT Student PT Student           Time Calculation:         PT Charges     Row Name 04/18/18 1629             Time Calculation    Start Time (P)  1501  -PV      Stop Time (P)  1524  -PV      Time Calculation (min) (P)  23 min  -PV      PT Received On (P)  04/18/18  -PV      PT - Next Appointment (P)  04/19/18  -PV      PT Goal Re-Cert Due Date (P)  04/25/18  -PV        User Key  (r) = Recorded By, (t) = Taken By, (c) = Cosigned By    Initials Name Provider Type    PV Terrance Shea PT Student PT Student          Therapy Charges for Today     Code Description Service Date Service Provider Modifiers Qty    92187806572 HC PT EVAL MOD COMPLEXITY 2 4/18/2018 Terrance Shea PT Student GP 1    74686121809 HC PT THER PROC EA 15 MIN 4/18/2018 Terrance Shea PT Student GP 1          PT G-Codes  Outcome Measure Options: (P) AM-PAC 6 Clicks Basic Mobility (PT)      Terrance Shea PT Student  4/18/2018

## 2018-04-18 NOTE — PLAN OF CARE
Problem: Patient Care Overview  Goal: Plan of Care Review  Outcome: Ongoing (interventions implemented as appropriate)   04/18/18 4336   Coping/Psychosocial   Plan of Care Reviewed With patient   OTHER   Outcome Summary Pt is a 62 y.o. male that presents to the hospital with R arm pain and cardiac arrest. After cardiac arrest, pt resuscitated and intubated. Pt presents to PT after being extubated. Pt with good strength and mobility requiring contact guard to minimal assistance for bed mobility and transfers. Pt able to ambulat 150 ft with rolling walker and contact guard. Pt may benefit from PT to help increase balance, activity tolerance, and mobility.

## 2018-04-18 NOTE — PLAN OF CARE
Problem: Patient Care Overview  Goal: Plan of Care Review   04/18/18 1803   Coping/Psychosocial   Plan of Care Reviewed With patient   Plan of Care Review   Progress improving   OTHER   Outcome Summary Pt made to OF status. Pperipheral IV attained. VSS.       Problem: Fall Risk (Adult)  Goal: Absence of Fall  Outcome: Ongoing (interventions implemented as appropriate)      Problem: Skin Injury Risk (Adult)  Goal: Skin Health and Integrity  Outcome: Ongoing (interventions implemented as appropriate)      Problem: Pain, Acute (Adult)  Goal: Acceptable Pain Control/Comfort Level  Outcome: Ongoing (interventions implemented as appropriate)

## 2018-04-18 NOTE — PLAN OF CARE
Problem: Patient Care Overview  Goal: Plan of Care Review  Outcome: Ongoing (interventions implemented as appropriate)    Goal: Individualization and Mutuality  Outcome: Ongoing (interventions implemented as appropriate)    Goal: Discharge Needs Assessment  Outcome: Ongoing (interventions implemented as appropriate)    Goal: Interprofessional Rounds/Family Conf  Outcome: Ongoing (interventions implemented as appropriate)      Problem: Fall Risk (Adult)  Goal: Identify Related Risk Factors and Signs and Symptoms  Outcome: Ongoing (interventions implemented as appropriate)    Goal: Absence of Fall  Outcome: Ongoing (interventions implemented as appropriate)      Problem: Skin Injury Risk (Adult)  Goal: Identify Related Risk Factors and Signs and Symptoms  Outcome: Ongoing (interventions implemented as appropriate)    Goal: Skin Health and Integrity  Outcome: Ongoing (interventions implemented as appropriate)

## 2018-04-18 NOTE — PLAN OF CARE
Problem: Patient Care Overview  Goal: Plan of Care Review  Outcome: Ongoing (interventions implemented as appropriate)   04/18/18 0207   Coping/Psychosocial   Plan of Care Reviewed With patient   OTHER   Outcome Summary Pt demonstrated a mild oropharyngeal dysphagia. No penetration or aspiration with any consistency. Slow mastication with piecemeal deglutition and oral residue. Min to mild pharyngeal residue with thin and mixed consistencies. Mild to moderate residue, focused in valleculae, with puree and dry solids. Pt was able to clear oral and pharyngeal residue with spontaneous multiple swallows and liquid wash. REC soft , ground diet with thin liquids

## 2018-04-18 NOTE — PROGRESS NOTES
[facility]  Phone: [phone]  Cb Hercules MD    Subjective:  LOS: 5    Mildly confused. Needing less O2.    Objective   Vital Signs past 24hrs    Temp range: Temp (24hrs), Av.4 °F (36.9 °C), Min:97.7 °F (36.5 °C), Max:98.7 °F (37.1 °C)    BP range: BP: ()/() 129/78  Pulse range: Heart Rate:  [] 100  Resp rate range: Resp:  [16-20] 20    Device (Oxygen Therapy): high-flow nasal cannulaFlow (L/min):  [8-40] 8  Oxygen range:SpO2:  [87 %-100 %] 99 %      61.2 kg (134 lb 14.7 oz); Body mass index is 21.13 kg/m².    Intake/Output Summary (Last 24 hours) at 18 1402  Last data filed at 18 0900   Gross per 24 hour   Intake             2508 ml   Output             3950 ml   Net            -1442 ml       Physical Exam   Constitutional: He is oriented to person, place, and time. He appears well-developed.   HENT:   Head: Normocephalic.   Eyes: Pupils are equal, round, and reactive to light.   Cardiovascular: Normal rate and regular rhythm.    No murmur heard.  Pulmonary/Chest: Effort normal. He has decreased breath sounds. He has no wheezes. He has rales (mild scattered lower lung fields).   Abdominal: Soft. Bowel sounds are normal. There is no tenderness.   Musculoskeletal: He exhibits no edema.   Neurological: He is alert and oriented to person, place, and time.   Skin: Skin is warm.     Results Review:    I have reviewed the laboratory and imaging data since the last note by [facility] physician.  My annotations are noted in assessment and plan.    Medication Review:  I have reviewed the current MAR.  My annotations are noted in assessment and plan.      dextrose 50 mL/hr Last Rate: 50 mL/hr (18 1309)   heparin (porcine) 12 Units/kg/hr Last Rate: 17 Units/kg/hr (18 1320)     Plan   PCCM Problems  Resuscitated cardiac arrest  Acute respiratory failure, vent liberated 4/15, optiflow, HF cannula, NC  Cardiogenic shock  Anoxic encephalopathy  STEMI  LV thrombus  Shock  liver  ABENA  ? Pneumonia - RAUOLTELLA !  COPD exacerbation  Relevant Medical Diagnoses  Current smoker    Plan of Treatment  Improving. Weaning O2.    Pulmonary infiltrates.  Sputum growing Raoultella.  Unsure if significant as no fever.  Finish out Ceftriaxone.    Wheezing resolved.  Likely COPD exacerbation.  Treat with steroids and nebs.  Advised to quit smoking.    Hemodynamics improved.  Now off milrinone.  Limited echo as per cardiology - EF 35%.    Heparin for LV thrombus.  Continue same. Warfarin.    Shock liver improving.    Passed swallow test.    Transfer.    Cb Hercules MD  04/18/18  2:02 PM    Part of this note may be an electronic transcription/translation of spoken language to printed text using the Dragon Dictation System.

## 2018-04-19 NOTE — PLAN OF CARE
Problem: Patient Care Overview  Goal: Plan of Care Review   04/19/18 0906   OTHER   Outcome Summary Continued balance and gait pattern impairments during PT today. Slow, unsteady gait using walker, w/ few minor LOB episodes - pt self corrected but appeared unaware of deficits. Required frequent cues for focus/participation. Currently recommend DC to SNU.

## 2018-04-19 NOTE — PROGRESS NOTES
"Negro Hall  1955 62 y.o.  [id number]      Patient Care Team:  No Known Provider as PCP - General    CC: Anterior STEMI, cardiac arrest    Interval History: Doing better, off oxygen and planes a lot of right leg pain    Objective   Vital Signs  Temp:  [97.6 °F (36.4 °C)-98.2 °F (36.8 °C)] 97.8 °F (36.6 °C)  Heart Rate:  [] 101  Resp:  [16-20] 16  BP: (102-144)/(59-99) 107/78    Intake/Output Summary (Last 24 hours) at 04/19/18 1524  Last data filed at 04/19/18 1349   Gross per 24 hour   Intake              320 ml   Output             1520 ml   Net            -1200 ml     Flowsheet Rows    Flowsheet Row First Filed Value   Admission Height 170.2 cm (67\") Documented at 04/12/2018 2153   Admission Weight 62.6 kg (138 lb) Documented at 04/12/2018 2153          Physical Exam:   General Appearance:    Alert,oriented, in no acute distress   Lungs:     Clear to auscultation,BS are equal    Heart:    Normal S1 and S2, RRR without murmur, gallop or rub   HEENT:    Sclera are clear, no JVD or adenopathy   Abdomen:     Normal bowel sounds, soft non-tender, non-distended, no HSM   Extremities:   Moves all extremities well, no edema, no cyanosis, no             Redness, no rash, no pulses in his legs      Medication Review:        aspirin 81 mg Oral Daily   carvedilol 3.125 mg Oral Q12H   ceftriaxone 1 g Intravenous Q24H   clopidogrel 75 mg Oral Daily   famotidine 20 mg Nasogastric Daily   ipratropium-albuterol 3 mL Nebulization 4x Daily - RT   [START ON 4/20/2018] potassium chloride 40 mEq Oral Daily   predniSONE 40 mg Oral Daily With Breakfast   torsemide 20 mg Oral Daily   warfarin 5 mg Oral Daily       heparin (porcine) 12 Units/kg/hr Last Rate: 17 Units/kg/hr (04/18/18 1512)         I reviewed the patient's new clinical results.  I personally viewed and interpreted the patient's EKG/Telemetry data    Assessment/Plan  Active Hospital Problems (** Indicates Principal Problem)    Diagnosis Date Noted   • Cardiac " arrest [I46.9] 04/13/2018      Resolved Hospital Problems    Diagnosis Date Noted Date Resolved   No resolved problems to display.       Cardiac arrest in the setting of a STEMI treated with hypothermia and angioplasty drug-eluting stenting to the LAD and ramus.  Has an LV thrombus but is really making a great recovery is now off the CCU he is complaining of right leg pain on the x-ray is right leg and hip and will get some ABIs on his leg I don't really see anything grossly abnormal but he doesn't have great pulses in his legs but had some Coreg to his regimen continue to anticoagulate him his LV thrombus of his stay on his Plavix      Lennox Max MD  04/19/18  3:24 PM

## 2018-04-19 NOTE — PLAN OF CARE
Problem: Patient Care Overview  Goal: Plan of Care Review  Outcome: Ongoing (interventions implemented as appropriate)   04/18/18 2231 04/19/18 0415   Coping/Psychosocial   Plan of Care Reviewed With patient --    Plan of Care Review   Progress --  no change   OTHER   Outcome Summary --  Pt transferred from CCU tonight. Safety maintained. Sinus rhythm and sinus tach on monitor. on 3 L NC. Safety maintained. Bed alarm on for falls protocol. Pt had run of SVT. Continue to monitor.      Goal: Individualization and Mutuality  Outcome: Ongoing (interventions implemented as appropriate)    Goal: Discharge Needs Assessment  Outcome: Ongoing (interventions implemented as appropriate)    Goal: Interprofessional Rounds/Family Conf  Outcome: Ongoing (interventions implemented as appropriate)      Problem: Fall Risk (Adult)  Goal: Identify Related Risk Factors and Signs and Symptoms  Outcome: Outcome(s) achieved Date Met: 04/19/18    Goal: Absence of Fall  Outcome: Ongoing (interventions implemented as appropriate)      Problem: Skin Injury Risk (Adult)  Goal: Identify Related Risk Factors and Signs and Symptoms  Outcome: Outcome(s) achieved Date Met: 04/19/18    Goal: Skin Health and Integrity  Outcome: Ongoing (interventions implemented as appropriate)      Problem: Cardiac Catheterization (Diagnostic/Interventional) (Adult)  Goal: Signs and Symptoms of Listed Potential Problems Will be Absent, Minimized or Managed (Cardiac Catheterization)  Outcome: Outcome(s) achieved Date Met: 04/19/18    Goal: Anesthesia/Sedation Recovery  Outcome: Ongoing (interventions implemented as appropriate)      Problem: Pain, Acute (Adult)  Goal: Identify Related Risk Factors and Signs and Symptoms  Outcome: Outcome(s) achieved Date Met: 04/19/18    Goal: Acceptable Pain Control/Comfort Level  Outcome: Ongoing (interventions implemented as appropriate)

## 2018-04-19 NOTE — PROGRESS NOTES
Continued Stay Note  [facility]     Patient Name: Negro Hall  MRN: [id number]  Today's Date: 4/19/2018    Admit Date: 4/12/2018          Discharge Plan     Row Name 04/19/18 1216       Plan    Plan referral pending for [facility]    Patient/Family in Agreement with Plan yes    Plan Comments Spoke with Joanne/Becca and at this time referral is still pending for [facility]. PT is following pt and it is noted that OT has been consulted today to eval and treat. CCP will continue to follow.............JW              Discharge Codes    No documentation.           Cathy Rosenthal RN

## 2018-04-19 NOTE — PROGRESS NOTES
[facility]  Phone: [phone]  Cb Hercules MD    Subjective:  LOS: 6    Eager to get home. Ambulating with PT. Denies complaints.    Objective   Vital Signs past 24hrs    Temp range: Temp (24hrs), Av.9 °F (36.6 °C), Min:97.6 °F (36.4 °C), Max:98.2 °F (36.8 °C)    BP range: BP: (107-144)/(72-99) 107/78  Pulse range: Heart Rate:  [] 101  Resp rate range: Resp:  [16-20] 16    Device (Oxygen Therapy): room airFlow (L/min):  [3-4] 3  Oxygen range:SpO2:  [85 %-98 %] 92 %      58.7 kg (129 lb 8 oz); Body mass index is 20.28 kg/m².    Intake/Output Summary (Last 24 hours) at 18 1840  Last data filed at 18 1612   Gross per 24 hour   Intake              320 ml   Output             1600 ml   Net            -1280 ml       Physical Exam   Constitutional: He is oriented to person, place, and time. He appears well-developed.   HENT:   Head: Normocephalic.   Eyes: Pupils are equal, round, and reactive to light.   Cardiovascular: Normal rate and regular rhythm.    No murmur heard.  Pulmonary/Chest: Effort normal. He has decreased breath sounds. He has no wheezes. He has no rales.   Abdominal: Soft. Bowel sounds are normal. There is no tenderness.   Musculoskeletal: He exhibits no edema.   Neurological: He is alert and oriented to person, place, and time.   Skin: Skin is warm.     Results Review:    I have reviewed the laboratory and imaging data since the last note by [facility] physician.  My annotations are noted in assessment and plan.    Medication Review:  I have reviewed the current MAR.  My annotations are noted in assessment and plan.      heparin (porcine) 12 Units/kg/hr Last Rate: 17 Units/kg/hr (18 1657)     Plan   PCCM Problems  Resuscitated cardiac arrest  Acute respiratory failure, vent liberated 4/15, optiflow, HF cannula, NC  Cardiogenic shock  Anoxic encephalopathy  STEMI  LV thrombus  Shock liver  ABENA  ? Pneumonia - RAUOLTELLA !  COPD exacerbation  Relevant Medical  Diagnoses  Current smoker    Plan of Treatment  Improving. Weaning O2. Walking oximetry tomorrow.    Pulmonary infiltrates.  Sputum growing Raoultella.  Unsure if significant as no fever.  Finish out Ceftriaxone. CxR in AM.    Wheezing resolved.  Likely COPD exacerbation.  Treat with steroids and nebs.  Advised to quit smoking.    Hemodynamics improved.  Limited echo as per cardiology - EF 35%.    Heparin for LV thrombus.  Continue same. Warfarin.    Shock liver improving.    Cb Hercules MD  04/19/18  6:40 PM    Part of this note may be an electronic transcription/translation of spoken language to printed text using the Dragon Dictation System.

## 2018-04-19 NOTE — PLAN OF CARE
Problem: Patient Care Overview  Goal: Plan of Care Review  Outcome: Ongoing (interventions implemented as appropriate)   04/19/18 1343 04/19/18 1835   Coping/Psychosocial   Plan of Care Reviewed With patient --    Plan of Care Review   Progress --  no change   OTHER   Outcome Summary --  VSS; pt. is recieving an heparin drip; pt. is oriented, but slow to understand sometimes; daily weight      Goal: Individualization and Mutuality  Outcome: Ongoing (interventions implemented as appropriate)   04/19/18 1835   Individualization   Patient Specific Interventions monitor labs and vitals     Goal: Discharge Needs Assessment  Outcome: Ongoing (interventions implemented as appropriate)    Goal: Interprofessional Rounds/Family Conf  Outcome: Ongoing (interventions implemented as appropriate)      Problem: Fall Risk (Adult)  Goal: Absence of Fall  Outcome: Ongoing (interventions implemented as appropriate)      Problem: Skin Injury Risk (Adult)  Goal: Skin Health and Integrity  Outcome: Ongoing (interventions implemented as appropriate)      Problem: Pain, Acute (Adult)  Goal: Acceptable Pain Control/Comfort Level  Outcome: Ongoing (interventions implemented as appropriate)

## 2018-04-19 NOTE — THERAPY TREATMENT NOTE
Acute Care - Physical Therapy Treatment Note  [facility]     Patient Name: Negro Hall  : 1955  MRN: [id number]  Today's Date: 2018  Onset of Illness/Injury or Date of Surgery: 18          Admit Date: 2018    Visit Dx:    ICD-10-CM ICD-9-CM   1. Cardiac arrest I46.9 427.5   2. STEMI involving left anterior descending coronary artery I21.02 410.10   3. Difficulty walking R26.2 719.7     Patient Active Problem List   Diagnosis   • Cardiac arrest       Therapy Treatment          Rehabilitation Treatment Summary     Row Name 18 0833             Treatment Time/Intention    Discipline physical therapist  -AR      Document Type therapy note (daily note)  -AR      Subjective Information complains of;weakness  -AR      Mode of Treatment physical therapy  -AR      Patient Effort good  -AR      Existing Precautions/Restrictions fall  -AR      Recorded by [AR] Claudette Lopez, PT 18 0918 09      Row Name 18 0833             Vital Signs    O2 Delivery Pre Treatment room air  -AR      Recorded by [AR] Claudette Lopez, PT 18 0900 18 09      Row Name 18 0833             Cognitive Assessment/Intervention- PT/OT    Affect/Mental Status (Cognitive) confused  -AR      Orientation Status (Cognition) oriented to;person;place  -AR      Safety Deficit (Cognitive) mild deficit  -AR      Personal Safety Interventions fall prevention program maintained;gait belt;muscle strengthening facilitated  -AR      Recorded by [AR] Claudette Lopez, PT 18 0906 18 09      Row Name 18 0833             Bed Mobility Assessment/Treatment    Supine-Sit [place] (Bed Mobility) supervision  -AR      Sit-Supine [place] (Bed Mobility) supervision  -AR      Assistive Device (Bed Mobility) bed rails;head of bed elevated  -AR      Recorded by [AR] Claudette Lopez, PT 18 0918 09      Row Name 18 0833             Transfer  Assessment/Treatment    Sit-Stand [place] (Transfers) contact guard  -AR      Stand-Sit [place] (Transfers) contact guard  -AR      Recorded by [AR] Claudette Lopez, PT 04/19/18 0906 04/19/18 0906      Row Name 04/19/18 0833             Sit-Stand Transfer    Assistive Device (Sit-Stand Transfers) walker, front-wheeled  -AR      Recorded by [AR] Claudette Lopez, PT 04/19/18 0906 04/19/18 0906      Row Name 04/19/18 0833             Stand-Sit Transfer    Assistive Device (Stand-Sit Transfers) walker, front-wheeled  -AR      Recorded by [AR] Claudette Lopez, PT 04/19/18 0906 04/19/18 0906      Row Name 04/19/18 0833             Gait/Stairs Assessment/Training    [place] Level (Gait) minimum assist (75% patient effort)  -AR      Assistive Device (Gait) walker, front-wheeled  -AR      Distance in Feet (Gait) 150  -AR      Pattern (Gait) step-through  -AR      Deviations/Abnormal Patterns (Gait) renetta decreased;festinating/shuffling;gait speed decreased  -AR      Bilateral Gait Deviations heel strike decreased  -AR      Comment (Gait/Stairs) slow shuffling pace, unsteady w/ few minor LOB - pt self corrected  -AR      Recorded by [AR] Claudette Lopez, PT 04/19/18 0906 04/19/18 0906      Row Name 04/19/18 0833             Positioning and Restraints    Pre-Treatment Position in bed  -AR      Post Treatment Position bed  -AR      In Bed supine;call light within reach;encouraged to call for assist;exit alarm on  -AR      Recorded by [AR] Claudette Lopez, PT 04/19/18 0906 04/19/18 0906      Row Name 04/19/18 0833             Pain Scale: Numbers Pre/Post-Treatment    Pain Scale: Numbers, Pretreatment 0/10 - no pain  -AR      Pain Scale: Numbers, Post-Treatment 0/10 - no pain  -AR      Recorded by [AR] Claudette Lopez, PT 04/19/18 0906 04/19/18 0906      Row Name 04/19/18 0833             Outcome Summary/Treatment Plan (PT)    Anticipated Discharge Disposition (PT) skilled nursing facility (SNF);home with home  health care   pending progress/level of assist available at home  -AR      Recorded by [AR] Claudette Lopez, PT 04/19/18 0906 04/19/18 0906        User Key  (r) = Recorded By, (t) = Taken By, (c) = Cosigned By    Initials Name Effective Dates Discipline    AR Claudette Lopez, PT 04/03/18 -  PT                     Physical Therapy Education     Title: PT OT SLP Therapies (Active)     Topic: Physical Therapy (Active)     Point: Mobility training (Active)    Learning Progress Summary     Learner Status Readiness Method Response Comment Documented by    Patient Active Acceptance E NR  AR 04/19/18 0906     Done Acceptance E,TB VU,DU,NR  PV 04/18/18 1625          Point: Home exercise program (Active)    Learning Progress Summary     Learner Status Readiness Method Response Comment Documented by    Patient Active Acceptance E NR  AR 04/19/18 0906     Done Acceptance E,TB VU,DU,NR  PV 04/18/18 1625          Point: Body mechanics (Active)    Learning Progress Summary     Learner Status Readiness Method Response Comment Documented by    Patient Active Acceptance E NR  AR 04/19/18 0906     Done Acceptance E,TB VU,DU,NR  PV 04/18/18 1625          Point: Precautions (Active)    Learning Progress Summary     Learner Status Readiness Method Response Comment Documented by    Patient Active Acceptance E NR  AR 04/19/18 0906     Done Acceptance E,TB VU,DU,NR  PV 04/18/18 1625                      User Key     Initials Effective Dates Name Provider Type Discipline    AR 04/03/18 -  Claudette Lopez, PT Physical Therapist PT    PV 03/07/18 -  Terrance Shea, PT Student PT Student PT                    PT Recommendation and Plan  Anticipated Discharge Disposition (PT): skilled nursing facility (SNF), home with home health care (pending progress/level of assist available at home)  Outcome Summary/Treatment Plan (PT)  Anticipated Discharge Disposition (PT): skilled nursing facility (SNF), home with home health care (pending progress/level of  assist available at home)  Outcome Summary: Continued balance and gait pattern impairments during PT today.  Slow, unsteady gait using walker, w/ few minor LOB episodes - pt self corrected but appeared unaware of deficits.  Required frequent cues for focus/participation.  Currently recommend DC to SNU.           Outcome Measures     Row Name 04/19/18 0900 04/18/18 1600          How much help from another person do you currently need...    Turning from your back to your side while in flat bed without using bedrails? 4  -AR 4  -CH (r) PV (t) CH (c)     Moving from lying on back to sitting on the side of a flat bed without bedrails? 4  -AR 3  -CH (r) PV (t) CH (c)     Moving to and from a bed to a chair (including a wheelchair)? 3  -AR 3  -CH (r) PV (t) CH (c)     Standing up from a chair using your arms (e.g., wheelchair, bedside chair)? 3  -AR 3  -CH (r) PV (t) CH (c)     Climbing 3-5 steps with a railing? 3  -AR 3  -CH (r) PV (t) CH (c)     To walk in hospital room? 3  -AR 3  -CH (r) PV (t) CH (c)     AM-PAC 6 Clicks Score 20  -AR 19  -CH (r) PV (t)        Functional Assessment    Outcome Measure Options  -- AM-PAC 6 Clicks Basic Mobility (PT)  -CH (r) PV (t) CH (c)       User Key  (r) = Recorded By, (t) = Taken By, (c) = Cosigned By    Initials Name Provider Type    ROBB Mcadams, PT Physical Therapist    KRISTINA Lopez PT Physical Therapist    RODRIGO Shea, PT Student PT Student           Time Calculation:         PT Charges     Row Name 04/19/18 0907             Time Calculation    Start Time 0834  -AR      Stop Time 0849  -AR      Time Calculation (min) 15 min  -AR      PT Received On 04/19/18  -AR      PT - Next Appointment 04/20/18  -AR        User Key  (r) = Recorded By, (t) = Taken By, (c) = Cosigned By    Initials Name Provider Type    KRISTINA Lopez PT Physical Therapist          Therapy Charges for Today     Code Description Service Date Service Provider Modifiers Qty    [id number]   PT THER PROC EA 15 MIN 4/19/2018 Claudette Lopez, PT GP 1          PT G-Codes  Outcome Measure Options: AM-PAC 6 Clicks Basic Mobility (PT)    Claudette Lopez PT  4/19/2018

## 2018-04-19 NOTE — PROGRESS NOTES
"   LOS: 6 days    Patient Care Team:  No Known Provider as PCP - General    Chief Complaint:    Chief Complaint   Patient presents with   • Arm Pain       Subjective  Follow UP ABENA    Interval History:   Confused.  Says he needs to go home.  Has everything at home that he needs.  Hungry.     Objective     Vital Signs  Temp:  [97.7 °F (36.5 °C)-98.2 °F (36.8 °C)] 98.2 °F (36.8 °C)  Heart Rate:  [] 73  Resp:  [20] 20  BP: (102-144)/(59-99) 144/99    Flowsheet Rows    Flowsheet Row First Filed Value   Admission Height 170.2 cm (67\") Documented at 04/12/2018 2153   Admission Weight 62.6 kg (138 lb) Documented at 04/12/2018 2153          No intake/output data recorded.  I/O last 3 completed shifts:  In: 2508 [P.O.:360; I.V.:1248; IV Piggyback:900]  Out: 1720 [Urine:1720]    Intake/Output Summary (Last 24 hours) at 04/19/18 0737  Last data filed at 04/19/18 0105   Gross per 24 hour   Intake              360 ml   Output              770 ml   Net             -410 ml       Physical Exam:  General Appearance :Confused. Nasal o2.   Skin: warm and dry  HEENT: Nonicteric sclerae, oral mucosa  moist  Neck: No JVD, trachea midline  Lungs: Crackles left base, no wheezing.   Heart: RRR, normal S1 and S2, no S3, no rub  Abdomen: soft, non-tender, +bs  Extremities: no edema, cyanosis or clubbing       Results Review:      Results from last 7 days  Lab Units 04/19/18  0524 04/18/18  0410 04/17/18 2000 04/17/18  0350  04/16/18  0406 04/15/18  0454   SODIUM mmol/L 144 149*  --  144  --  153* 146*   POTASSIUM mmol/L 3.0* 3.8 3.2* 3.6  < > 3.3* 3.6   CHLORIDE mmol/L 103 110*  --  109*  --  116* 111*   CO2 mmol/L 28.4 23.5  --  24.1  --  20.8* 17.2*   BUN mg/dL 29* 26*  --  23  --  37* 62*   CREATININE mg/dL 0.97 1.09  --  1.03  --  1.34* 2.50*   CALCIUM mg/dL 8.2* 7.8*  --  7.7*  --  7.7* 7.4*   BILIRUBIN mg/dL  --  0.9  --   --   --  0.9 0.6   ALK PHOS U/L  --  98  --   --   --  82 76   ALT (SGPT) U/L  --  1,235*  --   --   --  " 2,648* 4,166*   AST (SGOT) U/L  --  144*  --   --   --  1,101* 3,002*   GLUCOSE mg/dL 124* 115*  --  160*  --  86 121*   < > = values in this interval not displayed.    Estimated Creatinine Clearance: 63.9 mL/min (by C-G formula based on SCr of 0.97 mg/dL).      Results from last 7 days  Lab Units 04/19/18  0524 04/18/18  0410 04/17/18  2000 04/17/18  0350   MAGNESIUM mg/dL  --  2.2 2.2 2.5*   PHOSPHORUS mg/dL 3.3 3.0 2.9 1.4*         Results from last 7 days  Lab Units 04/17/18  0350 04/16/18  0406 04/14/18  2329   URIC ACID mg/dL 5.1 7.4* 11.4*         Results from last 7 days  Lab Units 04/19/18  0524 04/18/18  0410 04/17/18  0350 04/16/18  0406 04/15/18  0454   WBC 10*3/mm3 18.62* 17.53* 14.24* 15.77* 21.06*   HEMOGLOBIN g/dL 12.5* 12.8* 11.4* 12.6* 13.1*   PLATELETS 10*3/mm3 170 142 107* 97* 147         Results from last 7 days  Lab Units 04/19/18  0524 04/18/18  0410 04/17/18  1645 04/13/18  0532   INR  1.76* 1.25* 1.19* 1.29*         Imaging Results (last 24 hours)     Procedure Component Value Units Date/Time    FL Video Swallow [[id number]] Collected:  04/18/18 1332     Updated:  04/18/18 1418    Narrative:       VIDEO ESOPHAGRAM     HISTORY: Dysphagia.     FINDINGS: The patient exhibits mild oropharyngeal dysphagia with small  to moderate residuals, particularly with pureed and dry solids. There  was no penetration or aspiration. Please also see the speech therapist's  report.     2 images were obtained and the fluoroscopy time measures 2 minutes and  49 seconds.     This report was finalized on 4/18/2018 2:15 PM by Dr. Howard Witt MD.             aspirin 81 mg Oral Daily   ceftriaxone 1 g Intravenous Q24H   clopidogrel 75 mg Oral Daily   famotidine 20 mg Nasogastric Daily   ipratropium-albuterol 3 mL Nebulization 4x Daily - RT   predniSONE 40 mg Oral Daily With Breakfast   torsemide 20 mg Oral Daily   warfarin 5 mg Oral Daily       dextrose 50 mL/hr Last Rate: 50 mL/hr (04/18/18 1309)   heparin  (porcine) 12 Units/kg/hr Last Rate: 17 Units/kg/hr (04/18/18 1512)       Medication Review:   Current Facility-Administered Medications   Medication Dose Route Frequency Provider Last Rate Last Dose   • acetaminophen (TYLENOL) tablet 650 mg  650 mg Oral Q4H PRN Lennox Max MD       • aspirin EC tablet 81 mg  81 mg Oral Daily West Cobian MD   81 mg at 04/18/18 0950   • cefTRIAXone (ROCEPHIN) IVPB 1 g  1 g Intravenous Q24H Cb Hercules  mL/hr at 04/19/18 0620 1 g at 04/19/18 0620   • clopidogrel (PLAVIX) tablet 75 mg  75 mg Oral Daily Lennox Max MD   75 mg at 04/18/18 0951   • dextrose (D50W) solution 25-50 mL  25-50 mL Intravenous Q30 Min PRN Fide Alex MD   25 mL at 04/13/18 2040   • dextrose (D5W) 5 % infusion  50 mL/hr Intravenous Continuous Kimberly Perez MD 50 mL/hr at 04/18/18 1309 50 mL/hr at 04/18/18 1309   • famotidine (PEPCID) tablet 20 mg  20 mg Nasogastric Daily Fide Alex MD   20 mg at 04/18/18 1119   • heparin (porcine) 5000 UNIT/ML injection 1,900-3,800 Units  30-60 Units/kg Intravenous Q6H PRN Lennox Max MD   3,800 Units at 04/17/18 1319   • heparin 21330 units/250 mL (100 units/mL) in 0.45 % NaCl infusion  12 Units/kg/hr Intravenous Titrated Lennox Max MD 10.64 mL/hr at 04/18/18 1512 17 Units/kg/hr at 04/18/18 1512   • HYDROcodone-acetaminophen (NORCO) 5-325 MG per tablet 1 tablet  1 tablet Oral Q4H PRN Lennox Max MD   1 tablet at 04/19/18 0048   • ipratropium-albuterol (DUO-NEB) nebulizer solution 3 mL  3 mL Nebulization Q2H PRN Cb Hercules MD       • ipratropium-albuterol (DUO-NEB) nebulizer solution 3 mL  3 mL Nebulization 4x Daily - RT Cb Hercules MD   3 mL at 04/18/18 2109   • magnesium hydroxide (MILK OF MAGNESIA) suspension 2400 mg/10mL 10 mL  10 mL Oral Daily PRN Lennox Max MD       • ondansetron (ZOFRAN) tablet 4 mg  4 mg Oral Q6H PRN Lennox Max MD        Or   • ondansetron ODT (ZOFRAN-ODT) disintegrating tablet 4 mg  4 mg Oral  Q6H PRN Lennox Max MD        Or   • ondansetron (ZOFRAN) injection 4 mg  4 mg Intravenous Q6H PRN Lennox Max MD   4 mg at 04/13/18 1421   • potassium chloride (MICRO-K) CR capsule 40 mEq  40 mEq Oral PRN Meng Mann MD        Or   • potassium chloride (KLOR-CON) packet 40 mEq  40 mEq Oral PRN Meng Mann MD        Or   • potassium chloride 10 mEq in 100 mL IVPB  10 mEq Intravenous Q1H PRN Meng Mann  mL/hr at 04/18/18 0444 10 mEq at 04/18/18 0444   • predniSONE (DELTASONE) tablet 40 mg  40 mg Oral Daily With Breakfast Cb Hercules MD   40 mg at 04/18/18 0951   • sodium chloride 0.9 % flush 10 mL  10 mL Intravenous PRN Isidro Albright MD       • torsemide (DEMADEX) tablet 20 mg  20 mg Oral Daily Kimberly Perez MD   20 mg at 04/18/18 0950   • warfarin (COUMADIN) tablet 5 mg  5 mg Oral Daily Lennox Max MD   5 mg at 04/18/18 1800       Assessment/Plan   1.  Acute kidney injury associated  cardiac arrest,  hypotension and cardiogenic shock.  Creatinine stable.  Sodium better. Phos replete.   2.   Metabolic acidosis, resolved.   3.  In-hospital cardiac arrest, with anterolateral STEMI, and cardiogenic shock.    4.  Left ventricular apical thrombus on IV heparin.  5.  Ischemic cardiomyopathy.  6.  Shock liver, improving . Off statin until normalized.   7.  Respiratory failure, extubated. Still on nasal 02.   8.  Leukocytosis , WBC up again today. Rocephin for Raoultella in sputum.   9. TCP.  Platelets have stabilized.   10. COPD exacerbation. Steroids, mininebs. PNA, Raoultella from sputum. Rocephin to start today. SP 4 doses Cefepime.   11. Encephalopathy    Plan:  1. Replace K per protocol and check mg.   2. Will sign off. Please call with questions.           Kimberly Perez MD  04/19/18  7:37 AM

## 2018-04-19 NOTE — NURSING NOTE
Initial eval started, chart reviewed. Will f/u for OT eval to proceed with evaluation of appropriateness, may be more suited to a subacute rehab stay.    Gemma Roman RN  Rehab Admissions Nurse  [phone]

## 2018-04-20 NOTE — SIGNIFICANT NOTE
04/20/18 1328   Rehab Time/Intention   Evaluation Not Performed patient unavailable for evaluation  (Pt now in vascular 1328)   Rehab Treatment   Discipline occupational therapist

## 2018-04-20 NOTE — PLAN OF CARE
Problem: Nutrition, Imbalanced: Inadequate Oral Intake (Adult)  Intervention: Promote/Optimize Nutrition   04/20/18 1437   Nutrition Interventions   Oral Nutrition Promotion calorie dense liquids provided;calorie dense foods provided;nutritional therapy counseling provided

## 2018-04-20 NOTE — PROGRESS NOTES
I was called to bedside for rapid response. Patient was noted to have a syncopal episode while in the shower and not on telemetry. He remembers feeling dizzy prior to episode. He was returned to bed and initial SBP was in the 80's. He is currently resting in bed without symptoms. Fingers on bilateral hands are white to knuckles. No cap refill. Radial pulses 2+.     Stop carvedilol and lisinopril. Will give cautious hydration over several hours. Hold off on further diuresis until reassessed in the morning.     Discussed with Dr. Max.    Isabelle Hennessy, APRN  04/20/18  3:55 PM

## 2018-04-20 NOTE — PROGRESS NOTES
Continued Stay Note  [facility]     Patient Name: Negro Hall  MRN: [id number]  Today's Date: 4/20/2018    Admit Date: 4/12/2018          Discharge Plan     Row Name 04/20/18 1030       Plan    Plan Comments Per Joanne hannah/ Lissa, unable to accept at this time on PT alone due to too high functioning/not skillable for PT alone.  Awaiting OT eval- if skilled need is identified.  CCP to follow-up and assist.                        Diamond Bliss RN

## 2018-04-20 NOTE — THERAPY TREATMENT NOTE
Acute Care - Physical Therapy Treatment Note  [facility]     Patient Name: Negro Hall  : 1955  MRN: [id number]  Today's Date: 2018  Onset of Illness/Injury or Date of Surgery: 18          Admit Date: 2018    Visit Dx:    ICD-10-CM ICD-9-CM   1. Cardiac arrest I46.9 427.5   2. STEMI involving left anterior descending coronary artery I21.02 410.10   3. Difficulty walking R26.2 719.7     Patient Active Problem List   Diagnosis   • Cardiac arrest       Therapy Treatment          Rehabilitation Treatment Summary     Row Name 1833             Treatment Time/Intention    Discipline physical therapist  -AR      Document Type therapy note (daily note)  -AR      Subjective Information complains of;pain  -AR      Mode of Treatment physical therapy  -AR      Patient Effort good  -AR      Existing Precautions/Restrictions fall  -AR      Recorded by [AR] Claudette Lopez, PT 18 0939 1839      Row Name 18             Vital Signs    O2 Delivery Pre Treatment room air  -AR      Recorded by [AR] Claudette Lopez, PT 18 0939 1839      Row Name 1833             Cognitive Assessment/Intervention- PT/OT    Affect/Mental Status (Cognitive) confused  -AR      Orientation Status (Cognition) oriented to;person;place  -AR      Recorded by [AR] Claudette Lopez, PT 18 0939 18      Row Name 1833             Bed Mobility Assessment/Treatment    Supine-Sit [place] (Bed Mobility) supervision;verbal cues  -AR      Sit-Supine [place] (Bed Mobility) minimum assist (75% patient effort)   for R LE  -AR      Assistive Device (Bed Mobility) bed rails;head of bed elevated  -AR      Recorded by [AR] Claudette Lopez, PT 18 0939 18      Row Name 18             Transfer Assessment/Treatment    Sit-Stand [place] (Transfers) contact guard  -AR      Stand-Sit [place] (Transfers) contact guard  -AR       Recorded by [AR] Claudette Lopez, PT 04/20/18 0939 04/20/18 0939      Row Name 04/20/18 0933             Sit-Stand Transfer    Assistive Device (Sit-Stand Transfers) walker, front-wheeled  -AR      Recorded by [AR] Claudette Lopez, PT 04/20/18 0939 04/20/18 0939      Row Name 04/20/18 0933             Stand-Sit Transfer    Assistive Device (Stand-Sit Transfers) walker, front-wheeled  -AR      Recorded by [AR] Claudette Lopez, PT 04/20/18 0939 04/20/18 0939      Row Name 04/20/18 0933             Gait/Stairs Assessment/Training    [place] Level (Gait) contact guard  -AR      Assistive Device (Gait) walker, front-wheeled  -AR      Distance in Feet (Gait) 130  -AR      Pattern (Gait) swing-through  -AR      Deviations/Abnormal Patterns (Gait) renetta decreased;festinating/shuffling;gait speed decreased  -AR      Bilateral Gait Deviations forward flexed posture;heel strike decreased  -AR      Recorded by [AR] Claudette Lopez, PT 04/20/18 0939 04/20/18 0939      Row Name 04/20/18 0933             Positioning and Restraints    Pre-Treatment Position in bed  -AR      Post Treatment Position bed  -AR      In Bed supine;call light within reach;encouraged to call for assist;exit alarm on  -AR      Recorded by [AR] Claudette Lopez, PT 04/20/18 0939 04/20/18 0939      Row Name 04/20/18 0933             Pain Assessment    Additional Documentation Pain Scale: Word Pre/Post-Treatment (Group)  -AR      Recorded by [AR] Claudette Lopez, PT 04/20/18 0939 04/20/18 0939      Row Name 04/20/18 0933             Pain Scale: Word Pre/Post-Treatment    Pain: Word Scale, Pretreatment 4 - moderate pain  -AR      Pain: Word Scale, Post-Treatment 4 - moderate pain  -AR      Pre/Post Treatment Pain Comment repositioned  -AR      Recorded by [AR] Claudette Lopez, PT 04/20/18 0939 04/20/18 0939      Row Name 04/20/18 0933             Outcome Summary/Treatment Plan (PT)    Anticipated Discharge Disposition (PT) skilled nursing facility (SNF)   -AR      Recorded by [AR] Claudette Lopez, PT 04/20/18 0939 04/20/18 0939        User Key  (r) = Recorded By, (t) = Taken By, (c) = Cosigned By    Initials Name Effective Dates Discipline    AR Claudette Lopez, PT 04/03/18 -  PT                     Physical Therapy Education     Title: PT OT SLP Therapies (Active)     Topic: Physical Therapy (Active)     Point: Mobility training (Active)    Learning Progress Summary     Learner Status Readiness Method Response Comment Documented by    Patient Active Acceptance E NR  AR 04/20/18 0940     Active Acceptance E NR  AR 04/19/18 0906     Done Acceptance E,TB VU,DU,NR  PV 04/18/18 1625          Point: Home exercise program (Active)    Learning Progress Summary     Learner Status Readiness Method Response Comment Documented by    Patient Active Acceptance E NR  AR 04/20/18 0940     Active Acceptance E NR  AR 04/19/18 0906     Done Acceptance E,TB VU,DU,NR  PV 04/18/18 1625          Point: Body mechanics (Active)    Learning Progress Summary     Learner Status Readiness Method Response Comment Documented by    Patient Active Acceptance E NR  AR 04/20/18 0940     Active Acceptance E NR  AR 04/19/18 0906     Done Acceptance E,TB VU,DU,NR  PV 04/18/18 1625          Point: Precautions (Active)    Learning Progress Summary     Learner Status Readiness Method Response Comment Documented by    Patient Active Acceptance E NR  AR 04/20/18 0940     Active Acceptance E NR  AR 04/19/18 0906     Done Acceptance E,TB VU,DU,NR  PV 04/18/18 1625                      User Key     Initials Effective Dates Name Provider Type Discipline    AR 04/03/18 -  Claudette Lopez, PT Physical Therapist PT    PV 03/07/18 -  Terrance Shea, PT Student PT Student PT                    PT Recommendation and Plan  Anticipated Discharge Disposition (PT): skilled nursing facility (SNF)  Outcome Summary/Treatment Plan (PT)  Anticipated Discharge Disposition (PT): skilled nursing facility (SNF)  Outcome Summary: Slow  progress towards goals during PT.  Ambulated 130' w/ contact assist and RW, limited by fatigue/weakness and R LE pain.  Recommend DC to SNU.            Outcome Measures     Row Name 04/20/18 0900 04/19/18 0900 04/18/18 1600       How much help from another person do you currently need...    Turning from your back to your side while in flat bed without using bedrails? 4  -AR 4  -AR 4  -CH (r) PV (t) CH (c)    Moving from lying on back to sitting on the side of a flat bed without bedrails? 4  -AR 4  -AR 3  -CH (r) PV (t) CH (c)    Moving to and from a bed to a chair (including a wheelchair)? 3  -AR 3  -AR 3  -CH (r) PV (t) CH (c)    Standing up from a chair using your arms (e.g., wheelchair, bedside chair)? 3  -AR 3  -AR 3  -CH (r) PV (t) CH (c)    Climbing 3-5 steps with a railing? 2  -AR 3  -AR 3  -CH (r) PV (t) CH (c)    To walk in hospital room? 3  -AR 3  -AR 3  -CH (r) PV (t) CH (c)    AM-PAC 6 Clicks Score 19  -AR 20  -AR 19  -CH (r) PV (t)       Functional Assessment    Outcome Measure Options  --  -- AM-PAC 6 Clicks Basic Mobility (PT)  -CH (r) PV (t) CH (c)      User Key  (r) = Recorded By, (t) = Taken By, (c) = Cosigned By    Initials Name Provider Type    ROBB Mcadams, PT Physical Therapist    KRISTINA Lopez PT Physical Therapist    RODRIGO Shea, PT Student PT Student           Time Calculation:         PT Charges     Row Name 04/20/18 0940             Time Calculation    Start Time 0926  -AR      Stop Time 0940  -AR      Time Calculation (min) 14 min  -AR      PT Received On 04/20/18  -AR      PT - Next Appointment 04/21/18  -AR        User Key  (r) = Recorded By, (t) = Taken By, (c) = Cosigned By    Initials Name Provider Type    KRISTINA Lopez PT Physical Therapist          Therapy Charges for Today     Code Description Service Date Service Provider Modifiers Qty    16346333084 HC PT THER PROC EA 15 MIN 4/19/2018 Claudette Lopez, PT GP 1    39300011577 HC PT THER PROC EA 15 MIN  4/20/2018 Claudette Lopez, PT GP 1          PT G-Codes  Outcome Measure Options: AM-PAC 6 Clicks Basic Mobility (PT)    Claudette Lopez PT  4/20/2018

## 2018-04-20 NOTE — PROGRESS NOTES
Malnutrition Severity Assessment    Patient Name:  Negro Hall  YOB: 1955  MRN: [id number]  Admit Date:  4/12/2018    Patient meets criteria for : Severe malnutrition    Severe malnutrition. 13% weight loss in the past few weeks due to minimal po intake.  From nutrition focused physical exam, moderate muscle loss to temporalis, clavicle and acromion bone region; mild fat loss to thoracic and upper arm region.    Malnutrition Type: Acute Illness/Injury Malnutrition     Malnutrition Type (last 8 hours)      Malnutrition Severity Assessment     Row Name 04/20/18 1428       Malnutrition Severity Assessment    Malnutrition Type Acute Illness/Injury Malnutrition    Row Name 04/20/18 1428       Physical Signs of Malnutrition (Acute)    Muscle Wasting Moderate    Fat Loss Mild    Row Name 04/20/18 1428       Weight Status (Acute)    Weight Loss Severe (>5% / 1 mo)   13% weight loss in the past few weeks    Row Name 04/20/18 1428       Energy Intake Status (Acute)    Energy Intake Severe (< or equal to 50% / > or equal to 5d)    Row Name 04/20/18 1428       Criteria Met (Must meet criteria for severity in at least 2 of these categories: M Wasting, Fat Loss, Fluid, Secondary Signs, Wt. Status, Intake)    Patient meets criteria for  Severe malnutrition          Electronically signed by:  Maria Elena Argueta RD  04/20/18 2:33 PM

## 2018-04-20 NOTE — CODE DOCUMENTATION
"RRT called d/t pt having episode of not responding.  Pt had been taking a shower, accompanied, she states he \"slumped over\", not responding, primary RN states max assist back to bed. Upon arrival pt awake, alert. Cardiac monitor=SR. Pt does not remember incident. Primary RN placed call to dr aguirre. Spoke with Ignacia WISE. Awaiting return call.   "

## 2018-04-20 NOTE — PROGRESS NOTES
[facility]  Phone: [phone]  Cb Hercules MD    Subjective:  LOS: 7    Eager to get home. Ambulating with PT. Denies complaints.    Objective   Vital Signs past 24hrs    Temp range: Temp (24hrs), Av.1 °F (36.7 °C), Min:97.6 °F (36.4 °C), Max:98.4 °F (36.9 °C)    BP range: BP: ()/(56-87) 83/63  Pulse range: Heart Rate:  [] 86  Resp rate range: Resp:  [16-18] 16    Device (Oxygen Therapy): room air   Oxygen range:SpO2:  [92 %-100 %] 100 %      53.5 kg (117 lb 14.4 oz); Body mass index is 18.46 kg/m².    Intake/Output Summary (Last 24 hours) at 18 1811  Last data filed at 18 1621   Gross per 24 hour   Intake              600 ml   Output              250 ml   Net              350 ml       Physical Exam   Constitutional: He is oriented to person, place, and time. He appears well-developed.   HENT:   Head: Normocephalic.   Eyes: Pupils are equal, round, and reactive to light.   Cardiovascular: Normal rate and regular rhythm.    No murmur heard.  Pulmonary/Chest: Effort normal. He has decreased breath sounds. He has no wheezes. He has no rales.   Abdominal: Soft. Bowel sounds are normal. There is no tenderness.   Musculoskeletal: He exhibits no edema.   Right hip hurting after mobilization by aide this morning.   Neurological: He is alert and oriented to person, place, and time.   Skin: Skin is warm.     Results Review:    I have reviewed the laboratory and imaging data since the last note by [facility] physician.  My annotations are noted in assessment and plan.    Medication Review:  I have reviewed the current MAR.  My annotations are noted in assessment and plan.       Plan   PCCM Problems  Resuscitated cardiac arrest  Acute respiratory failure, vent liberated 4/15, optiflow, HF cannula, NC  Cardiogenic shock  Anoxic encephalopathy  STEMI  LV thrombus  Shock liver  ABENA  ? Pneumonia - RAUOLTELLA !  COPD exacerbation  Relevant Medical Diagnoses  Current smoker    Plan of  Treatment  Improving. Weaning O2. Walking oximetry not done - attempt tomorrow.    Pulmonary infiltrates.  Sputum growing Raoultella.  Unsure if significant as no fever.  Finish out abx - change to po for 2 more days. CxR now completely clear.    Wheezing resolved.  Likely COPD exacerbation.  Treat with steroids and nebs.  Add symbicort. Advised to quit smoking.    Acute STEMI with intervention. Heparin for LV thrombus.  Continue same. Warfarin.    Shock liver improving.    Discharge when OK with cardiology.    - Notes regarding RRT noted. I was not called. Cardiology has addressed and meds were adjusted. Observe for now.    Cb Hercules MD  04/20/18  6:11 PM    Part of this note may be an electronic transcription/translation of spoken language to printed text using the Dragon Dictation System.

## 2018-04-20 NOTE — PLAN OF CARE
Problem: Patient Care Overview  Goal: Plan of Care Review   04/20/18 2301   OTHER   Outcome Summary Slow progress towards goals during PT. Ambulated 130' w/ contact assist and RW, limited by fatigue/weakness and R LE pain. Recommend DC to SNU.

## 2018-04-20 NOTE — PROGRESS NOTES
"Negro Hall  1955 62 y.o.  [id number]      Patient Care Team:  No Known Provider as PCP - General    CC: Anterior STEMI, cardiac arrest    Interval History: Doing better, his main complaint is right leg    Objective   Vital Signs  Temp:  [97.6 °F (36.4 °C)-98.4 °F (36.9 °C)] 98.4 °F (36.9 °C)  Heart Rate:  [] 106  Resp:  [16-20] 18  BP: (107-131)/(78-87) 115/87    Intake/Output Summary (Last 24 hours) at 04/20/18 0738  Last data filed at 04/20/18 0619   Gross per 24 hour   Intake              420 ml   Output             1300 ml   Net             -880 ml     Flowsheet Rows    Flowsheet Row First Filed Value   Admission Height 170.2 cm (67\") Documented at 04/12/2018 2153   Admission Weight 62.6 kg (138 lb) Documented at 04/12/2018 2153          Physical Exam:   General Appearance:    Alert,oriented, in no acute distress   Lungs:     Clear to auscultation,BS are equal    Heart:    Normal S1 and S2, RRR without murmur, gallop or rub   HEENT:    Sclera are clear, no JVD or adenopathy   Abdomen:     Normal bowel sounds, soft non-tender, non-distended, no HSM   Extremities:   Moves all extremities well, no edema, no cyanosis, no             Redness, no rash, no pulses in his legs      Medication Review:        aspirin 81 mg Oral Daily   carvedilol 3.125 mg Oral Q12H   ceftriaxone 1 g Intravenous Q24H   clopidogrel 75 mg Oral Daily   famotidine 20 mg Nasogastric Daily   ipratropium-albuterol 3 mL Nebulization 4x Daily - RT   lisinopril 2.5 mg Oral Q24H   potassium chloride 40 mEq Oral Daily   predniSONE 20 mg Oral Daily With Breakfast   torsemide 20 mg Oral Daily   warfarin 2 mg Oral Daily            I reviewed the patient's new clinical results.  I personally viewed and interpreted the patient's EKG/Telemetry data    Assessment/Plan  Active Hospital Problems (** Indicates Principal Problem)    Diagnosis Date Noted   • Cardiac arrest [I46.9] 04/13/2018      Resolved Hospital Problems    Diagnosis Date Noted " Date Resolved   No resolved problems to display.       Cardiac arrest in the setting of a STEMI treated with hypothermia and angioplasty drug-eluting stenting to the LAD and ramus.  Has an LV thrombus but is really making a great recovery is now off the CCU he is complaining of right leg pain on the x-ray is right leg and hip and will get some ABIs on his leg I don't really see anything grossly abnormal but he doesn't have great pulses in his legs but had some Coreg to his regimen continue to anticoagulate him his LV thrombus of his stay on his Plavix    INR is therapeutic, and actually decrease his dose of Coumadin.  His heparin increase his activity probably daily has to the weekend went home early part of next week      Lennox Max MD  04/20/18  7:38 AM

## 2018-04-20 NOTE — PLAN OF CARE
Problem: Patient Care Overview  Goal: Plan of Care Review  Outcome: Ongoing (interventions implemented as appropriate)   04/20/18 0713   Coping/Psychosocial   Plan of Care Reviewed With patient   Plan of Care Review   Progress improving   OTHER   Outcome Summary VSS. PAIN MED X 1 FOR RIGHT HIP/THIGH PAIN WITH RELIEF. SLEPT AT LONG INTERVALS BETWEEN CARE. SAFETY MAINTAINED.       Problem: Fall Risk (Adult)  Goal: Absence of Fall  Outcome: Ongoing (interventions implemented as appropriate)      Problem: Skin Injury Risk (Adult)  Goal: Skin Health and Integrity  Outcome: Ongoing (interventions implemented as appropriate)      Problem: Pain, Acute (Adult)  Goal: Acceptable Pain Control/Comfort Level  Outcome: Ongoing (interventions implemented as appropriate)

## 2018-04-20 NOTE — PROGRESS NOTES
Adult Nutrition  Assessment/PES    Patient Name:  Negro Hall  YOB: 1955  MRN: [id number]  Admit Date:  4/12/2018    Assessment Date:  4/20/2018    Follow up-patient consuming minimal-average 25% of meals. Patient reported doesn't like the food (on mechanical soft diet). Spoke with Speech therapy-going re-evaluate patient today. 17# weight loss since admission-severe malnutrition.  Provided nutrition therapy-encouraged adequate po intake for weight gain and to meet nutritional needs. Patient agreed to try nutritional supplements TID.  Will follow   Reason for Assessment   Reason For Assessment follow-up protocol   Identified At Risk by Screening Criteria reduced oral intake over the last month           Adult Nutrition Assessment     Row Name 04/20/18 1428       Malnutrition Severity Assessment    Malnutrition Type Acute Illness/Injury Malnutrition       Physical Signs of Malnutrition (Acute)    Muscle Wasting Moderate    Fat Loss Mild       Weight Status (Acute)    Weight Loss Severe (>5% / 1 mo)   13% weight loss in the past few weeks    Energy Intake Severe (< or equal to 50% / > or equal to 5d)       Criteria Met (Must meet criteria for severity in at least 2 of these categories: M Wasting, Fat Loss, Fluid, Secondary Signs, Wt. Status, Intake)    Patient meets criteria for  Severe malnutrition    Row Name 04/20/18 1427                        Usual Body Weight (UBW)    Weight Loss unintentional    Weight Loss Time Frame 17# weight loss in the past 2 weeks        Body Mass Index (BMI)    BMI Assessment BMI 17-18.4: protein-energy malnutrition grade I       Labs/Procedures/Meds    Lab Results Reviewed reviewed, pertinent       Physical Findings    Overall Physical Appearance loss of subcutaneous fat;loss of muscle mass;underweight       Nutrition Prescription PO    Current PO Diet Dysphagia    Dysphagia Level 4  Mechanical soft no mixed consistencies    Common Modifiers Cardiac       PO Evaluation     Number of Meals 2    % PO Intake 25          Problem/Interventions:        Problem 1     Row Name 04/20/18 1429       Nutrition Diagnoses Problem 1    Problem 1 Malnutrition    Etiology (related to) MNT for Treatment/Condition    Signs/Symptoms (evidenced by) Report of Mnimal PO Intake;Unintended Weight Change;PO Intake    Percent (%) intake recorded 25 %    Over number of meals 2    Unintended Weight Change Loss    Number of Pounds Lost 17#    Weight loss time period past 2 weeks                    Intervention Goal     Row Name 04/20/18 1429       Intervention Goal    General Maintain nutrition;Meet nutritional needs for age/condition    PO Tolerate PO;Increase intake    Weight Appropriate weight gain            Nutrition Intervention     Row Name 04/20/18 1430       Nutrition Intervention    RD/Tech Action Interview for preference;Care plan reviewd;Follow Tx progress;Encourage intake;Recommend/ordered;Supplement provided    Recommended/Ordered Supplement            Nutrition Prescription     Row Name 04/20/18 1430       Nutrition Prescription PO    PO Prescription Begin/change supplement    Supplement [place] Breakfast Essentials;Ensure;Milkshake;PRO powder    Supplement Frequency 3 times a day    New PO Prescription Ordered? Yes            Education/Evaluation     Row Name 04/20/18 1430       Education    Education Will Instruct as appropriate       Monitor/Evaluation    Monitor Per protocol;I&O;PO intake;Supplement intake;Pertinent labs;Weight;Skin status;Symptoms    Education Follow-up Reinforce PRN        Electronically signed by:  Maria Elena Argueta RD  04/20/18 2:31 PM

## 2018-04-20 NOTE — THERAPY TREATMENT NOTE
Acute Care - Speech Language Pathology   Swallow Treatment Note [facility]     Patient Name: Negro Hall  : 1955  MRN: [id number]  Today's Date: 2018  Onset of Illness/Injury or Date of Surgery: 18            Admit Date: 2018    Visit Dx:      ICD-10-CM ICD-9-CM   1. Cardiac arrest I46.9 427.5   2. STEMI involving left anterior descending coronary artery I21.02 410.10   3. Difficulty walking R26.2 719.7     Patient Active Problem List   Diagnosis   • Cardiac arrest       Therapy Treatment    Therapy Treatment / Health Promotion    Treatment Time/Intention  Discipline: speech language pathologist (18 1400 : MS CARL Bojorquez)  Document Type: re-evaluation (18 1400 : MS CARL Bojorquez)  Subjective Information: complains of (not liking food) (18 1400 : MS CARL Bojorquez)  Mode of Treatment: individual therapy (18 1400 : MS CARL Bojorquez)  Care Plan Review: evaluation/treatment results reviewed, care plan/treatment goals reviewed (18 1400 : MS CARL Bojorquez)  Patient Effort: excellent (18 1400 : MS CARL Bojorquez)  Plan of Care Review  Plan of Care Reviewed With: patient (18 1439 : MS CARL Bojorquez)    Vitals/Pain/Safety  Pain Scale: Numbers Pre/Post-Treatment  Pain Scale: Numbers, Pretreatment: 0/10 - no pain (18 1400 : MS CARL Bojorquez)  Pain Scale: Numbers, Post-Treatment: 0/10 - no pain (18 1400 : MS CARL Bojorquez)    Cognition, Communication, Swallow       Outcome Summary               SLP GOALS     Row Name 18 1400 18 0900          Oral Nutrition/Hydration Goal 1 (SLP)    Oral Nutrition/Hydration Goal 1, SLP Pt will tolerate recommended diet.  - Pt will tolerate recommended diet.  -SA     Time Frame (Oral Nutrition/Hydration Goal 1, SLP) by discharge  -SH by discharge  -SA     Barriers (Oral Nutrition/Hydration Goal 1, SLP) Cognition. Progress: Pt seen  for re evaluation of swallow function per RD request d/t poor intake. Pt on mech soft and thins per VFSS. SLP discussed with SLP who completed VFSS and that clinician agreed patient appropriate for diet upgrade at the bedside. Pt with broken teeth, mostly edentulous. Pt reports tolerating a regular diet at baseline. Pt's voice clear. No s/s of asp with thins despite frequent belching. Prolonged mastication with mech soft and regular. SLP suspects piece meal swallow at times d/t large bolus size and continued mastication after initial swallow. Laryngeal elevation appeared functional. Voice clear post swallow. Pt appropriate for diet upgrade to regular.    -SH  --       User Key  (r) = Recorded By, (t) = Taken By, (c) = Cosigned By    Initials Name Provider Type    SA Ignacia Willett MS CCC-SLP Speech and Language Pathologist    SAVITA Almendarez MS CCC-SLP Speech and Language Pathologist          EDUCATION  The patient has been educated in the following areas:   Dysphagia (Swallowing Impairment).    SLP Recommendation and Plan   Upgrade to regular                                     Plan of Care Reviewed With: patient  Plan of Care Review  Plan of Care Reviewed With: patient  Progress: improving  Outcome Summary: RD requested re eval of swallowing d/t poor intake. Pt improving. SLP recs upgrade to regular. Will follow for diet tolerance.        SLP Outcome Measures (last 72 hours)      SLP Outcome Measures     Row Name 04/20/18 1400 04/18/18 0900          SLP Outcome Measures    Outcome Measure Used? Adult NOMS  -SH Adult NOMS  -SA        FCM Scores    FCM Chosen Swallowing  -SH  --     Swallowing FCM Score 6  -SH 4  -SA       User Key  (r) = Recorded By, (t) = Taken By, (c) = Cosigned By    Initials Name Effective Dates    SA Ignacia Willett MS CCC-SLP 03/07/18 -      Chante Almendarez MS CCC-SLP 03/07/18 -              Time Calculation:         Time Calculation- SLP     Row Name 04/20/18 1446             Time  Calculation- SLP    SLP Start Time 1400  -      SLP Received On 04/20/18  -        User Key  (r) = Recorded By, (t) = Taken By, (c) = Cosigned By    Initials Name Provider Type     Chante Almendarez MS CCC-SLP Speech and Language Pathologist          Therapy Charges for Today     Code Description Service Date Service Provider Modifiers Qty    [id number]  ST TREATMENT SWALLOW 3 4/20/2018 Chante Almendarez MS CCC-SLP GN 1                 Chante Almendarez MS CCC-RAYSA  4/20/2018

## 2018-04-20 NOTE — NURSING NOTE
As per 4/19/18 Rehab note may be more appropriate for SNF stay.  OT unable to see today.  Will continue to follow.  Thank you--Judith Vega,  Rehab Adm Nurse

## 2018-04-21 NOTE — PLAN OF CARE
Problem: Patient Care Overview  Goal: Plan of Care Review  Outcome: Ongoing (interventions implemented as appropriate)   04/21/18 1012   Coping/Psychosocial   Plan of Care Reviewed With patient   OTHER   Outcome Summary Pt presents from home with cardiac arrest. Pt SBA for ADL and bathroom mobility. Closer SBA given this am due to ? syncopal episode while showering yesterday. Pt A&O today and states mentation much improved. Pt may benefit from follow up skilled OT for increase independence.

## 2018-04-21 NOTE — THERAPY EVALUATION
Acute Care - Occupational Therapy Initial Evaluation  [facility]     Patient Name: Negro Hall  : 1955  MRN: [id number]  Today's Date: 2018  Onset of Illness/Injury or Date of Surgery: 18  Date of Referral to OT: 18  Referring Physician: Dr Hercules     Admit Date: 2018       ICD-10-CM ICD-9-CM   1. Cardiac arrest I46.9 427.5   2. STEMI involving left anterior descending coronary artery I21.02 410.10   3. Difficulty walking R26.2 719.7     Patient Active Problem List   Diagnosis   • Cardiac arrest     Past Medical History:   Diagnosis Date   • Hypertension      Past Surgical History:   Procedure Laterality Date   • CARDIAC CATHETERIZATION N/A 2018    Procedure: Left Heart Cath;  Surgeon: Lennox Max MD;  Location: [facility] INVASIVE LOCATION;  Service: Cardiovascular   • CARDIAC CATHETERIZATION N/A 2018    Procedure: Coronary angiography;  Surgeon: Lennox Max MD;  Location: [facility] INVASIVE LOCATION;  Service: Cardiovascular   • CARDIAC CATHETERIZATION N/A 2018    Procedure: Stent TEJ coronary;  Surgeon: Lennox Mxa MD;  Location: [facility] CATH INVASIVE LOCATION;  Service: Cardiovascular          OT ASSESSMENT FLOWSHEET (last 72 hours)      Occupational Therapy Evaluation     Row Name 18 1207                   OT Evaluation Time/Intention    Subjective Information no complaints  -LE        Document Type evaluation;therapy note (daily note)  -LE           General Information    Patient Profile Reviewed? yes  -LE        Onset of Illness/Injury or Date of Surgery 18  -LE        Referring Physician Dr Hercules   -ADINA        General Observations of Patient pt sitting EOB.  RN present at time during OT   -LE        Prior Level of Function independent:;transfer;ADL's  -LE        Equipment Currently Used at Home --   none  -LE        Pertinent History of Current Functional Problem --   3 week h/o L arm numb.  cardiac arrest.   -LE        Existing  "Precautions/Restrictions --   rapid response last night after shower, dizzy, ?syncope  -LE           Relationship/Environment    Primary Source of Support/Comfort --   brother, son, girlfriend  -LE           Resource/Environmental Concerns    Current Living Arrangements --   outbuilding of girlfriend's parents. uses BR inside house.   -LE           Cognitive Assessment/Intervention- PT/OT    Affect/Mental Status (Cognitive) --   cooperative, pleasant.  states \"all those pills zonked me\"  -LE        Orientation Status (Cognition) oriented x 4  -LE        Follows Commands (Cognition) follows one step commands;over 90% accuracy  -LE        Safety Deficit (Cognitive) --   pt reports improved mentation.  appropriate with OT.   -LE        Personal Safety Interventions fall prevention program maintained;gait belt;nonskid shoes/slippers when out of bed  -LE        Cognitive Assessment/Intervention Comment --   no confusion noted.   -LE           Bed Mobility Assessment/Treatment    Sit-Supine [place] (Bed Mobility) independent  -LE           Functional Mobility    Functional Mobility- Ind. Level supervision required  -LE        Functional Mobility-Distance (Feet) 20   bed to bathroom to toilet to shower to sink to bed  -LE        Functional Mobility- Safety Issues --   no LOB.  no AD.  no unsteadiness noted.   -LE        Functional Mobility- Comment --   close SBA due to ?syncope last night with shower  -LE           Transfer Assessment/Treatment    Transfer Assessment/Treatment toilet transfer;shower transfer  -LE        Sit-Stand [place] (Transfers) supervision  -LE        Stand-Sit [place] (Transfers) supervision  -LE        [place] Level (Shower Transfer) supervision  -LE        Assistive Device (Shower Transfer) --   use of grab bar  -LE           Toilet Transfer    Type (Toilet Transfer) stand pivot/stand step  -LE           Shower Transfer    Type (Shower Transfer) stand pivot/stand step  -LE     "       ADL Assessment/Intervention    BADL Assessment/Intervention lower body dressing;toileting;feeding;grooming  -LE           Lower Body Dressing Assessment/Training    Lower Body Dressing [place] Level doff;don;socks;supervision  -LE        Lower Body Dressing Position edge of bed sitting  -LE           Grooming Assessment/Training    [place] Level (Grooming) conditional independence  -LE        Grooming Position sink side;unsupported standing  -LE           Self-Feeding Assessment/Training    [place] Level (Feeding) --   pt denies difficulty or assist with self feeding  -LE           Toileting Assessment/Training    [place] Level (Toileting) supervision;verbal cues;set up;perform perineal hygiene   R upper leg/flank pain limits reach.  ed pt on various tech.  -LE        Assistive Devices (Toileting) --   pt best completes using L hand to reach back to bottom  -LE        Toileting Position unsupported sitting;unsupported standing  -LE           General ROM    GENERAL ROM COMMENTS B UE 7/8 AROM.   -LE           MMT (Manual Muscle Testing)    Additional Documentation --   MMT B UE 5/5.    -LE           Static Standing Balance    Level of [place] (Supported Standing, Static Balance) supervision   no LOB or unsteadiness.  SBA given due to recent ? syncope  -LE           Sensory Assessment/Intervention    Sensory General Assessment --   denies numb/tingle/clumsiness  -LE           Positioning and Restraints    Pre-Treatment Position in bed  -LE        Post Treatment Position bed  -LE        In Bed notified nsg;supine;call light within reach;encouraged to call for assist;exit alarm on  -LE           Pain Assessment    Additional Documentation Pain Scale: Numbers Pre/Post-Treatment (Group)  -LE           Pain Scale: Numbers Pre/Post-Treatment    Pain Scale: Numbers, Pretreatment 4/10  -LE        Pain Scale: Numbers, Post-Treatment 4/10  -LE        Pain Location - Side Right  -LE        Pain  Location - Orientation --   flank/upper hip area, new onset from movement.    -LE        Pain Intervention(s) Repositioned;Rest   Modify positionig for toileting to reduce pain during task  -LE           Clinical Impression (OT)    Date of Referral to OT 04/19/18  -LE        OT Diagnosis need for assist with personal care  -LE        Patient/Family Goals Statement (OT Eval) --   pt would like to increase weight and independence.   -LE        Criteria for Skilled Therapeutic Interventions Met (OT Eval) yes;treatment indicated   for consistency and bathing assessment.   -LE        Therapy Frequency (OT Eval) 5 times/wk  -LE        Care Plan Review (OT) evaluation/treatment results reviewed;care plan/treatment goals reviewed  -LE        Anticipated Equipment Needs at Discharge (OT) --   may benefit from shower chair  -LE        Anticipated Discharge Disposition (OT) --   pending support at d/c.   -LE           Vital Signs    O2 Delivery Pre Treatment room air  -LE        O2 Delivery Intra Treatment room air  -LE        O2 Delivery Post Treatment room air  -LE        Pre Patient Position Sitting  -LE        Intra Patient Position Standing  -LE        Post Patient Position Supine  -LE        Activity Duration --   no LOB, dizziness during OT today  -LE           Planned OT Interventions    Planned Therapy Interventions (OT Eval) activity tolerance training;transfer/mobility retraining;BADL retraining  -LE           OT Goals    Transfer Goal Selection (OT) transfer, OT goal 1  -LE        Bathing Goal Selection (OT) bathing, OT goal 1  -LE           Transfer Goal 1 (OT)    Activity/Assistive Device (Transfer Goal 1, OT) bed-to-chair/chair-to-bed;toilet;shower chair  -LE        [place] Level/Cues Needed (Transfer Goal 1, OT) conditional independence  -LE        Time Frame (Transfer Goal 1, OT) 1 week  -LE           Bathing Goal 1 (OT)    Activity/Assistive Device (Bathing Goal 1, OT) upper body bathing;lower body  bathing;shower chair  -LE        [place] Level/Cues Needed (Bathing Goal 1, OT) conditional independence  -LE          User Key  (r) = Recorded By, (t) = Taken By, (c) = Cosigned By    Initials Name Effective Dates    ADINA Chacko OTR 03/07/18 -            Occupational Therapy Education     Title: PT OT SLP Therapies (Active)     Topic: Occupational Therapy (Done)     Point: ADL training (Done)     Description: Instruct learner(s) on proper safety adaptation and remediation techniques during self care or transfers.   Instruct in proper use of assistive devices.   Learning Progress Summary     Learner Status Readiness Method Response Comment Documented by    Patient Done Adriáner E,TB,D DU,VU Ed pt on modifying body position and using L hand for hygiene after toileting.  Pt attempts various changes and feel using L hand best.  04/21/18 1234                      User Key     Initials Effective Dates Name Provider Type Discipline     03/07/18 -  Rosa Chacko OTR Occupational Therapist OT                  OT Recommendation and Plan  Outcome Summary/Treatment Plan (OT)  Anticipated Equipment Needs at Discharge (OT):  (may benefit from shower chair)  Anticipated Discharge Disposition (OT):  (pending support at d/c. )  Planned Therapy Interventions (OT Eval): activity tolerance training, transfer/mobility retraining, BADL retraining  Therapy Frequency (OT Eval): 5 times/wk  Plan of Care Review  Plan of Care Reviewed With: patient  Plan of Care Reviewed With: patient  Outcome Summary: Pt presents from home with cardiac arrest.  Pt SBA for ADL and bathroom mobility.  Closer SBA given this am due to ? syncopal episode while showering yesterday.  Pt  A&O today and states mentation much improved.  Pt may benefit from follow up skilled OT for increase independence.           Outcome Measures     Row Name 04/21/18 1237 04/21/18 1200 04/20/18 0900       How much help from another person do you currently need...    Turning  from your back to your side while in flat bed without using bedrails?  --  -- 4  -AR    Moving from lying on back to sitting on the side of a flat bed without bedrails?  --  -- 4  -AR    Moving to and from a bed to a chair (including a wheelchair)?  --  -- 3  -AR    Standing up from a chair using your arms (e.g., wheelchair, bedside chair)?  --  -- 3  -AR    Climbing 3-5 steps with a railing?  --  -- 2  -AR    To walk in hospital room?  --  -- 3  -AR    AM-PAC 6 Clicks Score  --  -- 19  -AR       How much help from another is currently needed...    Putting on and taking off regular lower body clothing? 3  -LE  --  --    Bathing (including washing, rinsing, and drying) 3  -LE  --  --    Toileting (which includes using toilet bed pan or urinal) 3  -LE  --  --    Putting on and taking off regular upper body clothing 3  -LE  --  --    Taking care of personal grooming (such as brushing teeth) 4  -LE  --  --    Eating meals 4  -LE  --  --    Score 20  -LE  --  --       Functional Assessment    Outcome Measure Options  -- AM-PAC 6 Clicks Daily Activity (OT)  -LE  --    Row Name 04/19/18 0900 04/18/18 1600          How much help from another person do you currently need...    Turning from your back to your side while in flat bed without using bedrails? 4  -AR 4  -CH (r) PV (t) CH (c)     Moving from lying on back to sitting on the side of a flat bed without bedrails? 4  -AR 3  -CH (r) PV (t) CH (c)     Moving to and from a bed to a chair (including a wheelchair)? 3  -AR 3  -CH (r) PV (t) CH (c)     Standing up from a chair using your arms (e.g., wheelchair, bedside chair)? 3  -AR 3  -CH (r) PV (t) CH (c)     Climbing 3-5 steps with a railing? 3  -AR 3  -CH (r) PV (t) CH (c)     To walk in hospital room? 3  -AR 3  -CH (r) PV (t) CH (c)     AM-PAC 6 Clicks Score 20  -AR 19  -CH (r) PV (t)        Functional Assessment    Outcome Measure Options  -- AM-PAC 6 Clicks Basic Mobility (PT)  -CH (r) PV (t) CH (c)       User Key  (r)  = Recorded By, (t) = Taken By, (c) = Cosigned By    Initials Name Provider Type    ADINA Chacko, OTR Occupational Therapist    CH Susana Mcadams, PT Physical Therapist    AR Claudette Lopez, PT Physical Therapist    RODRIGO Shea, PT Student PT Student          Time Calculation:   OT Start Time: 1101  OT Stop Time: 1121  OT Time Calculation (min): 20 min    Therapy Charges for Today     Code Description Service Date Service Provider Modifiers Qty    80990551129 HC OT EVAL LOW COMPLEXITY 2 4/21/2018 Rosa Chacko OTR GO 1    44033024731 HC OT SELF CARE/MGMT/TRAIN EA 15 MIN 4/21/2018 Rosa Chacko OTR GO 1               Rosa Chacko OTR  4/21/2018

## 2018-04-21 NOTE — PROGRESS NOTES
"Negro Hall  1955 62 y.o.  [id number]      Patient Care Team:  No Known Provider as PCP - General    CC: Anterior STEMI, cardiac arrest    Interval History: Doing better,     Objective   Vital Signs  Temp:  [97.6 °F (36.4 °C)-98.2 °F (36.8 °C)] 98.2 °F (36.8 °C)  Heart Rate:  [] 89  Resp:  [16-18] 18  BP: ()/(56-79) 109/72    Intake/Output Summary (Last 24 hours) at 04/21/18 1140  Last data filed at 04/21/18 0855   Gross per 24 hour   Intake             1220 ml   Output              850 ml   Net              370 ml     Flowsheet Rows    Flowsheet Row First Filed Value   Admission Height 170.2 cm (67\") Documented at 04/12/2018 2153   Admission Weight 62.6 kg (138 lb) Documented at 04/12/2018 2153          Physical Exam:   General Appearance:    Alert,oriented, in no acute distress   Lungs:     Clear to auscultation,BS are equal    Heart:    Normal S1 and S2, RRR without murmur, gallop or rub   HEENT:    Sclera are clear, no JVD or adenopathy   Abdomen:     Normal bowel sounds, soft non-tender, non-distended, no HSM   Extremities:   Moves all extremities well, no edema, no cyanosis, no             Redness, no rash, no pulses in his legs      Medication Review:        atorvastatin 20 mg Oral Daily   budesonide-formoterol 2 puff Inhalation BID - RT   cefdinir 300 mg Oral Q12H   clopidogrel 75 mg Oral Daily   famotidine 20 mg Nasogastric Daily   ipratropium-albuterol 3 mL Nebulization 4x Daily - RT   predniSONE 20 mg Oral Daily With Breakfast   warfarin 3 mg Oral Daily            I reviewed the patient's new clinical results.  I personally viewed and interpreted the patient's EKG/Telemetry data    Assessment/Plan  Active Hospital Problems (** Indicates Principal Problem)    Diagnosis Date Noted   • Cardiac arrest [I46.9] 04/13/2018      Resolved Hospital Problems    Diagnosis Date Noted Date Resolved   No resolved problems to display.       Cardiac arrest in the setting of a STEMI treated with " hypothermia and angioplasty drug-eluting stenting to the LAD and ramus.  Has an LV thrombus but is really making a great recovery is now off the CCU he is complaining of right leg pain on the x-ray is right leg and hip and will get some ABIs on his leg I don't really see anything grossly abnormal but he doesn't have great pulses in his legs but had some Coreg to his regimen continue to anticoagulate him his LV thrombus of his stay on his Plavix    INR is therapeutic, and actually decrease his dose of Coumadin.  His heparin increase his activity probably daily has to the weekend went home early part of next week    Yesterday he dropped his pressure when he got up so I stopped basically anything that would lower his pressure and only actually can hold his diuretic today increase his activity would looking it may be going home on Monday      Lennox Max MD  04/21/18  11:40 AM

## 2018-04-21 NOTE — PLAN OF CARE
Problem: Patient Care Overview  Goal: Plan of Care Review  Outcome: Ongoing (interventions implemented as appropriate)   04/21/18 5269   Coping/Psychosocial   Plan of Care Reviewed With patient   Plan of Care Review   Progress improving   OTHER   Outcome Summary VSS, SR on monitor, c/o hip pain prn Lortab given, zohreh po well, safety maintained

## 2018-04-21 NOTE — PROGRESS NOTES
[facility]     Mar/chart reviewed  F/u resuscitated cardiac arrest  Doing well.  Expect pain    Vital Sign Min/Max for last 24 hours  Temp  Min: 97.6 °F (36.4 °C)  Max: 98.2 °F (36.8 °C)   BP  Min: 81/56  Max: 116/76   Pulse  Min: 72  Max: 103   Resp  Min: 16  Max: 18   SpO2  Min: 95 %  Max: 100 %   No Data Recorded   Weight  Min: 53.8 kg (118 lb 9.6 oz)  Max: 53.8 kg (118 lb 9.6 oz)     Appears ill, alert, seems to have some short term memory problems.  perrl, eomi, no icterus,  mmm, no jvd, trachea midline, neck supple,  chest cta bilaterally, no crackles, no wheezes,   rrr,   soft, nt, nd +bs,  no c/c/ 1+ edema    Labs:  inr 2.17  Cr 1.04  Bicarb 26  Wbc 22  hgb 9.7  plts 205    Resuscitated cardiac arrest  Acute respiratory failure, vent liberated 4/15, optiflow, HF cannula, NC  Cardiogenic shock  Anoxic encephalopathy  STEMI  LV thrombus  Shock liver  ABENA  ? Pneumonia - RAUOLTELLA !  COPD exacerbation  Relevant Medical Diagnoses  Current smoker    Doing well, continue current treatment,  Likely home on Monday.

## 2018-04-21 NOTE — PLAN OF CARE
Problem: Patient Care Overview  Goal: Plan of Care Review  Outcome: Ongoing (interventions implemented as appropriate)      Problem: Fall Risk (Adult)  Goal: Absence of Fall  Outcome: Ongoing (interventions implemented as appropriate)      Problem: Skin Injury Risk (Adult)  Goal: Skin Health and Integrity  Outcome: Ongoing (interventions implemented as appropriate)

## 2018-04-22 NOTE — NURSING NOTE
Noted to be SBA for bathroom mobility with Ot.  Does not indicate a need for acute inpt rehab.  Recommend snu if unable to return home

## 2018-04-22 NOTE — PROGRESS NOTES
[facility]      Mar/chart reviewed  F/u resuscitated cardiac arrest  Doing well.  Expect pain    Vital Sign Min/Max for last 24 hours  Temp  Min: 97.7 °F (36.5 °C)  Max: 99.3 °F (37.4 °C)   BP  Min: 93/60  Max: 116/76   Pulse  Min: 83  Max: 119   Resp  Min: 16  Max: 20   SpO2  Min: 93 %  Max: 100 %   No Data Recorded   Weight  Min: 52.6 kg (115 lb 14.4 oz)  Max: 52.6 kg (115 lb 14.4 oz)     Appears ill, alert, seems to have some short term memory problems.  perrl, eomi, no icterus,  mmm, no jvd, trachea midline, neck supple,  chest cta bilaterally, no crackles, no wheezes,   rrr,   soft, nt, nd +bs,  no c/c/ 1+ edema     Labs:  inr 1.6    Resuscitated cardiac arrest  Acute respiratory failure, vent liberated 4/15, optiflow, HF cannula, NC  Cardiogenic shock  Anoxic encephalopathy  STEMI  LV thrombus  Shock liver  ABENA  ? Pneumonia - RAUOLTELLA !  COPD exacerbation  Relevant Medical Diagnoses  Current smoker     Doing well, continue current treatment, inr a little low to day.   Likely d/c on Monday. Placement appears to be an issue

## 2018-04-22 NOTE — PLAN OF CARE
Problem: Patient Care Overview  Goal: Plan of Care Review  Outcome: Ongoing (interventions implemented as appropriate)   04/22/18 3520   Coping/Psychosocial   Plan of Care Reviewed With patient   Plan of Care Review   Progress improving   OTHER   Outcome Summary VSS, denies dizziness, sits at BS to void, zohreh po well, prn Lortab x1 for c/o hip pain, safety maintained

## 2018-04-22 NOTE — THERAPY TREATMENT NOTE
Acute Care - Physical Therapy Treatment Note  [facility]     Patient Name: Negro Hall  : 1955  MRN: [id number]  Today's Date: 2018  Onset of Illness/Injury or Date of Surgery: 18     Referring Physician: Dr Hercules     Admit Date: 2018    Visit Dx:    ICD-10-CM ICD-9-CM   1. Cardiac arrest I46.9 427.5   2. STEMI involving left anterior descending coronary artery I21.02 410.10   3. Difficulty walking R26.2 719.7     Patient Active Problem List   Diagnosis   • Cardiac arrest       Therapy Treatment          Rehabilitation Treatment Summary     Row Name 18 1500             Treatment Time/Intention    Discipline physical therapist  -GR      Document Type therapy note (daily note)  -GR      Subjective Information no complaints  -GR      Mode of Treatment physical therapy  -GR      Patient Effort good  -GR      Existing Precautions/Restrictions fall  -GR      Recorded by [GR] Anish Zarate, PT 18 1540 18 1540      Row Name 18 1500             Cognitive Assessment/Intervention- PT/OT    Affect/Mental Status (Cognitive) confused  -GR      Recorded by [GR] Anish Zarate, PT 18 1540 18 1540      Row Name 18 1500             Bed Mobility Assessment/Treatment    Supine-Sit [place] (Bed Mobility) supervision  -GR      Sit-Supine [place] (Bed Mobility) supervision  -GR      Assistive Device (Bed Mobility) bed rails;head of bed elevated  -GR      Recorded by [GR] Anish Zarate, PT 18 1540 18 1540      Row Name 18 1500             Transfer Assessment/Treatment    Sit-Stand [place] (Transfers) supervision  -GR      Stand-Sit [place] (Transfers) supervision  -GR      Recorded by [GR] Anish Zarate, PT 18 1540 18 1540      Row Name 18 1500             Sit-Stand Transfer    Assistive Device (Sit-Stand Transfers) walker, front-wheeled  -GR      Recorded by [GR] Anish Zarate, PT 18 1540  04/22/18 1540      Row Name 04/22/18 1500             Stand-Sit Transfer    Assistive Device (Stand-Sit Transfers) walker, front-wheeled  -GR      Recorded by [GR] Anish PLACIDO Zarate, PT 04/22/18 1540 04/22/18 1540      Row Name 04/22/18 1500             Gait/Stairs Assessment/Training    [place] Level (Gait) contact guard  -GR      Assistive Device (Gait) walker, front-wheeled  -GR      Distance in Feet (Gait) 150  -GR      Recorded by [GR] Anish Zarate, PT 04/22/18 1540 04/22/18 1540      Row Name 04/22/18 1500             Positioning and Restraints    Pre-Treatment Position in bed  -GR      Post Treatment Position bed  -GR      In Bed call light within reach;encouraged to call for assist  -GR      Recorded by [GR] Anish Zarate, PT 04/22/18 1540 04/22/18 1540      Row Name 04/22/18 1500             Pain Scale: Numbers Pre/Post-Treatment    Pain Scale: Numbers, Pretreatment 0/10 - no pain  -GR      Pain Scale: Numbers, Post-Treatment 0/10 - no pain  -GR      Recorded by [GR] Anish Zarate, PT 04/22/18 1540 04/22/18 1540      Row Name 04/22/18 1500             Outcome Summary/Treatment Plan (PT)    Anticipated Discharge Disposition (PT) --   dispo pending; possible SNF  -GR      Recorded by [GR] Anish Zarate, PT 04/22/18 1540 04/22/18 1540        User Key  (r) = Recorded By, (t) = Taken By, (c) = Cosigned By    Initials Name Effective Dates Discipline    GR Anish Zarate, PT 10/03/16 -  PT                     Physical Therapy Education     Title: PT OT SLP Therapies (Done)     Topic: Physical Therapy (Done)     Point: Mobility training (Done)    Learning Progress Summary     Learner Status Readiness Method Response Comment Documented by    Patient Done Acceptance E VU,NR balance, benefits of activity GR 04/22/18 1541     Active Acceptance E NR  AR 04/20/18 0940     Active Acceptance E NR  AR 04/19/18 0906     Done Acceptance E,TB VU,DU,NR  PV 04/18/18 1625          Point: Home exercise program  (Done)    Learning Progress Summary     Learner Status Readiness Method Response Comment Documented by    Patient Done Acceptance E VU,NR balance, benefits of activity GR 04/22/18 1541     Active Acceptance E NR  AR 04/20/18 0940     Active Acceptance E NR  AR 04/19/18 0906     Done Acceptance E,TB VU,DU,NR  PV 04/18/18 1625          Point: Body mechanics (Done)    Learning Progress Summary     Learner Status Readiness Method Response Comment Documented by    Patient Done Acceptance E VU,NR balance, benefits of activity GR 04/22/18 1541     Active Acceptance E NR  AR 04/20/18 0940     Active Acceptance E NR  AR 04/19/18 0906     Done Acceptance E,TB VU,DU,NR  PV 04/18/18 1625          Point: Precautions (Done)    Learning Progress Summary     Learner Status Readiness Method Response Comment Documented by    Patient Done Acceptance E VU,NR balance, benefits of activity GR 04/22/18 1541     Active Acceptance E NR  AR 04/20/18 0940     Active Acceptance E NR  AR 04/19/18 0906     Done Acceptance E,TB VU,DU,NR  PV 04/18/18 1625                      User Key     Initials Effective Dates Name Provider Type Discipline    GR 10/03/16 -  Anish Zarate, PT Physical Therapist PT    AR 04/03/18 -  Claudette Lopez, PT Physical Therapist PT    PV 03/07/18 -  Terrance Shea, PT Student PT Student PT                    PT Recommendation and Plan  Anticipated Discharge Disposition (PT):  (dispo pending; possible SNF)  Outcome Summary/Treatment Plan (PT)  Anticipated Discharge Disposition (PT):  (dispo pending; possible SNF)  Plan of Care Reviewed With: patient  Progress: improving  Outcome Summary: Ambulated increased distance with RW. Was able to walk short distances without AD, unsteady gait.  Motivation level is good. Patient confused at times.          Outcome Measures     Row Name 04/22/18 1500 04/21/18 1237 04/21/18 1200       How much help from another person do you currently need...    Turning from your back to your side  while in flat bed without using bedrails? 4  -GR  --  --    Moving from lying on back to sitting on the side of a flat bed without bedrails? 4  -GR  --  --    Moving to and from a bed to a chair (including a wheelchair)? 3  -GR  --  --    Standing up from a chair using your arms (e.g., wheelchair, bedside chair)? 3  -GR  --  --    Climbing 3-5 steps with a railing? 3  -GR  --  --    To walk in hospital room? 3  -GR  --  --    AM-PAC 6 Clicks Score 20  -GR  --  --       How much help from another is currently needed...    Putting on and taking off regular lower body clothing?  -- 3  -LE  --    Bathing (including washing, rinsing, and drying)  -- 3  -LE  --    Toileting (which includes using toilet bed pan or urinal)  -- 3  -LE  --    Putting on and taking off regular upper body clothing  -- 3  -LE  --    Taking care of personal grooming (such as brushing teeth)  -- 4  -LE  --    Eating meals  -- 4  -LE  --    Score  -- 20  -LE  --       Functional Assessment    Outcome Measure Options AM-PAC 6 Clicks Basic Mobility (PT)  -GR  -- AM-PAC 6 Clicks Daily Activity (OT)  -LE    Row Name 04/20/18 0900             How much help from another person do you currently need...    Turning from your back to your side while in flat bed without using bedrails? 4  -AR      Moving from lying on back to sitting on the side of a flat bed without bedrails? 4  -AR      Moving to and from a bed to a chair (including a wheelchair)? 3  -AR      Standing up from a chair using your arms (e.g., wheelchair, bedside chair)? 3  -AR      Climbing 3-5 steps with a railing? 2  -AR      To walk in hospital room? 3  -AR      AM-PAC 6 Clicks Score 19  -AR        User Key  (r) = Recorded By, (t) = Taken By, (c) = Cosigned By    Initials Name Provider Type    ADINA Chacko, OTR Occupational Therapist    GR Anish Zarate, PT Physical Therapist    AR Claudette Lopez, PT Physical Therapist           Time Calculation:         PT Charges     Row Name  04/22/18 1542             Time Calculation    Start Time 1501  -GR      Stop Time 1510  -GR      Time Calculation (min) 9 min  -GR      PT Received On 04/22/18  -GR      PT - Next Appointment 04/23/18  -GR      PT Goal Re-Cert Due Date 04/25/18  -GR        User Key  (r) = Recorded By, (t) = Taken By, (c) = Cosigned By    Initials Name Provider Type     Anish Zarate PT Physical Therapist          Therapy Charges for Today     Code Description Service Date Service Provider Modifiers Qty    95984060528 HC GAIT TRAINING EA 15 MIN 4/22/2018 Anish Zarate, PT GP 1          PT G-Codes  Outcome Measure Options: AM-PAC 6 Clicks Basic Mobility (PT)    Anish Zarate PT  4/22/2018

## 2018-04-22 NOTE — PROGRESS NOTES
"Negro Hall  1955 62 y.o.  [id number]      Patient Care Team:  No Known Provider as PCP - General    CC: Anterior STEMI, cardiac arrest    Interval History: Doing better    Objective   Vital Signs  Temp:  [97.7 °F (36.5 °C)-99.3 °F (37.4 °C)] 97.8 °F (36.6 °C)  Heart Rate:  [] 119  Resp:  [16-20] 20  BP: ()/(60-82) 116/76    Intake/Output Summary (Last 24 hours) at 04/22/18 0951  Last data filed at 04/22/18 0702   Gross per 24 hour   Intake              960 ml   Output                0 ml   Net              960 ml     Flowsheet Rows    Flowsheet Row First Filed Value   Admission Height 170.2 cm (67\") Documented at 04/12/2018 2153   Admission Weight 62.6 kg (138 lb) Documented at 04/12/2018 2153          Physical Exam:   General Appearance:    Alert,oriented, in no acute distress   Lungs:     Clear to auscultation,BS are equal    Heart:    Normal S1 and S2, RRR without murmur, gallop or rub   HEENT:    Sclera are clear, no JVD or adenopathy   Abdomen:     Normal bowel sounds, soft non-tender, non-distended, no HSM   Extremities:   Moves all extremities well, no edema, no cyanosis, no             Redness, no rash, no pulses in his legs      Medication Review:        atorvastatin 20 mg Oral Daily   budesonide-formoterol 2 puff Inhalation BID - RT   cefdinir 300 mg Oral Q12H   clopidogrel 75 mg Oral Daily   famotidine 20 mg Nasogastric Daily   ipratropium-albuterol 3 mL Nebulization 4x Daily - RT   warfarin 5 mg Oral Daily            I reviewed the patient's new clinical results.  I personally viewed and interpreted the patient's EKG/Telemetry data    Assessment/Plan  Active Hospital Problems (** Indicates Principal Problem)    Diagnosis Date Noted   • Cardiac arrest [I46.9] 04/13/2018      Resolved Hospital Problems    Diagnosis Date Noted Date Resolved   No resolved problems to display.       Cardiac arrest in the setting of a STEMI treated with hypothermia and angioplasty drug-eluting stenting to " the LAD and ramus.  Has an LV thrombus but is really making a great recovery.  I think at this point he is ready for discharge I would look to go to a rehabilitation facility tomorrow he's not been able to take any other heart medicines and her blood pressure medicine because he is hypotensive here to little bit of a nonsustained run of VT yesterday but was asymptomatic when he goes home we'll have him come and see Erum in a week and see me in a month and start him on cardiac rehabilitation he will go home on Plavix and warfarin    Lennox Max MD  04/22/18  9:51 AM

## 2018-04-22 NOTE — PLAN OF CARE
Problem: Patient Care Overview  Goal: Plan of Care Review  Outcome: Ongoing (interventions implemented as appropriate)   04/22/18 1541 04/22/18 1743   Coping/Psychosocial   Plan of Care Reviewed With patient --    Plan of Care Review   Progress --  improving   OTHER   Outcome Summary --  VSS; patient may be discharged in a few days; waiting on rehab placement; patient had a 9 run of v-tach this evening; called cardiology and spoke with Marianne Caldwell; going to check magnesium; potassium is 4.0       Problem: Fall Risk (Adult)  Goal: Absence of Fall  Outcome: Ongoing (interventions implemented as appropriate)      Problem: Skin Injury Risk (Adult)  Goal: Skin Health and Integrity  Outcome: Ongoing (interventions implemented as appropriate)      Problem: Pain, Acute (Adult)  Goal: Acceptable Pain Control/Comfort Level  Outcome: Ongoing (interventions implemented as appropriate)      Problem: Nutrition, Imbalanced: Inadequate Oral Intake (Adult)  Goal: Improved Oral Intake  Outcome: Ongoing (interventions implemented as appropriate)    Goal: Prevent Further Weight Loss  Outcome: Ongoing (interventions implemented as appropriate)

## 2018-04-22 NOTE — PLAN OF CARE
Problem: Patient Care Overview  Goal: Plan of Care Review  Outcome: Ongoing (interventions implemented as appropriate)   04/22/18 8179   Coping/Psychosocial   Plan of Care Reviewed With patient   Plan of Care Review   Progress improving   OTHER   Outcome Summary Ambulated increased distance with RW. Was able to walk short distances without AD, unsteady gait. Motivation level is good. Patient confused at times.

## 2018-04-23 NOTE — PLAN OF CARE
Problem: Patient Care Overview  Goal: Plan of Care Review  Outcome: Ongoing (interventions implemented as appropriate)   04/23/18 5556   Coping/Psychosocial   Plan of Care Reviewed With patient   OTHER   Outcome Summary cues to slow pace and improve balance during turns w/rwx

## 2018-04-23 NOTE — PROGRESS NOTES
Continued Stay Note  [facility]     Patient Name: Negro Hall  MRN: [id number]  Today's Date: 4/23/2018    Admit Date: 4/12/2018          Discharge Plan     Row Name 04/23/18 1134       Plan    Plan [facility]    Plan Comments Spoke to pt sister Yelitza [phone].  She would like pt referred to[facility]  The pt is agreeable and his oldest son works there.  Spoke with Kitty[facility] insurance  She will review his information for placement  CCP following              Discharge Codes    No documentation.           Bere Chua RN

## 2018-04-23 NOTE — PLAN OF CARE
Problem: Patient Care Overview  Goal: Plan of Care Review  Outcome: Ongoing (interventions implemented as appropriate)   04/23/18 1342 04/23/18 4394   Coping/Psychosocial   Plan of Care Reviewed With patient --    Plan of Care Review   Progress --  improving   OTHER   Outcome Summary --  VSS; patient ready to be discharged; pt. on amiodarone and coreg; warfarin increased to 7.5MG; hopefully to [facility] soon       Problem: Fall Risk (Adult)  Goal: Absence of Fall  Outcome: Ongoing (interventions implemented as appropriate)      Problem: Skin Injury Risk (Adult)  Goal: Skin Health and Integrity  Outcome: Ongoing (interventions implemented as appropriate)      Problem: Pain, Acute (Adult)  Goal: Acceptable Pain Control/Comfort Level  Outcome: Ongoing (interventions implemented as appropriate)      Problem: Nutrition, Imbalanced: Inadequate Oral Intake (Adult)  Goal: Improved Oral Intake  Outcome: Ongoing (interventions implemented as appropriate)    Goal: Prevent Further Weight Loss  Outcome: Ongoing (interventions implemented as appropriate)

## 2018-04-23 NOTE — DISCHARGE PLACEMENT REQUEST
"Shannan Hall (62 y.o. Male)     Date of Birth Social Security Number Address Home Phone MRN    1955  [id number]   [facility] [address] [phone] [id number]    Rastafarian Marital Status          None        Admission Date Admission Type Admitting Provider Attending Provider Department, Room/Bed    4/12/18 Emergency Kahlil Lake MD Kellie, Scott P, MD [address], 446/1    Discharge Date Discharge Disposition Discharge Destination                       Attending Provider:  Jared Tapia MD    Allergies:  Penicillins    Isolation:  None   Infection:  None   Code Status:  FULL    Ht:  170.2 cm (67.01\")   Wt:  52.3 kg (115 lb 6.4 oz)    Admission Cmt:  None   Principal Problem:  None                Active Insurance as of 4/12/2018     Primary Coverage     Payor Plan Insurance Group Employer/Plan Group    [employer] [place] [employer] [place]      Payor Plan Address Payor Plan Phone Number Effective From Effective To    [address] [address] [phone] 4/12/2018     [place] [address]       Subscriber Name Subscriber Birth Date Member ID       SHANNAN HALL 1955 [id number]                 Emergency Contacts      (Rel.) Home Phone Work Phone Mobile Phone    Tere Hall (Son) [phone] -- [phone]    Saulo(Ex-Wife)Eron (Other) [phone] -- [phone]    Yelitza Olivas (Sister) -- -- [phone]    Emigdio Quesada (Friend) -- -- [phone]              "

## 2018-04-23 NOTE — PAYOR COMM NOTE
"Shannan Hall (62 y.o. Male)       PLEASE SEE ATTACHED CLINICAL U/D.     REF#[id number]    THANK YOU    RAYNE HARDY, JENNIFER, CCP    Date of Birth Social Security Number Address Home Phone MRN    [id number]  [id number]   [facility] [address] [phone] [id number]    Adventism Marital Status          None        Admission Date Admission Type Admitting Provider Attending Provider Department, Room/Bed    4/12/18 Emergency Kahlil Lake MD Kellie, Scott P, MD [address], 446/1    Discharge Date Discharge Disposition Discharge Destination                       Attending Provider:  Jared Tapia MD    Allergies:  Penicillins    Isolation:  None   Infection:  None   Code Status:  FULL    Ht:  170.2 cm (67.01\")   Wt:  52.3 kg (115 lb 6.4 oz)    Admission Cmt:  None   Principal Problem:  None                Active Insurance as of 4/12/2018     Primary Coverage     Payor Plan Insurance Group Employer/Plan Group    [employer] [place] [employer] [place]      Payor Plan Address Payor Plan Phone Number Effective From Effective To    [address] [address] [phone] 4/12/2018     [place] [address]       Subscriber Name Subscriber Birth Date Member ID       SHANNAN HALL 1955 [id number]                 Emergency Contacts      (Rel.) Home Phone Work Phone Mobile Phone    Tere Hall (Son) [phone] -- [phone]    Saulo(Ex-Wife)Eron (Other) [phone] -- [phone]    BrendonRajanYelitza (Sister) -- -- [phone]    SangitaEmigdio (Friend) -- -- [phone]              Intake & Output (last day)       04/22 0701 - 04/23 0700 04/23 0701 - 04/24 0700    P.O. 840 720    Total Intake(mL/kg) 840 (16.1) 720 (13.8)    Urine (mL/kg/hr)      Total Output        Net +840 +720          Unmeasured Urine Occurrence 2 x 1 x    Unmeasured Stool Occurrence 1 x 1 x                Physician Progress Notes (last 24 hours) (Notes from 4/22/2018  1:51 PM through 4/23/2018  1:51 PM)    " "  Lennox Max MD at 4/23/2018 11:19 AM          Negro Hall  1955 62 y.o.  [id number]      Patient Care Team:  No Known Provider as PCP - General    CC: Anterior STEMI, cardiac arrest    Interval History: He is feeling down today and is a little bummed out on that okay    Objective   Vital Signs  Temp:  [97.8 °F (36.6 °C)-98.4 °F (36.9 °C)] 98.4 °F (36.9 °C)  Heart Rate:  [86-97] 88  Resp:  [18] 18  BP: (108-124)/(69-83) 117/70    Intake/Output Summary (Last 24 hours) at 04/23/18 1119  Last data filed at 04/23/18 1100   Gross per 24 hour   Intake             1080 ml   Output                0 ml   Net             1080 ml     Flowsheet Rows    Flowsheet Row First Filed Value   Admission Height 170.2 cm (67\") Documented at 04/12/2018 2153   Admission Weight 62.6 kg (138 lb) Documented at 04/12/2018 2153          Physical Exam:   General Appearance:    Alert,oriented, in no acute distress   Lungs:     Clear to auscultation,BS are equal    Heart:    Normal S1 and S2, RRR without murmur, gallop or rub   HEENT:    Sclera are clear, no JVD or adenopathy   Abdomen:     Normal bowel sounds, soft non-tender, non-distended, no HSM   Extremities:   Moves all extremities well, no edema, no cyanosis, no             Redness, no rash, no pulses in his legs      Medication Review:        amiodarone 200 mg Oral Q24H   atorvastatin 20 mg Oral Daily   budesonide-formoterol 2 puff Inhalation BID - RT   carvedilol 3.125 mg Oral Q12H   cefdinir 300 mg Oral Q12H   clopidogrel 75 mg Oral Daily   famotidine 20 mg Nasogastric Daily   warfarin 7.5 mg Oral Daily            I reviewed the patient's new clinical results.  I personally viewed and interpreted the patient's EKG/Telemetry data    Assessment/Plan  Active Hospital Problems (** Indicates Principal Problem)    Diagnosis Date Noted   • Cardiac arrest [I46.9] 04/13/2018      Resolved Hospital Problems    Diagnosis Date Noted Date Resolved   No resolved problems to display. "       Cardiac arrest in the setting of a STEMI treated with hypothermia and angioplasty drug-eluting stenting to the LAD and ramus.  Has an LV thrombus but is really making a great recovery.  I think at this point he is ready for discharge I would look to go to a rehabilitation facility tomorrow he's not been able to take any other heart medicines and her blood pressure medicine because he is hypotensive here to little bit of a nonsustained run of VT     He has continued to have little bursts of nonsustained VT he has a severe ischemic cardiomyopathy and then a put him on a low-dose of amiodarone significant, down I'm also going to try and back on Coreg and like to watch him for one more day        Lennox Max MD  04/23/18  11:19 AM              Electronically signed by Lennox Max MD at 4/23/2018 11:24 AM     Jared Tapia MD at 4/23/2018 10:30 AM          [facility]      Mar/chart reviewed  F/u resuscitated cardiac arrest  Doing well.  Expect pain    Vital Sign Min/Max for last 24 hours  Temp  Min: 97.8 °F (36.6 °C)  Max: 98.4 °F (36.9 °C)   BP  Min: 108/69  Max: 124/83   Pulse  Min: 86  Max: 97   Resp  Min: 18  Max: 18   SpO2  Min: 94 %  Max: 98 %   No Data Recorded   Weight  Min: 52.3 kg (115 lb 6.4 oz)  Max: 52.3 kg (115 lb 6.4 oz)     Appears ill, alert, seems to have some short term memory problems.  perrl, eomi, no icterus,  mmm, no jvd, trachea midline, neck supple,  chest cta bilaterally, no crackles, no wheezes,   rrr,   soft, nt, nd +bs,  no c/c/ 1+ edema     Labs:  inr 1.3  Cr 0.78  Wbc 20  hgb 8.6  plts 284    Resuscitated cardiac arrest  Acute respiratory failure, vent liberated 4/15, optiflow, HF cannula, NC  Cardiogenic shock  Anoxic encephalopathy  STEMI  LV thrombus  Shock liver  ABENA  ? Pneumonia - RAUOLTELLA !  COPD exacerbation  Relevant Medical Diagnoses  Current smoker    Adjustment to coumadin noted, await guidance from case management in terms of placement? Likely  can be d/c'ed once inr going back up.     Electronically signed by Jared Tapia MD at 4/23/2018 10:51 AM       All medication doses during the admission are shown, including meds that are no longer on order.   Scheduled Meds Sorted by Name   for Negro Hall as of 4/23/18 through 4/23/18     1 Day 3 Days 7 Days 10 Days < Today >    Legend:                          Inactive     Active     Other Encounter    Linked               Medications 04/23/18   amiodarone (PACERONE) tablet 200 mg  Dose: 200 mg Freq: Every 24 Hours Scheduled Route: PO  Start: 04/22/18 2015    Admin Instructions:   Avoid grapefruit juice while taking this medication.    0038                          artificial tears ophthalmic ointment  Freq: Every 4 Hours Route: Both Eyes  Start: 04/13/18 0600 End: 04/13/18 1554    Admin Instructions:   Apply to each eye while patient on neuromuscular blockade.       aspirin EC tablet 81 mg  Dose: 81 mg Freq: Daily Route: PO  Start: 04/14/18 0915 End: 04/21/18 0859    Admin Instructions:   Herbal/drug interaction: Avoid use with ginkgo biloba. Do not crush or chew.  Do not exceed 4 grams of aspirin in a 24 hr period.    If given for pain, use the following pain scale:   Mild Pain = Pain Score of 1-3, CPOT 1-2  Moderate Pain = Pain Score of 4-6, CPOT 3-4  Severe Pain = Pain Score of 7-10, CPOT 5-8       aspirin tablet 325 mg  Dose: 325 mg Freq: Once Route: PO  Start: 04/12/18 2344 End: 04/13/18 0014    Admin Instructions:   Do not exceed 4 grams of aspirin in a 24 hr period.    If given for pain, use the following pain scale:   Mild Pain = Pain Score of 1-3, CPOT 1-2  Moderate Pain = Pain Score of 4-6, CPOT 3-4  Severe Pain = Pain Score of 7-10, CPOT 5-8       atorvastatin (LIPITOR) tablet 20 mg  Dose: 20 mg Freq: Daily Route: PO  Start: 04/21/18 1215    Admin Instructions:   Avoid grapefruit juice.    0827                          atorvastatin (LIPITOR) tablet 40 mg  Dose: 40 mg Freq: Nightly Route:  PO  Start: 04/13/18 2100 End: 04/17/18 0953    Admin Instructions:   Avoid grapefruit juice.       barium sulfate (E-Z-PAQUE) 96 % oral suspension reconstituted suspension 65 mL  Dose: 65 mL Freq: Once in Imaging Route: PO  Start: 04/18/18 0945 End: 04/18/18 0907    Admin Instructions:    Mix with water according to package insert. Shake well before administration.       Barium Sulfate 60 % cream 1 teaspoon(s)  Dose: 1 teaspoon(s) Freq: Once in Imaging Route: PO  Start: 04/18/18 0945 End: 04/18/18 0908       barium sulfate oral suspension 4 mL  Dose: 4 mL Freq: Once in Imaging Route: PO  Start: 04/18/18 0945 End: 04/18/18 0908    Admin Instructions:    Mix with water according to package insert. Shake well before administration.       budesonide-formoterol (SYMBICORT) 160-4.5 MCG/ACT inhaler 2 puff  Dose: 2 puff Freq: 2 Times Daily - RT Route: IN  Start: 04/20/18 2130    Admin Instructions:   Shake well. Rinse mouth after use, do not swallow water. Send aerosols to pharmacy in ziplock bag for proper disposal.    0928 2130                        carvedilol (COREG) tablet 3.125 mg  Dose: 3.125 mg Freq: Every 12 Hours Scheduled Route: PO  Start: 04/23/18 1100    Admin Instructions:   Give with food.    1204 2100                        carvedilol (COREG) tablet 3.125 mg  Dose: 3.125 mg Freq: Every 12 Hours Scheduled Route: PO  Start: 04/19/18 2100 End: 04/20/18 1551    Admin Instructions:   Give with food.       cefdinir (OMNICEF) capsule 300 mg  Dose: 300 mg Freq: Every 12 Hours Scheduled Route: PO  Indications of Use: PNEUMONIA  Start: 04/21/18 0900 End: 04/24/18 0859    Admin Instructions:   Avoid taking antacids, iron, or dairy products within 2 hours of medication.    0827 2100                        cefdinir (OMNICEF) capsule 300 mg  Dose: 300 mg Freq: Every 12 Hours Scheduled Route: PO  Indications of Use: PNEUMONIA  Start: 04/20/18 2100 End: 04/20/18 1817    Admin Instructions:   Avoid taking antacids,  iron, or dairy products within 2 hours of medication.       cefepime (MAXIPIME) 2 g/100 mL 0.9% NS (mbp)  Dose: 2 g Freq: Every 12 Hours Route: IV  Indications of Use: PNEUMONIA  Start: 04/17/18 0000 End: 04/18/18 1521    Admin Instructions:   Activate vial before using.       cefepime (MAXIPIME) 2 g/100 mL 0.9% NS (mbp)  Dose: 2 g Freq: Once Route: IV  Indications of Use: PNEUMONIA  Last Dose: 2 g (04/16/18 1312)  Start: 04/16/18 1200 End: 04/16/18 1342    Admin Instructions:   Activate vial before using.       cefTRIAXone (ROCEPHIN) IVPB 1 g  Dose: 1 g Freq: Every 24 Hours Route: IV  Indications of Use: PNEUMONIA  Last Dose: 1 g (04/20/18 0619)  Start: 04/19/18 0600 End: 04/20/18 0649    Admin Instructions:   Refrigerate       cisatracurium (NIMBEX) injection 20 mg  Dose: 20 mg Freq: Once Route: IV  Start: 04/13/18 0121 End: 04/13/18 1607       cisatracurium (NIMBEX) injection 6,260 mcg  Dose: 100 mcg/kg Freq: Once Route: IV  Start: 04/13/18 0600 End: 04/13/18 1607    Admin Instructions:   Initial bolus       clindamycin (CLEOCIN) 600 mg in dextrose 5% 50 mL IVPB (premix)  Dose: 600 mg Freq: Every 8 Hours Route: IV  Indications of Use: PNEUMONIA  Start: 04/13/18 1515 End: 04/15/18 0746    Admin Instructions:   Do Not refrigerate.       clopidogrel (PLAVIX) tablet 600 mg  Dose: 600 mg Freq: Once Route: PO  Start: 04/13/18 0330 End: 04/13/18 0330       And  clopidogrel (PLAVIX) tablet 75 mg  Dose: 75 mg Freq: Daily Route: PO  Start: 04/14/18 0900    0827                          famotidine (PEPCID) tablet 20 mg  Dose: 20 mg Freq: Daily Route: NG  Start: 04/14/18 0900    0827                          famotidine (PEPCID) tablet 20 mg  Dose: 20 mg Freq: 2 Times Daily Route: NG  Start: 04/13/18 0930 End: 04/13/18 1549       heparin (porcine) 5000 UNIT/ML injection 3,800 Units  Dose: 60 Units/kg Freq: Once Route: IV  Start: 04/13/18 1100 End: 04/13/18 1211    Admin Instructions:   **Max Dose of 4,000 units** Bolus for  Cardiac or Other Not VTE       heparin (porcine) 5000 UNIT/ML injection 5,000 Units  Dose: 5,000 Units Freq: Every 8 Hours Scheduled Route: SC  Start: 04/13/18 1400 End: 04/13/18 1036       insulin aspart (novoLOG) injection 0-24 Units  Dose: 0-24 Units Freq: Every 4 Hours Scheduled Route: SC  Start: 04/14/18 0830 End: 04/18/18 1109    Admin Instructions:   Correction - Individual Dose.    Give 1 unit Novolog SQ for every 10 mg/dl over 130 Blood Glucose  If over 24 units call provider         insulin aspart (novoLOG) injection 0-7 Units  Dose: 0-7 Units Freq: 4 Times Daily With Meals & Nightly Route: SC  Start: 04/13/18 0800 End: 04/13/18 1118    Admin Instructions:   Correction - Low Dose.  Less than 40 units/day total insulin dose or lean, elderly, renal patients    Blood glucose 150-199 mg/dL - 2 units  Blood glucose 200-249 mg/dL - 3 units  Blood glucose 250-299 mg/dL - 4 units  Blood glucose 300-349 mg/dL - 5 units  Blood glucose 350-400 mg/dL - 6 units  Blood glucose greater than 400 mg/dL - 7 units and call provider           insulin aspart (novoLOG) injection 0-9 Units  Dose: 0-9 Units Freq: 4 Times Daily With Meals & Nightly Route: SC  Start: 04/13/18 2130 End: 04/14/18 0804    Admin Instructions:   Correction - Moderate Dose.  40-60 units/day total insulin dose or average weight, on oral agents    Blood glucose 150-199 mg/dL - 2 units  Blood glucose 200-249 mg/dL - 4 units  Blood glucose 250-299 mg/dL - 6 units  Blood glucose 300-349 mg/dL - 7 units  Blood glucose 350-400 mg/dL - 8 units  Blood glucose greater than 400 mg/dL - 9 units and call provider           ipratropium-albuterol (DUO-NEB) nebulizer solution 3 mL  Dose: 3 mL Freq: 4 Times Daily - RT Route: NEBULIZATION  Start: 04/18/18 1630 End: 04/22/18 0689       ipratropium-albuterol (DUO-NEB) nebulizer solution 3 mL  Dose: 3 mL Freq: Every 4 Hours - RT Route: NEBULIZATION  Start: 04/16/18 1130 End: 04/18/18 1417       lisinopril (PRINIVIL,ZESTRIL)  tablet 2.5 mg  Dose: 2.5 mg Freq: Every 24 Hours Scheduled Route: PO  Start: 04/20/18 0900 End: 04/20/18 1551       LORazepam (ATIVAN) 2 MG/ML injection  - ADS Override Pull  Start: 04/13/18 0008 End: 04/13/18 0010    Admin Instructions:   IMAN NASSAR: asafinet override       methylPREDNISolone sodium succinate (SOLU-Medrol) injection 125 mg  Dose: 125 mg Freq: Once Route: IV  Start: 04/16/18 1115 End: 04/16/18 1205       metoprolol tartrate (LOPRESSOR) 5 MG/5ML injection  - ADS Override Pull  Start: 04/13/18 0100 End: 04/13/18 0105    Admin Instructions:   IMAN NASSAR: asafinet override       pantoprazole (PROTONIX) injection 40 mg  Dose: 40 mg Freq: Every 12 Hours Scheduled Route: IV  Indications of Use: Gastrointestinal (GI) Bleed  Start: 04/15/18 0900 End: 04/18/18 1110    Admin Instructions:   Dilute with 10 mL of 0.9% NaCl and give IV push over 2 minutes.       phosphorus (K PHOS NEUTRAL) tablet 1 tablet  Dose: 250 mg Freq: 2 Times Daily Route: PO  Start: 04/17/18 1030 End: 04/18/18 2022       potassium chloride (MICRO-K) CR capsule 20 mEq  Dose: 20 mEq Freq: Daily Route: PO  Start: 04/19/18 0900 End: 04/19/18 1147    Admin Instructions:   Do not crush. Take with food.       potassium chloride (MICRO-K) CR capsule 40 mEq  Dose: 40 mEq Freq: Once Route: PO  Start: 04/19/18 1330 End: 04/19/18 1435    Admin Instructions:   Do not crush. Take with food.       potassium chloride (MICRO-K) CR capsule 40 mEq  Dose: 40 mEq Freq: Daily Route: PO  Start: 04/20/18 0900 End: 04/21/18 1137    Admin Instructions:   Do not crush. Take with food.       predniSONE (DELTASONE) tablet 20 mg  Dose: 20 mg Freq: Daily With Breakfast Route: PO  Start: 04/20/18 0800 End: 04/22/18 0900    Admin Instructions:   Take with food.       predniSONE (DELTASONE) tablet 40 mg  Dose: 40 mg Freq: Daily With Breakfast Route: PO  Start: 04/17/18 0800 End: 04/19/18 7162    Admin Instructions:   Take with food.       Propofol (DIPRIVAN) 10  MG/ML injection  - ADS Override Pull  Start: 04/13/18 0106 End: 04/13/18 0110    Admin Instructions:   IMAN NASSAR: cabinet override       Propofol (DIPRIVAN) 10 MG/ML injection  - ADS Override Pull  Start: 04/13/18 0058 End: 04/13/18 1258    Admin Instructions:   IMAN NASSAR: cabinet override       sodium chloride 0.9 % bolus 500 mL  Dose: 500 mL Freq: Once Route: IV  Last Dose: 500 mL (04/20/18 1621)  Start: 04/20/18 1630 End: 04/20/18 2121       thiamine (B-1) 100 mg in sodium chloride 0.9 % 100 mL IVPB  Dose: 100 mg Freq: Daily Route: IV  Last Dose: Stopped (04/15/18 2333)  Start: 04/14/18 1645 End: 04/16/18 1042    Admin Instructions:   Protect from light.       torsemide (DEMADEX) tablet 20 mg  Dose: 20 mg Freq: Daily Route: PO  Start: 04/17/18 1030 End: 04/21/18 1137       warfarin (COUMADIN) tablet 2 mg  Dose: 2 mg Freq: Daily Warfarin Route: PO  Indications Comment: lv thrombus  Start: 04/20/18 1800 End: 04/21/18 0859    Admin Instructions:    Review INR prior to administration       warfarin (COUMADIN) tablet 3 mg  Dose: 3 mg Freq: Daily Warfarin Route: PO  Indications Comment: lv thrombus  Start: 04/21/18 1800 End: 04/22/18 0925    Admin Instructions:    Review INR prior to administration       warfarin (COUMADIN) tablet 5 mg  Dose: 5 mg Freq: Daily Warfarin Route: PO  Indications Comment: lv thrombus  Start: 04/22/18 1800 End: 04/23/18 0933    Admin Instructions:    Review INR prior to administration    0933-D/C'd                        warfarin (COUMADIN) tablet 5 mg  Dose: 5 mg Freq: Daily Warfarin Route: PO  Indications Comment: lv thrombus  Start: 04/17/18 1800 End: 04/20/18 0642    Admin Instructions:    Review INR prior to administration       warfarin (COUMADIN) tablet 7.5 mg  Dose: 7.5 mg Freq: Daily Warfarin Route: PO  Indications Comment: lv thrombus  Start: 04/23/18 1800    Admin Instructions:   REview INR prior to administration    1800                          ziprasidone (GEODON)  injection 10 mg  Dose: 10 mg Freq: Once Route: IM  Start: 04/15/18 1315 End: 04/16/18 1042    Admin Instructions:   Max dose 40 mg/day       ziprasidone (GEODON) injection 5 mg  Dose: 5 mg Freq: Once Route: IM  Start: 04/15/18 1700 End: 04/15/18 1623    Admin Instructions:   Max dose 40 mg/day       Medications 04/23/18       Continuous Meds Sorted by Name   for Negro Hall as of 4/23/18 through 4/23/18    Legend:                          Inactive     Active     Other Encounter    Linked               Medications 04/23/18   amiodarone (CORDARONE) 900 mg in dextrose (D5W) 5 % 500 mL (1.8 mg/mL) infusion  Freq: Code / Trauma / Sedation Continuous Med Route: IV  Last Dose: 1 mg/min (04/12/18 2340)  Start: 04/12/18 2340 End: 04/12/18 2340       amiodarone (NEXTERONE) 360 mg/200 mL (1.8 mg/mL) infusion  Rate: 33.3 mL/hr Freq: Titrated Route: IV  Last Dose: Stopped (04/13/18 0250)  Start: 04/12/18 2347 End: 04/13/18 0350    Admin Instructions:   Start at 1 mg/min x 6 hours, then decrease to 0.5 mg/min.  Contact MD after 24 hours for additional orders.  IV med requiring filtration 0.22 micron inline filter       cisatracurium besylate (NIMBEX) 200 mg in sodium chloride 0.9 % 100 mL (2 mg/mL) infusion  Rate: 5.63 mL/hr Freq: Titrated Route: IV  Start: 04/13/18 0600 End: 04/13/18 1759    Admin Instructions:   Begin infusion at 3 mcg/kg/min. Increase rate by increments of 0.5 mcg/kg/min as needed. Contact physician for rate over 10 mcg/kg/min.  Train of Four Monitoring: Twitches = 0 - Hold Infusion, Recheck TOF Every 30-60 Minutes Until Response Occurs. Resume Infusion Once TOF is 3-4 Twitches Twitches = 1-2 - Adjust Per Medication Orders Twitches = 3-4 Or No Diaphragmatic Movement - Maintain Infusion & Assess Every 4 Hours       cisatracurium besylate (NIMBEX) 200 mg in sodium chloride 0.9 % 100 mL (2 mg/mL) infusion  Rate: 5.63 mL/hr Freq: Continuous Route: IV  Last Dose: Stopped (04/13/18 0530)  Start: 04/13/18 0430 End:  04/13/18 0521       cisatracurium besylate (NIMBEX) 200 mg in sodium chloride 0.9 % 100 mL (2 mg/mL) infusion  Rate: 3.76 mL/hr Freq: Titrated Route: IV  Last Dose: 3 mcg/kg/min (04/13/18 0122)  Start: 04/13/18 0111 End: 04/13/18 0521       dexmedetomidine (PRECEDEX) 400 mcg/100 mL (4 mcg/mL) infusion  Rate: 3.35-25.09 mL/hr Freq: Titrated Route: IV  Last Dose: Stopped (04/15/18 1617)  Start: 04/15/18 1200 End: 04/16/18 0946    Admin Instructions:   Begin infusion at 0.2 mcg/kg/hr and titrate by 0.1 mcg/kg/hr every 15 minutes to maintain RASS goal.  Maximum rate 1.5 mcg/kg/hr.  Notify MD if not at RASS goal and requiring 1.5 mcg/kg/hr or heart rate is less than 50 beats per minute or MAP is less than 60 mmHg.       dextrose (D5W) 5 % infusion  Rate: 50 mL/hr Freq: Continuous Route: IV  Last Dose: 50 mL/hr (04/18/18 1309)  Start: 04/18/18 1200 End: 04/19/18 1308       dextrose (D5W) 5 % infusion  Rate: 125 mL/hr Freq: Continuous Route: IV  Last Dose: Stopped (04/17/18 1008)  Start: 04/16/18 0815 End: 04/17/18 0945       heparin 40256 units/250 mL (100 units/mL) in 0.45 % NaCl infusion  Rate: 7.51 mL/hr Freq: Titrated Route: IV  Last Dose: Stopped (04/20/18 0835)  Start: 04/13/18 1100 End: 04/20/18 0642    Admin Instructions:   Weight Based Heparin Nomogram: Cardiac or Other Not VTE:     PTT Less Than 54 sec:      Bolus 60 units/kg & Increase Rate By 3 units/kg/hr.    PTT 54-63 sec:    Bolus 30 units/kg & Increase Rate By 1 units/kg/hr (If Less Than 50 kg Round Up to 100 Units).  PTT 64-85 sec:    No Bolus, No Rate Change.  PTT  sec:  No Bolus, Decrease Rate By 2 units/kg/hr.  PTT Greater Than 104 sec:    No Bolus. Hold Infusion 1 hour. Decrease Rate By 3 units/kg/hr. Repeat PTT in 6 hours.  If PTT Remains Greater Than 104 Stop Heparin Drip & Contact Provider       insulin regular (HumuLIN R,NovoLIN R) 100 Units in sodium chloride 0.9 % 100 mL (1 Units/mL) infusion  Rate: 1-20 mL/hr Freq: Titrated Route:  IV  Last Dose: Stopped (04/13/18 2028)  Start: 04/13/18 0930 End: 04/13/18 2046    Admin Instructions:   - If Blood Glucose 40 or Less - STOP INSULIN Infusion & Give D50W; Recheck Glucose Every 30 Minutes - Notify Provider  - If Blood Glucose 41-70 - STOP INSULIN Infusion & Give D50W; Recheck Glucose Every 30 Minutes - When Blood Glucose Greater Than 111, Restart Insulin Drip at 50% of Previous Insulin Rate (Round to the Nearest Whole Unit)  - If Blood Glucose Drops  mg/dL Since Last Check - Do NOT Increase Insulin Infusion  - If Blood Glucose Drops Greater Than 100 mg/dL Since Last Check - STOP INSULIN Infusion & Recheck Glucose Every Hour - When Blood Glucose Greater Than 180, Restart Insulin Drip at 50% of Previous Insulin Rate (Round to the Nearest Whole Unit)  - Check Blood Glucose On Arrival & Every Hour; If Glucose 111-180 on Same Insulin Rate for 4 Hours, Check Blood Glucose Every 2 Hours       milrinone 20 mg/100 mL (0.2 mg/mL) in 5 % dextrose infusion  Rate: 5.02-15.05 mL/hr Freq: Titrated Route: IV  Last Dose: Stopped (04/17/18 1454)  Start: 04/16/18 1030 End: 04/17/18 1431    Admin Instructions:   Initiate Infusion at 0.25 mcg/kg/min & Titrate By 0.125 mcg/kg/min Every 30 Minutes to Maintain SBP Greater Than 90 or Cardiac Index 2.5 or Higher.  Maximum Dose 0.75 mcg/kg/min.       norepinephrine (LEVOPHED) 8 mg/250 mL (32 mcg/mL) in sodium chloride 0.9% infusion (premix)  Rate: 2.35-35.21 mL/hr Freq: Titrated Route: IV  Last Dose: Stopped (04/16/18 1013)  Start: 04/13/18 0530 End: 04/16/18 0946    Admin Instructions:   Initiate Infusion at 0.02 mcg/kg/min & Titrate By 0.02 mcg/kg/min Every 5 Minutes to Maintain SBP Greater Than 90 or MAP Greater Than 65.  Maximum Dose 0.3 mcg/kg/min.  Contact Provider if Unable to Maintain BP Goals on Max Dose.  Concentration 8 mg/250 mL       norepinephrine (LEVOPHED) 8 mg/250 mL (32 mcg/mL) in sodium chloride 0.9% infusion (premix)  Freq: Continuous PRN  Last Dose:  "Stopped (04/13/18 0241)  Start: 04/13/18 0213 End: 04/13/18 0312       phenylephrine (SOLO-SYNEPHRINE) 50 mg/250 mL (0.2 mg/mL) in 0.9% NS  infusion  Rate: 9.39-56.34 mL/hr Freq: Titrated Route: IV  Last Dose: Stopped (04/14/18 0326)  Start: 04/13/18 1015 End: 04/16/18 0946    Admin Instructions:   Initiate Infusion at 0.5 mcg/kg/min & Titrate By 0.3 mcg/kg/min Every 5 Minutes to Maintain SBP Greater Than or MAP Greater Than 65}.  Maximum Dose 3 mcg/kg/min.  Contact Provider if Unable to Maintain BP Goals on Max Dose.  Protect from light.       propofol (DIPRIVAN) infusion 10 mg/mL 100 mL  Rate: 1.88-18.78 mL/hr Freq: Titrated Route: IV  Last Dose: 15 mcg/kg/min (04/15/18 0438)  Start: 04/13/18 0600 End: 04/16/18 0946    Admin Instructions:   Do not administer through the same IV catheter with blood or plasma.  Tubing and any unused portions of propofol vials should be discarded after 12 hours.  Begin infusion at 5 mcg/kg/min and titrate by by 5 mcg/kg/min every 5 minutes to maintain RASS goal. Maximum rate 50 mcg/kg/min.  Notify MD if not at goal and requiring more than 50 mcg/kg/min. Infuse into dedicated line, change vial and tube every 12 hours. Patient must be intubated.       remifentanil (ULTIVA) 50 mcg/mL in sodium chloride 0.9 % 100 mL  Rate: 0.66-19.83 mL/hr Freq: Titrated Route: IV  Last Dose: Stopped (04/14/18 1632)  Start: 04/13/18 1115 End: 04/14/18 1724    Admin Instructions:   Begin infusion at 0.5 mcg/kg/hr. Adjust in 0.5 mcg/kg/hr increment every 5 minutes. Maximum dose is 15 mcg/kg/hr. Target for RASS goal of 0 to -1 and CPOT goal of <3; and BIS 40-60\"       sodium chloride 0.9 % infusion  Rate: 30 mL/hr Freq: Continuous Route: IV  Last Dose: 30 mL/hr (04/13/18 1142)  Start: 04/13/18 1115 End: 04/17/18 1457    Admin Instructions:   For insulin gtt       sodium chloride 0.9 % infusion  Rate: 9 mL/hr Freq: Continuous Route: IV  Start: 04/13/18 0845 End: 04/18/18 1129       sodium chloride 0.9 % " infusion  Rate: 125 mL/hr Freq: Continuous Route: IV  Last Dose: 125 mL/hr (04/13/18 0522)  Start: 04/13/18 0330 End: 04/13/18 1040       sodium chloride 0.9 % infusion  Freq: Continuous PRN  Last Dose: 125 mL/hr (04/13/18 0125)  Start: 04/13/18 0125 End: 04/13/18 0312       sodium chloride 0.9 % infusion  Freq: Code / Trauma / Sedation Continuous Med Route: IV  Last Dose: 500 mL (04/12/18 2321)  Start: 04/12/18 2321 End: 04/12/18 2321       vasopressin (PITRESSIN) 20 Units in sodium chloride 0.9 % 100 mL (0.2 Units/mL) infusion  Rate: 9 mL/hr Freq: Titrated Route: IV  Last Dose: Stopped (04/14/18 0621)  Start: 04/13/18 0530 End: 04/16/18 0946    Admin Instructions:   Keep SBP> 90 & MAP > 65       Medications 04/23/18       PRN Meds Sorted by Name   for Hall Negro as of 4/23/18 through 4/23/18    Legend:                          Inactive     Active     Other Encounter    Linked               Medications 04/23/18   acetaminophen (TYLENOL) tablet 650 mg  Dose: 650 mg Freq: Every 4 Hours PRN Route: PO  PRN Reasons: Mild Pain ,Fever  PRN Comment: temperature greater than 101F  Start: 04/13/18 0250    Admin Instructions:   Do not exceed 4 grams of acetaminophen in a 24 hr period.    If given for pain, use the following pain scale:   Mild Pain = Pain Score of 1-3, CPOT 1-2  Moderate Pain = Pain Score of 4-6, CPOT 3-4  Severe Pain = Pain Score of 7-10, CPOT 5-8       amiodarone (CORDARONE) 900 mg in dextrose (D5W) 5 % 500 mL (1.8 mg/mL) infusion  Freq: Code / Trauma / Sedation Continuous Med Route: IV  Start: 04/12/18 2340 End: 04/12/18 2340       amiodarone (CORDARONE) injection  Freq: Code / Trauma / Sedation Medication Route: IV  Start: 04/12/18 2332 End: 04/12/18 2332       aspirin tablet  Freq: As Needed  Start: 04/13/18 0300 End: 04/13/18 0312       atropine injection  Freq: Code / Trauma / Sedation Medication  Start: 04/12/18 2328 End: 04/12/18 2328       clopidogrel (PLAVIX) tablet  Freq: As Needed  Start:  04/13/18 0259 End: 04/13/18 0312       dextrose (D50W) solution 25 g  Dose: 25 g Freq: Every 15 Minutes PRN Route: IV  PRN Reason: Low Blood Sugar  PRN Comment: Blood Sugar Less Than 70  Start: 04/14/18 0804 End: 04/18/18 1109    Admin Instructions:   Blood sugar less than 70; patient has IV access - Unresponsive, NPO or Unable To Safely Swallow       dextrose (D50W) solution 25 g  Dose: 25 g Freq: Every 15 Minutes PRN Route: IV  PRN Reason: Low Blood Sugar  PRN Comment: Blood Sugar Less Than 70  Start: 04/13/18 2045 End: 04/14/18 0804    Admin Instructions:   Blood sugar less than 70; patient has IV access - Unresponsive, NPO or Unable To Safely Swallow       dextrose (D50W) solution 25-50 mL  Dose: 25-50 mL Freq: Every 30 Minutes PRN Route: IV  PRN Reason: Low Blood Sugar  PRN Comment: Blood Glucose <70 mg/dL; Per Insulin Infusion Protocol  Start: 04/13/18 0856    Admin Instructions:   Blood Glucose 40 or Less - Give 50mL (1 amp) D50W  Blood Glucose 41-70 - Give 25mL (1/2 amp) D50W       dextrose (GLUTOSE) oral gel 15 g  Dose: 15 g Freq: Every 15 Minutes PRN Route: PO  PRN Reason: Low Blood Sugar  PRN Comment: Blood sugar less than 70  Start: 04/14/18 0804 End: 04/18/18 1109    Admin Instructions:   BS<70, Patient Alert, Is not NPO, Can safely swallow.       dextrose (GLUTOSE) oral gel 15 g  Dose: 15 g Freq: Every 15 Minutes PRN Route: PO  PRN Reason: Low Blood Sugar  PRN Comment: Blood sugar less than 70  Start: 04/13/18 2045 End: 04/14/18 0804    Admin Instructions:   BS<70, Patient Alert, Is not NPO, Can safely swallow.       EPINEPHrine PF (ADRENALIN) injection  Freq: Code / Trauma / Sedation Medication  Start: 04/12/18 2326 End: 04/12/18 2332       fentaNYL citrate (PF) (SUBLIMAZE) injection 200 mcg  Dose: 200 mcg Freq: Every 1 Hour PRN Route: IV  PRN Reason: Severe Pain   Start: 04/13/18 0120 End: 04/14/18 1724    Admin Instructions:   Use filter needle to withdraw dose from ampule.  If given for pain, use  the following pain scale:  Mild Pain = Pain Score of 1-3, CPOT 1-2  Moderate Pain = Pain Score of 4-6, CPOT 3-4  Severe Pain = Pain Score of 7-10, CPOT 5-8       fentaNYL citrate (PF) (SUBLIMAZE) injection 50 mcg  Dose: 50 mcg Freq: Every 2 Hours PRN Route: IV  PRN Comment: moderate to severe pain  Start: 04/14/18 1724 End: 04/17/18 0816    Admin Instructions:   Use filter needle to withdraw dose from ampule.  If given for pain, use the following pain scale:  Mild Pain = Pain Score of 1-3, CPOT 1-2  Moderate Pain = Pain Score of 4-6, CPOT 3-4  Severe Pain = Pain Score of 7-10, CPOT 5-8       fentaNYL citrate (PF) (SUBLIMAZE) injection 50 mcg  Dose: 50 mcg Freq: Every 1 Hour PRN Route: IV  PRN Reason: Severe Pain   Start: 04/13/18 0104 End: 04/13/18 0120    Admin Instructions:   Use filter needle to withdraw dose from ampule.  If given for pain, use the following pain scale:  Mild Pain = Pain Score of 1-3, CPOT 1-2  Moderate Pain = Pain Score of 4-6, CPOT 3-4  Severe Pain = Pain Score of 7-10, CPOT 5-8       glucagon (human recombinant) (GLUCAGEN DIAGNOSTIC) injection 1 mg  Dose: 1 mg Freq: As Needed Route: SC  PRN Comment: Blood Glucose Less Than 70  Start: 04/14/18 0804 End: 04/18/18 1109    Admin Instructions:   Blood Glucose Less Than 70 - Patient Without IV Access - Unresponsive, NPO or Unable To Safely Swallow       glucagon (human recombinant) (GLUCAGEN DIAGNOSTIC) injection 1 mg  Dose: 1 mg Freq: As Needed Route: SC  PRN Comment: Blood Glucose Less Than 70  Start: 04/13/18 2045 End: 04/14/18 0804    Admin Instructions:   Blood Glucose Less Than 70 - Patient Without IV Access - Unresponsive, NPO or Unable To Safely Swallow       haloperidol lactate (HALDOL) injection 2 mg  Dose: 2 mg Freq: Every 2 Hours PRN Route: IV  PRN Reason: Agitation  Start: 04/14/18 0847 End: 04/17/18 0816    Admin Instructions:   For IV Push, administer no faster than 5 mg / minute.       heparin (porcine) 5000 UNIT/ML injection  1,900-3,800 Units  Dose: 30-60 Units/kg Freq: Every 6 Hours PRN Route: IV  PRN Comment: Per Heparin Nomogram  Start: 04/13/18 1024    Admin Instructions:   **Max Dose of 4,000 units** Bolus for Cardiac or Other Not VTE:   For PTT Less Than 54 Administer 60 units/kg Bolus.  For PTT 54 - 63 Administer 30 units/kg Bolus.       heparin (porcine) injection  Freq: As Needed  Start: 04/13/18 0140 End: 04/13/18 0312       HYDROcodone-acetaminophen (NORCO) 5-325 MG per tablet 1 tablet  Dose: 1 tablet Freq: Every 4 Hours PRN Route: PO  PRN Reason: Moderate Pain   Start: 04/13/18 0250 End: 04/23/18 0249    Admin Instructions:   Do not exceed 4 grams of acetaminophen in a 24 hr period.    If given for pain, use the following pain scale:   Mild Pain = Pain Score of 1-3, CPOT 1-2  Moderate Pain = Pain Score of 4-6, CPOT 3-4  Severe Pain = Pain Score of 7-10, CPOT 5-8    0249-D/C'd                        iopamidol (ISOVUE-370) 76 % injection  Freq: As Needed  Start: 04/13/18 0248 End: 04/13/18 0312       ipratropium-albuterol (DUO-NEB) nebulizer solution 3 mL  Dose: 3 mL Freq: Every 2 Hours PRN Route: NEBULIZATION  PRN Reason: Shortness of Air  Start: 04/16/18 1039       lidocaine (XYLOCAINE) 2% injection  Freq: As Needed  Start: 04/13/18 0136 End: 04/13/18 0312       magnesium hydroxide (MILK OF MAGNESIA) suspension 2400 mg/10mL 10 mL  Dose: 10 mL Freq: Daily PRN Route: PO  PRN Reason: Constipation  Start: 04/13/18 0250       Naloxone HCl (NARCAN) injection  Freq: Code / Trauma / Sedation Medication Route: IV  Start: 04/12/18 2334 End: 04/12/18 2334       norepinephrine (LEVOPHED) 8 mg/250 mL (32 mcg/mL) in sodium chloride 0.9% infusion (premix)  Freq: Continuous PRN  Start: 04/13/18 0213 End: 04/13/18 0312       ondansetron (ZOFRAN) tablet 4 mg  Dose: 4 mg Freq: Every 6 Hours PRN Route: PO  PRN Reasons: Nausea,Vomiting  Start: 04/13/18 0250       Or  ondansetron ODT (ZOFRAN-ODT) disintegrating tablet 4 mg  Dose: 4 mg Freq: Every  6 Hours PRN Route: PO  PRN Reasons: Nausea,Vomiting  Start: 04/13/18 0250    Admin Instructions:   Place on tongue and allow to dissolve.       Or  ondansetron (ZOFRAN) injection 4 mg  Dose: 4 mg Freq: Every 6 Hours PRN Route: IV  PRN Reasons: Nausea,Vomiting  Start: 04/13/18 0250       phenylephrine (SOLO-SYNEPHRINE) injection solution  Freq: As Needed  Start: 04/13/18 0207 End: 04/13/18 0312       potassium chloride (MICRO-K) CR capsule 40 mEq  Dose: 40 mEq Freq: As Needed Route: PO  PRN Comment: potassium replacement.  see admin instructions  Start: 04/16/18 0745    Admin Instructions:   Potassium replacement    Oral (may give capsule or powder packet)  If K+ less than or equal to 3.1 give KCl 40 mEq q4h x 3 doses  If K+ 3.2-3.6 give KCl 40 mEq q4h x 2 doses    Peripheral IV  If K+ less than or equal to 3.1 give KCl 10 mEq/100 mL NS IV q1h x 6 doses  If K+ 3.2-3.6 give KCl 10 mEq/100 mL NS q1h x 4 doses    Central Line  If K+ less than or equal to 3.1 give KCl 20 mEq/50 mL NS IV q1h x 3 doses  If K+ 3.2-3.6 give KCl 20 mEq/50 mL NS q1h x 2 doses    Check potassium 4 hours after last dose given.  Check magnesium if K stays low after replacement.  DO NOT GIVE if CrCl is less than 30 mL/minute or urine output is less than 30 mL/hr       Or  potassium chloride (KLOR-CON) packet 40 mEq  Dose: 40 mEq Freq: As Needed Route: PO  PRN Comment: potassium replacement, see admin instructions  Start: 04/16/18 0745    Admin Instructions:   Potassium replacement    Oral (may give capsule or powder packet)  If K+ less than or equal to 3.1 give KCl 40 mEq q4h x 3 doses  If K+ 3.2-3.6 give KCl 40 mEq q4h x 2 doses    Peripheral IV  If K+ less than or equal to 3.1 give KCl 10 mEq/100 mL NS IV q1h x 6 doses  If K+ 3.2-3.6 give KCl 10 mEq/100 mL NS q1h x 4 doses    Central Line  If K+ less than or equal to 3.1 give KCl 20 mEq/50 mL NS IV q1h x 3 doses  If K+ 3.2-3.6 give KCl 20 mEq/50 mL NS q1h x 2 doses    Check potassium 4 hours  after last dose given.  Check magnesium if K stays low after replacement.  DO NOT GIVE if CrCl is less than 30 mL/minute or urine output is less than 30 mL/hr       Or  potassium chloride 10 mEq in 100 mL IVPB  Dose: 10 mEq Freq: Every 1 Hour PRN Route: IV  PRN Comment: potassium protocol PERIPHERAL - see admin instructions  Start: 04/16/18 0745    Admin Instructions:   Potassium replacement - patient NPO or cannot tolerate oral potassium    Peripheral or Central IV  If K+ less than or equal to 3.1 give KCl 10 mEq/100 mL NS IV q1h x 6 doses  If K+ 3.2-3.6 give KCl 10 mEq/100 mL NS q1h x 4 doses    Check potassium 4 hours after last dose given.  Check magnesium if K stays low after replacement.  DO NOT GIVE if CrCl is less than 30 mL/minute or urine output is less than 30 mL/hr. Rates greater than 10mEq/hr require ECG monitoring.       potassium chloride 20 mEq in 50 mL IVPB  Dose: 20 mEq Freq: Every 1 Hour PRN Route: IV  PRN Comment: See admin Instructions.  Start: 04/13/18 0524 End: 04/13/18 1759    Admin Instructions:   Potassium replacement    Central Line -   If K+ less than or equal to 3.1 give KCl 20 mEq/50 mL NS IV q1h x 3 doses  If K+ 3.2-3.6 give KCl 20 mEq/50 mL NS q1h x 2 doses    Check potassium 4 hours after last dose given.  Check magnesium if K stays low after replacement.  DO NOT GIVE if CrCl is less than 30 mL/minute or urine output is less than 30 mL/hr. Rates greater than 10mEq/hr require ECG monitoring.  Patient's CrCL =        sodium bicarbonate injection 8.4%  Freq: Code / Trauma / Sedation Medication Route: IV  Start: 04/12/18 2338 End: 04/12/18 2338       sodium chloride 0.9 % flush 10 mL  Dose: 10 mL Freq: As Needed Route: IV  PRN Reason: Line Care  Start: 04/12/18 2341       sodium chloride 0.9 % infusion  Freq: Continuous PRN  Start: 04/13/18 0125 End: 04/13/18 0312       sodium chloride 0.9 % infusion  Freq: Code / Trauma / Sedation Continuous Med Route: IV  Start: 04/12/18 2321 End:  04/12/18 2321       verapamil (ISOPTIN) 500 mcg, nitroglycerin (TRIDIL) 200 mcg, heparin (porcine) 3,000 Units radial artery injection  Freq: As Needed  Start: 04/13/18 0137 End: 04/13/18 0312       ziprasidone (GEODON) injection 5 mg  Dose: 5 mg Freq: Every 6 Hours PRN Route: IM  PRN Reason: Agitation  Start: 04/15/18 1729 End: 04/16/18 1042    Admin Instructions:   Max dose 40 mg/day       Medications 04/23/18

## 2018-04-23 NOTE — PROGRESS NOTES
"Negro Hall  1955 62 y.o.  [id number]      Patient Care Team:  No Known Provider as PCP - General    CC: Anterior STEMI, cardiac arrest    Interval History: He is feeling down today and is a little bummed out on that okay    Objective   Vital Signs  Temp:  [97.8 °F (36.6 °C)-98.4 °F (36.9 °C)] 98.4 °F (36.9 °C)  Heart Rate:  [86-97] 88  Resp:  [18] 18  BP: (108-124)/(69-83) 117/70    Intake/Output Summary (Last 24 hours) at 04/23/18 1119  Last data filed at 04/23/18 1100   Gross per 24 hour   Intake             1080 ml   Output                0 ml   Net             1080 ml     Flowsheet Rows    Flowsheet Row First Filed Value   Admission Height 170.2 cm (67\") Documented at 04/12/2018 2153   Admission Weight 62.6 kg (138 lb) Documented at 04/12/2018 2153          Physical Exam:   General Appearance:    Alert,oriented, in no acute distress   Lungs:     Clear to auscultation,BS are equal    Heart:    Normal S1 and S2, RRR without murmur, gallop or rub   HEENT:    Sclera are clear, no JVD or adenopathy   Abdomen:     Normal bowel sounds, soft non-tender, non-distended, no HSM   Extremities:   Moves all extremities well, no edema, no cyanosis, no             Redness, no rash, no pulses in his legs      Medication Review:        amiodarone 200 mg Oral Q24H   atorvastatin 20 mg Oral Daily   budesonide-formoterol 2 puff Inhalation BID - RT   carvedilol 3.125 mg Oral Q12H   cefdinir 300 mg Oral Q12H   clopidogrel 75 mg Oral Daily   famotidine 20 mg Nasogastric Daily   warfarin 7.5 mg Oral Daily            I reviewed the patient's new clinical results.  I personally viewed and interpreted the patient's EKG/Telemetry data    Assessment/Plan  Active Hospital Problems (** Indicates Principal Problem)    Diagnosis Date Noted   • Cardiac arrest [I46.9] 04/13/2018      Resolved Hospital Problems    Diagnosis Date Noted Date Resolved   No resolved problems to display.       Cardiac arrest in the setting of a STEMI treated " with hypothermia and angioplasty drug-eluting stenting to the LAD and ramus.  Has an LV thrombus but is really making a great recovery.  I think at this point he is ready for discharge I would look to go to a rehabilitation facility tomorrow he's not been able to take any other heart medicines and her blood pressure medicine because he is hypotensive here to little bit of a nonsustained run of VT     He has continued to have little bursts of nonsustained VT he has a severe ischemic cardiomyopathy and then a put him on a low-dose of amiodarone significant, down I'm also going to try and back on Coreg and like to watch him for one more day        Lennox Max MD  04/23/18  11:19 AM

## 2018-04-23 NOTE — PLAN OF CARE
Problem: Patient Care Overview  Goal: Plan of Care Review  Outcome: Ongoing (interventions implemented as appropriate)   04/23/18 0541   Coping/Psychosocial   Plan of Care Reviewed With patient   Plan of Care Review   Progress improving   OTHER   Outcome Summary No c/o's of pain or discomfort. No VT this shift. Amiodarone started for ectopy. Sister in route to hospital from [place] to assist with D/C planning . Safety maintained.      Goal: Individualization and Mutuality  Outcome: Ongoing (interventions implemented as appropriate)    Goal: Discharge Needs Assessment  Outcome: Ongoing (interventions implemented as appropriate)      Problem: Fall Risk (Adult)  Goal: Absence of Fall  Outcome: Ongoing (interventions implemented as appropriate)      Problem: Skin Injury Risk (Adult)  Goal: Skin Health and Integrity  Outcome: Ongoing (interventions implemented as appropriate)      Problem: Pain, Acute (Adult)  Goal: Acceptable Pain Control/Comfort Level  Outcome: Ongoing (interventions implemented as appropriate)

## 2018-04-23 NOTE — THERAPY TREATMENT NOTE
Acute Care - Physical Therapy Treatment Note  [facility]     Patient Name: Negro Hall  : 1955  MRN: [id number]  Today's Date: 2018  Onset of Illness/Injury or Date of Surgery: 18     Referring Physician: Dr Hercules     Admit Date: 2018    Visit Dx:    ICD-10-CM ICD-9-CM   1. Cardiac arrest I46.9 427.5   2. STEMI involving left anterior descending coronary artery I21.02 410.10   3. Difficulty walking R26.2 719.7     Patient Active Problem List   Diagnosis   • Cardiac arrest       Therapy Treatment          Rehabilitation Treatment Summary     Row Name 18 1333             Treatment Time/Intention    Discipline physical therapist  -      Document Type therapy note (daily note)  -JM      Subjective Information no complaints  -JM      Mode of Treatment physical therapy  -      Patient Effort good  -      Existing Precautions/Restrictions fall  -JM      Recorded by [JM] Victoria Vega, [facility] 18 1339 18 1339      Row Name 18 1333             Cognitive Assessment/Intervention- PT/OT    Affect/Mental Status (Cognitive) confused  -JM      Recorded by [JM] Victoria Vega, [facility] 18 1339 18 1339      Row Name 18 1333             Bed Mobility Assessment/Treatment    Comment (Bed Mobility) in chair  -JM      Recorded by [JM] Victoria Vega, [facility] 18 1339 18 1339      Row Name 18 1333             Transfer Assessment/Treatment    Sit-Stand [place] (Transfers) supervision  -      Stand-Sit [place] (Transfers) supervision  -JM      Recorded by [JM] Victoria Vega, JIMMIE 18 1339 18 1339      Row Name 18 1333             Sit-Stand Transfer    Assistive Device (Sit-Stand Transfers) walker, front-wheeled  -      Recorded by [JM] Victoria Vega PTA 18 1339 18 1339      Row Name 18 1333             Stand-Sit Transfer    Assistive Device (Stand-Sit Transfers) walker, front-wheeled  -      Recorded by  [] Victoria Gary, [facility] 04/23/18 1339 04/23/18 1339      Row Name 04/23/18 1333             Gait/Stairs Assessment/Training    [place] Level (Gait) contact guard;stand by assist;verbal cues  -      Assistive Device (Gait) walker, front-wheeled  -      Distance in Feet (Gait) 150  -      Comment (Gait/Stairs) cues to slow pace and keep balance on turns for safety  -      Recorded by [] Victoria Vega, [facility] 04/23/18 1339 04/23/18 1339      Row Name 04/23/18 1333             Positioning and Restraints    Pre-Treatment Position sitting in chair/recliner  -      Post Treatment Position chair  -JM      In Chair sitting;call light within reach;encouraged to call for assist   no alarm on upon entry  -JM      Recorded by [] Victoria Vega, [facility] 04/23/18 1339 04/23/18 1339      Row Name 04/23/18 1333             Pain Scale: Numbers Pre/Post-Treatment    Pain Scale: Numbers, Pretreatment 0/10 - no pain  -      Pain Scale: Numbers, Post-Treatment 0/10 - no pain  -      Recorded by [] Victoria Vega, [facility] 04/23/18 1339 04/23/18 1339      Row Name 04/23/18 1333             Outcome Summary/Treatment Plan (PT)    Anticipated Discharge Disposition (PT) --   dispo pending; possible SNF  -      Recorded by [] Victoria Vega, [facility] 04/23/18 1339 04/23/18 1339        User Key  (r) = Recorded By, (t) = Taken By, (c) = Cosigned By    Initials Name Effective Dates Discipline    ERIN Vega, [facility] 03/07/18 -  PT                     Physical Therapy Education     Title: PT OT SLP Therapies (Done)     Topic: Physical Therapy (Done)     Point: Mobility training (Done)    Learning Progress Summary     Learner Status Readiness Method Response Comment Documented by    Patient Done Acceptance EJOHN 04/23/18 1340     Done Acceptance E CECELIA CHIANG balance, benefits of activity GR 04/22/18 1541     Active Acceptance E NR  AR 04/20/18 0940     Active Acceptance E NR  AR 04/19/18 0906     Done Acceptance EJOHN DU,NR   PV 04/18/18 1625          Point: Home exercise program (Done)    Learning Progress Summary     Learner Status Readiness Method Response Comment Documented by    Patient Done Acceptance E,TB VU   04/23/18 1340     Done Acceptance E VU,NR balance, benefits of activity GR 04/22/18 1541     Active Acceptance E NR  AR 04/20/18 0940     Active Acceptance E NR  AR 04/19/18 0906     Done Acceptance E,TB VU,DU,NR  PV 04/18/18 1625          Point: Body mechanics (Done)    Learning Progress Summary     Learner Status Readiness Method Response Comment Documented by    Patient Done Acceptance E,TB VU   04/23/18 1340     Done Acceptance E VU,NR balance, benefits of activity GR 04/22/18 1541     Active Acceptance E NR  AR 04/20/18 0940     Active Acceptance E NR  AR 04/19/18 0906     Done Acceptance E,TB VU,DU,NR  PV 04/18/18 1625          Point: Precautions (Done)    Learning Progress Summary     Learner Status Readiness Method Response Comment Documented by    Patient Done Acceptance E,TB VU   04/23/18 1340     Done Acceptance E VU,NR balance, benefits of activity  04/22/18 1541     Active Acceptance E NR  AR 04/20/18 0940     Active Acceptance E NR  AR 04/19/18 0906     Done Acceptance E,TB VU,DU,NR  PV 04/18/18 1625                      User Key     Initials Effective Dates Name Provider Type Discipline     10/03/16 -  Anish Zarate, PT Physical Therapist PT     03/07/18 -  Victoria Vega, PTA Physical Therapy Assistant PT    AR 04/03/18 -  Claudette Lopez, PT Physical Therapist PT     03/07/18 -  Terrance Shea, PT Student PT Student PT                    PT Recommendation and Plan  Anticipated Discharge Disposition (PT):  (dispo pending; possible SNF)  Outcome Summary/Treatment Plan (PT)  Anticipated Discharge Disposition (PT):  (dispo pending; possible SNF)  Plan of Care Reviewed With: patient  Outcome Summary: cues to slow pace and improve balance during turns w/rwx          Outcome Measures     Row Name  04/23/18 1300 04/22/18 1500 04/21/18 1237       How much help from another person do you currently need...    Turning from your back to your side while in flat bed without using bedrails? 4  -JM 4  -GR  --    Moving from lying on back to sitting on the side of a flat bed without bedrails? 4  -JM 4  -GR  --    Moving to and from a bed to a chair (including a wheelchair)? 3  - 3  -GR  --    Standing up from a chair using your arms (e.g., wheelchair, bedside chair)? 3  - 3  -GR  --    Climbing 3-5 steps with a railing? 3  - 3  -GR  --    To walk in hospital room? 3  - 3  -GR  --    AM-PAC 6 Clicks Score 20  -JM 20  -GR  --       How much help from another is currently needed...    Putting on and taking off regular lower body clothing?  --  -- 3  -LE    Bathing (including washing, rinsing, and drying)  --  -- 3  -LE    Toileting (which includes using toilet bed pan or urinal)  --  -- 3  -LE    Putting on and taking off regular upper body clothing  --  -- 3  -LE    Taking care of personal grooming (such as brushing teeth)  --  -- 4  -LE    Eating meals  --  -- 4  -LE    Score  --  -- 20  -LE       Functional Assessment    Outcome Measure Options  -- AM-PAC 6 Clicks Basic Mobility (PT)  -GR  --    Row Name 04/21/18 1200             Functional Assessment    Outcome Measure Options AM-[facility] 6 Clicks Daily Activity (OT)  -        User Key  (r) = Recorded By, (t) = Taken By, (c) = Cosigned By    Initials Name Provider Type    ADINA Chacko, OTR Occupational Therapist    MARYLU Zarate, PT Physical Therapist    ERIN Vega, PTA Physical Therapy Assistant           Time Calculation:         PT Charges     Row Name 04/23/18 1345             Time Calculation    Start Time 1310  -JM      Stop Time 1330  -JM      Time Calculation (min) 20 min  -JM      PT Received On 04/23/18  -      PT - Next Appointment 04/24/18  -        User Key  (r) = Recorded By, (t) = Taken By, (c) = Cosigned By    Initials Name  Provider Type    JM Victoria Vega PTA Physical Therapy Assistant          Therapy Charges for Today     Code Description Service Date Service Provider Modifiers Qty    38004474792 HC PT THER PROC EA 15 MIN 4/23/2018 Victoria Vega PTA GP 1          PT G-Codes  Outcome Measure Options: AM-PAC 6 Clicks Basic Mobility (PT)    Victoria Vega PTA  4/23/2018

## 2018-04-23 NOTE — SIGNIFICANT NOTE
"   04/23/18 1406   Rehab Treatment   Discipline occupational therapist   Reason Treatment Not Performed patient/family declined treatment  (\"No thanks, I really do not want to do anything else this afternoon.  I took a walk with PT just a few minutes ago.\" pt. stated )   Recommendation   OT - Next Appointment 04/24/18     "

## 2018-04-23 NOTE — PROGRESS NOTES
[facility]      Mar/chart reviewed  F/u resuscitated cardiac arrest  Doing well.  Expect pain    Vital Sign Min/Max for last 24 hours  Temp  Min: 97.8 °F (36.6 °C)  Max: 98.4 °F (36.9 °C)   BP  Min: 108/69  Max: 124/83   Pulse  Min: 86  Max: 97   Resp  Min: 18  Max: 18   SpO2  Min: 94 %  Max: 98 %   No Data Recorded   Weight  Min: 52.3 kg (115 lb 6.4 oz)  Max: 52.3 kg (115 lb 6.4 oz)     Appears ill, alert, seems to have some short term memory problems.  perrl, eomi, no icterus,  mmm, no jvd, trachea midline, neck supple,  chest cta bilaterally, no crackles, no wheezes,   rrr,   soft, nt, nd +bs,  no c/c/ 1+ edema     Labs:  inr 1.3  Cr 0.78  Wbc 20  hgb 8.6  plts 284    Resuscitated cardiac arrest  Acute respiratory failure, vent liberated 4/15, optiflow, HF cannula, NC  Cardiogenic shock  Anoxic encephalopathy  STEMI  LV thrombus  Shock liver  ABENA  ? Pneumonia - RAUOLTELLA !  COPD exacerbation  Relevant Medical Diagnoses  Current smoker    Adjustment to coumadin noted, await guidance from case management in terms of placement? Likely can be d/c'ed once inr going back up.

## 2018-04-24 NOTE — PLAN OF CARE
Problem: Patient Care Overview  Goal: Plan of Care Review  Outcome: Ongoing (interventions implemented as appropriate)   04/24/18 2328   OTHER   Outcome Summary episode of left arm pain last evening. spoke with kayy jeffries and dr fowler. orders noted and pt medicated with dilaudid per orders. pain relieved, labs and ekg obtained. cont to monitor.      Goal: Individualization and Mutuality  Outcome: Ongoing (interventions implemented as appropriate)    Goal: Discharge Needs Assessment  Outcome: Ongoing (interventions implemented as appropriate)

## 2018-04-24 NOTE — THERAPY TREATMENT NOTE
Acute Care - Physical Therapy Treatment Note  [facility]     Patient Name: Negro Hall  : 1955  MRN: [id number]  Today's Date: 2018  Onset of Illness/Injury or Date of Surgery: 18     Referring Physician: Dr Hercules     Admit Date: 2018    Visit Dx:    ICD-10-CM ICD-9-CM   1. Cardiac arrest I46.9 427.5   2. STEMI involving left anterior descending coronary artery I21.02 410.10   3. Difficulty walking R26.2 719.7     Patient Active Problem List   Diagnosis   • Cardiac arrest       Therapy Treatment          Rehabilitation Treatment Summary     Row Name 18 1102             Treatment Time/Intention    Discipline physical therapist  -      Document Type therapy note (daily note)  -      Subjective Information no complaints   earlier left UE pn-nsg OK'd said MD cleared  -ERIN      Mode of Treatment physical therapy  -JM      Patient Effort good  -JM      Existing Precautions/Restrictions fall;seizures  -JM      Recorded by [JM] Victoria Vega, [facility] 18 1109 18 1109      Row Name 18 1102             Cognitive Assessment/Intervention- PT/OT    Affect/Mental Status (Cognitive) confused  -JM      Recorded by [JM] Victoria Vega, [facility] 18 1109 18 1109      Row Name 18 1102             Transfer Assessment/Treatment    Sit-Stand [place] (Transfers) stand by assist   due to earlier reports of arm pn and hx of MI  -JM      Stand-Sit [place] (Transfers) supervision  -JM2      Recorded by [JM] Victoria Vega, [facility] 18 1113 18 1113  [JM2] Victoria Vega [facility] 18 1109 18 1109      Row Name 18 1102             Sit-Stand Transfer    Assistive Device (Sit-Stand Transfers) walker, front-wheeled  -ERIN      Recorded by [JM] Victoria Vega, [facility] 18 1109 18 1109      Row Name 18 1102             Stand-Sit Transfer    Assistive Device (Stand-Sit Transfers) walker, front-wheeled  -ERIN      Recorded by [JM] Victoria Vega,  [facility] 04/24/18 1109 04/24/18 1109      Row Name 04/24/18 1102             Gait/Stairs Assessment/Training    [place] Level (Gait) contact guard;stand by assist;verbal cues  -JM      Assistive Device (Gait) walker, front-wheeled  -JM      Distance in Feet (Gait) 150  -JM      Deviations/Abnormal Patterns (Gait) stride length decreased  -JM      Bilateral Gait Deviations forward flexed posture  -JM      Comment (Gait/Stairs) improved balance on turns, but pt likes to leave rwx behind into BR and had slight LOB  -JM      Recorded by [] Victoria Vega, [facility] 04/24/18 1109 04/24/18 1109      Row Name 04/24/18 1102             Positioning and Restraints    Pre-Treatment Position sitting in chair/recliner  -JM      In Chair reclined;call light within reach;encouraged to call for assist;exit alarm on;notified nsg  -JM      Recorded by [] Victoria Vega, [facility] 04/24/18 1109 04/24/18 1109      Row Name 04/24/18 1102             Pain Scale: Numbers Pre/Post-Treatment    Pain Scale: Numbers, Pretreatment 0/10 - no pain  -JM      Pain Scale: Numbers, Post-Treatment 0/10 - no pain  -JM      Recorded by [] Victoria Vega, [facility] 04/24/18 1109 04/24/18 1109      Row Name 04/24/18 1102             Outcome Summary/Treatment Plan (PT)    Anticipated Discharge Disposition (PT) --   dispo pending; possible SNF  -JM      Recorded by [] Victoria Vega, [facility] 04/24/18 1109 04/24/18 1109        User Key  (r) = Recorded By, (t) = Taken By, (c) = Cosigned By    Initials Name Effective Dates Discipline     Victoria Vega, [facility] 03/07/18 -  PT                     Physical Therapy Education     Title: PT OT SLP Therapies (Done)     Topic: Physical Therapy (Done)     Point: Mobility training (Done)    Learning Progress Summary     Learner Status Readiness Method Response Comment Documented by    Patient Done Eager JOHN SIMPSON,D MARIIA,CECELIA   04/24/18 1111     Done Acceptance JOHN SIMPSON   04/23/18 1340     Done Acceptance E VU,NR balance, benefits of  activity GR 04/22/18 1541     Active Acceptance E NR  AR 04/20/18 0940     Active Acceptance E NR  AR 04/19/18 0906     Done Acceptance E,TB VU,DU,NR  PV 04/18/18 1625          Point: Home exercise program (Done)    Learning Progress Summary     Learner Status Readiness Method Response Comment Documented by    Patient Done Eager E,TB,D VU,NR   04/24/18 1111     Done Acceptance E,TB VU   04/23/18 1340     Done Acceptance E VU,NR balance, benefits of activity  04/22/18 1541     Active Acceptance E NR  AR 04/20/18 0940     Active Acceptance E NR  AR 04/19/18 0906     Done Acceptance E,TB VU,DU,NR  PV 04/18/18 1625          Point: Body mechanics (Done)    Learning Progress Summary     Learner Status Readiness Method Response Comment Documented by    Patient Done Eager E,TB,D VU,NR   04/24/18 1111     Done Acceptance E,TB VU   04/23/18 1340     Done Acceptance E VU,NR balance, benefits of activity  04/22/18 1541     Active Acceptance E NR  AR 04/20/18 0940     Active Acceptance E NR  AR 04/19/18 0906     Done Acceptance E,TB VU,DU,NR  PV 04/18/18 1625          Point: Precautions (Done)    Learning Progress Summary     Learner Status Readiness Method Response Comment Documented by    Patient Done Eager E,TB,D VU,NR   04/24/18 1111     Done Acceptance E,TB VU   04/23/18 1340     Done Acceptance E VU,NR balance, benefits of activity  04/22/18 1541     Active Acceptance E NR  AR 04/20/18 0940     Active Acceptance E NR  AR 04/19/18 0906     Done Acceptance E,TB VU,DU,NR  PV 04/18/18 1625                      User Key     Initials Effective Dates Name Provider Type Discipline     10/03/16 -  Anish Zarate, PT Physical Therapist PT     03/07/18 -  Victoria Vega, PTA Physical Therapy Assistant PT    AR 04/03/18 -  Claudette Lopez, PT Physical Therapist PT    PV 03/07/18 -  Terrance Shea, PT Student PT Student PT                    PT Recommendation and Plan  Anticipated Discharge Disposition (PT):   (dispo pending; possible SNF)  Outcome Summary/Treatment Plan (PT)  Anticipated Discharge Disposition (PT):  (dispo pending; possible SNF)  Plan of Care Reviewed With: patient  Outcome Summary: pt seems to be motivated for PT , but overlooks some balance deficits at times; could benefit from SNU stay to improve strength and endurance before dc home alone          Outcome Measures     Row Name 04/24/18 1100 04/23/18 1300 04/22/18 1500       How much help from another person do you currently need...    Turning from your back to your side while in flat bed without using bedrails? 4  -JM 4  -JM 4  -GR    Moving from lying on back to sitting on the side of a flat bed without bedrails? 4  -JM 4  -JM 4  -GR    Moving to and from a bed to a chair (including a wheelchair)? 3  -JM 3  -JM 3  -GR    Standing up from a chair using your arms (e.g., wheelchair, bedside chair)? 3  -JM 3  -JM 3  -GR    Climbing 3-5 steps with a railing? 3  -JM 3  -JM 3  -GR    To walk in hospital room? 3  -JM 3  -JM 3  -GR    AM-PAC 6 Clicks Score 20  -JM 20  -JM 20  -GR       Functional Assessment    Outcome Measure Options  --  -- AM-PAC 6 Clicks Basic Mobility (PT)  -GR    Row Name 04/21/18 1237 04/21/18 1200          How much help from another is currently needed...    Putting on and taking off regular lower body clothing? 3  -LE  --     Bathing (including washing, rinsing, and drying) 3  -LE  --     Toileting (which includes using toilet bed pan or urinal) 3  -LE  --     Putting on and taking off regular upper body clothing 3  -LE  --     Taking care of personal grooming (such as brushing teeth) 4  -LE  --     Eating meals 4  -LE  --     Score 20  -LE  --        Functional Assessment    Outcome Measure Options  -- AM-PAC 6 Clicks Daily Activity (OT)  -LE       User Key  (r) = Recorded By, (t) = Taken By, (c) = Cosigned By    Initials Name Provider Type    ADINA Chacko, OTR Occupational Therapist    GR Anish Zarate, PT Physical Therapist     EIRN Vega PTA Physical Therapy Assistant           Time Calculation:         PT Charges     Row Name 04/24/18 1113             Time Calculation    Start Time 0950  -ERIN      Stop Time 1007  -ERIN      Time Calculation (min) 17 min  -ERIN      PT Received On 04/24/18  -ERIN      PT - Next Appointment 04/25/18  -ERIN        User Key  (r) = Recorded By, (t) = Taken By, (c) = Cosigned By    Initials Name Provider Type    ERIN Vega PTA Physical Therapy Assistant          Therapy Charges for Today     Code Description Service Date Service Provider Modifiers Qty    63299319797 HC PT THER PROC EA 15 MIN 4/23/2018 Victoria Vega PTA GP 1    48487523467 HC PT THER PROC EA 15 MIN 4/24/2018 Victoria Vega PTA GP 1          PT G-Codes  Outcome Measure Options: AM-PAC 6 Clicks Basic Mobility (PT)    Victoria Vega PTA  4/24/2018

## 2018-04-24 NOTE — DISCHARGE PLACEMENT REQUEST
"Shannan Hall (62 y.o. Male)     Date of Birth Social Security Number Address Home Phone MRN    1955  [id number]  [facility] [address] [phone] [id number]    [facility] Marital Status          None        Admission Date Admission Type Admitting Provider Attending Provider Department, Room/Bed    4/12/18 Emergency Kahlil Lake MD Kellie, Scott P, MD [address], 446/1    Discharge Date Discharge Disposition Discharge Destination                       Attending Provider:  Jared Tapia MD    Allergies:  Penicillins    Isolation:  None   Infection:  None   Code Status:  FULL    Ht:  170.2 cm (67.01\")   Wt:  52.8 kg (116 lb 8 oz)    Admission Cmt:  None   Principal Problem:  None                Active Insurance as of 4/12/2018     Primary Coverage     Payor Plan Insurance Group Employer/Plan Group    [employer] [place] [employer]      Payor Plan Address Payor Plan Phone Number Effective From Effective To    [address] [address] [phone] 4/12/2018     [place] [address]       Subscriber Name Subscriber Birth Date Member ID       SHANNAN HALL 1955 [id number]                 Emergency Contacts      (Rel.) Home Phone Work Phone Mobile Phone    Tere Hall (Son) [phone] -- [phone]    Saulo(Ex-Wife)Eron (Other) [phone] -- [phone]    Yelitza Olivas (Sister) -- -- [phone]    Emigdio Quesada (Friend) -- -- [phone]              "

## 2018-04-24 NOTE — PROGRESS NOTES
Continued Stay Note  [facility]     Patient Name: Negro Hall  MRN: [id number]  Today's Date: 4/24/2018    Admit Date: 4/12/2018          Discharge Plan     Row Name 04/24/18 1432       Plan    Plan [facility]     Plan Comments Pt has well care medicaid and other facilities cannot take him  Referral to [facility]     Row Name 04/24/18 1333       Plan    Plan [facility]    Plan Comments [facility] not take [facility]  Waiting for reply from [facility]              Discharge Codes    No documentation.           Bree Chua RN

## 2018-04-24 NOTE — PROGRESS NOTES
Continued Stay Note  [facility]     Patient Name: Negro Hall  MRN: [id number]  Today's Date: 4/24/2018    Admit Date: 4/12/2018          Discharge Plan     Row Name 04/24/18 1045       Plan    Plan [facility]    Plan Comments messaged again to Barb at [facility].  Spoke with Kitty Kan for [facility]  She is evaluating pt for placement  [facility]P following    Row Name 04/24/18 0957       Plan    Plan Comments Referral to [facility] per daughter in law              Discharge Codes    No documentation.           Bree Chua RN

## 2018-04-24 NOTE — PROGRESS NOTES
[facility]      Mar/chart reviewed  F/u resuscitated cardiac arrest  Doing well.  Expect pain    Vital Sign Min/Max for last 24 hours  Temp  Min: 97.6 °F (36.4 °C)  Max: 98.9 °F (37.2 °C)   BP  Min: 98/72  Max: 127/80   Pulse  Min: 71  Max: 108   Resp  Min: 16  Max: 20   SpO2  Min: 94 %  Max: 100 %   Flow (L/min)  Min: 2  Max: 2   Weight  Min: 52.8 kg (116 lb 8 oz)  Max: 52.8 kg (116 lb 8 oz)     Appears ill, alert, seems to have some short term memory problems.  perrl, eomi, no icterus,  mmm, no jvd, trachea midline, neck supple,  chest cta bilaterally, no crackles, no wheezes,   rrr,   soft, nt, nd +bs,  no c/c/ e  Labs:  inr 1.49    Resuscitated cardiac arrest  Acute respiratory failure, vent liberated 4/15, optiflow, HF cannula, NC  Cardiogenic shock  Anoxic encephalopathy  STEMI  LV thrombus  Shock liver  ABENA  ? Pneumonia - RAUOLTELLA !  COPD exacerbation  Relevant Medical Diagnoses  Current smoker     Adjustment to coumadin noted,   D/c to [facility] once approved/arranged

## 2018-04-24 NOTE — PROGRESS NOTES
"Negro Hall  1955 62 y.o.  [id number]      Patient Care Team:  No Known Provider as PCP - General    CC: Anterior STEMI, cardiac arrest    Interval History: He had some left arm pain yesterday but none since and otherwise is doing well  Objective   Vital Signs  Temp:  [97.6 °F (36.4 °C)-98.9 °F (37.2 °C)] 97.7 °F (36.5 °C)  Heart Rate:  [] 82  Resp:  [16-20] 16  BP: ()/(69-85) 106/72    Intake/Output Summary (Last 24 hours) at 04/24/18 1502  Last data filed at 04/24/18 1300   Gross per 24 hour   Intake              740 ml   Output                0 ml   Net              740 ml     Flowsheet Rows    Flowsheet Row First Filed Value   Admission Height 170.2 cm (67\") Documented at 04/12/2018 2153   Admission Weight 62.6 kg (138 lb) Documented at 04/12/2018 2153          Physical Exam:   General Appearance:    Alert,oriented, in no acute distress   Lungs:     Clear to auscultation,BS are equal    Heart:    Normal S1 and S2, RRR without murmur, gallop or rub   HEENT:    Sclera are clear, no JVD or adenopathy   Abdomen:     Normal bowel sounds, soft non-tender, non-distended, no HSM   Extremities:   Moves all extremities well, no edema, no cyanosis, no             Redness, no rash, no pulses in his legs      Medication Review:        amiodarone 200 mg Oral Q24H   atorvastatin 20 mg Oral Daily   budesonide-formoterol 2 puff Inhalation BID - RT   clopidogrel 75 mg Oral Daily   famotidine 20 mg Nasogastric Daily   warfarin 7.5 mg Oral Daily            I reviewed the patient's new clinical results.  I personally viewed and interpreted the patient's EKG/Telemetry data    Assessment/Plan  Active Hospital Problems (** Indicates Principal Problem)    Diagnosis Date Noted   • Cardiac arrest [I46.9] 04/13/2018      Resolved Hospital Problems    Diagnosis Date Noted Date Resolved   No resolved problems to display.       Cardiac arrest in the setting of a STEMI treated with hypothermia and angioplasty drug-eluting " stenting to the LAD and ramus.  Has an LV thrombus but is really making a great recovery.  I think at this point he is ready for discharge I would look to go to a rehabilitation facility tomorrow he's not been able to take any other heart medicines and her blood pressure medicine because he is hypotensive here to little bit of a nonsustained run of VT     I think his arm pains probably musculoskeletal and not ischemia from his heart.  Is not a more VT since we put him on a low-dose of amiodarone think he can be discharged she is given need an INR goal of 2-3 should see Erum in a week and see me in a month        Lennox Max MD  04/24/18  3:02 PM

## 2018-04-24 NOTE — PROGRESS NOTES
Continued Stay Note  [facility]     Patient Name: Negro Hall  MRN: [id number]  Today's Date: 4/24/2018    Admit Date: 4/12/2018          Discharge Plan     Row Name 04/24/18 1333       Plan    Plan [facility]    Plan Comments [facility] not [facility]  Waiting for reply from [facility]    Row Name 04/24/18 1045       Plan    Plan [facility] vs [facility]    Plan Comments messaged again to Barb at [facility].  Spoke with Kitty [facility] for [facility]  She is evaluating pt for placement  CCP following              Discharge Codes    No documentation.           Bree Chua RN

## 2018-04-24 NOTE — PLAN OF CARE
Problem: Patient Care Overview  Goal: Plan of Care Review  Outcome: Ongoing (interventions implemented as appropriate)   04/24/18 0674   Coping/Psychosocial   Plan of Care Reviewed With patient   Plan of Care Review   Progress improving   OTHER   Outcome Summary up to chair for a lot of the day. denies pain. BP remains lower--coreg d/c'd. HR NSR. ready for discharge--awaiting placement. ADALBERTO Carr RN     Goal: Individualization and Mutuality  Outcome: Ongoing (interventions implemented as appropriate)    Goal: Discharge Needs Assessment  Outcome: Ongoing (interventions implemented as appropriate)    Goal: Interprofessional Rounds/Family Conf  Outcome: Ongoing (interventions implemented as appropriate)      Problem: Fall Risk (Adult)  Goal: Absence of Fall  Outcome: Ongoing (interventions implemented as appropriate)

## 2018-04-24 NOTE — SIGNIFICANT NOTE
Call placed to cardiology when pt started to c/o of left arm pain r/t boarderline bp. ekg obtained. Spoke with kayy jeffries. Updated her on pt hx and symptoms. Received call from dr fowler and discussed pt's symptoms and plan of care. ekg completed, awaiting troponin draw and pt medicated with dilaudid per dr fowler's order

## 2018-04-24 NOTE — PROGRESS NOTES
Continued Stay Note  [facility]     Patient Name: Negro Hall  MRN: [id number]  Today's Date: 4/24/2018    Admit Date: 4/12/2018          Discharge Plan     Row Name 04/24/18 0957       Plan    Plan Comments Referral to [facility] per daughter in law              Discharge Codes    No documentation.           Bree Chua RN

## 2018-04-24 NOTE — PLAN OF CARE
Problem: Patient Care Overview  Goal: Plan of Care Review  Outcome: Ongoing (interventions implemented as appropriate)   04/24/18 1114   Coping/Psychosocial   Plan of Care Reviewed With patient   OTHER   Outcome Summary pt seems to be motivated for PT , but overlooks some balance deficits at times; could benefit from SNU stay to improve strength and endurance before dc home alone

## 2018-04-24 NOTE — PLAN OF CARE
Problem: Nutrition, Imbalanced: Inadequate Oral Intake (Adult)  Intervention: Promote/Optimize Nutrition   04/24/18 1529   Nutrition Interventions   Oral Nutrition Promotion calorie dense liquids provided;nutritional therapy counseling provided;calorie dense foods provided   Follow up-improved appetite, eating well. Drinking supplements daily.

## 2018-04-25 PROBLEM — I50.20 SYSTOLIC CHF (HCC): Status: ACTIVE | Noted: 2018-01-01

## 2018-04-25 PROBLEM — J44.9 COPD (CHRONIC OBSTRUCTIVE PULMONARY DISEASE) (HCC): Status: ACTIVE | Noted: 2018-01-01

## 2018-04-25 PROBLEM — I46.9 CARDIAC ARREST (HCC): Status: RESOLVED | Noted: 2018-01-01 | Resolved: 2018-01-01

## 2018-04-25 PROBLEM — I25.10 CAD (CORONARY ARTERY DISEASE): Status: ACTIVE | Noted: 2018-01-01

## 2018-04-25 NOTE — PROGRESS NOTES
"Negro Hall  1955 62 y.o.  [id number]      Patient Care Team:  No Known Provider as PCP - General    CC: Anterior STEMI, cardiac arrest    Interval History: He is a new man today he's got a haircut this in the shave and he says he feels great  Objective   Vital Signs  Temp:  [97.5 °F (36.4 °C)-98.7 °F (37.1 °C)] 97.5 °F (36.4 °C)  Heart Rate:  [] 89  Resp:  [16-18] 18  BP: ()/(60-79) 121/76    Intake/Output Summary (Last 24 hours) at 04/25/18 1255  Last data filed at 04/25/18 0100   Gross per 24 hour   Intake              680 ml   Output              300 ml   Net              380 ml     Flowsheet Rows    Flowsheet Row First Filed Value   Admission Height 170.2 cm (67\") Documented at 04/12/2018 2153   Admission Weight 62.6 kg (138 lb) Documented at 04/12/2018 2153          Physical Exam:   General Appearance:    Alert,oriented, in no acute distress   Lungs:     Clear to auscultation,BS are equal    Heart:    Normal S1 and S2, RRR without murmur, gallop or rub   HEENT:    Sclera are clear, no JVD or adenopathy   Abdomen:     Normal bowel sounds, soft non-tender, non-distended, no HSM   Extremities:   Moves all extremities well, no edema, no cyanosis, no             Redness, no rash, no pulses in his legs      Medication Review:        amiodarone 200 mg Oral Q24H   atorvastatin 20 mg Oral Daily   budesonide-formoterol 2 puff Inhalation BID - RT   clopidogrel 75 mg Oral Daily   famotidine 20 mg Nasogastric Daily   warfarin 7.5 mg Oral Daily            I reviewed the patient's new clinical results.  I personally viewed and interpreted the patient's EKG/Telemetry data    Assessment/Plan  Active Hospital Problems (** Indicates Principal Problem)    Diagnosis Date Noted   • COPD (chronic obstructive pulmonary disease) [J44.9] 04/25/2018   • CAD (coronary artery disease) [I25.10] 04/25/2018   • Systolic CHF [I50.20] 04/25/2018      Resolved Hospital Problems    Diagnosis Date Noted Date Resolved   • " Cardiac arrest [I46.9] 04/13/2018 04/25/2018       Cardiac arrest in the setting of a STEMI treated with hypothermia and angioplasty drug-eluting stenting to the LAD and ramus.  Has an LV thrombus but is really making a great recovery.  I think at this point he is ready for discharge I would look to go to a rehabilitation facility tomorrow he's not been able to take any other heart medicines and her blood pressure medicine because he is hypotensive here to little bit of a nonsustained run of VT     I think he's reached maximal benefit with this hospitalization medic continue him on the amiodarone for a month and then probably stop it I cannot give him a beta blocker and ACE inhibitor because his blood pressure drops too much so he will follow-up with Erum Carter in about a week and then see me in a month      Lennox Max MD  04/25/18  12:55 PM

## 2018-04-25 NOTE — DISCHARGE SUMMARY
[facility]    Admit date: 4/12/2018  Discharge date: 4/25/2018    Admission diagnosis:  1. vfib arrest  2. St elevation MI  3. AHRF  4. ABENA  5. Hyperglycemia  6. Electrolyte disturbance  7. luekocytosis  8. Hypotension    Discharge diagnosis: same plus:  Cardiogenic shock -- present on admission  Anoxic encephalopathy  Shock liver  Left ventricular thrombus  Pneumonia  COPD with acute exacerbation  Tobacco abuse (present on admission)    HPI as per Dr. Lake:  This is a 62-year-old male patient, active smoker, actively healthy with the exception of prior history of hypertension.  Patient did not see a physician for years.  He presented with left arm pain for 3 weeks, gradually getting worse recently.  On arrival to ED in triage, patient collapsed.  Initial rhythm with V. fib.  Code team was activated and patient was resuscitated over 15 minutes. He was intubated during the code.   Reportedly, he received 6 DC cardioversion and was started on amiodarone.  Return of circulation occurred after  ~15 minutes of resuscitation but patient had ventricular tachycardia.       Hospital course:  Mr. Hall was able to be successfully extubated and started on anticoagulant for his LV thrombus.  He did have Left heart cath with angioplasty and drug eluting stent placement in to LAD and RI. His EF has been low post arrest at EF 34%    Discharge medications:  Per med rec sheet    Diet:  Heart healthy with supplements    Activity:  Restrictions as per cardiology    Follow up:  As per cardiology instructions  With primary care physician after d/c from rehab  With Dr. Hercules in the office after d/c from rehab    Dispo:  Pending approval  Vs. Home if patient refuses placement      Greater than 30 mins spent in discharging patient.

## 2018-04-25 NOTE — SIGNIFICANT NOTE
04/25/18 0954   PT Discharge Summary   Reason for Discharge Independent  (pt declines further need for PT.)

## 2018-04-25 NOTE — SIGNIFICANT NOTE
04/25/18 0952   Rehab Treatment   Reason Treatment Not Performed other (see comments)  (Pt declines PT, states he is walking just fine. pt was moving about room independently upon entry. Pt also states he is going home today. SPoke with RNJoanne, and will sign off at this time.)

## 2018-04-25 NOTE — PROGRESS NOTES
Case Management Discharge Note    Final Note: Pt decided to go home and not go to [facility]  Spoke to son and wife and pt will go home and have Home Health with [facility]  Information given to Allegra and pt contact will be made tomorrow    Destination - Selection Complete     Service Request Status Selected Specialties Address Phone Number Fax Number    [facility] [address] [facility] [id number] [phone]    [facility] CARE Declined  not in network for insurance N/A [address], [facility] [address] [phone]        Maria Guadalupe Ray, RN 4/18/2018 1255    [facility] left for [facility].               [facility] Declined N/A [address], [facility] [address] [phone]        Maria Guadalupe Ray, RN 4/18/2018 1348    Spoke with Joanne SARABIA [facility] Declined  out of network N/A [address] [facility] [address] [phone]        Bree Chua, RN 4/23/2018 1134    Referral to Kitty  [facility] take pt insurance               [facility] Declined  out of network N/A [address] [facility] [address] [phone]        Bree Chua, RN 4/24/2018 0957    Message to [facility] Declined N/A [address] [facility] [address] [phone]    [facility] Declined N/A [address] [address] [phone]    [facility] Declined N/A [address] [facility] [address] [phone]      Durable Medical Equipment     No service coordination in this encounter.      Dialysis/Infusion     No service coordination in this encounter.      Home Medical Care     Service Request Status Selected Specialties Address Phone Number Fax Number    [facility] [address] [address] [phone]        Bree Chua, RN 4/25/2018 [address] [place] [address] service coordination in this encounter.        Other: Other    Final Discharge Disposition Code: 06 - home with home health care

## 2018-04-25 NOTE — PROGRESS NOTES
Continued Stay Note  [facility]     Patient Name: Negro Hall  MRN: [id number]  Today's Date: 4/25/2018    Admit Date: 4/12/2018          Discharge Plan     Row Name 04/25/18 1427       Plan    Plan Comments Pt son Tere notified [facility] that pt cannot go to [facility] as it is too far from his home  ([place]) and that the internet says it is a not a good facility  Son Tere, states his father wants to go to [facility].  Informed him that his dad was declined from [facility] .  Ajit Carranza calling facility.  Joanne Hudson notified.  She reports that pt was too high functioning and that they did not take him based on that.  She agreed to look at patient again and follow up with facility                Discharge Codes    No documentation.       Expected Discharge Date and Time     Expected Discharge Date Expected Discharge Time    Apr 25, 2018             Bree Chua RN

## 2018-04-25 NOTE — PROGRESS NOTES
Case Management Discharge Note    Final Note: DC to [facility] with brother transporting    Destination - Selection Complete     Service Request Status Selected Specialties Address Phone Number Fax Number    [facility] [address] [facility] [address] [phone]    [facility] Pending - Request Sent N/A [address] [facility] [address] [phone]    [facility] Declined  not in network for insurance N/A [address] [facility] [address] [phone]        Maria Guadalupe Ray, RN 4/18/2018 1255    VM left for [facility] Declined N/A [address], [facility] [id number] [phone]        Maria Guadalupe Ray, RN 4/18/2018 1348    Spoke with Joanne SARABIA [facility] Declined  out of network N/A [address] [facility] [address] [phone]        Bree Chua, RN 4/23/2018 1134    Referral to Kitty [facility]ock take pt insurance               [facility] Declined  out of network N/A [address] [facility] [address] [phone]        Bree Chua, RN 4/24/2018 0933    Message to [facility] [employer]     No service coordination in this encounter.      Dialysis/Infusion     No service coordination in this encounter.      Home Medical Care     No service coordination in this encounter.      Social Care     No service coordination in this encounter.        Other: Other    Final Discharge Disposition Code: 03 - skilled nursing facility (SNF)

## 2018-04-25 NOTE — PLAN OF CARE
Problem: Patient Care Overview  Goal: Plan of Care Review  Outcome: Ongoing (interventions implemented as appropriate)   04/25/18 0424   Coping/Psychosocial   Plan of Care Reviewed With patient   Plan of Care Review   Progress improving   OTHER   Outcome Summary Jean Carlos increased activity well. Occ PVC noted, no runs of VT this shift. Safety maintained. Awaiting placement      Goal: Individualization and Mutuality  Outcome: Ongoing (interventions implemented as appropriate)    Goal: Discharge Needs Assessment  Outcome: Ongoing (interventions implemented as appropriate)      Problem: Fall Risk (Adult)  Goal: Absence of Fall  Outcome: Ongoing (interventions implemented as appropriate)      Problem: Skin Injury Risk (Adult)  Goal: Skin Health and Integrity  Outcome: Ongoing (interventions implemented as appropriate)      Problem: Pain, Acute (Adult)  Goal: Acceptable Pain Control/Comfort Level  Outcome: Ongoing (interventions implemented as appropriate)      Problem: Arrhythmia/Dysrhythmia (Symptomatic) (Adult)  Goal: Signs and Symptoms of Listed Potential Problems Will be Absent, Minimized or Managed (Arrhythmia/Dysrhythmia)  Outcome: Ongoing (interventions implemented as appropriate)

## 2018-04-25 NOTE — PROGRESS NOTES
Continued Stay Note  [facility]     Patient Name: Negro Hall  MRN: [id number]  Today's Date: 4/25/2018    Admit Date: 4/12/2018          Discharge Plan     Row Name 04/25/18 1057       Plan    Plan Comments Pt accepted to [facility]  Does not need pre cert  Friend to transport  Notified jb Carranza              Discharge Codes    No documentation.       Expected Discharge Date and Time     Expected Discharge Date Expected Discharge Time    Apr 25, 2018             Bree Chua RN

## 2018-04-26 NOTE — PAYOR COMM NOTE
"Shannan Hall (62 y.o. Male)     PLEASE SEE ATTACHED DC SUMMARY  '  REF#[id number]    THANK YOU    RAYNE HARDY LPN, CCP    Date of Birth Social Security Number Address Home Phone MRN    1955  [id number]   [facility] [address] [phone] [id number]    Latter day Marital Status          None        Admission Date Admission Type Admitting Provider Attending Provider Department, Room/Bed    4/12/18 Emergency Kahlil Lake MD  [address], [address]    Discharge Date Discharge Disposition Discharge Destination        4/25/2018 Home or Self Care              Attending Provider:  (none)   Allergies:  Penicillins    Isolation:  None   Infection:  None   Code Status:  Prior    Ht:  170.2 cm (67.01\")   Wt:  55.3 kg (122 lb)    Admission Cmt:  None   Principal Problem:  None                Active Insurance as of 4/12/2018     Primary Coverage     Payor Plan Insurance Group Employer/Plan Group    [employer] [place] [employer] [place]      Payor Plan Address Payor Plan Phone Number Effective From Effective To    [address] [address] [phone] 4/12/2018     [place] [address]       Subscriber Name Subscriber Birth Date Member ID       SHANNAN HALL 1955 [id number]                 Emergency Contacts      (Rel.) Home Phone Work Phone Mobile Phone    Tere Hall (Son) [phone] -- [phone]    Saulo(Ex-Wife)Eron (Other) [phone] -- [phone]    Yelitza Olivas (Sister) -- -- [phone]    Emigdio Quesada (Friend) -- -- [phone]               Discharge Summary      Jared Tapia MD at 4/25/2018  9:28 AM        [facility]    Admit date: 4/12/2018  Discharge date: 4/25/2018    Admission diagnosis:  1. vfib arrest  2. St elevation MI  3. AHRF  4. ABENA  5. Hyperglycemia  6. Electrolyte disturbance  7. luekocytosis  8. Hypotension    Discharge diagnosis: same plus:  Cardiogenic shock -- present on admission  Anoxic encephalopathy  Shock " liver  Left ventricular thrombus  Pneumonia  COPD with acute exacerbation  Tobacco abuse (present on admission)    HPI as per Dr. Lake:  This is a 62-year-old male patient, active smoker, actively healthy with the exception of prior history of hypertension.  Patient did not see a physician for years.  He presented with left arm pain for 3 weeks, gradually getting worse recently.  On arrival to ED in triage, patient collapsed.  Initial rhythm with V. fib.  Code team was activated and patient was resuscitated over 15 minutes. He was intubated during the code.   Reportedly, he received 6 DC cardioversion and was started on amiodarone.  Return of circulation occurred after  ~15 minutes of resuscitation but patient had ventricular tachycardia.       Hospital course:  Mr. Hall was able to be successfully extubated and started on anticoagulant for his LV thrombus.  He did have Left heart cath with angioplasty and drug eluting stent placement in to LAD and RI. His EF has been low post arrest at EF 34%    Discharge medications:  Per med rec sheet    Diet:  Heart healthy with supplements    Activity:  Restrictions as per cardiology    Follow up:  As per cardiology instructions  With primary care physician after d/c from rehab  With Dr. Hercules in the office after d/c from rehab    Dispo:  Pending approval  Vs. Home if patient refuses placement      Greater than 30 mins spent in discharging patient.             Electronically signed by Jared Tapia MD at 4/25/2018  9:54 AM

## 2018-04-30 PROBLEM — R77.8 ELEVATED TROPONIN: Status: ACTIVE | Noted: 2018-01-01

## 2018-04-30 PROBLEM — I23.6 LV (LEFT VENTRICULAR) MURAL THROMBUS FOLLOWING MI (HCC): Status: ACTIVE | Noted: 2018-01-01

## 2018-04-30 PROBLEM — R11.2 NAUSEA & VOMITING: Status: ACTIVE | Noted: 2018-01-01

## 2018-04-30 PROBLEM — T45.511A POISONING BY WARFARIN SODIUM: Status: ACTIVE | Noted: 2018-01-01

## 2018-04-30 PROBLEM — D72.829 LEUKOCYTOSIS: Status: ACTIVE | Noted: 2018-01-01

## 2018-04-30 PROBLEM — Z86.74 H/O CARDIAC ARREST: Status: ACTIVE | Noted: 2018-01-01

## 2018-04-30 PROBLEM — K66.1 NONTRAUMATIC RETROPERITONEAL HEMATOMA: Status: ACTIVE | Noted: 2018-01-01

## 2018-04-30 PROBLEM — D62 ACUTE BLOOD LOSS ANEMIA: Status: ACTIVE | Noted: 2018-01-01

## 2018-04-30 PROBLEM — I25.5 ISCHEMIC CARDIOMYOPATHY: Status: ACTIVE | Noted: 2018-01-01

## 2018-04-30 PROBLEM — N17.9 AKI (ACUTE KIDNEY INJURY) (HCC): Status: ACTIVE | Noted: 2018-01-01

## 2018-05-01 NOTE — PROGRESS NOTES
Continued Stay Note   [facility]     Patient Name: Negro Hall  MRN: [id number]  Today's Date: 5/1/2018    Admit Date: 4/30/2018          Discharge Plan     Row Name 05/01/18 1120       Plan    Plan follow for SNF choice     Patient/Family in Agreement with Plan yes    Plan Comments CCP met wiht patient; patient disclosed it was not [facility] he was thinking of but could not recall which facility; patient to review SNF list and notifiy CCP which facility. Kassie CARLSON     Row Name 05/01/18 1100       Plan    Plan Home with [facility] vs. SNF     Patient/Family in Agreement with Plan yes    Plan Comments CCP met with patient at bedside. CCP role explained and discharge planning discussed. Face sheet verified. Patient does not have POA/living will. Patient lives with his Friend, Emigdio Saleem at [address] [place] [address] [place] [address]. Patient has two steps to the entrance of the home and no steps inside. Patient does not use any medical equipment and is independent with his ADLs. Patient is current with . [facility] is following. Patient is considering rehab at discharge. Patient is interested in [facility]. CCP spoke with Ignacia/Loreta; she will see if [facility] is in network with insurance. Patient uses [facility] on [address]; patient denies having trouble affording his medications and does not have trouble remembering to take his medications. CCP will follow for PT evals and SNF choices. Kassie CARLSON                  Discharge Codes    No documentation.           ALDO Fischer

## 2018-05-01 NOTE — NURSING NOTE
Spoke with cardiology on-call nurse Chante at [phone].  Relayed all data and need for Cardio to make this patient a Priority this morning.  Elevated troponin and rest of labs discussed as well.      Spoke with Dr. Gilmore, relayed all pertinent data and critical labs to him.  Also informed him that cardio was asked to see the pt early this AM.

## 2018-05-01 NOTE — PAYOR COMM NOTE
"Shannan Hall (62 y.o. Male)     ATTN: LOC KAMALA   ID#[id number]  ICD 10 CODE=K66.1  PLEASE CALL BACK TO EYAL [phone] OR [phone]  THANKS!   EYAL      Date of Birth Social Security Number Address Home Phone MRN    1955  [id number] [facility] [address] [phone] [id number]    [employer] Status          None        Admission Date Admission Type Admitting Provider Attending Provider Department, Room/Bed    18 Emergency Marilyn Blakely MD Beard, Lyle E, MD [address], [address]/    Discharge Date Discharge Disposition Discharge Destination                       Attending Provider:  Enoch Collins MD    Allergies:  No Known Allergies    Isolation:  None   Infection:  None   Code Status:  FULL    Ht:  170.2 cm (67.01\")   Wt:  61 kg (134 lb 7.7 oz)    Admission Cmt:  None   Principal Problem:  None                Active Insurance as of 2018     Primary Coverage     Payor Plan Insurance Group Employer/Plan Group    [employer] [place] [employer] [place]ICA[place]      Payor Plan Address Payor Plan Phone Number Effective From Effective To    [address] [address] [phone] 2018     [place] [address]       Subscriber Name Subscriber Birth Date Member ID       SHANNAN HALL 1955 [id number]                 Emergency Contacts      (Rel.) Home Phone Work Phone Mobile Phone    Tere Hall (Son) [phone] -- [phone]    aSulo(Ex-Wife)Eron (Other) [phone] -- [phone]    Yelitza Olivas (Sister) -- -- [phone]    SangitaEmigdio (Friend) -- -- [phone]               History & Physical      Marilyn Blakely MD at 2018  7:09 PM          Name: Shannan Hall ADMIT: 2018   : 1955  PCP: No Known Provider    MRN: [id number] LOS: 0 days   AGE/SEX: 62 y.o. male  ROOM: 537/     Chief Complaint   Patient presents with   • Vomiting     N/V STARTING 3 DAYS AGO, UNABLE TO HOLD ANYTHING DOWN; RECENTLY D/C " DUE TO ACS        History of Present Illness  61 yo male who presented to the ER with nausea and vomiting.  He tells me this started just last night although the ER history was 3 days. He also denies diarrhea to me and reports he hasn't had a BM since his recent d/c from the hospital. He had been admitted on 4/12/2018 with a one-week history of left arm pain.  He had walked into the ER at that time but then collapsed and was in ventricular fibrillation.  He was successfully resuscitated and admitted to ICU on ventilator and in cardiogenic shock. He did undergo cardiac catheterization with angioplasty and placement of a drug-eluting stent to the LAD and ramus intermedius.  He did develop some ABENA with his creatinine getting as high as 3.3, but by the time of discharge it was down to 0.78.  He also had persistent leukocytosis with his white count ranging from 14,000 to just over 30,000.  His troponin peaked at 5.4 and at discharge was down to 4.19.  He was placed on Coumadin because of a left ventricular thrombus.  He was discharged to a skilled nursing facility, but left there that same day. He doesn't have a PCP and has not had his INR checked since discharge. In the ER, it was found to be >10. He has received 2 units FFP and the repeat is down to 2.33.  He reports he has been feeling lightheaded all day and has been freezing all day. He reports severe heartburn as well. He is still nauseated and hasn't been able to keep anything down. He said he does feel hungry. He denies any fever.  No CP or arm pain. Denies SOA, RANDOLPH or edema in feet. He has not urinated at all today.      Past Medical History:   Diagnosis Date   • Anoxic encephalopathy    • Cardiac arrest with ventricular fibrillation     cardiogenic shock with shock liver   • Cardiogenic shock    • COPD with acute exacerbation    • Diverticulosis    • Hyperglycemia    • Ischemic cardiomyopathy 04/2018    EF 34% 4/16/2018   • Left ventricular thrombosis with  acute MI    • STEMI (ST elevation myocardial infarction)    • Tobacco abuse      Past Surgical History:   Procedure Laterality Date   • CARDIAC CATHETERIZATION N/A 4/13/2018    Procedure: Left Heart Cath;  Surgeon: Lennox Max MD;  Location: [facility] CATH INVASIVE LOCATION;  Service: Cardiovascular   • CARDIAC CATHETERIZATION N/A 4/13/2018    Procedure: Coronary angiography;  Surgeon: Lennox Max MD;  Location: [facility] CATH INVASIVE LOCATION;  Service: Cardiovascular   • CARDIAC CATHETERIZATION N/A 4/13/2018    Procedure: Stent TEJ coronary;  Surgeon: Lennox Max MD;  Location: [facility] CATH INVASIVE LOCATION;  Service: Cardiovascular       Allergies:  Review of patient's allergies indicates no known allergies.    Prescriptions Prior to Admission   Medication Sig Dispense Refill Last Dose   • amiodarone (PACERONE) 200 MG tablet Take 1 tablet by mouth Daily. 30 tablet 0 4/30/2018 at Unknown time   • atorvastatin (LIPITOR) 20 MG tablet Take 1 tablet by mouth Daily. 30 tablet 12 4/29/2018 at Unknown time   • budesonide-formoterol (SYMBICORT) 160-4.5 MCG/ACT inhaler Inhale 2 puffs 2 (Two) Times a Day. 1 inhaler 12 Past Week at Unknown time   • clopidogrel (PLAVIX) 75 MG tablet Take 1 tablet by mouth Daily. 30 tablet 0 Past Week at Unknown time   • warfarin (COUMADIN) 7.5 MG tablet One tablet daily, monitor INR with cardiology 30 tablet 0 Past Week at Unknown time       Social History   Substance Use Topics   • Smoking status: Former Smoker     Packs/day: 2.00     Years: 12.00     Types: Cigarettes   • Smokeless tobacco: Never Used      Comment: Quit 15 days ago    • Alcohol use No       History reviewed. No pertinent family history.    Review of Systems   Constitutional: weight stable.   HEENT: No vision changes. No rhinorrhea, sore throat. Not hard of hearing.   Respiratory: see HPI  Cardiovascular: see HPI  Gastrointestinal: see HPI  Endocrine:   diabetes   Genitourinary: No difficulty urinating, dysuria or  frequency prior to today.  Musculoskeletal: No arthralgias or myalgias.   Skin: No rash or wound.   Neurological: Negative for syncope or headaches. No paresthesias or focal weakness  Hematological:  No h/o DVTs  Psychiatric/Behavioral: No confusion. The patient is not nervous/anxious.       Objective    Vital Signs  Temp:  [97.6 °F (36.4 °C)-98.6 °F (37 °C)] 97.6 °F (36.4 °C)  Heart Rate:  [] 96  Resp:  [12-16] 14  BP: ()/(57-77) 95/57  SpO2:  [90 %-100 %] 100 %  on   ;   Device (Oxygen Therapy): room air  Body mass index is 18.79 kg/m².    Physical Exam   WDWN male in NAD  PERRL, EOMI, sclera nonicteric. Oropharynx benign except for very poor dentition with almost all of his teeth eroded to the gum. Tongue midline, palate elevates symmetrically. Neck supple without adenopathy or thyromegaly. No JVD.  Lungs clear with equal breath sounds bilaterally. No wheezes, rales or rhonchi. Breathing nonlabored  Heart RRR with 2/6 systolic murmur heard throughout precordium but loudest at apex  Back no kyphosis  Abdomen soft, nontender, bowel sounds present throughout.  Extremities no cyanosis, clubbing or edema.   Skin warm & dry  Neuro no gross motor deficits, alert & oriented. Speech fluent.       Results Review:   I reviewed the patient's new clinical results.    Lab Results (last 24 hours)     Procedure Component Value Units Date/Time    CBC & Differential [[id number]] Collected:  04/30/18 1153    Specimen:  Blood Updated:  04/30/18 1210    Narrative:       The following orders were created for panel order CBC & Differential.  Procedure                               Abnormality         Status                     ---------                               -----------         ------                     Scan Slide[[id number]]                                                                  CBC Auto Differential[[id number]]        Abnormal            Final result                 Please view results for these tests on  the individual orders.    Comprehensive Metabolic Panel [[id number]]  (Abnormal) Collected:  04/30/18 1153    Specimen:  Blood Updated:  04/30/18 1230     Glucose 155 (H) mg/dL      BUN 67 (H) mg/dL      Creatinine 1.89 (H) mg/dL      Sodium 137 mmol/L      Potassium 4.0 mmol/L      Chloride 97 (L) mmol/L      CO2 22.1 mmol/L      Calcium 7.9 (L) mg/dL      Total Protein 6.5 g/dL      Albumin 3.10 (L) g/dL      ALT (SGPT) 153 (H) U/L      AST (SGOT) 128 (H) U/L      Alkaline Phosphatase 196 (H) U/L      Total Bilirubin 1.6 (H) mg/dL      eGFR Non African Amer 36 (L) mL/min/1.73      Globulin 3.4 gm/dL      A/G Ratio 0.9 g/dL      BUN/Creatinine Ratio 35.4 (H)     Anion Gap 17.9 mmol/L     Lipase [[id number]]  (Abnormal) Collected:  04/30/18 1153    Specimen:  Blood Updated:  04/30/18 1225     Lipase 77 (H) U/L     Protime-INR [[id number]]  (Abnormal) Collected:  04/30/18 1153    Specimen:  Blood Updated:  04/30/18 1223     Protime 82.5 (C) Seconds      INR >10.00 (C)    CBC Auto Differential [[id number]]  (Abnormal) Collected:  04/30/18 1153    Specimen:  Blood Updated:  04/30/18 1210     WBC 18.12 (H) 10*3/mm3      RBC 2.67 (L) 10*6/mm3      Hemoglobin 8.3 (L) g/dL      Hematocrit 26.6 (L) %      MCV 99.6 (H) fL      MCH 31.1 pg      MCHC 31.2 (L) g/dL      RDW 15.8 (H) %      RDW-SD 56.3 (H) fl      MPV 9.8 fL      Platelets 517 (H) 10*3/mm3      Neutrophil % 87.9 (H) %      Lymphocyte % 5.4 (L) %      Monocyte % 6.3 %      Eosinophil % 0.2 (L) %      Basophil % 0.2 %      Immature Grans % 2.2 (H) %      Neutrophils, Absolute 15.94 (H) 10*3/mm3      Lymphocytes, Absolute 0.97 10*3/mm3      Monocytes, Absolute 1.15 10*3/mm3      Eosinophils, Absolute 0.03 10*3/mm3      Basophils, Absolute 0.03 10*3/mm3      Immature Grans, Absolute 0.39 (H) 10*3/mm3      nRBC 0.6 (H) /100 WBC     Troponin [[id number]]  (Abnormal) Collected:  04/30/18 1153    Specimen:  Blood Updated:  04/30/18 1338     Troponin T 6.100 (C) ng/mL      Narrative:       Troponin T Reference Ranges:  Less than 0.03 ng/mL:    Negative for AMI  0.03 to 0.09 ng/mL:      Indeterminant for AMI  Greater than 0.09 ng/mL: Positive for AMI    Troponin [[id number]]  (Abnormal) Collected:  04/30/18 1854    Specimen:  Blood Updated:  04/30/18 1956     Troponin T 5.350 (C) ng/mL     Narrative:       Troponin T Reference Ranges:  Less than 0.03 ng/mL:    Negative for AMI  0.03 to 0.09 ng/mL:      Indeterminant for AMI  Greater than 0.09 ng/mL: Positive for AMI    Hemoglobin & Hematocrit, Blood [[id number]]  (Abnormal) Collected:  04/30/18 1854    Specimen:  Blood Updated:  04/30/18 1917     Hemoglobin 8.3 (L) g/dL      Hematocrit 27.1 (L) %     Protime-INR [[id number]]  (Abnormal) Collected:  04/30/18 1854    Specimen:  Blood Updated:  04/30/18 1933     Protime 25.2 (H) Seconds      INR 2.33 (H)            Results from last 7 days  Lab Units 04/30/18  1854 04/30/18  1153   WBC 10*3/mm3  --  18.12*   HEMOGLOBIN g/dL 8.3* 8.3*   PLATELETS 10*3/mm3  --  517*       Results from last 7 days  Lab Units 04/30/18  1153 04/25/18  1158 04/25/18  0552 04/24/18  2357 04/24/18  1814 04/24/18  1221 04/24/18  0550   SODIUM mmol/L 137  --   --   --   --   --   --    POTASSIUM mmol/L 4.0 3.8 4.1 4.0 4.3 4.1 4.3   CHLORIDE mmol/L 97*  --   --   --   --   --   --    CO2 mmol/L 22.1  --   --   --   --   --   --    BUN mg/dL 67*  --   --   --   --   --   --    CREATININE mg/dL 1.89*  --   --   --   --   --   --    GLUCOSE mg/dL 155*  --   --   --   --   --   --    ALBUMIN g/dL 3.10*  --   --   --   --   --   --    BILIRUBIN mg/dL 1.6*  --   --   --   --   --   --    ALK PHOS U/L 196*  --   --   --   --   --   --    AST (SGOT) U/L 128*  --   --   --   --   --   --    ALT (SGPT) U/L 153*  --   --   --   --   --   --    Estimated Creatinine Clearance: 30.4 mL/min (by C-G formula based on SCr of 1.89 mg/dL (H)).    Results from last 7 days  Lab Units 04/30/18  1854 04/30/18  1153 04/25/18  0552  04/24/18  0550   INR  2.33* >10.00* 2.03* 1.49*   TROPONIN T ng/mL 5.350* 6.100*  --  4.190*         Invalid input(s): LDLCALC      CT Abdomen Pelvis Without Contrast   Final Result        Assessment/Plan   Assessment:      Active Hospital Problems (** Indicates Principal Problem)    Diagnosis Date Noted   • Poisoning by warfarin sodium [T45.511A] 04/30/2018   • Leukocytosis [D72.829] 04/30/2018   • ABENA (acute kidney injury) [N17.9] 04/30/2018   • Acute blood loss anemia [D62] 04/30/2018   • Nausea & vomiting [R11.2] 04/30/2018   • Elevated troponin [R74.8] 04/30/2018   • Nontraumatic retroperitoneal hematoma [K66.1] 04/30/2018   • H/O cardiac arrest [Z86.74] 04/30/2018   • LV (left ventricular) mural thrombus following MI [I21.29, I23.6] 04/30/2018   • COPD (chronic obstructive pulmonary disease) [J44.9] 04/25/2018   • CAD (coronary artery disease) [I25.10] 04/25/2018   • Ischemic cardiomyopathy [I25.5] 04/25/2018      Resolved Hospital Problems    Diagnosis Date Noted Date Resolved   No resolved problems to display.       Plan:     He has had a drop in his hemoglobin related to the retroperitoneal hematoma and I expect that it will drop even further with the IV fluids.  He is also volume depleted based on the elevated BUN/creatinine ratio, likely from all of the vomiting that he was having.  This means that his hemoglobin is actually lower than 8.3.  Given his recent MI and the fact that his troponin is back up, I am going to go ahead and transfuse 1 unit of packed cells.  His hemoglobin will continue to be monitored serially, as well as his PT/INR and troponin.  He is being hydrated with IV fluids as well and I have consulted cardiology to see him.  The etiology of his nausea & vomiting is not clear, although the CT of the abd done earlier does report potential gallbladder sludge. He was having dry heaves when I was in seeing him, so I have added on IV pepcid & IV reglan. At this point he can't keep any of his  "meds down. He tried taking a drink of water and started dry heaving shortly after that. I am only running his IVFs at 100 cc/hr to hopefully avoid putting him into pulmonary edema but he also received 2 units of FFP & will be getting a unit of blood so he'll need to be closely monitored.  He has a persistent leukocytosis that is unexplained as well. I'm going to check his urine when he is able to produce some & also do blood cultures & a CXR. When he was in the last time, it was thought that he had pneumonia but his CXR cleared in 3 days so it was decided that the infiltrates were likely pulmonary edema & not pneumonia.  He does have an LV thrombus so I'm not going to give him any further FFP. His repeat INR is down to 2.23. He did receive vit K in the ER as well.          Marilyn Blakely MD  [facility]  04/30/18  8:57 PM      Electronically signed by Marilyn Blakely MD at 4/30/2018  9:00 PM          Emergency Department Notes      Jessica Serra RN at 4/30/2018 11:39 AM        Pt reports he started having nausea and vomiting three days ago. Pt was discharged from this hospital on 4/25/18 and was supposed to be go to [facility] for rehab and physical therapy but did not stay because \"the place was disgusting\". Pt has only been taking \"his heart medicine\" since discharge because \"we couldn't get the hospital to write for any since he didn't go to rehab\".     Electronically signed by Jessica Serra RN at 4/30/2018 11:42 AM     WHITNEY Carpio at 4/30/2018 12:33 PM           EMERGENCY DEPARTMENT ENCOUNTER    CHIEF COMPLAINT  Chief Complaint: Vomiting  History given by: Pt  History limited by: Nothing  Room Number: 03/03  PMD: No Known Provider      HPI:  Pt is a 62 y.o. male who presents complaining of vomiting starting on 4/27/18. Pt was recently admitted from 4/13 - 4/25 after resuscitated arrest. He states he was discharged from the hospital to [facility], but signed " himself out to go home. Pt also c/o diarrhea, dizziness, and generalized weakness. Pt denies CP, SOA, abd pain, dysuria, difficulty urinating, or leg swelling.     Duration:  4 days  Onset: gradual  Timing: intermittent  Quality: vomiting  Intensity/Severity: moderate to severe  Progression: unchanged  Associated Symptoms: diarrhea, dizziness, and generalized weakness  Aggravating Factors: none  Alleviating Factors: none  Previous Episodes: None stated  Treatment before arrival: Pt was recently admitted from 4/13 - 4/25 secondary to resuscitated arrest    PAST MEDICAL HISTORY  Active Ambulatory Problems     Diagnosis Date Noted   • COPD (chronic obstructive pulmonary disease) 04/25/2018   • CAD (coronary artery disease) 04/25/2018   • Systolic CHF 04/25/2018     Resolved Ambulatory Problems     Diagnosis Date Noted   • Cardiac arrest 04/13/2018     Past Medical History:   Diagnosis Date   • ABENA (acute kidney injury)    • Anoxic encephalopathy    • Cardiac arrest with ventricular fibrillation    • Cardiogenic shock    • COPD with acute exacerbation    • Electrolyte disturbance    • Hyperglycemia    • Hypertension    • Hypotension    • Left ventricular thrombosis with acute MI    • Pneumonia    • Shock liver    • STEMI (ST elevation myocardial infarction)    • Systolic CHF    • Tobacco abuse        PAST SURGICAL HISTORY  Past Surgical History:   Procedure Laterality Date   • CARDIAC CATHETERIZATION N/A 4/13/2018    Procedure: Left Heart Cath;  Surgeon: Lennox Max MD;  Location: [facility] CATH INVASIVE LOCATION;  Service: Cardiovascular   • CARDIAC CATHETERIZATION N/A 4/13/2018    Procedure: Coronary angiography;  Surgeon: Lennox Max MD;  Location: [facility] CATH INVASIVE LOCATION;  Service: Cardiovascular   • CARDIAC CATHETERIZATION N/A 4/13/2018    Procedure: Stent TEJ coronary;  Surgeon: Lennox Max MD;  Location: [facility] CATH INVASIVE LOCATION;  Service: Cardiovascular       FAMILY HISTORY  History reviewed.  No pertinent family history.    SOCIAL HISTORY  Social History     Social History   • Marital status:      Spouse name: N/A   • Number of children: N/A   • Years of education: N/A     Occupational History   • Not on file.     Social History Main Topics   • Smoking status: Current Every Day Smoker     Packs/day: 1.00   • Smokeless tobacco: Not on file   • Alcohol use No   • Drug use:      Types: Marijuana   • Sexual activity: Not on file     Other Topics Concern   • Not on file     Social History Narrative   • No narrative on file       ALLERGIES  Penicillins    REVIEW OF SYSTEMS  Review of Systems   Constitutional: Negative for activity change, appetite change and fever.   HENT: Negative for congestion and sore throat.    Respiratory: Negative for cough and shortness of breath.    Cardiovascular: Negative for chest pain and leg swelling.   Gastrointestinal: Positive for diarrhea and vomiting. Negative for abdominal pain.   Genitourinary: Negative for decreased urine volume and dysuria.   Musculoskeletal: Negative for neck pain.   Skin: Negative for rash and wound.   Neurological: Positive for dizziness and weakness (generalized). Negative for numbness and headaches.   Psychiatric/Behavioral: Negative.    All other systems reviewed and are negative.      PHYSICAL EXAM  ED Triage Vitals   Temp Heart Rate Resp BP SpO2   04/30/18 1122 04/30/18 1122 04/30/18 1142 04/30/18 1142 04/30/18 1122   98.6 °F (37 °C) 96 14 105/67 97 %      Temp src Heart Rate Source Patient Position BP Location FiO2 (%)   -- -- 04/30/18 1142 04/30/18 1142 --     Sitting Right arm        Physical Exam   Constitutional: He is oriented to person, place, and time and well-developed, well-nourished, and in no distress. He appears jaundiced (mildly).   Pt is chronically-ill appearing.    HENT:   Head: Normocephalic and atraumatic.   Eyes: EOM are normal. Pupils are equal, round, and reactive to light.   Neck: Normal range of motion. Neck  supple.   Cardiovascular: Normal rate and regular rhythm.    Murmur heard.   Systolic murmur is present with a grade of 3/6   Pulmonary/Chest: Effort normal. No respiratory distress. He has decreased breath sounds. He has no wheezes.   Abdominal: Soft. There is tenderness (mild) in the right upper quadrant. There is no rebound and no guarding.   Large area of ecchymosis from R abdomen to R flank.   Musculoskeletal: Normal range of motion. He exhibits no edema.   Neurological: He is alert and oriented to person, place, and time. He has normal sensation and normal strength.   Skin: Skin is warm and dry.   Psychiatric: Mood and affect normal.   Nursing note and vitals reviewed.      LAB RESULTS  Lab Results (last 24 hours)     Procedure Component Value Units Date/Time    CBC & Differential [[id number]] Collected:  04/30/18 1153    Specimen:  Blood Updated:  04/30/18 1210    Narrative:       The following orders were created for panel order CBC & Differential.  Procedure                               Abnormality         Status                     ---------                               -----------         ------                     Scan Slide[[id number]]                                                                  CBC Auto Differential[[id number]]        Abnormal            Final result                 Please view results for these tests on the individual orders.    Comprehensive Metabolic Panel [[id number]]  (Abnormal) Collected:  04/30/18 1153    Specimen:  Blood Updated:  04/30/18 1230     Glucose 155 (H) mg/dL      BUN 67 (H) mg/dL      Creatinine 1.89 (H) mg/dL      Sodium 137 mmol/L      Potassium 4.0 mmol/L      Chloride 97 (L) mmol/L      CO2 22.1 mmol/L      Calcium 7.9 (L) mg/dL      Total Protein 6.5 g/dL      Albumin 3.10 (L) g/dL      ALT (SGPT) 153 (H) U/L      AST (SGOT) 128 (H) U/L      Alkaline Phosphatase 196 (H) U/L      Total Bilirubin 1.6 (H) mg/dL      eGFR Non African Amer 36 (L) mL/min/1.73       Globulin 3.4 gm/dL      A/G Ratio 0.9 g/dL      BUN/Creatinine Ratio 35.4 (H)     Anion Gap 17.9 mmol/L     Lipase [[id number]]  (Abnormal) Collected:  04/30/18 1153    Specimen:  Blood Updated:  04/30/18 1225     Lipase 77 (H) U/L     Protime-INR [[id number]]  (Abnormal) Collected:  04/30/18 1153    Specimen:  Blood Updated:  04/30/18 1223     Protime 82.5 (C) Seconds      INR >10.00 (C)    CBC Auto Differential [[id number]]  (Abnormal) Collected:  04/30/18 1153    Specimen:  Blood Updated:  04/30/18 1210     WBC 18.12 (H) 10*3/mm3      RBC 2.67 (L) 10*6/mm3      Hemoglobin 8.3 (L) g/dL      Hematocrit 26.6 (L) %      MCV 99.6 (H) fL      MCH 31.1 pg      MCHC 31.2 (L) g/dL      RDW 15.8 (H) %      RDW-SD 56.3 (H) fl      MPV 9.8 fL      Platelets 517 (H) 10*3/mm3      Neutrophil % 87.9 (H) %      Lymphocyte % 5.4 (L) %      Monocyte % 6.3 %      Eosinophil % 0.2 (L) %      Basophil % 0.2 %      Immature Grans % 2.2 (H) %      Neutrophils, Absolute 15.94 (H) 10*3/mm3      Lymphocytes, Absolute 0.97 10*3/mm3      Monocytes, Absolute 1.15 10*3/mm3      Eosinophils, Absolute 0.03 10*3/mm3      Basophils, Absolute 0.03 10*3/mm3      Immature Grans, Absolute 0.39 (H) 10*3/mm3      nRBC 0.6 (H) /100 WBC     Troponin [[id number]]  (Abnormal) Collected:  04/30/18 1153    Specimen:  Blood Updated:  04/30/18 1338     Troponin T 6.100 (C) ng/mL     Narrative:       Troponin T Reference Ranges:  Less than 0.03 ng/mL:    Negative for AMI  0.03 to 0.09 ng/mL:      Indeterminant for AMI  Greater than 0.09 ng/mL: Positive for AMI          I ordered the above labs and reviewed the results    RADIOLOGY  CT Abdomen Pelvis Without Contrast   Final Result   CONCLUSION:  1. Sizable right retroperitoneal hematoma with additional hematoma  involving the iliopsoas which is enlarged. Recommend conservative CT  surveillance to document resolution and to exclude associated neoplasm.  2. Diverticulosis of the colon.  3. Potential gallbladder  sludge.  4. Renal cysts with nonobstructing left renal calculi.  5. Serous pleural effusions. Bibasilar infiltrate/atelectasis.     The findings of this report were called to Geoffrey Chacko in the emergency  room at the time of completion, 1:40 PM.     This report was finalized on 4/30/2018 1:52 PM by Dr. Varinder Weinstein M.D..           I ordered the above noted radiological studies. Interpreted by radiologist. Discussed with radiologist (Dr. Weinstein). Reviewed by me in PACS.       PROCEDURES  Procedures      PROGRESS AND CONSULTS  ED Course     1233 - IVF and zofran ordered.     1240 - Informed pt of the results of his lab results including his elevated INR and poor kidney function. Informed pt that he will needed to be admitted for further work up and treatment. Pt understands and agrees with plan. All questions answered.      1247 - CT abd/pelvis ordered for further evaluation.     1250 - Discussed course of care with Dr. Villar and agrees with plan of care.     1254 - Vitamin K ordered.     1322 - Troponin and EKG ordered for further evaluation.    1405 - Call placed with A.     1543 - Discussed pt care with Dr. Blakely ([facility]) who agrees to admit the pt. She requests that I order FFP, which I will do.     MEDICAL DECISION MAKING  Results were reviewed/discussed with the patient and they were also made aware of online access. Pt also made aware that some labs, such as cultures, will not be resulted during ER visit and follow up with PMD is necessary.     MDM  Number of Diagnoses or Management Options  Acute renal failure, unspecified acute renal failure type:   Poisoning by warfarin sodium, accidental or unintentional, initial encounter:   Retroperitoneal hematoma:      Amount and/or Complexity of Data Reviewed  Clinical lab tests: ordered and reviewed (Troponin - 6.1  WBC - 18.12  INR - >10  Hemoglobin - 8.3  Lipase - 77  Bilirubin - 1.6  BUN - 67  Creatinine - 1.89)  Tests in the radiology section of CPT®:  ordered  and reviewed (CT abd/pelvis - retroperitoneal hematoma with additional hematoma involving the iliopsoas)  Discussion of test results with the performing providers: yes (Dr. Weinstein)  Decide to obtain previous medical records or to obtain history from someone other than the patient: yes  Review and summarize past medical records: yes (Pt was admitted from 4/12 - 4/25. Pt had V-fib arrest and was resuscitated and later had a stent placed in his LAD. On discharge, pt's creatinine was 0.78.)  Discuss the patient with other providers: yes (Dr. Blakely ([facility]))           DIAGNOSIS  Final diagnoses:   Poisoning by warfarin sodium, accidental or unintentional, initial encounter   Acute renal failure, unspecified acute renal failure type   Retroperitoneal hematoma       DISPOSITION  ADMISSION    Discussed treatment plan and reason for admission with pt/family and admitting physician.  Pt/family voiced understanding of the plan for admission for further testing/treatment as needed.     Latest Documented Vital Signs:  As of 2:12 PM  BP- 102/74 HR- 93 Temp- 98.6 °F (37 °C) O2 sat- 94%    --  Documentation assistance provided by neelam Knutson for Geoffrey Chacko PA-C.  Information recorded by the scribe was done at my direction and has been verified and validated by me.     Kenneth Knutson  04/30/18 1302              WHITNEY Carpio  04/30/18 1550      Electronically signed by WHITNEY Carpio at 4/30/2018  3:50 PM     Lennox Villar MD at 4/30/2018  1:32 PM        The patient presents complaining of vomiting that began 3 days ago. Pt was recently admitted from 4/13 - 4/25 after resuscitated arrest. Pt c/o nausea, diarrhea, dizziness and weakness.    Limited physical exam:  Patient is nontoxic appearing  Lungs/cardiovascular: HRR, lungs CTAB  Abdomen: mild R sided abdominal pain, ecchymosis along R flank, no rebound/guarding    Labs reviewed, pt is in renal failure; INR >10, Troponin - 6.1    Plan to order CT Abd/Pel for  further evaluation    EKG           EKG time: 1336  Rhythm/Rate: SR/94  P waves and NV: normal  QRS, axis: LAD, ant Q waves   ST and T waves: ST elevation anteriorly, no ST depression     Interpreted Contemporaneously by me, independently viewed  changed compared to prior 4/25/18- ST elevation was also present but is less prominent today        MD ATTESTATION NOTE    The RODNEY and I have discussed this patient's history, physical exam, and treatment plan.  I have reviewed the documentation and personally had a face to face interaction with the patient. I affirm the documentation and agree with the treatment and plan.  The attached note describes my personal findings.      Documentation assistance provided by neelam Espinal for Dr. Villar.  Information recorded by the scribe was done at my direction and has been verified and validated by me.     Dylan Espinal  04/30/18 1345       Lennox Villar MD  04/30/18 1522      Electronically signed by Lennox Villar MD at 4/30/2018  3:22 PM     Miryam Matthew RN at 4/30/2018  2:30 PM        Pt provided ginger ale per MD       Miryam Matthew, RN  04/30/18 1430      Electronically signed by Miryam Matthew RN at 4/30/2018  2:30 PM     Zaynab Sebastian RN at 4/30/2018  4:05 PM        Obtained informed consent to administer blood producgts.      Zaynab Sebastian RN  04/30/18 1605      Electronically signed by Zaynab Sebastian RN at 4/30/2018  4:05 PM     Zaynab Sebastian RN at 4/30/2018  4:19 PM        Admit RN gives ok to send pt up prior to bld if not ready before his departure     Zaynab Sebastian RN  04/30/18 1620      Electronically signed by Zaynab Sebastian RN at 4/30/2018  4:20 PM       Hospital Medications (all)       Dose Frequency Start End    acetaminophen (TYLENOL) tablet 650 mg 650 mg Every 4 Hours PRN 4/30/2018     Sig - Route: Take 2 tablets by mouth Every 4 (Four) Hours As Needed for Mild Pain . - Oral    aluminum-magnesium  "hydroxide-simethicone (MAALOX MAX) 400-400-40 MG/5ML suspension 15 mL 15 mL Every 6 Hours PRN 4/30/2018     Sig - Route: Take 15 mL by mouth Every 6 (Six) Hours As Needed for Heartburn. - Oral    amiodarone (PACERONE) tablet 200 mg 200 mg Every 24 Hours Scheduled 4/30/2018     Sig - Route: Take 1 tablet by mouth Daily. - Oral    atorvastatin (LIPITOR) tablet 20 mg 20 mg Daily 4/30/2018     Sig - Route: Take 1 tablet by mouth Daily. - Oral    bisacodyl (DULCOLAX) EC tablet 5 mg 5 mg Daily PRN 4/30/2018     Sig - Route: Take 1 tablet by mouth Daily As Needed for Constipation. - Oral    budesonide-formoterol (SYMBICORT) 160-4.5 MCG/ACT inhaler 2 puff 2 puff 2 Times Daily - RT 4/30/2018     Sig - Route: Inhale 2 puffs 2 (Two) Times a Day. - Inhalation    clopidogrel (PLAVIX) tablet 75 mg 75 mg Daily 4/30/2018     Sig - Route: Take 1 tablet by mouth Daily. - Oral    famotidine (PEPCID) injection 20 mg 20 mg Every 12 Hours Scheduled 4/30/2018     Sig - Route: Infuse 2 mL into a venous catheter Every 12 (Twelve) Hours. - Intravenous    metoclopramide (REGLAN) injection 5 mg 5 mg Every 6 Hours 4/30/2018     Sig - Route: Infuse 1 mL into a venous catheter Every 6 (Six) Hours. - Intravenous    ondansetron (ZOFRAN) injection 4 mg 4 mg Once 4/30/2018 4/30/2018    Sig - Route: Infuse 2 mL into a venous catheter 1 (One) Time. - Intravenous    Cosign for Ordering: Accepted by Lennox Villar MD on 4/30/2018  2:57 PM    ondansetron (ZOFRAN) injection 4 mg 4 mg Every 6 Hours PRN 4/30/2018     Sig - Route: Infuse 2 mL into a venous catheter Every 6 (Six) Hours As Needed for Nausea or Vomiting. - Intravenous    Linked Group 1:  \"Or\" Linked Group Details        ondansetron (ZOFRAN) tablet 4 mg 4 mg Every 6 Hours PRN 4/30/2018     Sig - Route: Take 1 tablet by mouth Every 6 (Six) Hours As Needed for Nausea or Vomiting. - Oral    Linked Group 1:  \"Or\" Linked Group Details        ondansetron ODT (ZOFRAN-ODT) disintegrating tablet 4 mg " "4 mg Once 4/30/2018 4/30/2018    Sig - Route: Take 1 tablet by mouth 1 (One) Time. - Oral    Cosign for Ordering: Accepted by Lennox Villar MD on 4/30/2018  2:57 PM    ondansetron ODT (ZOFRAN-ODT) disintegrating tablet 4 mg 4 mg Every 6 Hours PRN 4/30/2018     Sig - Route: Take 1 tablet by mouth Every 6 (Six) Hours As Needed for Nausea or Vomiting. - Oral    Linked Group 1:  \"Or\" Linked Group Details        phytonadione (AQUA-MEPHYTON, VITAMIN K) 5 mg in sodium chloride 0.9 % 50 mL IVPB 5 mg Once 4/30/2018 4/30/2018    Sig - Route: Infuse 5 mg into a venous catheter 1 (One) Time. - Intravenous    Cosign for Ordering: Accepted by Lennox Villar MD on 4/30/2018  2:57 PM    promethazine (PHENERGAN) injection 12.5 mg 12.5 mg Once 4/30/2018 4/30/2018    Sig - Route: Infuse 0.5 mL into a venous catheter 1 (One) Time. - Intravenous    sodium chloride 0.9 % bolus 1,000 mL 1,000 mL Once 4/30/2018 4/30/2018    Sig - Route: Infuse 1,000 mL into a venous catheter 1 (One) Time. - Intravenous    Cosign for Ordering: Accepted by Lennox Villar MD on 4/30/2018  2:57 PM    sodium chloride 0.9 % flush 10 mL 10 mL As Needed 4/30/2018     Sig - Route: Infuse 10 mL into a venous catheter As Needed for Line Care. - Intravenous    Cosign for Ordering: Accepted by Lennox Villar MD on 4/30/2018  2:57 PM    sodium chloride 0.9 % flush 10 mL 10 mL As Needed 4/30/2018     Sig - Route: Infuse 10 mL into a venous catheter As Needed for Line Care. - Intravenous    Cosign for Ordering: Accepted by Lennox Villar MD on 4/30/2018  2:57 PM    Linked Group 2:  \"And\" Linked Group Details        sodium chloride 0.9 % infusion 100 mL/hr Continuous 4/30/2018     Sig - Route: Infuse 100 mL/hr into a venous catheter Continuous. - Intravenous          Physician Progress Notes (last 24 hours) (Notes from 4/30/2018 10:29 AM through 5/1/2018 10:29 AM)     No notes of this type exist for this encounter.        Consult Notes (last 24 hours) " (Notes from 4/30/2018 10:29 AM through 5/1/2018 10:29 AM)     No notes of this type exist for this encounter.

## 2018-05-01 NOTE — PLAN OF CARE
Problem: Anemia (Adult)  Goal: Identify Related Risk Factors and Signs and Symptoms  Outcome: Ongoing (interventions implemented as appropriate)    Goal: Symptom Improvement  Outcome: Ongoing (interventions implemented as appropriate)    Goal: Identify Related Risk Factors and Signs and Symptoms  Outcome: Ongoing (interventions implemented as appropriate)    Goal: Symptom Improvement  Outcome: Ongoing (interventions implemented as appropriate)      Problem: Fall Risk (Adult)  Goal: Identify Related Risk Factors and Signs and Symptoms  Outcome: Ongoing (interventions implemented as appropriate)    Goal: Absence of Fall  Outcome: Ongoing (interventions implemented as appropriate)      Problem: Patient Care Overview  Goal: Plan of Care Review  Outcome: Ongoing (interventions implemented as appropriate)   05/01/18 1541   Coping/Psychosocial   Plan of Care Reviewed With patient   Plan of Care Review   Progress no change   OTHER   Outcome Summary low BP, 3L NC, critical troponin, echo pending, very little urine output, continue to monitor     Goal: Individualization and Mutuality  Outcome: Ongoing (interventions implemented as appropriate)

## 2018-05-01 NOTE — PLAN OF CARE
Problem: Anemia (Adult)  Goal: Symptom Improvement  Outcome: Ongoing (interventions implemented as appropriate)   05/01/18 0413   Anemia (Adult)   Symptom Improvement making progress toward outcome     Goal: Identify Related Risk Factors and Signs and Symptoms  Outcome: Ongoing (interventions implemented as appropriate)   05/01/18 0413   Anemia (Adult)   Related Risk Factors (Anemia) bleeding   Signs and Symptoms (Anemia) dyspnea       Problem: Fall Risk (Adult)  Goal: Absence of Fall  Outcome: Ongoing (interventions implemented as appropriate)   05/01/18 0413   Fall Risk (Adult)   Absence of Fall making progress toward outcome      05/01/18 0413   Fall Risk (Adult)   Absence of Fall making progress toward outcome       Problem: Patient Care Overview  Goal: Plan of Care Review  Outcome: Ongoing (interventions implemented as appropriate)   05/01/18 0413   Coping/Psychosocial   Plan of Care Reviewed With patient   OTHER   Outcome Summary Pt with some improvement after 1 unit of blood, will recheck in the morning and MD will assess for further needs. Nausea and vomiting resolved after 1 time phenergan dose.

## 2018-05-01 NOTE — CONSULTS
Malnutrition Severity Assessment    Patient Name:  Negro Hall  YOB: 1955  MRN: [id number]  Admit Date:  4/30/2018    Patient meets criteria for : Mild malnutrition    Comments:  Pt meets criteria due to recent hx of wt loss and decrease po intake lately.    Malnutrition Type: Acute Illness/Injury Malnutrition     Malnutrition Type (last 8 hours)      Malnutrition Severity Assessment     Row Name 05/01/18 0930       Malnutrition Severity Assessment    Malnutrition Type Acute Illness/Injury Malnutrition    Row Name 05/01/18 0930       Weight Status (Acute)    Weight Loss Mild (>5% / 3 mo)    Row Name 05/01/18 0930       Energy Intake Status (Acute)    Energy Intake Mild (<75% / 5d)    Row Name 05/01/18 0930       Criteria Met (Must meet criteria for severity in at least 2 of these categories: M Wasting, Fat Loss, Fluid, Secondary Signs, Wt. Status, Intake)    Patient meets criteria for  Mild malnutrition          Electronically signed by:  Miryam Gomes RD  05/01/18 9:40 AM

## 2018-05-01 NOTE — CONSULTS
Adult Nutrition  Assessment/PES    Patient Name:  Negro Hall  YOB: 1955  MRN: [id number]  Admit Date:  4/30/2018    Assessment Date:  5/1/2018    Comments: Nutrition assessment completed per RN trigger for decrease po intake and wt loss lately. MSA completed for mild malnutrition.           Adult Nutrition Assessment     Row Name 05/01/18 0930       Malnutrition Severity Assessment    Malnutrition Type Acute Illness/Injury Malnutrition       Weight Status (Acute)    Weight Loss Mild (>5% / 3 mo)    Energy Intake Mild (<75% / 5d)       Criteria Met (Must meet criteria for severity in at least 2 of these categories: M Wasting, Fat Loss, Fluid, Secondary Signs, Wt. Status, Intake)    Patient meets criteria for  Mild malnutrition    Row Name 05/01/18 0917       Nutrition/Diet History    Factors Affecting Nutritional Intake --   elevated troponin    Row Name 05/01/18 0912       Nutrition/Diet History    Food Preferences dislikes the food here       Nutrition Prescription PO    Current PO Diet NPO    Row Name 05/01/18 0911       Calculation Measurements    Weight Used For Calculations 61 kg (134 lb 7.7 oz)       Estimated/Assessed Needs    Additional Documentation Calorie Requirements (Group);Fluid Requirements (Group);Protein Requirements (Group)       Calorie Requirements    Estimated Calorie Requirement Comment 1361-3519       KCAL/KG    14 Kcal/Kg (kcal) 854    15 Kcal/Kg (kcal) 915    18 Kcal/Kg (kcal) 1098    20 Kcal/Kg (kcal) 1220    25 Kcal/Kg (kcal) 1525    30 Kcal/Kg (kcal) 1830    35 Kcal/Kg (kcal) 2135    40 Kcal/Kg (kcal) 2440    45 Kcal/Kg (kcal) 2745    50 Kcal/Kg (kcal) 3050       Whitman-St. Jeor Equation    RMR (Whitman-St. Jeor Equation) 1358.75       Protein Requirements    Est Protein Requirement Amount (gms/kg) 1.0 gm protein    Estimated Protein Requirements (gms/day) 61       Fluid Requirements    Estimated Fluid Requirements (mL/day) 1830    RDA Method (mL) 1830     "Amawalk-Segar Method (over 20 kg) 2720    Row Name 05/01/18 0910       Physical Findings    Skin --   bruised, ecchymotic    Row Name 05/01/18 0909       Labs/Procedures/Meds    Lab Results Reviewed reviewed    Lab Results Comments na, glu, bun, cr, alt, alb, tbili, wbc, hgb/hct    Row Name 05/01/18 0907       Anthropometrics    Height 170.2 cm (67.01\")    Weight 61 kg (134 lb 7.7 oz)       Ideal Body Weight (IBW)    Ideal Body Weight (IBW) (kg) 68.12    % Ideal Body Weight 89.55       Usual Body Weight (UBW)    Weight Loss other (see comments)   pt had lost wt on last adm, but has gained back since d/c 4/18, pt states he has lost a couple of lbs these last couple of days due to N/V       Body Mass Index (BMI)    BMI (kg/m2) 21.1    BMI Assessment BMI 18.5-24.9: normal       IBW Adjustment, Para/Tetraplegia    5% Adjustment, Para (IBW) 64.71    10% Adjustment, Para (IBW) 61.31    10% Adjustment, Tetra (IBW) 61.31    15% Adjustment, Tetra (IBW) 57.9    Row Name 05/01/18 0906       Nutrition/Diet History    Typical Food/Fluid Intake po intake has been down for a couple of days and has lost a couple of pounds due to N/V    Row Name 05/01/18 0905       Reason for Assessment    Reason For Assessment identified at risk by screening criteria    Diagnosis --   N/V for a couple of days, hx of MI, CHD, ABENA    Row Name 05/01/18 0500       Anthropometrics    Weight 61 kg (134 lb 7.7 oz)          Problem/Interventions:        Problem 1     Row Name 05/01/18 0913       Nutrition Diagnoses Problem 1    Problem 1 Altered GI Function    Etiology (related to) Medical Diagnosis    Gastrointestinal Nausea;Vomiting                    Intervention Goal     Row Name 05/01/18 0913       Intervention Goal    General Maintain nutrition    PO Initiate feeding;Tolerate PO    Weight Maintain weight            Nutrition Intervention     Row Name 05/01/18 0913       Nutrition Intervention    RD/Tech Action Follow Tx progress;Care plan " reviewd;Interview for preference              Education/Evaluation     Row Name 05/01/18 0913       Education    Education Will Instruct as appropriate       Monitor/Evaluation    Monitor Per protocol        Electronically signed by:  Miryam Gomes RD  05/01/18 9:37 AM

## 2018-05-01 NOTE — SIGNIFICANT NOTE
05/01/18 0815   Rehab Time/Intention   Evaluation Not Performed unable to evaluate, medical status change  (per RN, hold today due to medical status, will follow up tomorrow)   Rehab Treatment   Discipline physical therapist   Recommendation   PT - Next Appointment 05/02/18

## 2018-05-01 NOTE — PROGRESS NOTES
Discharge Planning Assessment   [facility]     Patient Name: Negro Hall  MRN: [id number]  Today's Date: 5/1/2018    Admit Date: 4/30/2018          Discharge Needs Assessment     Row Name 05/01/18 1058       Living Environment    Lives With friend(s)    Name(s) of Who Lives With Patient Emigdio Quesada    Current Living Arrangements home/apartment/condo    Potentially Unsafe Housing Conditions other (see comments)   no concerns     Primary Care Provided by self    Provides Primary Care For no one    Family Caregiver if Needed child(butch), adult    Quality of Family Relationships helpful;involved;supportive    Able to Return to Prior Arrangements yes       Resource/Environmental Concerns    Resource/Environmental Concerns none    Transportation Concerns car, none       Transition Planning    Patient/Family Anticipates Transition to inpatient rehabilitation facility;home with help/services    Patient/Family Anticipated Services at Transition none    Transportation Anticipated family or friend will provide       Discharge Needs Assessment    Readmission Within the Last 30 Days previous discharge plan unsuccessful    Concerns to be Addressed adjustment to diagnosis/illness;care coordination/care conferences;discharge planning    Equipment Currently Used at Home none    Anticipated Changes Related to Illness none    Equipment Needed After Discharge none            Discharge Plan     Row Name 05/01/18 1100       Plan    Plan Home with [facility] vs. SNF     Patient/Family in Agreement with Plan yes    Plan Comments CCP met with patient at bedside. CCP role explained and discharge planning discussed. Face sheet verified. Patient does not have POA/living will. Patient lives with his Friend, Emigdio Quesada at [address] [place] [address] [place] [address]. Patient has two steps to the entrance of the home and no steps inside. Patient does not use any medical equipment and is independent with his ADLs. Patient is current with [facility] is  following. Patient is considering rehab at discharge. Patient is interested in [facility]. CCP spoke with Ignacia/Loreta; she will see if [facility] is in network with insurance. Patient uses [facility] on [address]; patient denies having trouble affording his medications and does not have trouble remembering to take his medications. CCP will follow for PT evals and SNF choices. Kassie CARLSON            Destination     No service coordination in this encounter.      Durable Medical Equipment     No service coordination in this encounter.      Dialysis/Infusion     No service coordination in this encounter.      Home Medical Care     No service coordination in this encounter.      Social Care     No service coordination in this encounter.                Demographic Summary     Row Name 05/01/18 1058       General Information    Admission Type inpatient    Arrived From emergency department    Referral Source admission list    Reason for Consult discharge planning    Preferred Language English     Used During This Interaction no            Functional Status     Row Name 05/01/18 1058       Functional Status    Usual Activity Tolerance moderate    Current Activity Tolerance moderate       Functional Status, IADL    Medications independent    Meal Preparation independent    Housekeeping independent    Laundry independent    Shopping independent       Mental Status    General Appearance WDL WDL       Mental Status Summary    Recent Changes in Mental Status/Cognitive Functioning no changes            Psychosocial    No documentation.           Abuse/Neglect    No documentation.           Legal    No documentation.           Substance Abuse    No documentation.           Patient Forms    No documentation.         ALDO Fischer

## 2018-05-01 NOTE — DISCHARGE PLACEMENT REQUEST
"Shannan Hall (62 y.o. Male)     Date of Birth Social Security Number Address Home Phone MRN    1955  [id number]   [place] [phone] [id number]    L[employer] Marital Status          None        Admission Date Admission Type Admitting Provider Attending Provider Department, Room/Bed    4/30/18 Emergency Marilyn Blakely MD Beard, Lyle E, MD [address], 537/1    Discharge Date Discharge Disposition Discharge Destination                       Attending Provider:  Enoch Collins MD    Allergies:  No Known Allergies    Isolation:  None   Infection:  None   Code Status:  FULL    Ht:  170.2 cm (67.01\")   Wt:  61 kg (134 lb 7.7 oz)    Admission Cmt:  None   Principal Problem:  None                Active Insurance as of 4/30/2018     Primary Coverage     Payor Plan Insurance Group Employer/Plan Group    [employer] [place] [employer] [place]      Payor Plan Address Payor Plan Phone Number Effective From Effective To    [address] [address] [phone] 4/12/2018     [place] [address]       Subscriber Name Subscriber Birth Date Member ID       SHANNAN HALL 1955 [id number]                 Emergency Contacts      (Rel.) Home Phone Work Phone Mobile Phone    Tere Hall (Son) [phone] -- [phone]    Saulo(Ex-Wife)Eron (Other) [phone] -- [phone]    Yelitza Olivas (Sister) -- -- [phone]    Emigdio Quesada (Friend) -- -- [phone]              "

## 2018-05-01 NOTE — PROGRESS NOTES
"DAILY PROGRESS NOTE  [facility]    Patient Identification:  Name: Negro Hall  Age: 62 y.o.  Sex: male  :  1955  MRN: [id number]         Primary Care Physician: No Known Provider    Subjective:  Interval History:He feels better today.    Objective:    Scheduled Meds:  budesonide-formoterol 2 puff Inhalation BID - RT   clopidogrel 75 mg Oral Daily   [START ON 2018] famotidine 20 mg Intravenous Daily   metoclopramide 5 mg Intravenous Q6H     Continuous Infusions:  sodium chloride 100 mL/hr Last Rate: 100 mL/hr (18 0941)       Vital signs in last 24 hours:  Temp:  [97.6 °F (36.4 °C)-98.5 °F (36.9 °C)] 98.2 °F (36.8 °C)  Heart Rate:  [] 82  Resp:  [14-18] 18  BP: (67-97)/(43-77) 95/65    Intake/Output:    Intake/Output Summary (Last 24 hours) at 18 1626  Last data filed at 18 1609   Gross per 24 hour   Intake           2322.5 ml   Output              175 ml   Net           2147.5 ml       Exam:  BP 95/65   Pulse 82   Temp 98.2 °F (36.8 °C) (Oral)   Resp 18   Ht 170.2 cm (67.01\")   Wt 61 kg (134 lb 7.7 oz)   SpO2 94%   BMI 21.06 kg/m²     General Appearance:    Alert, cooperative, no distress   Head:    Normocephalic, without obvious abnormality, atraumatic   Eyes:       Throat:   Lips, tongue, gums normal   Neck:   Supple, symmetrical, trachea midline, no JVD   Lungs:     Clear to auscultation bilaterally, respirations unlabored   Chest Wall:    No tenderness or deformity    Heart:    Regular rate and rhythm, S1 and S2 normal, no murmur,no  Rub or gallop   Abdomen:     Soft, non-tender, bowel sounds active, no masses, no organomegaly    Extremities:   Extremities normal, atraumatic, no cyanosis or edema   Pulses:      Skin:   Skin is warm and dry,  no rashes or palpable lesions   Neurologic:   no focal deficits noted      [unfilled]  Data Review:    Results from last 7 days  Lab Units 18  0346 18  1153  18  1158   SODIUM mmol/L 135* 137  --  "  --    POTASSIUM mmol/L 4.7 4.0  --  3.8   CHLORIDE mmol/L 95* 97*  --   --    CO2 mmol/L 14.6* 22.1  --   --    BUN mg/dL 79* 67*  --   --    CREATININE mg/dL 2.50* 1.89*  --   --    GLUCOSE mg/dL 111* 155*  < >  --    CALCIUM mg/dL 7.5* 7.9*  --   --    < > = values in this interval not displayed.    Results from last 7 days  Lab Units 05/01/18  0346 04/30/18  1854 04/30/18  1153   WBC 10*3/mm3 31.07*  --  18.12*   HEMOGLOBIN g/dL 8.3* 8.3* 8.3*   HEMATOCRIT % 27.3* 27.1* 26.6*   PLATELETS 10*3/mm3 344  --  517*               Lab Results  Lab Value Date/Time   TROPONINT 8.290 (C) 05/01/2018 0346   TROPONINT 5.350 (C) 04/30/2018 1854   TROPONINT 6.100 (C) 04/30/2018 1153   TROPONINT 4.190 (C) 04/24/2018 0550   TROPONINT 4.260 (C) 04/23/2018 2223   TROPONINT 5.400 (C) 04/13/2018 0532   TROPONINT 0.113 (C) 04/12/2018 2359           Results from last 7 days  Lab Units 04/30/18  1153   ALK PHOS U/L 196*   BILIRUBIN mg/dL 1.6*   ALT (SGPT) U/L 153*   AST (SGOT) U/L 128*             No results found for: POCGLU    Results from last 7 days  Lab Units 05/01/18  0346 04/30/18  1854 04/30/18  1153   INR  2.55* 2.33* >10.00*       Patient Active Problem List   Diagnosis Code   • COPD (chronic obstructive pulmonary disease) J44.9   • CAD (coronary artery disease) I25.10   • Ischemic cardiomyopathy I25.5   • Poisoning by warfarin sodium T45.511A   • Leukocytosis D72.829   • ABENA (acute kidney injury) N17.9   • Acute blood loss anemia D62   • Nausea & vomiting R11.2   • Elevated troponin R74.8   • Nontraumatic retroperitoneal hematoma K66.1   • H/O cardiac arrest Z86.74   • LV (left ventricular) mural thrombus following MI I21.29, I23.6       Assessment:  Active Hospital Problems (** Indicates Principal Problem)    Diagnosis Date Noted   • **Acute blood loss anemia [D62] 04/30/2018   • Poisoning by warfarin sodium [T45.511A] 04/30/2018   • Leukocytosis [D72.829] 04/30/2018   • ABENA (acute kidney injury) [N17.9] 04/30/2018   •  Nausea & vomiting [R11.2] 04/30/2018   • Elevated troponin [R74.8] 04/30/2018   • Nontraumatic retroperitoneal hematoma [K66.1] 04/30/2018   • H/O cardiac arrest [Z86.74] 04/30/2018   • LV (left ventricular) mural thrombus following MI [I21.29, I23.6] 04/30/2018   • COPD (chronic obstructive pulmonary disease) [J44.9] 04/25/2018   • CAD (coronary artery disease) [I25.10] 04/25/2018   • Ischemic cardiomyopathy [I25.5] 04/25/2018      Resolved Hospital Problems    Diagnosis Date Noted Date Resolved   No resolved problems to display.       Plan:  Continue with IV fluids, holding coumadin.Cardiology consult noted, follow labs and transfuse as needed  Enoch Collins MD  5/1/2018  4:26 PM

## 2018-05-01 NOTE — CONSULTS
"Date of Hospital Visit: 18  Encounter Provider: Lennox Max MD  Place of Service: [facility] CARDIOLOGY  Patient Name: Negro Hall  :1955  [id number]  Referral Provider: Marilyn Blakely MD    Chief complaint:vomiting    Consulted for: elevated troponin    History of Present Illness:Mr. Hall is a 61 y/o with a history of V fib arrest on 18 with STEMI s/p TEJ to LAD and ramus intermedius (Plavix), COPD, HTN, LV thrombus (coumadin). He recently stopped smoking. He presented to the ED yesterday with c/o vomiting, diarrhea, dizziness and generalized weakness x 1-3 days. On arrival his bp was 105/67, HR 96. Labs showed a bun/creat of 67/1.89 (0.78), AST//153, bili 1.6, , lipase 77, INR >10, WBC 18.1, hgb 8.3 and troponin 6.1. EKG showed NSR with ST elevation anteriorly. CT abd/pelvis revealed a right retroperitoneal and iliopsoas hematomas and serous pleural effusions with bibasilar infiltrate/atelectasis. He was given Vit K and 2u FFP then admitted. He has been given 1U PRBC and hydrated with IVF.     We are asked to see for elevated troponin. This am it is 8.29. Additionally his creat is 2.5, INR 2.5, WBC 31.0 (afebrile), hgb 8.3. Blood cx are pending. He was d/c to home on 18 post STEMI to a rehab facility, but left there same day because \"it was a dump.\" He has been staying with a friend and states he was taking his medications as prescribed. He has been hypotensive since admission, sbp high 80s, but this was the case when he was last here as well. SpO2 is running in the low 90s on 3L. His EF post STEMI was 20% and 34% on repeat echo done 3 days later. He denies any chest, back, shoulder, arm, jaw pain/pressure. He states he is urinating, but there is no u/o documented.     I have reviewed the above history of present illness and agree with it.  This is a gentleman who had a cardiac arrest extensive anterior MI was extremely ill but is " recovered without him go home last week he went to rehabilitation facility didn't stay there long went to his house now comes in with anorexia and vomiting diarrhea weakness found to have had a spontaneous retroperitoneal bleed A does not complain of PND orthopnea or chest pain    Cardiac Testing:  Echo 18  · Left ventricular systolic function is moderately decreased. Calculated EF = 34.5%. Estimated EF was in agreement with the calculated EF. Normal left ventricular cavity size noted. The anteroapex is akinetic. There is a layering thrombus in the apex and along the septum.     Echo 18  · The study is technically difficult for diagnosis.  · Left ventricular wall thickness is consistent with mild concentric hypertrophy.  · Estimated EF appears to be in the range of less than 20%.  · The left ventricular apex appears to have thrombus in it.  · Mild mitral valve regurgitation is present  · There is akinesis of the mid and distal heart and apex. Hypokinesis of the base    18 Cardiac Cath     CORONARY ANGIOGRAPHY:  Left main: 60% distal left main  Left anterior descendin% origin  Ramus intermedius: 90% origin  Circumflex: Small but otherwise normal  RCA: Is a dominant vessel.  Large vessel free of obstructive disease very faint right to left collaterals to the LAD  Past Medical History:   Diagnosis Date   • Anoxic encephalopathy    • Cardiac arrest with ventricular fibrillation     cardiogenic shock with shock liver   • Cardiogenic shock    • COPD with acute exacerbation    • Diverticulosis    • Hyperglycemia    • Ischemic cardiomyopathy 2018    EF 34% 2018   • Left ventricular thrombosis with acute MI    • STEMI (ST elevation myocardial infarction)    • Tobacco abuse        Past Surgical History:   Procedure Laterality Date   • CARDIAC CATHETERIZATION N/A 2018    Procedure: Left Heart Cath;  Surgeon: Lennox Max MD;  Location: [facility] CATH INVASIVE LOCATION;  Service: Cardiovascular    • CARDIAC CATHETERIZATION N/A 4/13/2018    Procedure: Coronary angiography;  Surgeon: Lennox Max MD;  Location:  [facility] CATH INVASIVE LOCATION;  Service: Cardiovascular   • CARDIAC CATHETERIZATION N/A 4/13/2018    Procedure: Stent TEJ coronary;  Surgeon: Lennox Max MD;  Location: [facility] CATH INVASIVE LOCATION;  Service: Cardiovascular       Prescriptions Prior to Admission   Medication Sig Dispense Refill Last Dose   • amiodarone (PACERONE) 200 MG tablet Take 1 tablet by mouth Daily. 30 tablet 0 4/30/2018 at Unknown time   • atorvastatin (LIPITOR) 20 MG tablet Take 1 tablet by mouth Daily. 30 tablet 12 4/29/2018 at Unknown time   • budesonide-formoterol (SYMBICORT) 160-4.5 MCG/ACT inhaler Inhale 2 puffs 2 (Two) Times a Day. 1 inhaler 12 Past Week at Unknown time   • clopidogrel (PLAVIX) 75 MG tablet Take 1 tablet by mouth Daily. 30 tablet 0 Past Week at Unknown time   • warfarin (COUMADIN) 7.5 MG tablet One tablet daily, monitor INR with cardiology 30 tablet 0 Past Week at Unknown time       Current Meds  Scheduled Meds:    budesonide-formoterol 2 puff Inhalation BID - RT   clopidogrel 75 mg Oral Daily   famotidine 20 mg Intravenous Q12H   metoclopramide 5 mg Intravenous Q6H     Continuous Infusions:    sodium chloride 100 mL/hr Last Rate: 100 mL/hr (05/01/18 0941)     PRN Meds:.•  acetaminophen  •  aluminum-magnesium hydroxide-simethicone  •  bisacodyl  •  ondansetron **OR** ondansetron ODT **OR** ondansetron  •  sodium chloride  •  Insert peripheral IV **AND** sodium chloride    Allergies as of 04/30/2018   • (No Known Allergies)       Social History     Social History   • Marital status:      Spouse name: N/A   • Number of children: N/A   • Years of education: N/A     Occupational History   • Not on file.     Social History Main Topics   • Smoking status: Former Smoker     Packs/day: 2.00     Years: 12.00     Types: Cigarettes   • Smokeless tobacco: Never Used      Comment: Quit 15 days ago   "  • Alcohol use No   • Drug use: No   • Sexual activity: Defer     Other Topics Concern   • Not on file     Social History Narrative   • No narrative on file       History reviewed. No pertinent family history.    REVIEW OF SYSTEMS:   ROS was performed and is negative except as outlined in HPI     REVIEW OF SYSTEMS:   CONSTITUTIONAL: No weight loss, fever, chills, weakness or fatigue.   HEENT: Eyes: No visual loss, blurred vision, double vision or yellow sclerae. Ears, Nose, Throat: No hearing loss, sneezing, congestion, runny nose or sore throat.   SKIN: No rash or itching.     RESPIRATORY: No shortness of breath, hemoptysis, cough or sputum.   GASTROINTESTINAL: No anorexia, nausea, vomiting or diarrhea. No abdominal pain, bright red blood per rectum or melena.  GENITOURINARY: No burning on urination, hematuria or increased frequency.  NEUROLOGICAL: No headache, dizziness, syncope, paralysis, ataxia, numbness or tingling in the extremities. No change in bowel or bladder control.   MUSCULOSKELETAL: No muscle, back pain, joint pain or stiffness.   HEMATOLOGIC: No anemia, bleeding or bruising.   LYMPHATICS: No enlarged nodes. No history of splenectomy.   PSYCHIATRIC: No history of depression, anxiety, hallucinations.   ENDOCRINOLOGIC: No reports of sweating, cold or heat intolerance. No polyuria or polydipsia.        Objective:   Temp:  [97.6 °F (36.4 °C)-98.5 °F (36.9 °C)] 97.8 °F (36.6 °C)  Heart Rate:  [] 83  Resp:  [12-18] 15  BP: ()/(43-77) 86/65  Body mass index is 21.06 kg/m².  Flowsheet Rows    Flowsheet Row First Filed Value   Admission Height 170.2 cm (67\") Documented at 04/30/2018 1142   Admission Weight 54.4 kg (120 lb) Documented at 04/30/2018 1142        Vitals:    05/01/18 1102   BP: (!) 86/65   Pulse: 83   Resp:    Temp:    SpO2: 99%       Head:    Normocephalic, without obvious abnormality, atraumatic   Eyes:            Lids and lashes normal, conjunctivae and sclerae normal, no   icterus, " no pallor   Ears:    Ears appear intact with no abnormalities noted   Throat:   No oral lesions, dentition good   Neck:   No adenopathy, supple, trachea midline, no thyromegaly, no   carotid bruit, no JVD   Lungs:     Breath sounds are equal and clear to auscultation    Heart:    Normal S1 and S2, RRR, No M/G/R   Abdomen:     Normal bowel sounds, no masses, no organomegaly, soft        non-tender, non-distended, no guarding   Extremities:   Moves all extremities well, no edema, no cyanosis, no redness   Pulses:   Pulses palpable and equal bilaterally.    Skin:  Psychiatric:   No bleeding, bruising or rash    Awake, alert and oriented x 3, normal mood and affect    CT abd/pelvis:  1. Sizable right retroperitoneal hematoma with additional hematoma  involving the iliopsoas which is enlarged. Recommend conservative CT  surveillance to document resolution and to exclude associated neoplasm.  2. Diverticulosis of the colon.  3. Potential gallbladder sludge.  4. Renal cysts with nonobstructing left renal calculi.  5. Serous pleural effusions. Bibasilar infiltrate/atelectasis.    CXR:  IMPRESSION:  Congestive heart failure is suspected, please clinically correlate. Less  probable differential diagnoses includes infiltrates.                    I personally viewed and interpreted the patient's EKG/Telemetry data    Assessment:  Active Hospital Problems (** Indicates Principal Problem)    Diagnosis Date Noted   • Poisoning by warfarin sodium [T45.511A] 04/30/2018   • Leukocytosis [D72.829] 04/30/2018   • ABENA (acute kidney injury) [N17.9] 04/30/2018   • Acute blood loss anemia [D62] 04/30/2018   • Nausea & vomiting [R11.2] 04/30/2018   • Elevated troponin [R74.8] 04/30/2018   • Nontraumatic retroperitoneal hematoma [K66.1] 04/30/2018   • H/O cardiac arrest [Z86.74] 04/30/2018   • LV (left ventricular) mural thrombus following MI [I21.29, I23.6] 04/30/2018   • COPD (chronic obstructive pulmonary disease) [J44.9] 04/25/2018   •  CAD (coronary artery disease) [I25.10] 04/25/2018   • Ischemic cardiomyopathy [I25.5] 04/25/2018      Resolved Hospital Problems    Diagnosis Date Noted Date Resolved   No resolved problems to display.       Plan:This is a major problem is he has an LV thrombus he has stents in his LAD and ramus up into his left main he has to be anticoagulated I'm going to stop his amiodarone in the event that it's making him anorexic and I'm also going to stop his Lipitor also I would not give him any more vitamin K.  He does not appear to have an acute coronary syndrome he does not appear to be in heart failure and when the check a CK-MB to think should be normal troponins high but that's in the setting of his MI and renal failure.  His prognosis is guarded    Lennox Max MD  05/01/18  11:51 AM.

## 2018-05-02 NOTE — CONSULTS
Referring Provider: Dr. César Tanner  Reason for Consultation: ABENA    Subjective     Chief complaint   Chief Complaint   Patient presents with   • Vomiting     N/V STARTING 3 DAYS AGO, UNABLE TO HOLD ANYTHING DOWN; RECENTLY D/C DUE TO ACS       History of present illness:  61 yo WM with normal renal fxn at baseline, admitted 4.30.18 for further eval of N/V.  Asked to see d/t rising SCr, with level 1.9 on arrival and 3.9 today.  Full PMH outlined below; pertinent is recent admission late April for V-fib arrest complicated by ABENA that fully resolved.  Stent was placed to LAD, and coumadin was begun for LV thrombus.  This admission, found to have large retroperitoneal hematoma; hemorrhagic shock on multiple pressors; coagulopathy. HyperK and met acidosis noted.  Hypothermic on warming blanket.  He wants to eat and drink; c/o of being hot; denies SOB, tho on 8 L/min.  No CP.  Has back and abd pain.  Minimal UOP; PVR's negligible.    Past Medical History:   Diagnosis Date   • Anoxic encephalopathy    • Cardiac arrest with ventricular fibrillation     cardiogenic shock with shock liver   • Cardiogenic shock    • COPD with acute exacerbation    • Diverticulosis    • Hyperglycemia    • Ischemic cardiomyopathy 04/2018    EF 34% 4/16/2018   • Left ventricular thrombosis with acute MI    • STEMI (ST elevation myocardial infarction)    • Tobacco abuse      Past Surgical History:   Procedure Laterality Date   • CARDIAC CATHETERIZATION N/A 4/13/2018    Procedure: Left Heart Cath;  Surgeon: Lennox Max MD;  Location: [facility] CATH INVASIVE LOCATION;  Service: Cardiovascular   • CARDIAC CATHETERIZATION N/A 4/13/2018    Procedure: Coronary angiography;  Surgeon: Lennox Max MD;  Location: [facility] CATH INVASIVE LOCATION;  Service: Cardiovascular   • CARDIAC CATHETERIZATION N/A 4/13/2018    Procedure: Stent TEJ coronary;  Surgeon: Lennox Max MD;  Location: [facility] CATH INVASIVE LOCATION;  Service: Cardiovascular  "    History reviewed. No pertinent family history.  Social History   Substance Use Topics   • Smoking status: Former Smoker     Packs/day: 2.00     Years: 12.00     Types: Cigarettes   • Smokeless tobacco: Never Used      Comment: Quit 15 days ago    • Alcohol use No     Prescriptions Prior to Admission   Medication Sig Dispense Refill Last Dose   • amiodarone (PACERONE) 200 MG tablet Take 1 tablet by mouth Daily. 30 tablet 0 4/30/2018 at Unknown time   • atorvastatin (LIPITOR) 20 MG tablet Take 1 tablet by mouth Daily. 30 tablet 12 4/29/2018 at Unknown time   • budesonide-formoterol (SYMBICORT) 160-4.5 MCG/ACT inhaler Inhale 2 puffs 2 (Two) Times a Day. 1 inhaler 12 Past Week at Unknown time   • clopidogrel (PLAVIX) 75 MG tablet Take 1 tablet by mouth Daily. 30 tablet 0 Past Week at Unknown time   • warfarin (COUMADIN) 7.5 MG tablet One tablet daily, monitor INR with cardiology 30 tablet 0 Past Week at Unknown time     Allergies:  Review of patient's allergies indicates no known allergies.    Review of Systems  14-point ROS performed and all negative except for pertinent +/-'s detailed in HPI.     Objective     Vital Signs  Temp:  [91.5 °F (33.1 °C)-98.2 °F (36.8 °C)] 95 °F (35 °C)  Heart Rate:  [] 86  Resp:  [16-26] 26  BP: ()/(21-70) 93/59    Flowsheet Rows    Flowsheet Row First Filed Value   Admission Height 170.2 cm (67\") Documented at 04/30/2018 1142   Admission Weight 54.4 kg (120 lb) Documented at 04/30/2018 1142           No intake/output data recorded.  I/O last 3 completed shifts:  In: 1857.5 [P.O.:100; I.V.:1493.3; Blood:264.2]  Out: 605 [Urine:605]    Intake/Output Summary (Last 24 hours) at 05/02/18 0928  Last data filed at 05/01/18 2200   Gross per 24 hour   Intake          1593.33 ml   Output              205 ml   Net          1388.33 ml       Physical Exam:  NAD; pleasant; mostly oriented; looks older than stated age  Thin; chr ill; in restraints  Dry MM; AT and NC  No eye d/c; no " scleral icterus  No JVD; no carotid bruits  Coarse with rhonchi bilat; not labored  RRR, no rub  Soft, +T, +D, BS hypoactive; ecchymosis visible right flank  No significant edema in lower legs, but +1 edema in hips  +clubbing  No asterixis  Moves all extremities   Speech fluent  Anxious  No rash    Results Review:    Results from last 7 days  Lab Units 05/02/18 0457 05/01/18 2355 05/01/18 0346 04/30/18  1153   SODIUM mmol/L 137 137 135* 137   POTASSIUM mmol/L 5.4* 6.7* 4.7 4.0   CHLORIDE mmol/L 87* 89* 95* 97*   CO2 mmol/L 7.9* 7.5* 14.6* 22.1   BUN mg/dL 86* 91* 79* 67*   CREATININE mg/dL 3.87* 3.85* 2.50* 1.89*   CALCIUM mg/dL 7.2* 7.5* 7.5* 7.9*   BILIRUBIN mg/dL  --  3.7*  --  1.6*   ALK PHOS U/L  --  191*  --  196*   ALT (SGPT) U/L  --  3,829*  --  153*   AST (SGOT) U/L  --  6,560*  --  128*   GLUCOSE mg/dL 171* 96 111* 155*       Estimated Creatinine Clearance: 16.6 mL/min (by C-G formula based on SCr of 3.87 mg/dL (H)).      Results from last 7 days  Lab Units 05/01/18 2355 04/25/18  1158   MAGNESIUM mg/dL 3.2* 2.0         Results from last 7 days  Lab Units 05/02/18 0457 05/01/18 2355 05/01/18  1805 05/01/18 0346 04/30/18  1854 04/30/18  1153   WBC 10*3/mm3 35.45* 38.53*  --  31.07*  --  18.12*   HEMOGLOBIN g/dL 7.3* 8.0* 8.8* 8.3* 8.3* 8.3*   PLATELETS 10*3/mm3 171 200  --  344  --  517*         Results from last 7 days  Lab Units 05/02/18 0457 05/01/18 0346 04/30/18  1854 04/30/18  1153   INR  4.30* 2.55* 2.33* >10.00*       Active Medications    budesonide-formoterol 2 puff Inhalation BID - RT   clopidogrel 75 mg Oral Daily   famotidine 20 mg Intravenous Daily   metoclopramide 5 mg Intravenous Q6H   phytonadione (VITAMIN K) IVPB 10 mg Intravenous Once   piperacillin-tazobactam 3.375 g Intravenous Q12H   sodium chloride 10 mL Intracatheter Q12H   Vancomycin Pharmacy Intermittent Dosing  Does not apply Daily       DOBUTamine 2.5 mcg/kg/min Last Rate: 2.5 mcg/kg/min (05/02/18 0816)   DOPamine 2-20  mcg/kg/min Last Rate: Stopped (05/02/18 0216)   norepinephrine 0.02-0.3 mcg/kg/min Last Rate: 0.3 mcg/kg/min (05/02/18 0230)   Pharmacy to dose vancomycin     sodium bicarbonate drip (greater than 75 mEq/bag) 100 mL/hr Last Rate: 100 mL/hr (05/02/18 0521)   sodium chloride 100 mL/hr Last Rate: 100 mL/hr (05/01/18 0941)   vasopressin 0.03 Units/min Last Rate: 0.03 Units/min (05/02/18 0421)       Assessment/Plan   Assessment  1.  ABENA, oliguric, prerenal +/- ATN from hemorrhagic shock.  HyperK, worsening azotemia, and huge AG (>40) met acidosis.  Lactate >20.  Central vol excess by imaging; +edema.   2.  Shock, hemorrhagic and possibly cardiogenic  3.  ABLA  4.  Coagulopathy  5.  CAD with recent stent to LAD following V-fib arrest a few weeks ago  6.  COPD  7.  Leukocytosis  8.  Shock liver      Principal Problem:    Acute blood loss anemia  Active Problems:    COPD (chronic obstructive pulmonary disease)    CAD (coronary artery disease)    Ischemic cardiomyopathy    Poisoning by warfarin sodium    Leukocytosis    ABENA (acute kidney injury)    Nausea & vomiting    Elevated troponin    Nontraumatic retroperitoneal hematoma    H/O cardiac arrest    LV (left ventricular) mural thrombus following MI      Plan  1.  Initiate CRRT to control solute, K, and acid-base  2.  Get HD access with pigtail; ok for PICC if additional access needed  3.  Check Hep B status prior to starting dialysis  4.  Will stop bicarb drip once CRRT underway  5.  D/w pt's family in room    I discussed the patient's findings and my recommendations with D/w Dr. Johan Saleh MD  05/02/18  9:28 AM

## 2018-05-02 NOTE — NURSING NOTE
REGARDING PICC LINE   PICC line retracted 2 cm  Sterile dressing place and repeat pcxr requested for placement verification.

## 2018-05-02 NOTE — CONSULTS
REGARDING PICC CONSULT  Chart was reviewed .  Renal functions abnormal.  Per primary nurse Dr Saleh was informed and OKed for PICC placement.  Ordered by Dr Tanner who is also aware of renal functions. Patient is not completely oriented to his situation and consent was obtained from [facility] per ICU staff.  Patient was cooperative with procedure with reminders.  TL PICC placed in right upper are using sterile max barriers.  Jean Carlos well without incident.  Site is still oozing discussed with primary nurse to continue to evaluate frequently and notify IV Team is bleeding continues.  Unable to clarify placement with sherlock 3CG.  Port cxr ortered per protocol.  Awaiting results.

## 2018-05-02 NOTE — PROGRESS NOTES
[facility] INPATIENT PROGRESS NOTE         [facility] CORONARY CARE    2018      PATIENT IDENTIFICATION:  Name: Negro Hall ADMIT: 2018   : 1955  PCP: No Known Provider    MRN: [id number] LOS: 2 days   AGE/SEX: 62 y.o. male  ROOM: 338/1                     LOS 2    Reason for visit: Follow-up sepsis with shock on multiple pressors      SUBJECTIVE:    Chart reviewed.  Discussed with nursing staff.  Arousable to voice.  Confused.  Discussed with nephrology.  Mild signs of melena per nursing.    Objective   OBJECTIVE:    Vital Sign Min/Max for last 24 hours  Temp  Min: 91.5 °F (33.1 °C)  Max: 98.1 °F (36.7 °C)   BP  Min: 35/21  Max: 105/66   Pulse  Min: 49  Max: 137   Resp  Min: 16  Max: 26   SpO2  Min: 85 %  Max: 99 %   No Data Recorded   No Data Recorded                         Body mass index is 21.06 kg/m².    Intake/Output Summary (Last 24 hours) at 18 1323  Last data filed at 18 0939   Gross per 24 hour   Intake              935 ml   Output              230 ml   Net              705 ml         Exam:  GEN:  Confused, appears acutely ill, appears stated age  EYES:   PERRL, anicteric sclera  ENT:    External ears/nose normal, OP clear  NECK:  No adenopathy, midline trachea  LUNGS: Normal chest on inspection, palpation and diminished bilateral breath sounds with crackles on auscultation  CV:  Normal S1S2, without murmur  ABD:  Non tender, non distended, no hepatosplenomegaly, +BS  EXT:  Cool extremities, no clubbing    Scheduled meds:    budesonide-formoterol 2 puff Inhalation BID - RT   clopidogrel 75 mg Oral Daily   famotidine 20 mg Intravenous Daily   heparin (porcine) 10,000 Units Intracatheter On Call   metoclopramide 5 mg Intravenous Q6H   piperacillin-tazobactam 3.375 g Intravenous Q12H   sodium chloride 10 mL Intracatheter Q12H   Vancomycin Pharmacy Intermittent Dosing  Does not apply Daily     IV meds:                        DOBUTamine 2.5 mcg/kg/min Last Rate: 2.5  mcg/kg/min (05/02/18 1242)   DOPamine 2-20 mcg/kg/min Last Rate: Stopped (05/02/18 0216)   norepinephrine 0.02-0.3 mcg/kg/min Last Rate: 0.3 mcg/kg/min (05/02/18 0230)   norepinephrine 0.02-0.3 mcg/kg/min Last Rate: 0.3 mcg/kg/min (05/02/18 1230)   Pharmacy to dose vancomycin     phenylephrine 0.5-3 mcg/kg/min    sodium bicarbonate drip (greater than 75 mEq/bag) 100 mL/hr Last Rate: 100 mL/hr (05/02/18 0939)   sodium chloride 100 mL/hr Last Rate: 100 mL/hr (05/01/18 0941)   vasopressin 0.03 Units/min Last Rate: 0.03 Units/min (05/02/18 0421)     Data Review:    Results from last 7 days  Lab Units 05/02/18  0457 05/01/18  2355 05/01/18 0346 04/30/18  1153   SODIUM mmol/L 137 137 135* 137   POTASSIUM mmol/L 5.4* 6.7* 4.7 4.0   CHLORIDE mmol/L 87* 89* 95* 97*   CO2 mmol/L 7.9* 7.5* 14.6* 22.1   BUN mg/dL 86* 91* 79* 67*   CREATININE mg/dL 3.87* 3.85* 2.50* 1.89*   GLUCOSE mg/dL 171* 96 111* 155*   CALCIUM mg/dL 7.2* 7.5* 7.5* 7.9*         Estimated Creatinine Clearance: 16.6 mL/min (by C-G formula based on SCr of 3.87 mg/dL (H)).    Results from last 7 days  Lab Units 05/02/18  1222 05/02/18  0457 05/01/18  2355 05/01/18  1805 05/01/18 0346 04/30/18  1153   WBC 10*3/mm3  --  35.45* 38.53*  --  31.07*  --  18.12*   HEMOGLOBIN g/dL 7.7* 7.3* 8.0* 8.8* 8.3*  < > 8.3*   PLATELETS 10*3/mm3  --  171 200  --  344  --  517*   < > = values in this interval not displayed.    Results from last 7 days  Lab Units 05/02/18  0457 05/01/18  0346 04/30/18  1854 04/30/18  1153   INR  4.30* 2.55* 2.33* >10.00*       Results from last 7 days  Lab Units 05/01/18  2355 04/30/18  1153   ALT (SGPT) U/L 3,829* 153*   AST (SGOT) U/L 6,560* 128*       Results from last 7 days  Lab Units 05/02/18  1205 05/02/18  0040 05/01/18  2325   PH, ARTERIAL pH units 7.324* 6.901* 6.845*   PO2 ART mm Hg 74.0* 74.4* 88.3   PCO2, ARTERIAL mm Hg 18.6* 19.2* 22.7*   HCO3 ART mmol/L 9.7* 3.8* 3.9*       Results from last 7 days  Lab Units 05/02/18  1222  05/02/18  0457 05/01/18  4025   PROCALCITONIN ng/mL  --   --  1.31*   LACTATE mmol/L 19.8* 21.8* 21.8*             )Assessment   ASSESSMENT:      Active Hospital Problems (** Indicates Principal Problem)    Diagnosis Date Noted   • **Acute blood loss anemia [D62] 04/30/2018   • Poisoning by warfarin sodium [T45.511A] 04/30/2018   • Leukocytosis [D72.829] 04/30/2018   • ABENA (acute kidney injury) [N17.9] 04/30/2018   • Nausea & vomiting [R11.2] 04/30/2018   • Elevated troponin [R74.8] 04/30/2018   • Nontraumatic retroperitoneal hematoma [K66.1] 04/30/2018   • H/O cardiac arrest [Z86.74] 04/30/2018   • LV (left ventricular) mural thrombus following MI [I21.29, I23.6] 04/30/2018   • COPD (chronic obstructive pulmonary disease) [J44.9] 04/25/2018   • CAD (coronary artery disease) [I25.10] 04/25/2018   • Ischemic cardiomyopathy [I25.5] 04/25/2018      Resolved Hospital Problems    Diagnosis Date Noted Date Resolved   No resolved problems to display.     Shock likely cardiogenic and septic  Severe metabolic acidosisWith severe hyperkalemia  Severe lactic acidosis  Acute renal failure worsening  Altered mental status due to above  Ischemic cardiomyopathy EF 25%  Coagulopathy  Elevated troponin  Retroperitoneal hematoma  History of cardiac arrest  LV thrombosis  COPD      PLAN:    Continue resuscitative measures.  On pressors and fluids.  Maxed out on Levophed, and also on dobutamine and vasopressin.  Getting central access.  Discussed with nephrology possible need for dialysis.  Continue broad-spectrum antibiotics for sepsis.  Trending lactic acid.  Significantly elevated still.  Stop bicarbonate drip once CRRT is underway.  Monitoring H&H closely.  Reversal of INR?  To the point cardiology is comfortable with signs of melena and retroperitoneal hematoma.  Has LV thrombus and stents.  Will discuss with Dr Max.      Discussed with multidisciplinary ICU team on rounds this morning.    CCT: 40 min    Isidro Prado  MD  Pulmonary and Critical Care Medicine  [facility], [facility]  5/2/2018    1:23 PM       Addendum:  Discussed with Dr. Max by telephone.  Given signs of bleeding agrees to reverse INR.  We'll give vitamin K and FFP.    Isidro Prado MD  1:51 PM

## 2018-05-02 NOTE — PROGRESS NOTES
"Negro Hall  1955 62 y.o.  [id number]      Patient Care Team:  No Known Provider as PCP - General    CC: Card arrest, recent STEMI tx with PCI, LV thrombus, prior smoker, is with weakness    Interval History: Hemodynamically collapsed last night, no pain anywhere, no SOB      Objective   Vital Signs  Temp:  [91.5 °F (33.1 °C)-98.1 °F (36.7 °C)] 97.4 °F (36.3 °C)  Heart Rate:  [] 91  Resp:  [10-26] 10  BP: ()/(21-71) 101/66    Intake/Output Summary (Last 24 hours) at 05/02/18 1922  Last data filed at 05/02/18 1900   Gross per 24 hour   Intake             8682 ml   Output              388 ml   Net             8294 ml     Flowsheet Rows    Flowsheet Row First Filed Value   Admission Height 170.2 cm (67\") Documented at 04/30/2018 1142   Admission Weight 54.4 kg (120 lb) Documented at 04/30/2018 1142          Physical Exam:   General Appearance:    Alert,oriented, in no acute distress   Lungs:     Clear to auscultation,BS are equal    Heart:    Normal S1 and S2, RRR without murmur, + [id number] or rub   HEENT:    Sclera are clear, no JVD or adenopathy   Abdomen:     Normal bowel sounds, soft non-tender, non-distended, no HSM   Extremities:   Moves all extremities well, no edema, no cyanosis, no             Redness, no rash     Medication Review:        budesonide-formoterol 2 puff Inhalation BID - RT   clopidogrel 75 mg Oral Daily   famotidine 20 mg Intravenous Daily   heparin (porcine) 10,000 Units Intracatheter On Call   metoclopramide 5 mg Intravenous Q6H   piperacillin-tazobactam 3.375 g Intravenous Q8H   sodium chloride 10 mL Intracatheter Q12H   vancomycin 1,000 mg Intravenous Once   Vancomycin Pharmacy Intermittent Dosing  Does not apply Daily       DOBUTamine 2.5 mcg/kg/min Last Rate: 2.5 mcg/kg/min (05/02/18 1242)   DOPamine 2-20 mcg/kg/min Last Rate: Stopped (05/02/18 0216)   norepinephrine 0.02-0.3 mcg/kg/min Last Rate: 0.3 mcg/kg/min (05/02/18 0230)   norepinephrine 0.02-0.3 mcg/kg/min Last " Rate: 0.22 mcg/kg/min (05/02/18 1901)   Pharmacy to dose vancomycin     phenylephrine 0.5-3 mcg/kg/min    sodium chloride 100 mL/hr Last Rate: 100 mL/hr (05/01/18 0941)   vasopressin 0.03 Units/min Last Rate: 0.03 Units/min (05/02/18 1441)         I reviewed the patient's new clinical results.  I personally viewed and interpreted the patient's EKG/Telemetry data    Assessment/Plan  Active Hospital Problems (** Indicates Principal Problem)    Diagnosis Date Noted   • **Acute blood loss anemia [D62] 04/30/2018   • Poisoning by warfarin sodium [T45.511A] 04/30/2018   • Leukocytosis [D72.829] 04/30/2018   • ABENA (acute kidney injury) [N17.9] 04/30/2018   • Nausea & vomiting [R11.2] 04/30/2018   • Elevated troponin [R74.8] 04/30/2018   • Nontraumatic retroperitoneal hematoma [K66.1] 04/30/2018   • H/O cardiac arrest [Z86.74] 04/30/2018   • LV (left ventricular) mural thrombus following MI [I21.29, I23.6] 04/30/2018   • COPD (chronic obstructive pulmonary disease) [J44.9] 04/25/2018   • CAD (coronary artery disease) [I25.10] 04/25/2018   • Ischemic cardiomyopathy [I25.5] 04/25/2018      Resolved Hospital Problems    Diagnosis Date Noted Date Resolved   No resolved problems to display.       Major catastrophic hemodynamic collapse; etiology ?,  He has heart failure, liver failure, kidney failure and malnutrition.  This may not be survivable.  Echo with EF of 25% no LV thrombus.  Actively GIB so will reverse the warfarin; was warfarin toxic.  No evidence of new ACS.  On three pressors including dobutamine.  No VT    Lennox Max MD  05/02/18  7:22 PM

## 2018-05-02 NOTE — NURSING NOTE
Due to worsening orientation, deeper tachypnea; suspect severe metabolic acidosis.  ABG obtained after extremely difficult time of obtaining same by RT.  Results called to Dr. Busch, who was on call for Dr. Collins.  Order for 2 amps of Na Hco3 obtained and given same.  Rapid called to pt's room for same.  Pt eventually transferred to 338 for immediate intubation and emergency renal eval/treatment.

## 2018-05-02 NOTE — PROGRESS NOTES
"Pharmacy to dose Vancomycin    Day 1  Consult for Dr Tanner  Treating: Sepsis  Duration: 7 Days    Current Vancomycin dose pulse dose based on levels    Anti-Infectives     Ordered     Dose/Rate Route Frequency Start Stop    05/02/18 0137  piperacillin-tazobactam (ZOSYN) 3.375 g in iso-osmotic dextrose 50 ml (premix)     Ordering Provider:  César Tanner MD    3.375 g  over 4 Hours Intravenous Every 12 Hours 05/02/18 1300 05/09/18 1259    05/02/18 0142  Vancomycin Pharmacy Intermittent Dosing     Ordering Provider:  César Tanner MD     Does not apply Daily 05/02/18 0900 05/09/18 0859    05/02/18 0137  piperacillin-tazobactam (ZOSYN) 3.375 g in iso-osmotic dextrose 50 ml (premix)     Ordering Provider:  César Tanner MD    3.375 g  over 30 Minutes Intravenous Once 05/02/18 0215 05/02/18 0330    05/02/18 0142  vancomycin (VANCOCIN) in iso-osmotic dextrose IVPB 1 g (premix) 200 mL     Ordering Provider:  César Tanenr MD    1,000 mg  over 100 Minutes Intravenous Once 05/02/18 0215 05/02/18 0439    05/02/18 0137  Pharmacy to dose vancomycin     Ordering Provider:  César Tanner MD     Does not apply Continuous PRN 05/02/18 0137 05/09/18 0136           Relevant clinical data and objective history reviewed:  62 y.o. male 170.2 cm (67.01\") 61 kg (134 lb 7.7 oz)    Lab Results   Component Value Date    CREATININE 3.87 (H) 05/02/2018    CREATININE 3.85 (H) 05/01/2018    CREATININE 2.50 (H) 05/01/2018     Estimated Creatinine Clearance: 16.6 mL/min (by C-G formula based on SCr of 3.87 mg/dL (H)).    Lab Results   Component Value Date    WBC 35.45 (C) 05/02/2018    WBC 38.53 (C) 05/01/2018    WBC 31.07 (C) 05/01/2018     Temp Readings from Last 3 Encounters:   05/02/18 95 °F (35 °C) (Rectal)   04/25/18 97.5 °F (36.4 °C) (Oral)       Baseline cultures:  5/1 blood cx NGTD  5/2 urine cx pending    Vancomycin Dosing History  5/2 @ 0259 1gm X one    PLAN: Patient given 1 gm x one this am, will pulse dose based on levels due " to renal function.  Will check a random level at 1400 today ~ 12 hours post dose and will re-dose as appropriate.    Sophia MontanezD, BCPS  05/02/18 9:48 AM

## 2018-05-02 NOTE — PROGRESS NOTES
"Pharmacokinetic Consult - Vancomycin Dosing (Follow-up Note)  Day 1  Consult for Dr Tanner  Treating: Sepsis  Duration: 7 Days     Current Vancomycin dose pulse dose based on levels    Admit date: 4/30/2018 12:26 PM    Relevant clinical data and objective history reviewed:  62 y.o. male 170.2 cm (67.01\") 61 kg (134 lb 7.7 oz)      Past Medical History:   Diagnosis Date   • Anoxic encephalopathy    • Cardiac arrest with ventricular fibrillation     cardiogenic shock with shock liver   • Cardiogenic shock    • COPD with acute exacerbation    • Diverticulosis    • Hyperglycemia    • Ischemic cardiomyopathy 04/2018    EF 34% 4/16/2018   • Left ventricular thrombosis with acute MI    • STEMI (ST elevation myocardial infarction)    • Tobacco abuse      Creatinine   Date Value Ref Range Status   05/02/2018 3.87 (H) 0.76 - 1.27 mg/dL Final   05/01/2018 3.85 (H) 0.76 - 1.27 mg/dL Final   05/01/2018 2.50 (H) 0.76 - 1.27 mg/dL Final     BUN   Date Value Ref Range Status   05/02/2018 86 (H) 8 - 23 mg/dL Final     Estimated Creatinine Clearance: 16.6 mL/min (by C-G formula based on SCr of 3.87 mg/dL (H)).    Lab Results   Component Value Date    WBC 35.45 (C) 05/02/2018    WBC 38.53 (C) 05/01/2018    WBC 31.07 (C) 05/01/2018     Temp Readings from Last 3 Encounters:   05/02/18 97.3 °F (36.3 °C) (Oral)   04/25/18 97.5 °F (36.4 °C) (Oral)     Baseline cultures/source/suscetibilit/labs/radiology:  5/1 blood cx NGTD  5/2 urine cx pending    Anti-Infectives     Ordered     Dose/Rate Route Frequency Start Stop    05/02/18 1528  piperacillin-tazobactam (ZOSYN) 3.375 g in iso-osmotic dextrose 50 ml (premix)     Ordering Provider:  César Tanner MD    3.375 g  over 4 Hours Intravenous Every 8 Hours 05/02/18 2119 05/09/18 2129 05/02/18 1615  vancomycin (VANCOCIN) in iso-osmotic dextrose IVPB 1 g (premix) 200 mL     Ordering Provider:  César Tanner MD    1,000 mg  over 100 Minutes Intravenous Once 05/02/18 1730      05/02/18 0142  " Vancomycin Pharmacy Intermittent Dosing     Ordering Provider:  César Tanner MD     Does not apply Daily 05/02/18 0900 05/09/18 0859    05/02/18 0137  piperacillin-tazobactam (ZOSYN) 3.375 g in iso-osmotic dextrose 50 ml (premix)     Ordering Provider:  César Tanner MD    3.375 g  over 30 Minutes Intravenous Once 05/02/18 0215 05/02/18 0330    05/02/18 0142  vancomycin (VANCOCIN) in iso-osmotic dextrose IVPB 1 g (premix) 200 mL     Ordering Provider:  César Tanner MD    1,000 mg  over 100 Minutes Intravenous Once 05/02/18 0215 05/02/18 0439    05/02/18 0137  Pharmacy to dose vancomycin     Ordering Provider:  César Tanner MD     Does not apply Continuous PRN 05/02/18 0137 05/09/18 0136           No results found for: CARSON  Lab Results   Component Value Date    VANCORANDOM 13.40 05/02/2018       Vancomycin dosing history:   5/2 @ 0259 1gm X one   5/2 1509 random: 13.4 mcg/mL    Assessment/Plan  1. ~12 hour random level of 13.4 mcg/mL. Will give vancomycin 1g iv once and check AM vancomycin random level.  Pharmacy will continue to follow daily while on vancomycin and adjust as needed.     Kaya Souza, Pharm.D.  05/02/18 4:16 PM

## 2018-05-02 NOTE — PROCEDURES
Central Venous Catheter Insertion Procedure Note      Indications:  vascular access    Procedure Details   Informed consent was obtained for the procedure, including sedation.  Risks of lung perforation, hemorrhage, arrhythmia, and adverse drug reaction were discussed.     Maximum sterile technique was used including usual patient drapes, antiseptics and physician sterile garments.    Under sterile conditions the skin above the  base of the right side of the throat was initially attempted.  Ultrasound was used.  I was able to get the line but patient then started developing hematoma on the right side due to severe back pressure.  The line was then removed and pressure applied.  We then attempted the right femoral area with typical draping and counting under stable condition.  Unable to cannulate the right femoral vein despite multiple attempts.  This part was also about abandoned.  We then proceeded under ultrasound guidance on the left IJ base of the throat.  Once again under sterile condition we attempted left IJ area.  I was able to cannulate the vein but unable to pass the guidewire despite multiple attempts in different positions.  This place was also abandoned.  Postprocedure no acute complications.  A chest x-ray is pending at this time.        César Tanner MD  5/2/2018

## 2018-05-02 NOTE — PLAN OF CARE
Problem: Anemia (Adult)  Goal: Identify Related Risk Factors and Signs and Symptoms  Outcome: Ongoing (interventions implemented as appropriate)    Goal: Symptom Improvement  Outcome: Ongoing (interventions implemented as appropriate)      Problem: Fall Risk (Adult)  Goal: Identify Related Risk Factors and Signs and Symptoms  Outcome: Ongoing (interventions implemented as appropriate)      Problem: Patient Care Overview  Goal: Plan of Care Review   05/02/18 0349   OTHER   Outcome Summary PT WAS STAT TRANSFER TO CCU FOR SEVERE ACIDOSIS, ACUTE RENAL FAILURE, AND SEPSIS. PH 6.9, CREAT 3.85. K 6.7. CARDIOLOGY UPDATED AND RENAL CONSULTED. CENTRAL LINE WAS ATTEMPTED X 3 BY DR. CORREIA AT BEDSIDE, BUT WAS UNSUCCESSFUL; OK'D FOR PICC LINE. PT CURRENTLY MAXED ON 3 PRESSORS AND HAS REQUIRED 1:1 CARE; AT THIS TIME FAMILY WISHES TO PROCEED WITH TREATMENT, BUT DO NOT WANT HIM TO BE EMERGENTLY RESUSCITATED.      Goal: Individualization and Mutuality  Outcome: Ongoing (interventions implemented as appropriate)      Problem: Skin Injury Risk (Adult)  Goal: Identify Related Risk Factors and Signs and Symptoms  Outcome: Ongoing (interventions implemented as appropriate)    Goal: Skin Health and Integrity  Outcome: Ongoing (interventions implemented as appropriate)      Problem: Sepsis/Septic Shock (Adult)  Goal: Signs and Symptoms of Listed Potential Problems Will be Absent, Minimized or Managed (Sepsis/Septic Shock)  Outcome: Ongoing (interventions implemented as appropriate)      Problem: Cardiac Output Decreased (Adult)  Goal: Identify Related Risk Factors and Signs and Symptoms  Outcome: Ongoing (interventions implemented as appropriate)    Goal: Effective Tissue Perfusion  Outcome: Ongoing (interventions implemented as appropriate)      Problem: Renal Failure/Kidney Injury, Acute (Adult)  Goal: Signs and Symptoms of Listed Potential Problems Will be Absent, Minimized or Managed (Renal Failure/Kidney Injury, Acute)  Outcome:  Ongoing (interventions implemented as appropriate)

## 2018-05-02 NOTE — PROGRESS NOTES
"DAILY PROGRESS NOTE  [facility]    Patient Identification:  Name: Negro Hall  Age: 62 y.o.  Sex: male  :  1955  MRN: [id number]         Primary Care Physician: No Known Provider    Subjective:  Interval History:He feels worse  Today.  Moved to ICU and on pressors.    Objective:    Scheduled Meds:    budesonide-formoterol 2 puff Inhalation BID - RT   clopidogrel 75 mg Oral Daily   famotidine 20 mg Intravenous Daily   heparin (porcine) 10,000 Units Intracatheter On Call   metoclopramide 5 mg Intravenous Q6H   piperacillin-tazobactam 3.375 g Intravenous Q12H   sodium chloride 10 mL Intracatheter Q12H   Vancomycin Pharmacy Intermittent Dosing  Does not apply Daily     Continuous Infusions:    DOBUTamine 2.5 mcg/kg/min Last Rate: 2.5 mcg/kg/min (18 1242)   DOPamine 2-20 mcg/kg/min Last Rate: Stopped (18 0216)   norepinephrine 0.02-0.3 mcg/kg/min Last Rate: 0.3 mcg/kg/min (18 0230)   norepinephrine 0.02-0.3 mcg/kg/min Last Rate: 0.3 mcg/kg/min (18 1230)   Pharmacy to dose vancomycin     phenylephrine 0.5-3 mcg/kg/min    sodium bicarbonate drip (greater than 75 mEq/bag) 100 mL/hr Last Rate: 100 mL/hr (18 0939)   sodium chloride 100 mL/hr Last Rate: 100 mL/hr (18 0941)   vasopressin 0.03 Units/min Last Rate: 0.03 Units/min (18 1441)       Vital signs in last 24 hours:  Temp:  [91.5 °F (33.1 °C)-98.1 °F (36.7 °C)] 95.6 °F (35.3 °C)  Heart Rate:  [] 92  Resp:  [16-26] 26  BP: ()/(21-70) 94/64    Intake/Output:    Intake/Output Summary (Last 24 hours) at 18 1457  Last data filed at 18 1441   Gross per 24 hour   Intake             6900 ml   Output              230 ml   Net             6670 ml       Exam:  BP 94/64   Pulse 92   Temp 95.6 °F (35.3 °C) (Rectal)   Resp 26   Ht 170.2 cm (67.01\")   Wt 61 kg (134 lb 7.7 oz)   SpO2 98%   BMI 21.06 kg/m²     General Appearance:    Alert, cooperative, no distress   Head:    Normocephalic, " without obvious abnormality, atraumatic   Eyes:       Throat:   Lips, tongue, gums normal   Neck:   Supple, symmetrical, trachea midline, no JVD   Lungs:     Clear to auscultation bilaterally, respirations unlabored   Chest Wall:    No tenderness or deformity    Heart:    Regular rate and rhythm, S1 and S2 normal, no murmur,no  Rub or gallop   Abdomen:     Soft, tender hematoma, bowel sounds active, no masses, no organomegaly    Extremities:   Extremities normal, atraumatic, no cyanosis or edema   Pulses:      Skin:   Skin is warm and dry,  no rashes or palpable lesions   Neurologic:   no focal deficits noted      [unfilled]  Data Review:    Results from last 7 days  Lab Units 05/02/18  0457 05/01/18  2355 05/01/18  0346   SODIUM mmol/L 137 137 135*   POTASSIUM mmol/L 5.4* 6.7* 4.7   CHLORIDE mmol/L 87* 89* 95*   CO2 mmol/L 7.9* 7.5* 14.6*   BUN mg/dL 86* 91* 79*   CREATININE mg/dL 3.87* 3.85* 2.50*   GLUCOSE mg/dL 171* 96 111*   CALCIUM mg/dL 7.2* 7.5* 7.5*       Results from last 7 days  Lab Units 05/02/18  1222 05/02/18  0457 05/01/18  2355 05/01/18  0346   WBC 10*3/mm3  --  35.45* 38.53*  --  31.07*   HEMOGLOBIN g/dL 7.7* 7.3* 8.0*  < > 8.3*   HEMATOCRIT % 25.5* 25.2* 27.8*  < > 27.3*   PLATELETS 10*3/mm3  --  171 200  --  344   < > = values in this interval not displayed.            Lab Results  Lab Value Date/Time   TROPONINT 8.290 (C) 05/01/2018 0346   TROPONINT 5.350 (C) 04/30/2018 1854   TROPONINT 6.100 (C) 04/30/2018 1153   TROPONINT 4.190 (C) 04/24/2018 0550   TROPONINT 4.260 (C) 04/23/2018 2223   TROPONINT 5.400 (C) 04/13/2018 0532   TROPONINT 0.113 (C) 04/12/2018 2359           Results from last 7 days  Lab Units 05/01/18  2355 04/30/18  1153   ALK PHOS U/L 191* 196*   BILIRUBIN mg/dL 3.7* 1.6*   ALT (SGPT) U/L 3,829* 153*   AST (SGOT) U/L 6,560* 128*             Glucose   Date/Time Value Ref Range Status   05/02/2018 0753 148 (H) 70 - 130 mg/dL Final   05/02/2018 0022 97 70 - 130 mg/dL Final    05/01/2018 2358 179 (H) 70 - 130 mg/dL Final       Results from last 7 days  Lab Units 05/02/18  0457 05/01/18  0346 04/30/18  1854   INR  4.30* 2.55* 2.33*       Patient Active Problem List   Diagnosis Code   • COPD (chronic obstructive pulmonary disease) J44.9   • CAD (coronary artery disease) I25.10   • Ischemic cardiomyopathy I25.5   • Poisoning by warfarin sodium T45.511A   • Leukocytosis D72.829   • ABENA (acute kidney injury) N17.9   • Acute blood loss anemia D62   • Nausea & vomiting R11.2   • Elevated troponin R74.8   • Nontraumatic retroperitoneal hematoma K66.1   • H/O cardiac arrest Z86.74   • LV (left ventricular) mural thrombus following MI I21.29, I23.6       Assessment:  Active Hospital Problems (** Indicates Principal Problem)    Diagnosis Date Noted   • **Acute blood loss anemia [D62] 04/30/2018   • Poisoning by warfarin sodium [T45.511A] 04/30/2018   • Leukocytosis [D72.829] 04/30/2018   • ABENA (acute kidney injury) [N17.9] 04/30/2018   • Nausea & vomiting [R11.2] 04/30/2018   • Elevated troponin [R74.8] 04/30/2018   • Nontraumatic retroperitoneal hematoma [K66.1] 04/30/2018   • H/O cardiac arrest [Z86.74] 04/30/2018   • LV (left ventricular) mural thrombus following MI [I21.29, I23.6] 04/30/2018   • COPD (chronic obstructive pulmonary disease) [J44.9] 04/25/2018   • CAD (coronary artery disease) [I25.10] 04/25/2018   • Ischemic cardiomyopathy [I25.5] 04/25/2018      Resolved Hospital Problems    Diagnosis Date Noted Date Resolved   No resolved problems to display.       Plan:  Continue with IV fluids, holding coumadin.Cardiology consult noted, follow labs and transfuse as needed, renal consult, pressors and ICU support  Enoch Collins MD  5/2/2018  2:57 PM

## 2018-05-02 NOTE — CONSULTS
Group: [facility]         CONSULT NOTE    Patient Identification:  Negro Hall  62 y.o.  male  1955  [id number]            Requesting physician: Hospitalist    Reason for Consultation:  Severe metabolic acidosis and ICU comanagement    CC:     History of Present Illness:  Mr. Hall is a 62-year-old gentleman significant cardiac history including V. fib arrest with ST MI status post drug-eluting stent to LAD.  He also has history of COPD, hypertension and LV thrombus.  He had recently stopped smoking.  He was admitted to the hospital with retroperitoneal hematoma and apparently his Coumadin was on hold.  Patient today was a rapid response on the floor with tachypnea And worsening mental status.  ABG obtained showed pH of 6.8.  Dr. Jackson called me and transferred the patient to the ICU.  He had received multiple rounds of bicarbonate pushes.  Remarkably patient does wake up and answers question.  He denies any active chest pain.  No nausea vomiting diarrhea was reported.      Review of Systems  Unable to get with the patient's present condition   Past Medical History:  Past Medical History:   Diagnosis Date   • Anoxic encephalopathy    • Cardiac arrest with ventricular fibrillation     cardiogenic shock with shock liver   • Cardiogenic shock    • COPD with acute exacerbation    • Diverticulosis    • Hyperglycemia    • Ischemic cardiomyopathy 04/2018    EF 34% 4/16/2018   • Left ventricular thrombosis with acute MI    • STEMI (ST elevation myocardial infarction)    • Tobacco abuse        Past Surgical History:  Past Surgical History:   Procedure Laterality Date   • CARDIAC CATHETERIZATION N/A 4/13/2018    Procedure: Left Heart Cath;  Surgeon: Lennox Max MD;  Location: [facility] CATH INVASIVE LOCATION;  Service: Cardiovascular   • CARDIAC CATHETERIZATION N/A 4/13/2018    Procedure: Coronary angiography;  Surgeon: Lennox Max MD;  Location: [facility] CATH INVASIVE LOCATION;  Service:  "Cardiovascular   • CARDIAC CATHETERIZATION N/A 4/13/2018    Procedure: Stent TEJ coronary;  Surgeon: Lennox Max MD;  Location: [facility] CATH INVASIVE LOCATION;  Service: Cardiovascular        Home Meds:  Prescriptions Prior to Admission   Medication Sig Dispense Refill Last Dose   • amiodarone (PACERONE) 200 MG tablet Take 1 tablet by mouth Daily. 30 tablet 0 4/30/2018 at Unknown time   • atorvastatin (LIPITOR) 20 MG tablet Take 1 tablet by mouth Daily. 30 tablet 12 4/29/2018 at Unknown time   • budesonide-formoterol (SYMBICORT) 160-4.5 MCG/ACT inhaler Inhale 2 puffs 2 (Two) Times a Day. 1 inhaler 12 Past Week at Unknown time   • clopidogrel (PLAVIX) 75 MG tablet Take 1 tablet by mouth Daily. 30 tablet 0 Past Week at Unknown time   • warfarin (COUMADIN) 7.5 MG tablet One tablet daily, monitor INR with cardiology 30 tablet 0 Past Week at Unknown time       Allergies:  No Known Allergies    Social History:   Social History     Social History   • Marital status:      Spouse name: N/A   • Number of children: N/A   • Years of education: N/A     Occupational History   • Not on file.     Social History Main Topics   • Smoking status: Former Smoker     Packs/day: 2.00     Years: 12.00     Types: Cigarettes   • Smokeless tobacco: Never Used      Comment: Quit 15 days ago    • Alcohol use No   • Drug use: No   • Sexual activity: Defer     Other Topics Concern   • Not on file     Social History Narrative   • No narrative on file       Family History:  History reviewed. No pertinent family history.    Physical Exam:  BP (!) 82/43   Pulse 84   Temp (!) 91.5 °F (33.1 °C) (Rectal)   Resp 24   Ht 170.2 cm (67.01\")   Wt 61 kg (134 lb 7.7 oz)   SpO2 97%   BMI 21.06 kg/m²  Body mass index is 21.06 kg/m². 97% 61 kg (134 lb 7.7 oz)  Physical Exam  Middle-aged gentleman lethargic and appears to be acutely ill  Oral cavity dry mucous membrane  Neck is supple no bruit no adenopathy  Chest diminished breath sounds crackles " bilaterally  CVS regular rate and rhythm  Abdomen is soft nontender no masses felt  Extremities cold and cyanotic  CNS moving all extremities but lethargic and confused  No joint deformities no skin rashes    LABS:  Lab Results   Component Value Date    CALCIUM 7.5 (L) 05/01/2018    PHOS 3.3 04/19/2018     Results from last 7 days  Lab Units 05/01/18  2355 05/01/18  1805 05/01/18  0346  04/30/18  1153  04/25/18  1158   MAGNESIUM mg/dL 3.2*  --   --   --   --   --  2.0   SODIUM mmol/L 137  --  135*  --  137  --   --    POTASSIUM mmol/L 6.7*  --  4.7  --  4.0  --  3.8   CHLORIDE mmol/L 89*  --  95*  --  97*  --   --    CO2 mmol/L 7.5*  --  14.6*  --  22.1  --   --    BUN mg/dL 91*  --  79*  --  67*  --   --    CREATININE mg/dL 3.85*  --  2.50*  --  1.89*  --   --    GLUCOSE mg/dL 96  --  111*  --  155*  < >  --    CALCIUM mg/dL 7.5*  --  7.5*  --  7.9*  --   --    WBC 10*3/mm3 38.53*  --  31.07*  --  18.12*  --   --    HEMOGLOBIN g/dL 8.0* 8.8* 8.3*  < > 8.3*  --   --    PLATELETS 10*3/mm3 200  --  344  --  517*  --   --    ALT (SGPT) U/L 3,829*  --   --   --  153*  --   --    AST (SGOT) U/L 6,560*  --   --   --  128*  --   --    PROCALCITONIN ng/mL 1.31*  --   --   --   --   --   --    < > = values in this interval not displayed.  Lab Results   Component Value Date    CKMB 3.55 05/01/2018    TROPONINT 8.290 (C) 05/01/2018       Results from last 7 days  Lab Units 05/01/18  0346 04/30/18  1854 04/30/18  1153   TROPONIN T ng/mL 8.290* 5.350* 6.100*       Results from last 7 days  Lab Units 05/01/18  0539 05/01/18  0349   BLOODCX  No growth at less than 24 hours No growth at less than 24 hours       Results from last 7 days  Lab Units 05/01/18  2355   PROCALCITONIN ng/mL 1.31*   LACTATE mmol/L 21.8*       Results from last 7 days  Lab Units 05/02/18  0040 05/01/18  2325   PH, ARTERIAL pH units 6.901* 6.845*   PCO2, ARTERIAL mm Hg 19.2* 22.7*   PO2 ART mm Hg 74.4* 88.3   FLOW RATE lpm 7 4   MODALITY  HFNC Cannula   O2  SATURATION CALC % 81.4* 85.6*           Results from last 7 days  Lab Units 05/01/18  0346 04/30/18  1854 04/30/18  1153 04/25/18  0552   INR  2.55* 2.33* >10.00* 2.03*       Results from last 7 days  Lab Units 05/01/18  0539 05/01/18  0349   BLOODCX  No growth at less than 24 hours No growth at less than 24 hours     No results found for: TSH  Estimated Creatinine Clearance: 16.7 mL/min (by C-G formula based on SCr of 3.85 mg/dL (H)).         Imaging: I personally visualized the images of scans/x-rays performed within last 3 days.      Assessment:  Shock likely cardiogenic and septic  Severe metabolic acidosisWith severe hyperkalemia  Severe lactic acidosis  Acute renal failure worsening  Altered mental status due to above  Ischemic cardiomyopathy EF 25%  Coagulopathy  Elevated troponin  Retroperitoneal hematoma  History of cardiac arrest  LV thrombosis  COPD      Recommendations:  At this point resuscitated with fluids and pressors  Shock appears to be cardiogenic and septic  We will initiate bicarbonate drip and consult nephrology  I have attempted to place central line and despite multiple attempts we have been unsuccessful.  Will attempt to get a PICC line  Temporizing measure for hyperkalemia with bicarbonate drip and calcium gluconate has been given.  As noted above renal will be consulted tonight.  Start pressors and dobutamine and attempt to maintain map greater than 65  Broad-spectrum antibiotic Zosyn and vancomycin will be started  Renal will be consulted  Discussed with Dr. Soriano and he recommended initiating pressors to see if he can stabilize his blood pressure  Overall prognosis is extremely poor  His son is on his way and we will discuss CODE STATUS with him  Continue supportive care    Critical care time 75 minutes    Discussed with son at bedside.  Updated on prognosis etc.  At this time son wishes to proceed with DNR conditional.  Explained DNR in detail to the son and his wife.  They wish to  proceed with DNR.      César Tanner MD  5/2/2018  1:42 AM      Much of this encounter note is an electronic transcription/translation of spoken language to printed text using Dragon Software.

## 2018-05-02 NOTE — SIGNIFICANT NOTE
05/02/18 1510   Rehab Time/Intention   Evaluation Not Performed other (see comments)  (pt in CCU on CRRT, spoke with BILLIE Trejo and patient not medically appropriate for PT today )   Rehab Treatment   Discipline physical therapist   Recommendation   PT - Next Appointment 05/03/18

## 2018-05-02 NOTE — PROCEDURES
Shiley Placement Note    05/02/18   Alejandro Mast MD      Preoperative Diagnosis: Acute renal failure    Postoperative Diagnosis: same as above    Procedure Performed: 1- Ultrasound guided access of the Right Femoral. 2- Placement of 24 cm    Surgeon: Alejandro Mast MD    Assistant: Cornelio Diaz KCSA    Anesthesia: Local Anesthesia with 1% Xylocaine with Epinephrine 1:100,000    Estimated Blood Loss: minimal    Specimen: None    Complications: None    Indication for procedure: 62 y.o. male with Acute renal failure who requires the initiation of emergent dialysis.      I have discussed with the patient the following five points:  1-  I have been asked to see Negro Hall for the treatment of the diagnosis of: Acute renal failure requiring hemodialysis  2- The planned treatment of this diagnosis is: Shiley catheter placement  3- The risks of a procedure include but are not limited to the following: bleeding, thrombosis, damage to adjacent anatomical structures including nerves or blood vessels, infections, wound healing problems, the need for further procedures, whether planned or emergent. The benefits of a procedure include but are not limited to the following: Management of acute renal failure with hemodialysis  4- Alternatives to the planned procedure include: Medical management  5- The natural history of the diagnosis if no treatment is given is: Worsening electrolyte disturbances or acid-base imbalance          Description of procedure: The vein was inspected with the ultrasound and found to be compressible without evidence of intraluminal echoes.  The site was prepped and draped in the usual sterile fashion.  Under ultrasound guidance and local anesthesia the vein was accessed in the centrally oriented manner and the guidewire was passed.  A small nick was made in the skin with the scalpel.  The dilators were used to enlarge the subcutaneous tract and then the Shiley catheter was  placed.  The guidewire was removed.  The large bore lumens were checked and were found to flush and draw back large volumes of dark red blood easily.  They were flushed with saline, locked with high-dose heparin, and the caps were placed.  The catheter was sutured to the patient's skin twice.  A sterile dressing was placed.  The patient tolerated the procedure well.            Alejandro Mast MD  05/02/18

## 2018-05-03 PROBLEM — R79.89 ELEVATED LFTS: Status: ACTIVE | Noted: 2018-01-01

## 2018-05-03 NOTE — PROGRESS NOTES
"Patient Care Team:  No Known Provider as PCP - General    Chief Complaint: Follow-up cardiogenic shock    Interval History:   Patient appears to be declining.  Currently DNR    Objective   Vital Signs  Temp:  [97 °F (36.1 °C)-98.7 °F (37.1 °C)] 97.4 °F (36.3 °C)  Heart Rate:  [75-96] 76  Resp:  [10-16] 16  BP: ()/(29-80) 97/48    Intake/Output Summary (Last 24 hours) at 05/03/18 1758  Last data filed at 05/03/18 0900   Gross per 24 hour   Intake           4286.5 ml   Output             3873 ml   Net            413.5 ml     Flowsheet Rows    Flowsheet Row First Filed Value   Admission Height 170.2 cm (67\") Documented at 04/30/2018 1142   Admission Weight 54.4 kg (120 lb) Documented at 04/30/2018 1142          General Appearance:    Cachectic.  Agonal breathing.     Head:    Normocephalic, without obvious abnormality, atraumatic       Neck:   No adenopathy, supple, no thyromegaly, no carotid bruit, no    JVD   Lungs:     Coarse breath sounds bilaterally     Heart:    Normal rate, regular rhythm,  No murmur, rub, or gallop   Chest Wall:    No abnormalities observed   Abdomen:    Hematoma documented    Extremities:   No cyanosis, clubbing, or edema   Pulses:   Pulses palpable and equal bilaterally   Skin:   No bleeding or rash         budesonide-formoterol 2 puff Inhalation BID - RT   clopidogrel 75 mg Oral Daily   famotidine 20 mg Intravenous Daily   hydrocortisone sodium succinate 100 mg Intravenous Q8H   metoclopramide 5 mg Intravenous Q6H   piperacillin-tazobactam 3.375 g Intravenous Q8H   sodium chloride 10 mL Intracatheter Q12H   Vancomycin Pharmacy Intermittent Dosing  Does not apply Daily         dextrose 30 mL/hr Last Rate: 30 mL/hr (05/03/18 1150)   DOBUTamine 2.5 mcg/kg/min Last Rate: 2.5 mcg/kg/min (05/03/18 1648)   DOPamine 2-20 mcg/kg/min Last Rate: Stopped (05/02/18 0216)   norepinephrine 0.02-0.3 mcg/kg/min Last Rate: 0.3 mcg/kg/min (05/02/18 0230)   norepinephrine 0.02-0.3 mcg/kg/min Last Rate: " 0.3 mcg/kg/min (05/03/18 1310)   Pharmacy to dose vancomycin     phenylephrine 0.5-3 mcg/kg/min    sodium chloride 100 mL/hr Last Rate: 100 mL/hr (05/01/18 0941)   vasopressin 0.03 Units/min Last Rate: 0.03 Units/min (05/02/18 2339)       Results Review:      Results from last 7 days  Lab Units 05/03/18  0621   SODIUM mmol/L 136   POTASSIUM mmol/L 4.5   CHLORIDE mmol/L 94*   CO2 mmol/L 24.6   BUN mg/dL 20   CREATININE mg/dL 1.27   GLUCOSE mg/dL 88   CALCIUM mg/dL 6.9*       Results from last 7 days  Lab Units 05/01/18  0346 04/30/18  1854 04/30/18  1153   TROPONIN T ng/mL 8.290* 5.350* 6.100*       Results from last 7 days  Lab Units 05/03/18  1717  05/03/18  0718   WBC 10*3/mm3  --   --  27.08*   HEMOGLOBIN g/dL 7.4*  < > 8.5*   HEMATOCRIT % 24.5*  < > 27.4*   PLATELETS 10*3/mm3  --   --  48*   < > = values in this interval not displayed.    Results from last 7 days  Lab Units 05/03/18  0621 05/02/18  0457 05/01/18  0346   INR  3.15* 4.30* 2.55*           Results from last 7 days  Lab Units 05/03/18  0621   MAGNESIUM mg/dL 2.6*           I reviewed the patient's new clinical results.  I personally viewed and interpreted the patient's EKG/Telemetry data          Assessment/Plan   1.  Multisystem organ failure: Acute kidney injury, liver failure(shock liver appearance),   2.  Shock  3.  Acute blood loss anemia: Extraperitoneal hematoma along with intramuscular hematoma within the right psoas  4.  Coronary artery disease with previous cardiac arrest and PCI to the LAD    -Unfortunately, this patient is actively dying.  There is nothing else to offer from a cardiac standpoint.  He is DNR and needs needs to be palliative care.  They're waiting on family to make this decision

## 2018-05-03 NOTE — PROGRESS NOTES
"Pharmacokinetic Consult - Vancomycin Dosing (Follow-up Note)    Negro Hall is a 62 y.o. male who is on day 2 pharmacy to dose vancomycin for sepsis.  Pharmacy dosing vancomycin per Dr. Tanner's request.     Goal trough: 15-20 mg/L    Current Vancomycin dose: intermittent dosing    Relevant clinical data and objective history reviewed:  170.2 cm (67.01\")  64.5 kg (142 lb 3.2 oz)  Body mass index is 22.27 kg/m².     He has a past medical history of Anoxic encephalopathy; Cardiac arrest with ventricular fibrillation; Cardiogenic shock; COPD with acute exacerbation; Diverticulosis; Hyperglycemia; Ischemic cardiomyopathy (04/2018); Left ventricular thrombosis with acute MI; STEMI (ST elevation myocardial infarction); and Tobacco abuse.    Allergies as of 04/30/2018   • (No Known Allergies)     Vital Signs (last 24 hours)       05/02 0700  -  05/03 0659 05/03 0700  -  05/03 1442   Most Recent    Temp (°F) 95 -  98    97.4 -  98.7     97.4 (36.3)    Heart Rate 81 -  104    76 -  92     78    Resp 10 -  19       16    BP (!)82/57 -  108/66    (!)81/71 -  111/69     95/62    SpO2 (%) (!)85 -  100    (!)85 -  100     98        Estimated Creatinine Clearance: 53.6 mL/min (by C-G formula based on SCr of 1.27 mg/dL).    Results from last 7 days  Lab Units 05/03/18  0621 05/03/18  0018 05/02/18  1742   CREATININE mg/dL 1.27 1.66* 2.87*       Results from last 7 days  Lab Units 05/03/18  0718 05/02/18  0457 05/01/18  2355   WBC 10*3/mm3 27.08* 35.45* 38.53*     Baseline culture/source/susceptibility:   BCx x2 (5/1): NGTD  UCx (5/2): NGF     Labs:  PCT (5/1): 1.31    Anti-Infectives     Ordered     Dose/Rate Route Frequency Start Stop    05/03/18 0801  vancomycin (VANCOCIN) in iso-osmotic dextrose IVPB 1 g (premix) 200 mL     Ordering Provider:  César Tanner MD    15 mg/kg × 64.5 kg  over 100 Minutes Intravenous Every 12 Hours 05/03/18 0900 05/09/18 0859    05/02/18 1528  piperacillin-tazobactam (ZOSYN) 3.375 g in iso-osmotic " dextrose 50 ml (premix)     Ordering Provider:  César Tanner MD    3.375 g  over 4 Hours Intravenous Every 8 Hours 05/02/18 2119 05/09/18 2129    05/02/18 1615  vancomycin (VANCOCIN) in iso-osmotic dextrose IVPB 1 g (premix) 200 mL     Ordering Provider:  César Tanner MD    1,000 mg  over 100 Minutes Intravenous Once 05/02/18 1730 05/02/18 1928    05/02/18 0137  piperacillin-tazobactam (ZOSYN) 3.375 g in iso-osmotic dextrose 50 ml (premix)     Ordering Provider:  César Tanner MD    3.375 g  over 30 Minutes Intravenous Once 05/02/18 0215 05/02/18 0330    05/02/18 0142  vancomycin (VANCOCIN) in iso-osmotic dextrose IVPB 1 g (premix) 200 mL     Ordering Provider:  César Tanner MD    1,000 mg  over 100 Minutes Intravenous Once 05/02/18 0215 05/02/18 0439    05/02/18 0137  Pharmacy to dose vancomycin     Ordering Provider:  César Tanner MD     Does not apply Continuous PRN 05/02/18 0137 05/09/18 0136           Lab Results   Component Value Date    VANCORANDOM 10.90 05/03/2018     Assessment/Plan  SLED stopped this AM.     1) Vancomycin 1000 mg IV x1 dose now. 12 hour random level ordered for tonight at 2030. Re-dose when level expected to be < 20 mg/L.   2) Will monitor serum creatinine with AM labs.   3) Encourage hydration as allowed by MD to help prevent toxic accumulation; monitor for s/sxn of toxicity including increase in SCr and decrease in UOP.    Pharmacy will continue to follow daily while on vancomycin and adjust as needed.     Thank you for this consult,    Kal Mirza, PharmD, JEIMY, BCPS

## 2018-05-03 NOTE — CONSULTS
[facility]  Initial Inpatient Consult Note    Referring Provider: Dr. Erick Saleh    Reason for Consultation: Liver failure    Subjective     History of present illness:    62 y.o. male with a history of V. fib arrest on 4/12/18 with ST MI status post drug-eluting stent to LAD on Plavix. He was also discharged 4/25 on Coumadin because of a left ventricular thrombus.   He presented to the emergency room on 4/30 with complaints of vomiting, diarrhea, and dizziness.  CT abdomen and pelvis demonstrated sludge in the gallbladder and a large retroperitoneal hematoma with an additional hematoma involving the ileopsoas. He was admitted with anemia and INR >10.  A rapid response on the floor was called yesterday for tachypnea with worsening mental status.  ABG showed a pH of 6.8.  He was transferred to the unit with hemorrhagic and possibly cardiogenic shock on pressors. He also has severe metabolic acidosis and hyperkalemia. His liver enzymes on admission were elevated were approximately 4 x the upper limit of normal. Today his liver enzymes demonstrate shock liver. (ALT 4.6k, AST >7k, , bilirubin 7.1) Platelets are 48 with an INR of 3.15.   Presently he is confused in restraints with slurred speech. He denies nausea, vomiting, or abdominal pain. No overt GI bleeding. Unable to obtain if there is any significant GI or liver disease history.     Past Medical History:  Past Medical History:   Diagnosis Date   • Anoxic encephalopathy    • Cardiac arrest with ventricular fibrillation     cardiogenic shock with shock liver   • Cardiogenic shock    • COPD with acute exacerbation    • Diverticulosis    • Hyperglycemia    • Ischemic cardiomyopathy 04/2018    EF 34% 4/16/2018   • Left ventricular thrombosis with acute MI    • STEMI (ST elevation myocardial infarction)    • Tobacco abuse      Past Surgical History:  Past Surgical History:   Procedure Laterality Date   • CARDIAC CATHETERIZATION N/A  4/13/2018    Procedure: Left Heart Cath;  Surgeon: Lennox Max MD;  Location: [facility] CATH INVASIVE LOCATION;  Service: Cardiovascular   • CARDIAC CATHETERIZATION N/A 4/13/2018    Procedure: Coronary angiography;  Surgeon: Lennox Max MD;  Location:  [facility] CATH INVASIVE LOCATION;  Service: Cardiovascular   • CARDIAC CATHETERIZATION N/A 4/13/2018    Procedure: Stent TEJ coronary;  Surgeon: Lennox Max MD;  Location:  [facility] CATH INVASIVE LOCATION;  Service: Cardiovascular      Social History:   Social History   Substance Use Topics   • Smoking status: Former Smoker     Packs/day: 2.00     Years: 12.00     Types: Cigarettes   • Smokeless tobacco: Never Used      Comment: Quit 15 days ago    • Alcohol use No      Family History:  History reviewed. No pertinent family history.    Home Meds:  Prescriptions Prior to Admission   Medication Sig Dispense Refill Last Dose   • amiodarone (PACERONE) 200 MG tablet Take 1 tablet by mouth Daily. 30 tablet 0 4/30/2018 at Unknown time   • atorvastatin (LIPITOR) 20 MG tablet Take 1 tablet by mouth Daily. 30 tablet 12 4/29/2018 at Unknown time   • budesonide-formoterol (SYMBICORT) 160-4.5 MCG/ACT inhaler Inhale 2 puffs 2 (Two) Times a Day. 1 inhaler 12 Past Week at Unknown time   • clopidogrel (PLAVIX) 75 MG tablet Take 1 tablet by mouth Daily. 30 tablet 0 Past Week at Unknown time   • warfarin (COUMADIN) 7.5 MG tablet One tablet daily, monitor INR with cardiology 30 tablet 0 Past Week at Unknown time     Current Meds:     budesonide-formoterol 2 puff Inhalation BID - RT   clopidogrel 75 mg Oral Daily   famotidine 20 mg Intravenous Daily   hydrocortisone sodium succinate 100 mg Intravenous Q8H   metoclopramide 5 mg Intravenous Q6H   piperacillin-tazobactam 3.375 g Intravenous Q8H   sodium chloride 10 mL Intracatheter Q12H   vancomycin 15 mg/kg Intravenous Q12H     Allergies:  No Known Allergies  Review of Systems  Review of systems could not be obtained due to   patient  unresponsive.     Objective     Vital Signs  Temp:  [97 °F (36.1 °C)-98.7 °F (37.1 °C)] 97.4 °F (36.3 °C)  Heart Rate:  [] 81  Resp:  [10-19] 16  BP: ()/(29-80) 93/60  Physical Exam:  General Appearance:    Arousable, confused with dysarthria    Head:    Normocephalic, without obvious abnormality, atraumatic   Eyes:            Lids and lashes normal,  icterus   Throat:   No oral lesions, no thrush, oral mucosa moist   Neck:   No adenopathy, supple, trachea midline, no thyromegaly, no   carotid bruit, no JVD   Lungs:     Clear to auscultation,respirations regular, even and                   unlabored    Heart:    Regular rhythm and normal rate, normal S1 and S2, no            murmur, no gallop, no rub, no click   Chest Wall:    No abnormalities observed   Abdomen:     Diminished bowel sounds, no masses, no organomegaly, soft        non-tender,distended, no guarding, no rebound                 tenderness   Rectal:     Deferred   Extremities:   no edema, no cyanosis, no redness   Skin:   , Icteric    Lymph nodes:   No palpable adenopathy   Psychiatric:  Judgement and insight: Impaired  Orientation to person place and time: Difficult to assess   Mood and affect: Difficult to assess    Results Review:   I reviewed the patient's new clinical results.      Results from last 7 days  Lab Units 05/03/18  1323 05/03/18  0718 05/02/18  1742  05/02/18  0457 05/01/18  2355   WBC 10*3/mm3  --  27.08*  --   --  35.45* 38.53*   HEMOGLOBIN g/dL 7.6* 8.5* 7.9*  < > 7.3* 8.0*   HEMATOCRIT % 25.3* 27.4* 25.2*  < > 25.2* 27.8*   PLATELETS 10*3/mm3  --  48*  --   --  171 200   < > = values in this interval not displayed.    Results from last 7 days  Lab Units 05/03/18  0621 05/03/18  0018 05/02/18  1742  05/01/18  2355  04/30/18  1153   SODIUM mmol/L 136 138 139  < > 137  < > 137   POTASSIUM mmol/L 4.5 4.1 4.3  < > 6.7*  < > 4.0   CHLORIDE mmol/L 94* 95* 92*  < > 89*  < > 97*   CO2 mmol/L 24.6 23.8 21.1*  < > 7.5*  < > 22.1    BUN mg/dL 20 31* 63*  < > 91*  < > 67*   CREATININE mg/dL 1.27 1.66* 2.87*  < > 3.85*  < > 1.89*   CALCIUM mg/dL 6.9* 6.9* 6.9*  < > 7.5*  < > 7.9*   BILIRUBIN mg/dL 7.1*  --   --   --  3.7*  --  1.6*   ALK PHOS U/L 288*  --   --   --  191*  --  196*   ALT (SGPT) U/L 4,611*  --   --   --  3,829*  --  153*   AST (SGOT) U/L >7,000*  --   --   --  6,560*  --  128*   GLUCOSE mg/dL 88 133* 146*  < > 96  < > 155*   < > = values in this interval not displayed.    Results from last 7 days  Lab Units 05/03/18  0621 05/02/18  0457 05/01/18  0346   INR  3.15* 4.30* 2.55*       Lab Results  Lab Value Date/Time   LIPASE 77 (H) 04/30/2018 1153       Radiology:  CT Abdomen Pelvis Without Contrast   Final Result   1. There has been interval decrease in size of the right extraperitoneal   hematoma measuring approximately 15 x 9 x 7 cm and there has been   decrease in the intramuscular hematoma within the right psoas and   iliopsoas muscles.   2. Circumferentially thickened appearance of the rectum. Please   correlate clinically for proctitis. There is a mild colonic ileus and no   evidence for bowel obstruction.   3. Development of anasarca. Significant increase in moderate-sized   bilateral pleural effusions and there are secondary compressive   atelectatic change at both lower lobes.   4. Stable 2.5 cm left adrenal nodule which may represent a benign   adrenal adenoma, but conservative surveillance is recommended.       This report was finalized on 5/3/2018 8:18 AM by Dr. Aleshia Kilgore MD.          XR Chest 1 View   Final Result      XR Chest Post CVA Port   Preliminary Result   Give of the right subclavian PICC line is at the cavoatrial junction,   otherwise no significant change.               XR Chest 1 View   Preliminary Result   Congestive heart failure, interval improvement. Follow-up to resolution   is recommended.              XR Chest 1 View   Preliminary Result   Abnormal examination, worsening congestive heart failure  versus   worsening infiltrates. Clinical correlation is recommended.              XR Chest 1 View   Final Result   Congestive heart failure is suspected, please clinically correlate. Less   probable differential diagnoses includes infiltrates.           This report was finalized on 5/1/2018 9:17 PM by Dr. Nidia Soliman M.D.          CT Abdomen Pelvis Without Contrast   Final Result          Assessment/Plan   Patient Active Problem List   Diagnosis   • COPD (chronic obstructive pulmonary disease)   • CAD (coronary artery disease)   • Ischemic cardiomyopathy   • Poisoning by warfarin sodium   • Leukocytosis   • ABENA (acute kidney injury)   • Acute blood loss anemia   • Nausea & vomiting   • Elevated troponin   • Nontraumatic retroperitoneal hematoma   • H/O cardiac arrest   • LV (left ventricular) mural thrombus following MI   • Elevated LFTs     Impression    1. Acute liver failure- secondary to shock/ hypoperfusion. Markedly elevated liver enzymes with coagulopathy and thrombocytopenia  2. Shock, hemorrhagic and cardiogenic  3. Acute renal failure- on SLED  4. ABLA- due to retroperitoneal hematoma  5. Coagulopathy- coumadin toxicity with acute liver failure  6. Nausea and vomiting  7. Severe metabolic acidosis with hyperkalemia  8. Altered mental status- normal ammonia     Recommendation  Continue to monitor LFTs  Treat for sepsis in hopes of improvement of hepatic perfusion  Consider more formal GI assessment of the liver if condition persists despite normalization of hemodynamics  I discussed the patients findings and my recommendations with patient and nursing staff.    Kal Schulz MD

## 2018-05-03 NOTE — PROGRESS NOTES
[facility] INPATIENT PROGRESS NOTE         [facility] CORONARY CARE    5/3/2018      PATIENT IDENTIFICATION:  Name: Negro Hall ADMIT: 2018   : 1955  PCP: No Known Provider    MRN: [id number] LOS: 3 days   AGE/SEX: 62 y.o. male  ROOM: 338/1                     LOS 3    Reason for visit: Follow-up sepsis with shock on multiple pressors      SUBJECTIVE:    Chart reviewed.  Discussed with nursing staff.  Significantly confused.  Does not appear to be improving.    Objective   OBJECTIVE:    Vital Sign Min/Max for last 24 hours  Temp  Min: 97 °F (36.1 °C)  Max: 98.7 °F (37.1 °C)   BP  Min: 82/57  Max: 111/69   Pulse  Min: 80  Max: 104   Resp  Min: 10  Max: 19   SpO2  Min: 85 %  Max: 100 %   No Data Recorded   Weight  Min: 64.5 kg (142 lb 3.2 oz)  Max: 64.5 kg (142 lb 3.2 oz)                         Body mass index is 22.27 kg/m².    Intake/Output Summary (Last 24 hours) at 18 1353  Last data filed at 18 0900   Gross per 24 hour   Intake          65385.5 ml   Output             4058 ml   Net           6368.5 ml         Exam:  GEN:  Confused, appears acutely ill, appears stated age  EYES:   PERRL, anicteric sclera  ENT:    External ears/nose normal, OP clear  NECK:  No adenopathy, midline trachea  LUNGS: Normal chest on inspection, palpation and diminished bilateral breath sounds with crackles on auscultation  CV:  Normal S1S2, without murmur  ABD:  Non tender, non distended, no hepatosplenomegaly, +BS  EXT:  Cool extremities, no clubbing    Scheduled meds:      budesonide-formoterol 2 puff Inhalation BID - RT   clopidogrel 75 mg Oral Daily   famotidine 20 mg Intravenous Daily   hydrocortisone sodium succinate 100 mg Intravenous Q8H   metoclopramide 5 mg Intravenous Q6H   piperacillin-tazobactam 3.375 g Intravenous Q8H   sodium chloride 10 mL Intracatheter Q12H   vancomycin 15 mg/kg Intravenous Q12H     IV meds:                          dextrose 30 mL/hr Last Rate: 30 mL/hr (18  1150)   DOBUTamine 2.5 mcg/kg/min Last Rate: 2.5 mcg/kg/min (05/02/18 1242)   DOPamine 2-20 mcg/kg/min Last Rate: Stopped (05/02/18 0216)   norepinephrine 0.02-0.3 mcg/kg/min Last Rate: 0.3 mcg/kg/min (05/02/18 0230)   norepinephrine 0.02-0.3 mcg/kg/min Last Rate: 0.3 mcg/kg/min (05/03/18 1310)   Pharmacy to dose vancomycin     phenylephrine 0.5-3 mcg/kg/min    sodium chloride 100 mL/hr Last Rate: 100 mL/hr (05/01/18 0941)   vasopressin 0.03 Units/min Last Rate: 0.03 Units/min (05/02/18 2339)     Data Review:    Results from last 7 days  Lab Units 05/03/18  0621 05/03/18  0018 05/02/18  1742 05/02/18  0457 05/01/18  2355   SODIUM mmol/L 136 138 139 137 137   POTASSIUM mmol/L 4.5 4.1 4.3 5.4* 6.7*   CHLORIDE mmol/L 94* 95* 92* 87* 89*   CO2 mmol/L 24.6 23.8 21.1* 7.9* 7.5*   BUN mg/dL 20 31* 63* 86* 91*   CREATININE mg/dL 1.27 1.66* 2.87* 3.87* 3.85*   GLUCOSE mg/dL 88 133* 146* 171* 96   CALCIUM mg/dL 6.9* 6.9* 6.9* 7.2* 7.5*         Estimated Creatinine Clearance: 53.6 mL/min (by C-G formula based on SCr of 1.27 mg/dL).    Results from last 7 days  Lab Units 05/03/18  1323 05/03/18  0718 05/02/18  1742 05/02/18  1222 05/02/18  0457 05/01/18  2355  05/01/18  0346  04/30/18  1153   WBC 10*3/mm3  --  27.08*  --   --  35.45* 38.53*  --  31.07*  --  18.12*   HEMOGLOBIN g/dL 7.6* 8.5* 7.9* 7.7* 7.3* 8.0*  < > 8.3*  < > 8.3*   PLATELETS 10*3/mm3  --  48*  --   --  171 200  --  344  --  517*   < > = values in this interval not displayed.    Results from last 7 days  Lab Units 05/03/18  0621 05/02/18  0457 05/01/18  0346 04/30/18  1854 04/30/18  1153   INR  3.15* 4.30* 2.55* 2.33* >10.00*       Results from last 7 days  Lab Units 05/03/18  0621 05/01/18  2355 04/30/18  1153   ALT (SGPT) U/L 4,611* 3,829* 153*   AST (SGOT) U/L >7,000* 6,560* 128*       Results from last 7 days  Lab Units 05/03/18  0737 05/02/18  1205 05/02/18  0040 05/01/18  2325   PH, ARTERIAL pH units 7.483* 7.324* 6.901* 6.845*   PO2 ART mm Hg 81.6 74.0*  74.4* 88.3   PCO2, ARTERIAL mm Hg 30.5* 18.6* 19.2* 22.7*   HCO3 ART mmol/L 22.9 9.7* 3.8* 3.9*       Results from last 7 days  Lab Units 05/03/18  0621 05/03/18  0015 05/02/18  1742 05/02/18  1222 05/02/18  0457 05/01/18  2355   PROCALCITONIN ng/mL  --   --   --   --   --  1.31*   LACTATE mmol/L 6.2* 4.2* 8.6* 19.8* 21.8* 21.8*             )Assessment   ASSESSMENT:      Active Hospital Problems (** Indicates Principal Problem)    Diagnosis Date Noted   • **Acute blood loss anemia [D62] 04/30/2018   • Elevated LFTs [R79.89] 05/03/2018   • Poisoning by warfarin sodium [T45.511A] 04/30/2018   • Leukocytosis [D72.829] 04/30/2018   • ABENA (acute kidney injury) [N17.9] 04/30/2018   • Nausea & vomiting [R11.2] 04/30/2018   • Elevated troponin [R74.8] 04/30/2018   • Nontraumatic retroperitoneal hematoma [K66.1] 04/30/2018   • H/O cardiac arrest [Z86.74] 04/30/2018   • LV (left ventricular) mural thrombus following MI [I21.29, I23.6] 04/30/2018   • COPD (chronic obstructive pulmonary disease) [J44.9] 04/25/2018   • CAD (coronary artery disease) [I25.10] 04/25/2018   • Ischemic cardiomyopathy [I25.5] 04/25/2018      Resolved Hospital Problems    Diagnosis Date Noted Date Resolved   No resolved problems to display.     Shock likely cardiogenic and septic  Severe metabolic acidosis With severe hyperkalemia  Multisystem organ failure  Severe lactic acidosis  Acute renal failure worsening  Altered mental status due to above  Ischemic cardiomyopathy EF 25%  Coagulopathy  Elevated troponin  Retroperitoneal hematoma  History of cardiac arrest  LV thrombosis  COPD  Hypoglycemia    PLAN:    Continue resuscitative measures.  On pressors and fluids.  Maxed out on Levophed, and also on dobutamine and vasopressin.    Continue broad-spectrum antibiotics for sepsis.  On D10 fluid for hypoglycemia.  Add stress dose steroids.  Lactic acid still significantly elevated.  Monitoring H&H closely.  Consult in gastroenterology for liver  failure.  Prognosis is poor      Discussed with multidisciplinary ICU team on rounds this morning.    CCT: 35 min    Isidro Prado MD  Pulmonary and Critical Care Medicine  [facility], [facility]  5/3/2018    1:53 PM

## 2018-05-03 NOTE — PROGRESS NOTES
NEPHROLOGY PROGRESS NOTE    PATIENT IDENTIFICATION:   Name:  Negro aHll      MRN:  [id number]     62 y.o.  male             Reason for visit: ABENA    SUBJECTIVE:  Multiple pressors; remains on SLED, tho clots forming in chamber; lethargic; states breathing is fine and that he has no pain; in restraints    OBJECTIVE:  Vitals:    05/03/18 0750 05/03/18 0805 05/03/18 0820 05/03/18 0850   BP: (!) 87/55 96/67 95/64 95/66   Pulse: 89 85 86 87   Resp:       Temp:       TempSrc:       SpO2:   93% 100%   Weight:       Height:               Body mass index is 22.27 kg/m².    Intake/Output Summary (Last 24 hours) at 05/03/18 0955  Last data filed at 05/03/18 0900   Gross per 24 hour   Intake          86880.5 ml   Output             4058 ml   Net           7031.5 ml     Wt Readings from Last 1 Encounters:   05/03/18 0605 64.5 kg (142 lb 3.2 oz)   05/01/18 0907 61 kg (134 lb 7.7 oz)   05/01/18 0500 61 kg (134 lb 7.7 oz)   04/30/18 1142 54.4 kg (120 lb)     Wt Readings from Last 3 Encounters:   05/03/18 64.5 kg (142 lb 3.2 oz)   04/25/18 55.3 kg (122 lb)         Exam:  NAD; pleasant; lethargic; looks older than stated age  Thin; chr ill; in restraints  Dry MM; AT and NC  No eye d/c; no scleral icterus  No JVD; no carotid bruits  Coarse with rhonchi bilat; not labored  RRR, no rub  Soft, +T, +D, BS hypoactive; ecchymosis visible right flank  No significant edema in lower legs, but +1 edema in hips  +clubbing  No asterixis  Moves all extremities   Speech bit garbled  No rash    Scheduled meds:    budesonide-formoterol 2 puff Inhalation BID - RT   clopidogrel 75 mg Oral Daily   famotidine 20 mg Intravenous Daily   metoclopramide 5 mg Intravenous Q6H   piperacillin-tazobactam 3.375 g Intravenous Q8H   sodium chloride 10 mL Intracatheter Q12H   vancomycin 15 mg/kg Intravenous Q12H     IV meds:                        DOBUTamine 2.5 mcg/kg/min Last Rate: 2.5 mcg/kg/min (05/02/18 1242)   DOPamine 2-20 mcg/kg/min Last Rate: Stopped  (05/02/18 0216)   norepinephrine 0.02-0.3 mcg/kg/min Last Rate: 0.3 mcg/kg/min (05/02/18 0230)   norepinephrine 0.02-0.3 mcg/kg/min Last Rate: 0.3 mcg/kg/min (05/03/18 0611)   Pharmacy to dose vancomycin     phenylephrine 0.5-3 mcg/kg/min    sodium chloride 100 mL/hr Last Rate: 100 mL/hr (05/01/18 0941)   vasopressin 0.03 Units/min Last Rate: 0.03 Units/min (05/02/18 2339)       Data Review:      Results from last 7 days  Lab Units 05/03/18  0621 05/03/18  0018 05/02/18  1742  05/01/18  2355  04/30/18  1153   SODIUM mmol/L 136 138 139  < > 137  < > 137   POTASSIUM mmol/L 4.5 4.1 4.3  < > 6.7*  < > 4.0   CHLORIDE mmol/L 94* 95* 92*  < > 89*  < > 97*   CO2 mmol/L 24.6 23.8 21.1*  < > 7.5*  < > 22.1   BUN mg/dL 20 31* 63*  < > 91*  < > 67*   CREATININE mg/dL 1.27 1.66* 2.87*  < > 3.85*  < > 1.89*   CALCIUM mg/dL 6.9* 6.9* 6.9*  < > 7.5*  < > 7.9*   BILIRUBIN mg/dL  --   --   --   --  3.7*  --  1.6*   ALK PHOS U/L  --   --   --   --  191*  --  196*   ALT (SGPT) U/L  --   --   --   --  3,829*  --  153*   AST (SGOT) U/L  --   --   --   --  6,560*  --  128*   GLUCOSE mg/dL 88 133* 146*  < > 96  < > 155*   < > = values in this interval not displayed.  Estimated Creatinine Clearance: 53.6 mL/min (by C-G formula based on SCr of 1.27 mg/dL).    Results from last 7 days  Lab Units 05/02/18  0818   SODIUM UR mmol/L 26   CREATININE UR mg/dL 66.6       Results from last 7 days  Lab Units 05/03/18  0621 05/03/18  0019 05/03/18  0018 05/02/18  1742   MAGNESIUM mg/dL 2.6* 2.1  --  2.4   PHOSPHORUS mg/dL 3.6  --  3.3 5.5*         Results from last 7 days  Lab Units 05/03/18  0718 05/02/18  1742 05/02/18  1222 05/02/18  0457 05/01/18  2355  05/01/18  0346  04/30/18  1153   WBC 10*3/mm3 27.08*  --   --  35.45* 38.53*  --  31.07*  --  18.12*   HEMOGLOBIN g/dL 8.5* 7.9* 7.7* 7.3* 8.0*  < > 8.3*  < > 8.3*   PLATELETS 10*3/mm3 48*  --   --  171 200  --  344  --  517*   < > = values in this interval not displayed.      Results from last 7  days  Lab Units 05/03/18  0621 05/02/18  0457 05/01/18  0346 04/30/18  1854 04/30/18  1153   INR  3.15* 4.30* 2.55* 2.33* >10.00*             ASSESSMENT:   Principal Problem:    Acute blood loss anemia  Active Problems:    COPD (chronic obstructive pulmonary disease)    CAD (coronary artery disease)    Ischemic cardiomyopathy    Poisoning by warfarin sodium    Leukocytosis    ABENA (acute kidney injury)    Nausea & vomiting    Elevated troponin    Nontraumatic retroperitoneal hematoma    H/O cardiac arrest    LV (left ventricular) mural thrombus following MI    1.  ABENA, oliguric, prerenal evolved to ATN from hemorrhagic +/- cardiogenic shock.  K normal and resolved met acidosis after prolonged SLED. Central vol excess by imaging; +edema.   2.  Shock, hemorrhagic and possibly cardiogenic  3.  ABLA  4.  Coagulopathy  5.  CAD with recent stent to LAD following V-fib arrest a few weeks ago  6.  COPD  7.  Leukocytosis  8.  Shock liver  9.  Lethargy       PLAN:  1.  Check LFT's and NH3 today  2.  Stop SLED for now; re-check renal panel this afternoon to assess K and AG  3.  Ca gluc iv  4.  Will re-assess for dialysis tomorrow  5.  Grave prognosis with multi-organ insult    Erick Saleh MD  5/3/2018    9:55 AM

## 2018-05-03 NOTE — PROGRESS NOTES
"DAILY PROGRESS NOTE  [facility]    Patient Identification:  Name: Negro Hall  Age: 62 y.o.  Sex: male  :  1955  MRN: [id number]         Primary Care Physician: No Known Provider    Subjective:  Interval History:He feels worse  Confused.    Objective:    Scheduled Meds:    budesonide-formoterol 2 puff Inhalation BID - RT   calcium gluconate IVPB 2 g Intravenous Once   clopidogrel 75 mg Oral Daily   famotidine 20 mg Intravenous Daily   metoclopramide 5 mg Intravenous Q6H   piperacillin-tazobactam 3.375 g Intravenous Q8H   sodium chloride 10 mL Intracatheter Q12H   vancomycin 15 mg/kg Intravenous Q12H     Continuous Infusions:    DOBUTamine 2.5 mcg/kg/min Last Rate: 2.5 mcg/kg/min (18 1242)   DOPamine 2-20 mcg/kg/min Last Rate: Stopped (18 0216)   norepinephrine 0.02-0.3 mcg/kg/min Last Rate: 0.3 mcg/kg/min (18 0230)   norepinephrine 0.02-0.3 mcg/kg/min Last Rate: 0.3 mcg/kg/min (18 0611)   Pharmacy to dose vancomycin     phenylephrine 0.5-3 mcg/kg/min    sodium chloride 100 mL/hr Last Rate: 100 mL/hr (18 0941)   vasopressin 0.03 Units/min Last Rate: 0.03 Units/min (18 2339)       Vital signs in last 24 hours:  Temp:  [95.6 °F (35.3 °C)-98.7 °F (37.1 °C)] 98.7 °F (37.1 °C)  Heart Rate:  [] 85  Resp:  [10-19] 16  BP: ()/(29-80) 94/67    Intake/Output:    Intake/Output Summary (Last 24 hours) at 18 1110  Last data filed at 18 0900   Gross per 24 hour   Intake          92377.5 ml   Output             4058 ml   Net           7031.5 ml       Exam:  BP 94/67   Pulse 85   Temp 98.7 °F (37.1 °C)   Resp 16   Ht 170.2 cm (67.01\")   Wt 64.5 kg (142 lb 3.2 oz)   SpO2 98%   BMI 22.27 kg/m²     General Appearance:    confused, no distress   Head:    Normocephalic, without obvious abnormality, atraumatic   Eyes:       Throat:   Lips, tongue, gums normal   Neck:   Supple, symmetrical, trachea midline, no JVD   Lungs:     Clear to " auscultation bilaterally, respirations unlabored   Chest Wall:    No tenderness or deformity    Heart:    Regular rate and rhythm, S1 and S2 normal, no murmur,no  Rub or gallop   Abdomen:     Soft, tender hematoma, bowel sounds active, no masses, no organomegaly    Extremities:   Extremities normal, atraumatic, no cyanosis or edema   Pulses:      Skin:   Skin is warm and dry,  no rashes or palpable lesions   Neurologic:   confused      [unfilled]  Data Review:    Results from last 7 days  Lab Units 05/03/18  0621 05/03/18  0018 05/02/18  1742   SODIUM mmol/L 136 138 139   POTASSIUM mmol/L 4.5 4.1 4.3   CHLORIDE mmol/L 94* 95* 92*   CO2 mmol/L 24.6 23.8 21.1*   BUN mg/dL 20 31* 63*   CREATININE mg/dL 1.27 1.66* 2.87*   GLUCOSE mg/dL 88 133* 146*   CALCIUM mg/dL 6.9* 6.9* 6.9*       Results from last 7 days  Lab Units 05/03/18  0718 05/02/18  1742 05/02/18  1222 05/02/18  0457 05/01/18  2355   WBC 10*3/mm3 27.08*  --   --  35.45* 38.53*   HEMOGLOBIN g/dL 8.5* 7.9* 7.7* 7.3* 8.0*   HEMATOCRIT % 27.4* 25.2* 25.5* 25.2* 27.8*   PLATELETS 10*3/mm3 48*  --   --  171 200               Lab Results  Lab Value Date/Time   TROPONINT 8.290 (C) 05/01/2018 0346   TROPONINT 5.350 (C) 04/30/2018 1854   TROPONINT 6.100 (C) 04/30/2018 1153   TROPONINT 4.190 (C) 04/24/2018 0550   TROPONINT 4.260 (C) 04/23/2018 2223   TROPONINT 5.400 (C) 04/13/2018 0532   TROPONINT 0.113 (C) 04/12/2018 2359           Results from last 7 days  Lab Units 05/03/18  0621 05/01/18  2355 04/30/18  1153   ALK PHOS U/L 288* 191* 196*   BILIRUBIN mg/dL 7.1* 3.7* 1.6*   BILIRUBIN DIRECT mg/dL 4.3*  --   --    ALT (SGPT) U/L 4,611* 3,829* 153*   AST (SGOT) U/L >7,000* 6,560* 128*             Glucose   Date/Time Value Ref Range Status   05/03/2018 0819 137 (H) 70 - 130 mg/dL Final   05/03/2018 0736 56 (L) 70 - 130 mg/dL Final   05/02/2018 0753 148 (H) 70 - 130 mg/dL Final   05/02/2018 0022 97 70 - 130 mg/dL Final   05/01/2018 2358 179 (H) 70 - 130 mg/dL Final        Results from last 7 days  Lab Units 05/03/18  0621 05/02/18  0457 05/01/18  0346   INR  3.15* 4.30* 2.55*       Patient Active Problem List   Diagnosis Code   • COPD (chronic obstructive pulmonary disease) J44.9   • CAD (coronary artery disease) I25.10   • Ischemic cardiomyopathy I25.5   • Poisoning by warfarin sodium T45.511A   • Leukocytosis D72.829   • ABENA (acute kidney injury) N17.9   • Acute blood loss anemia D62   • Nausea & vomiting R11.2   • Elevated troponin R74.8   • Nontraumatic retroperitoneal hematoma K66.1   • H/O cardiac arrest Z86.74   • LV (left ventricular) mural thrombus following MI I21.29, I23.6       Assessment:  Active Hospital Problems (** Indicates Principal Problem)    Diagnosis Date Noted   • **Acute blood loss anemia [D62] 04/30/2018   • Poisoning by warfarin sodium [T45.511A] 04/30/2018   • Leukocytosis [D72.829] 04/30/2018   • ABENA (acute kidney injury) [N17.9] 04/30/2018   • Nausea & vomiting [R11.2] 04/30/2018   • Elevated troponin [R74.8] 04/30/2018   • Nontraumatic retroperitoneal hematoma [K66.1] 04/30/2018   • H/O cardiac arrest [Z86.74] 04/30/2018   • LV (left ventricular) mural thrombus following MI [I21.29, I23.6] 04/30/2018   • COPD (chronic obstructive pulmonary disease) [J44.9] 04/25/2018   • CAD (coronary artery disease) [I25.10] 04/25/2018   • Ischemic cardiomyopathy [I25.5] 04/25/2018      Resolved Hospital Problems    Diagnosis Date Noted Date Resolved   No resolved problems to display.       Plan:  Continue with IV fluids, holding coumadin.Cardiology consult noted, follow labs and transfuse as needed, renal consult, pressors and ICU support  Enoch Collins MD  5/3/2018  11:10 AM

## 2018-05-03 NOTE — PLAN OF CARE
Problem: Patient Care Overview  Goal: Plan of Care Review  Outcome: Ongoing (interventions implemented as appropriate)   05/03/18 0644   Coping/Psychosocial   Plan of Care Reviewed With patient   OTHER   Outcome Summary Pt remains in CCU on 3 pressors and CRRT. Will continue to monitor     Goal: Individualization and Mutuality  Outcome: Ongoing (interventions implemented as appropriate)      Problem: Skin Injury Risk (Adult)  Goal: Identify Related Risk Factors and Signs and Symptoms  Outcome: Ongoing (interventions implemented as appropriate)    Goal: Skin Health and Integrity  Outcome: Ongoing (interventions implemented as appropriate)   05/03/18 0644   Skin Injury Risk (Adult)   Skin Health and Integrity making progress toward outcome       Problem: Sepsis/Septic Shock (Adult)  Goal: Signs and Symptoms of Listed Potential Problems Will be Absent, Minimized or Managed (Sepsis/Septic Shock)  Outcome: Ongoing (interventions implemented as appropriate)   05/03/18 0644   Goal/Outcome Evaluation   Problems Assessed (Sepsis) all       Problem: Cardiac Output Decreased (Adult)  Goal: Identify Related Risk Factors and Signs and Symptoms  Outcome: Ongoing (interventions implemented as appropriate)    Goal: Effective Tissue Perfusion  Outcome: Ongoing (interventions implemented as appropriate)   05/03/18 0644   Cardiac Output Decreased (Adult)   Effective Tissue Perfusion making progress toward outcome       Problem: Renal Failure/Kidney Injury, Acute (Adult)  Goal: Signs and Symptoms of Listed Potential Problems Will be Absent, Minimized or Managed (Renal Failure/Kidney Injury, Acute)  Outcome: Ongoing (interventions implemented as appropriate)   05/03/18 0644   Goal/Outcome Evaluation   Problems Assessed (Acute Renal Failure/Kidney Injury) all   Problems Present (ARF/Kidney Injury) electrolyte imbalance;infection;situational response;acid-base imbalance       Problem: Restraint, Nonbehavioral (Nonviolent)  Goal: Rationale  and Justification  Outcome: Ongoing (interventions implemented as appropriate)   05/03/18 0644   Restraint, Nonbehavioral (Nonviolent)   Rationale and Justification prevent line/tube removal     Goal: Nonbehavioral (Nonviolent) Restraint: Achievement of Discontinuation Criteria  Outcome: Ongoing (interventions implemented as appropriate)   05/03/18 0644   Restraint, Nonbehavioral (Nonviolent)   Nonbehavioral (Nonviolent) Restraint: Achievement of Discontinuation Criteria not met     Goal: Nonbehavioral (Nonviolent) Restraint: Preservation of Dignity and Wellbeing  Outcome: Ongoing (interventions implemented as appropriate)   05/03/18 0644   Restraint, Nonbehavioral (Nonviolent)   Nonbehavioral (Nonviolent) Restraint: Preservation of Dignity and Wellbeing met     Goal: Rationale and Justification  Outcome: Ongoing (interventions implemented as appropriate)   05/03/18 0644   Restraint, Nonbehavioral (Nonviolent)   Rationale and Justification prevent line/tube removal     Goal: Nonbehavioral (Nonviolent) Restraint: Absence of Injury/Harm  Outcome: Ongoing (interventions implemented as appropriate)    Goal: Nonbehavioral (Nonviolent) Restraint: Achievement of Discontinuation Criteria  Outcome: Ongoing (interventions implemented as appropriate)   05/03/18 0644   Restraint, Nonbehavioral (Nonviolent)   Nonbehavioral (Nonviolent) Restraint: Achievement of Discontinuation Criteria not met     Goal: Nonbehavioral (Nonviolent) Restraint: Preservation of Dignity and Wellbeing  Outcome: Ongoing (interventions implemented as appropriate)   05/03/18 0644   Restraint, Nonbehavioral (Nonviolent)   Nonbehavioral (Nonviolent) Restraint: Preservation of Dignity and Wellbeing not met       Problem: Pain, Acute (Adult)  Goal: Identify Related Risk Factors and Signs and Symptoms  Outcome: Ongoing (interventions implemented as appropriate)    Goal: Acceptable Pain Control/Comfort Level  Outcome: Ongoing (interventions implemented as  appropriate)   05/03/18 0644   Pain, Acute (Adult)   Acceptable Pain Control/Comfort Level making progress toward outcome

## 2018-05-03 NOTE — SIGNIFICANT NOTE
05/03/18 1010   Rehab Time/Intention   Evaluation Not Performed unable to evaluate, medical status change  (BILLIE Carver states pt just came off of CRRT, is still very hypotensive and unable to follow commands. PT will check back tomorrow.)   Rehab Treatment   Discipline physical therapist   Recommendation   PT - Next Appointment 05/04/18

## 2018-05-06 LAB
BACTERIA SPEC AEROBE CULT: NORMAL
BACTERIA SPEC AEROBE CULT: NORMAL

## 2018-05-08 NOTE — DISCHARGE SUMMARY
PHYSICIAN DISCHARGE SUMMARY                                                                        [facility]    Patient Identification:  Name: Negro Hall  Age: 62 y.o.  Sex: male  :  1955  MRN: [id number]  Primary Care Physician: No Known Provider    Admit date: 2018  Discharge date and time:2018  Discharged Condition:     Discharge Diagnoses:  Active Hospital Problems (** Indicates Principal Problem)    Diagnosis Date Noted   • **Acute blood loss anemia [D62] 2018   • Elevated LFTs [R79.89] 2018   • Poisoning by warfarin sodium [T45.511A] 2018   • Leukocytosis [D72.829] 2018   • ABENA (acute kidney injury) [N17.9] 2018   • Nausea & vomiting [R11.2] 2018   • Elevated troponin [R74.8] 2018   • Nontraumatic retroperitoneal hematoma [K66.1] 2018   • H/O cardiac arrest [Z86.74] 2018   • LV (left ventricular) mural thrombus following MI [I21.29, I23.6] 2018   • COPD (chronic obstructive pulmonary disease) [J44.9] 2018   • CAD (coronary artery disease) [I25.10] 2018   • Ischemic cardiomyopathy [I25.5] 2018      Resolved Hospital Problems    Diagnosis Date Noted Date Resolved   No resolved problems to display.      Patient Active Problem List   Diagnosis Code   • COPD (chronic obstructive pulmonary disease) J44.9   • CAD (coronary artery disease) I25.10   • Ischemic cardiomyopathy I25.5   • Poisoning by warfarin sodium T45.511A   • Leukocytosis D72.829   • ABENA (acute kidney injury) N17.9   • Acute blood loss anemia D62   • Nausea & vomiting R11.2   • Elevated troponin R74.8   • Nontraumatic retroperitoneal hematoma K66.1   • H/O cardiac arrest Z86.74   • LV (left ventricular) mural thrombus following MI I21.29, I23.6   • Elevated LFTs R79.89       PMHX:   Past Medical History:   Diagnosis Date   • Anoxic encephalopathy    • Cardiac arrest  with ventricular fibrillation     cardiogenic shock with shock liver   • Cardiogenic shock    • COPD with acute exacerbation    • Diverticulosis    • Hyperglycemia    • Ischemic cardiomyopathy 04/2018    EF 34% 4/16/2018   • Left ventricular thrombosis with acute MI    • STEMI (ST elevation myocardial infarction)    • Tobacco abuse      PSHX:   Past Surgical History:   Procedure Laterality Date   • CARDIAC CATHETERIZATION N/A 4/13/2018    Procedure: Left Heart Cath;  Surgeon: Lennox Max MD;  Location: [facility] CATH INVASIVE LOCATION;  Service: Cardiovascular   • CARDIAC CATHETERIZATION N/A 4/13/2018    Procedure: Coronary angiography;  Surgeon: Lennox Max MD;  [facility];  Service: Cardiovascular   • CARDIAC CATHETERIZATION N/A 4/13/2018    Procedure: Stent TEJ coronary;  Surgeon: Lennox Max MD;  Location: [facility] CATH INVASIVE LOCATION;  Service: Cardiovascular       Hospital Course: Negro Hall  Is a 61 yo male who presented to the ER with nausea and vomiting.  He tells me this started just last night although the ER history was 3 days. He also denies diarrhea to me and reports he hasn't had a BM since his recent d/c from the hospital. He had been admitted on 4/12/2018 with a one-week history of left arm pain.  He had walked into the ER at that time but then collapsed and was in ventricular fibrillation.  He was successfully resuscitated and admitted to ICU on ventilator and in cardiogenic shock. He did undergo cardiac catheterization with angioplasty and placement of a drug-eluting stent to the LAD and ramus intermedius.  He did develop some ABENA with his creatinine getting as high as 3.3, but by the time of discharge it was down to 0.78.  He also had persistent leukocytosis with his white count ranging from 14,000 to just over 30,000.  His troponin peaked at 5.4 and at discharge was down to 4.19.  He was placed on Coumadin because of a left ventricular thrombus.  He was  discharged to a skilled nursing facility, but left there that same day. He doesn't have a PCP and has not had his INR checked since discharge. In the ER, it was found to be >10. He has received 2 units FFP and the repeat is down to 2.33.  He reports he has been feeling lightheaded all day and has been freezing all day. He reports severe heartburn as well. He is still nauseated and hasn't been able to keep anything down. He said he does feel hungry. He denies any fever.        The patient was admitted the hospital and seen by multiple consultants.  Given transfusions and his coagulopathy was reversed.  The patient had hypotension and continued decline with multiorgan failure.  The patient was in the ICU on multiple drips to support his blood pressure and was started on dialysis for renal failure.  He is making little progress and after being in the ICU for a few days family opted for DNR status and palliative care and the patient  from his illness.    Consults:     Consults     Date and Time Order Name Status Description    5/3/2018 1223 Inpatient Gastroenterology Consult Completed     2018 0935 Inpatient Vascular Surgery Consult Completed     2018 2346 Inpatient Pulmonology Consult Completed     2018 1827 Inpatient Cardiology Consult Completed     2018 1405 [facility] (on-call MD unless specified) Completed     2018 0802 Inpatient Nephrology Consult Completed     2018 1607 Inpatient Neurology Consult Completed     2018 2345 Pulmonology (on-call MD unless specified) Completed           Results from last 7 days  Lab Units 18  1717  18  0718   WBC 10*3/mm3  --   --  27.08*   HEMOGLOBIN g/dL 7.4*  < > 8.5*   HEMATOCRIT % 24.5*  < > 27.4*   PLATELETS 10*3/mm3  --   --  48*   < > = values in this interval not displayed.    Results from last 7 days  Lab Units 18  1717   SODIUM mmol/L 133*   POTASSIUM mmol/L 6.2*   CHLORIDE mmol/L 89*   CO2 mmol/L 12.1*   BUN mg/dL 25*    CREATININE mg/dL 2.14*   GLUCOSE mg/dL 144*   CALCIUM mg/dL 7.3*     Significant Diagnostic Studies: No results found for: WBC, HGB, HCT, PLT  No results found for: NA, K, CL, CO2, BUN, CREATININE, GLUCOSE  No results found for: CALCIUM, MG, PHOS  No results found for: AST, ALT, ALKPHOS  No results found for: APTT, INR  No results found for: COLORU, CLARITYU, SPECGRAV, PHUR, PROTEINUR, GLUCOSEU, KETONESU, BLOODU, NITRITE, LEUKOCYTESUR, BILIRUBINUR, UROBILINOGEN, RBCUA, WBCUA, BACTERIA  No results found for: TROPONINT, TROPONINI, BNP  No components found for: HGBA1C;2  No components found for: TSH;2  Imaging Results (all)     Procedure Component Value Units Date/Time    XR Chest Post CVA Port [[id number]] Collected:  05/02/18 0502     Updated:  05/07/18 2203    Narrative:       X-RAY CHEST 1 VIEW.     HISTORY: PICC line retracted.     COMPARISON: 5/2/2018.     FINDINGS:  Cardiomegaly and pulmonary congestion. Lung volumes are low.         Tip of the right subclavian PICC line is at the cavoatrial junction.              Impression:       Tip of the right subclavian PICC line is at the cavoatrial junction,  otherwise no significant change.          This report was finalized on 5/7/2018 10:00 PM by Dr. Nidia Soliman M.D.       XR Chest 1 View [[id number]] Collected:  05/02/18 0420     Updated:  05/07/18 2203    Narrative:       X-RAY CHEST 1 VIEW.     HISTORY: PICC line placement.     COMPARISON: 5/2/2018.     FINDINGS:  Cardiomegaly and low lung volumes.         Severe pulmonary congestion, interval improvement. Small right effusion.        Right subclavian PICC line tip is in the proximal right atrium.          Impression:       Congestive heart failure, interval improvement. Follow-up to resolution  is recommended.         This report was finalized on 5/7/2018 10:00 PM by Dr. Nidia Soliman M.D.       XR Chest 1 View [[id number]] Collected:  05/02/18 0213     Updated:  05/07/18 2155    Narrative:       X-RAY CHEST  1 VIEW.     HISTORY: Rule out infection.     COMPARISON: 4/30/2018.     FINDINGS:  Cardiomegaly, worsening pulmonary congestion.         Bilateral small effusions are suspected.              Impression:       Abnormal examination, worsening congestive heart failure versus  worsening infiltrates. Clinical correlation is recommended.         This report was finalized on 5/7/2018 9:51 PM by Dr. Nidia Soliman M.D.       CT Abdomen Pelvis Without Contrast [[id number]] Collected:  05/02/18 1549     Updated:  05/03/18 0821    Narrative:       CT ABDOMEN AND PELVIS WITHOUT IV CONTRAST     HISTORY: 62-year-old male with abdominal pain and abdominal wall  bruising. Melanoma. Sepsis, renal failure.     TECHNIQUE: Radiation dose reduction techniques were utilized, including  automated exposure control and exposure modulation based on body size.   3 mm images were obtained through the abdomen and pelvis without the  administration of IV contrast. Compared with previous CT from  04/30/2018.     FINDINGS: There has been significant interval decrease in size of the  right extraperitoneal hematoma spanning 15 cm in craniocaudad span,  previously 17 cm, measured on the sagittal reconstruction series. On an  axial sequence, the hematoma measures 9 x 5 cm, previously 9 x 6.6 cm.  There has also been interval decrease in the hemorrhage within the right  psoas and iliopsoas muscles. There has been development of significant  3rd spacing of fluid diffusely and there has been significant increase  in the moderate sized bilateral pleural effusions. There is a tiny  amount of free fluid in the dependent aspect of the pelvis. There is no  acute abnormality involving the noncontrasted liver, gallbladder,  spleen, pancreas, adrenals, or kidneys. Nonobstructing left renal stones  and the 2.5 cm left adrenal nodule are stable. The left adrenal nodule  is heterogeneous, but there are portions of the nodule which have  density measurements of less  than 10 Hounsfield units. There is a  circumferentially thickened appearance of the distal aspect of the  rectum. There is moderately extensive sigmoid diverticulosis without  evidence for diverticulitis. There is dilatation of the air-filled right  and transverse colon, but the left colon is collapsed. In the visualized  lower lung fields, there has been significant increase in the  compressive atelectasis by the moderate size pleural effusions which  have increased in the interval.       Impression:       1. There has been interval decrease in size of the right extraperitoneal  hematoma measuring approximately 15 x 9 x 7 cm and there has been  decrease in the intramuscular hematoma within the right psoas and  iliopsoas muscles.  2. Circumferentially thickened appearance of the rectum. Please  correlate clinically for proctitis. There is a mild colonic ileus and no  evidence for bowel obstruction.  3. Development of anasarca. Significant increase in moderate-sized  bilateral pleural effusions and there are secondary compressive  atelectatic change at both lower lobes.  4. Stable 2.5 cm left adrenal nodule which may represent a benign  adrenal adenoma, but conservative surveillance is recommended.     This report was finalized on 5/3/2018 8:18 AM by Dr. Aleshia Kilgore MD.       XR Chest 1 View [[id number]] Collected:  05/02/18 0930     Updated:  05/02/18 0936    Narrative:       AP CHEST     HISTORY: PICC line placement.     COMPARISON: AP chest 05/02/2018 at 4:41 AM.     FINDINGS: The heart size is enlarged. Moderate right and small left  pleural effusions are present and there is hazy opacity in the right  base due to a combination of pleural fluid and potential infiltrate.   Aortic vascular calcifications are present. Right-sided PICC has been  withdrawn since the previous exam. There is no pneumothorax or other  change.     This report was finalized on 5/2/2018 9:33 AM by Dr. Anish Gomez M.D..       XR Chest 1  View [[id number]] Collected:  04/30/18 2233     Updated:  05/01/18 2120    Narrative:       X-RAY CHEST 1 VIEW.     HISTORY: Persistent leukocytosis.     COMPARISON: 4/20/2018.     FINDINGS:  Cardiomediastinal silhouette is within normal limits. Borderline  cardiomegaly and pulmonary congestion.     Trace bilateral effusions cannot be excluded.              Impression:       Congestive heart failure is suspected, please clinically correlate. Less  probable differential diagnoses includes infiltrates.         This report was finalized on 5/1/2018 9:17 PM by Dr. Nidia Soliman M.D.       CT Abdomen Pelvis Without Contrast [[id number]] Collected:  04/30/18 1346     Updated:  04/30/18 1355    Narrative:       CT ABDOMEN PELVIS WO CONTRAST-     HISTORY:  62-year-old male with right abdominal pain and vomiting with  right sided abdominal wall bruising.     Radiation dose reduction techniques were utilized, including automated  exposure control and exposure modulation based on body size.     FINDINGS: There is enlargement of the right iliopsoas muscle with a  complex fluid collection laterally in the retroperitoneum. This  collection is of mixed attenuation, measuring 9 cm transverse, 6.6 cm AP  and 11 cm craniocaudal. I see no discrete masslike lesion, findings  compatible with iliopsoas/retroperitoneal hematoma.     The hematoma is extrinsic to and displacing the right kidney in an  anterior direction. There is an 18 mm right renal cyst. No calculus,  hydronephrosis, or suspicious renal lesion. No acute rib, lower thoracic  spine, or lumbar spine abnormality.     There is a 10 mm left renal cortical cyst with there are 3  nonobstructing left renal calculi, the largest of these measuring 5-6 mm  in greatest diameter.     Bilateral small serous pleural effusions. Patchy infiltrate/atelectasis  both lower lobes. Minimal sludge may be present within the dependent  portion of the gallbladder. The liver is satisfactory in  appearance. No  pancreatic abnormality. Splenic size is normal. The right adrenal gland  is satisfactory in appearance. A 2.2 cm left adrenal nodule, relatively  low-attenuation suggestive of probable adenoma. Atherosclerotic  calcification of a normal-caliber aorta. Mild diffuse bladder wall  thickening/trabeculation. Calcifications within the central prostate.  Seminal vesicles are normal. Diverticulosis of the colon. Appendix and  small bowel within normal limits.     CONCLUSION:  1. Sizable right retroperitoneal hematoma with additional hematoma  involving the iliopsoas which is enlarged. Recommend conservative CT  surveillance to document resolution and to exclude associated neoplasm.  2. Diverticulosis of the colon.  3. Potential gallbladder sludge.  4. Renal cysts with nonobstructing left renal calculi.  5. Serous pleural effusions. Bibasilar infiltrate/atelectasis.     The findings of this report were called to Geoffrey Chacko in the emergency  room at the time of completion, 1:40 PM.     This report was finalized on 4/30/2018 1:52 PM by Dr. Varinder Weinstein M.D..           Lab Results (last 7 days)     Procedure Component Value Units Date/Time    Blood Gas, Arterial [[id number]]  (Abnormal) Collected:  05/03/18 1854    Specimen:  Arterial Blood Updated:  05/03/18 1858     Site Arterial: right radial     Hermes's Test Positive     pH, Arterial 6.948 (C) pH units      Comment: Critical:Notify BILLIE ROJAS (03-May-18 18:56:45)Read back ok        pCO2, Arterial 56.7 (C) mm Hg      Comment: Critical:Notify BILLIE ROJAS (03-May-18 18:56:45)Read back ok        pO2, Arterial 75.6 (L) mm Hg      HCO3, Arterial 12.4 (L) mmol/L      Base Excess, Arterial -18.6 (L) mmol/L      O2 Saturation Calculated 83.1 (L) %      Barometric Pressure for Blood Gas 752.4 mmHg      Modality HFNC     Flow Rate 8 lpm      Rate 14 Breaths/minute     Narrative:       Meter: [id number] : [id number] Edinson Mitchell    POC Glucose  Once [[id number]]  (Abnormal) Collected:  05/03/18 1840    Specimen:  Blood Updated:  05/03/18 1841     Glucose 64 (L) mg/dL     Narrative:       Meter: [id number] : [id number] Elayne Carver RN    Renal Function Panel [[id number]]  (Abnormal) Collected:  05/03/18 1717    Specimen:  Blood Updated:  05/03/18 1822     Glucose 144 (H) mg/dL      BUN 25 (H) mg/dL      Creatinine 2.14 (H) mg/dL      Sodium 133 (L) mmol/L      Potassium 6.2 (C) mmol/L      Chloride 89 (L) mmol/L      CO2 12.1 (L) mmol/L      Calcium 7.3 (L) mg/dL      Albumin 2.40 (L) g/dL      Phosphorus 9.5 (H) mg/dL      Anion Gap 31.9 mmol/L      BUN/Creatinine Ratio 11.7     eGFR Non African Amer 31 (L) mL/min/1.73     POC Glucose Once [[id number]]  (Normal) Collected:  05/03/18 1805    Specimen:  Blood Updated:  05/03/18 1806     Glucose 117 mg/dL     Narrative:       Meter: [id number] : [id number] Elayne Carver RN    Hemoglobin & Hematocrit, Blood [[id number]]  (Abnormal) Collected:  05/03/18 1717    Specimen:  Blood Updated:  05/03/18 1740     Hemoglobin 7.4 (L) g/dL      Hematocrit 24.5 (L) %     POC Glucose Once [[id number]]  (Normal) Collected:  05/03/18 1721    Specimen:  Blood Updated:  05/03/18 1723     Glucose 77 mg/dL     Narrative:       Meter: [id number] : [id number] Elayne Carver RN    POC Glucose Once [[id number]]  (Normal) Collected:  05/03/18 1627    Specimen:  Blood Updated:  05/03/18 1708     Glucose 76 mg/dL     Narrative:       Meter: [id number] : [id number] Rakesh FLETCHER    Ferritin [[id number]]  (Abnormal) Collected:  05/03/18 0621    Specimen:  Blood Updated:  05/03/18 1607     Ferritin 38,867.00 (H) ng/mL     POC Glucose Once [[id number]]  (Abnormal) Collected:  05/03/18 1414    Specimen:  Blood Updated:  05/03/18 1415     Glucose 139 (H) mg/dL     Narrative:       Meter: [id number] : [id number] Elayne Carver RN    Hemoglobin & Hematocrit, Blood [[id number]]  (Abnormal) Collected:  05/03/18 1323    Specimen:   Blood Updated:  05/03/18 1347     Hemoglobin 7.6 (L) g/dL      Hematocrit 25.3 (L) %     POC Glucose Once [[id number]]  (Abnormal) Collected:  05/03/18 1314    Specimen:  Blood Updated:  05/03/18 1315     Glucose 139 (H) mg/dL     Narrative:       Meter: [id number] : [id number] Elayne Carver RN    POC Glucose Once [[id number]]  (Abnormal) Collected:  05/03/18 1236    Specimen:  Blood Updated:  05/03/18 1237     Glucose 169 (H) mg/dL     Narrative:       Meter: [id number] : [id number] Elayne Carver RN    POC Glucose Once [[id number]]  (Normal) Collected:  05/03/18 1218    Specimen:  Blood Updated:  05/03/18 1228     Glucose 110 mg/dL     Narrative:       Meter: [id number] : [id number] Elayne Carver RN    POC Glucose Once [[id number]]  (Abnormal) Collected:  05/03/18 1159    Specimen:  Blood Updated:  05/03/18 1209     Glucose 46 (C) mg/dL     Narrative:       Result Not Confirmed Meter: [id number] : [id number] Elayne Carver RN    Hepatitis Panel, Acute [[id number]]  (Normal) Collected:  05/03/18 0019    Specimen:  Blood Updated:  05/03/18 1146     Hepatitis B Surface Ag Non-Reactive     Hep A IgM Non-Reactive     Hep B C IgM Non-Reactive     Hepatitis C Ab Non-Reactive    POC Glucose Once [[id number]]  (Abnormal) Collected:  05/03/18 1134    Specimen:  Blood Updated:  05/03/18 1137     Glucose <10 (C) mg/dL     Narrative:       RN Notified R and V Meter: [id number] : [id number] Rakesh Staci FLETCHER    Hepatic Function Panel [[id number]]  (Abnormal) Collected:  05/03/18 0621    Specimen:  Blood Updated:  05/03/18 1022     Total Protein 5.2 (L) g/dL      Albumin 2.60 (L) g/dL      ALT (SGPT) 4,611 (H) U/L      AST (SGOT) >7,000 (H) U/L      Alkaline Phosphatase 288 (H) U/L      Total Bilirubin 7.1 (H) mg/dL      Bilirubin, Direct 4.3 (H) mg/dL      Bilirubin, Indirect 2.8 mg/dL     Ammonia [[id number]]  (Normal) Collected:  05/03/18 0915    Specimen:  Blood Updated:  05/03/18 0938     Ammonia 45 umol/L      Urine Culture - Urine, Urine, Clean Catch [[id number]]  (Normal) Collected:  05/02/18 0818    Specimen:  Urine from Urine, Clean Catch Updated:  05/03/18 0913     Urine Culture No growth    POC Glucose Once [[id number]]  (Abnormal) Collected:  05/03/18 0819    Specimen:  Blood Updated:  05/03/18 0820     Glucose 137 (H) mg/dL     Narrative:       Meter: [id number] : [id number] Elayne Carver RN    Manual Differential [[id number]]  (Abnormal) Collected:  05/03/18 0718    Specimen:  Blood Updated:  05/03/18 0810     Neutrophil % 90.0 (H) %      Lymphocyte % 4.0 (L) %      Monocyte % 3.0 (L) %      Metamyelocyte % 2.0 (H) %      Myelocyte % 1.0 (H) %      Neutrophils Absolute 24.37 (H) 10*3/mm3      Lymphocytes Absolute 1.08 10*3/mm3      Monocytes Absolute 0.81 10*3/mm3      Anisocytosis Mod/2+     Polychromasia Slight/1+     Spherocytes Slight/1+     WBC Morphology Normal     Platelet Morphology Normal    Scan Slide [[id number]] Collected:  05/03/18 0718    Specimen:  Blood Updated:  05/03/18 0810     Anisocytosis Mod/2+     Polychromasia Slight/1+     Spherocytes Slight/1+     Scan Slide --     Comment: See Manual Differential Results       CBC Auto Differential [[id number]]  (Abnormal) Collected:  05/03/18 0718    Specimen:  Blood Updated:  05/03/18 0810     WBC 27.08 (H) 10*3/mm3      RBC 2.82 (L) 10*6/mm3      Hemoglobin 8.5 (L) g/dL      Hematocrit 27.4 (L) %      MCV 97.2 (H) fL      MCH 30.1 pg      MCHC 31.0 (L) g/dL      RDW 17.5 (H) %      RDW-SD 59.9 (H) fl      MPV 11.3 fL      Platelets 48 (L) 10*3/mm3     POC Glucose Once [[id number]]  (Abnormal) Collected:  05/03/18 0736    Specimen:  Blood Updated:  05/03/18 0748     Glucose 56 (L) mg/dL     Narrative:       Meter: [id number] : [id number] Rakesh Staci L JUSTINE    Protime-INR [[id number]]  (Abnormal) Collected:  05/03/18 0621    Specimen:  Blood Updated:  05/03/18 0744     Protime 31.9 (H) Seconds      INR 3.15 (H)    Blood Gas, Arterial [[id number]]   (Abnormal) Collected:  05/03/18 0737    Specimen:  Arterial Blood Updated:  05/03/18 0740     Site Arterial: left brachial     Hermes's Test N/A     pH, Arterial 7.483 (H) pH units      pCO2, Arterial 30.5 (L) mm Hg      pO2, Arterial 81.6 mm Hg      HCO3, Arterial 22.9 mmol/L      Base Excess, Arterial -0.2 (L) mmol/L      O2 Saturation Calculated 96.9 %      Barometric Pressure for Blood Gas 753.2 mmHg      Modality HFNC     Flow Rate 8 lpm      Set [facility] Resp Rate 28    Narrative:       sat 96 Meter: [id number] : [id number] Eva Geiger    Renal Function Panel [[id number]]  (Abnormal) Collected:  05/03/18 0621    Specimen:  Blood Updated:  05/03/18 0729     Glucose 88 mg/dL      BUN 20 mg/dL      Creatinine 1.27 mg/dL      Sodium 136 mmol/L      Potassium 4.5 mmol/L      Chloride 94 (L) mmol/L      CO2 24.6 mmol/L      Calcium 6.9 (L) mg/dL      Albumin 2.40 (L) g/dL      Phosphorus 3.6 mg/dL      Anion Gap 17.4 mmol/L      BUN/Creatinine Ratio 15.7     eGFR Non African Amer 57 (L) mL/min/1.73     Lactic Acid, Plasma [[id number]]  (Abnormal) Collected:  05/03/18 0621    Specimen:  Blood Updated:  05/03/18 0716     Lactate 6.2 (C) mmol/L     Vancomycin, Random [[id number]]  (Normal) Collected:  05/03/18 0621    Specimen:  Blood Updated:  05/03/18 0716     Vancomycin Random 10.90 mcg/mL     Magnesium [[id number]]  (Abnormal) Collected:  05/03/18 0621    Specimen:  Blood Updated:  05/03/18 0708     Magnesium 2.6 (H) mg/dL     Hepatitis B Core Antibody, Total [[id number]] Collected:  05/02/18 1222    Specimen:  Blood Updated:  05/03/18 0618     Hep B Core Total Ab Negative    Narrative:       Performed at:  [address] [place] [address]  [id number]  : Chente Echevarria PhD, Phone:  [phone]    Calcium, Ionized [[id number]]  (Abnormal) Collected:  05/03/18 0019    Specimen:  Blood Updated:  05/03/18 0117     Ionized Calcium 0.93 (L) mmol/L      Ionized Calcium 3.7 (L) mg/dL      Renal Function Panel [[id number]]  (Abnormal) Collected:  05/03/18 0018    Specimen:  Blood Updated:  05/03/18 0054     Glucose 133 (H) mg/dL      BUN 31 (H) mg/dL      Creatinine 1.66 (H) mg/dL      Sodium 138 mmol/L      Potassium 4.1 mmol/L      Chloride 95 (L) mmol/L      CO2 23.8 mmol/L      Calcium 6.9 (L) mg/dL      Albumin 2.80 (L) g/dL      Phosphorus 3.3 mg/dL      Anion Gap 19.2 mmol/L      BUN/Creatinine Ratio 18.7     eGFR Non African Amer 42 (L) mL/min/1.73     Lactic Acid, Plasma [[id number]]  (Abnormal) Collected:  05/03/18 0015    Specimen:  Blood Updated:  05/03/18 0042     Lactate 4.2 (C) mmol/L     Magnesium [[id number]]  (Normal) Collected:  05/03/18 0019    Specimen:  Blood Updated:  05/03/18 0040     Magnesium 2.1 mg/dL     Calcium, Ionized [[id number]]  (Abnormal) Collected:  05/02/18 1742    Specimen:  Blood Updated:  05/02/18 1856     Ionized Calcium 0.84 (L) mmol/L      Ionized Calcium 3.4 (L) mg/dL     Magnesium [[id number]]  (Normal) Collected:  05/02/18 1742    Specimen:  Blood Updated:  05/02/18 1835     Magnesium 2.4 mg/dL     Renal Function Panel [[id number]]  (Abnormal) Collected:  05/02/18 1742    Specimen:  Blood Updated:  05/02/18 1834     Glucose 146 (H) mg/dL      BUN 63 (H) mg/dL      Creatinine 2.87 (H) mg/dL      Sodium 139 mmol/L      Potassium 4.3 mmol/L      Chloride 92 (L) mmol/L      CO2 21.1 (L) mmol/L      Calcium 6.9 (L) mg/dL      Albumin 3.00 (L) g/dL      Phosphorus 5.5 (H) mg/dL      Anion Gap 25.9 mmol/L      BUN/Creatinine Ratio 22.0     eGFR Non African Amer 22 (L) mL/min/1.73     Lactic Acid, Plasma [[id number]]  (Abnormal) Collected:  05/02/18 1742    Specimen:  Blood Updated:  05/02/18 1817     Lactate 8.6 (C) mmol/L     Hemoglobin & Hematocrit, Blood [[id number]]  (Abnormal) Collected:  05/02/18 1742    Specimen:  Blood Updated:  05/02/18 1808     Hemoglobin 7.9 (L) g/dL      Hematocrit 25.2 (L) %     Vancomycin, Random [[id number]]  (Normal) Collected:  05/02/18  1509    Specimen:  Blood Updated:  05/02/18 1557     Vancomycin Random 13.40 mcg/mL     Hepatitis B Surface Antigen [[id number]]  (Normal) Collected:  05/02/18 1222    Specimen:  Blood Updated:  05/02/18 1333     Hepatitis B Surface Ag Non-Reactive    Hepatitis B Surface Antibody [[id number]]  (Abnormal) Collected:  05/02/18 1222    Specimen:  Blood Updated:  05/02/18 1333     Hep B S Ab Reactive (A)    Lactic Acid, Plasma [[id number]]  (Abnormal) Collected:  05/02/18 1222    Specimen:  Blood Updated:  05/02/18 1304     Lactate 19.8 (C) mmol/L     Sodium, Urine, Random - Urine, Clean Catch [[id number]] Collected:  05/02/18 0818    Specimen:  Urine from Urine, Clean Catch Updated:  05/02/18 1302     Sodium, Urine 26 mmol/L     Narrative:       Reference intervals for random urine have not been established.  Clinical usage is dependent upon physician's interpretation in combination with other laboratory tests.     Creatinine, Urine, Random - Urine, Clean Catch [[id number]] Collected:  05/02/18 0818    Specimen:  Urine from Urine, Clean Catch Updated:  05/02/18 1301     Creatinine, Urine 66.6 mg/dL     Narrative:       Reference intervals for random urine have not been established.  Clinical usage is dependent upon physician's interpretation in combination with other laboratory tests.     Hemoglobin & Hematocrit, Blood [[id number]]  (Abnormal) Collected:  05/02/18 1222    Specimen:  Blood Updated:  05/02/18 1242     Hemoglobin 7.7 (L) g/dL      Hematocrit 25.5 (L) %     Blood Gas, Arterial [[id number]]  (Abnormal) Collected:  05/02/18 1205    Specimen:  Arterial Blood Updated:  05/02/18 1212     Site Arterial: right radial     Hermes's Test Positive     pH, Arterial 7.324 (L) pH units      pCO2, Arterial 18.6 (C) mm Hg      Comment: Critical:Notify Dr dr deleon (02-May-18 12:09:06)Read back ok        pO2, Arterial 74.0 (L) mm Hg      HCO3, Arterial 9.7 (L) mmol/L      Base Excess, Arterial -14.9 (L) mmol/L      O2 Saturation  Calculated 94.1 %      Barometric Pressure for Blood Gas 755.7 mmHg      Modality HFNC     Flow Rate 6 lpm      Set Mech Resp Rate 18    Narrative:       sat 98 Meter: [id number] : [id number] Jose Crane    Urinalysis, Microscopic Only - Urine, Clean Catch [[id number]]  (Abnormal) Collected:  05/02/18 0818    Specimen:  Urine from Urine, Clean Catch Updated:  05/02/18 0930     RBC, UA Too Numerous to Count (A) /HPF      WBC, UA 6-12 (A) /HPF      Bacteria, UA 4+ (A) /HPF      Squamous Epithelial Cells, UA 0-2 /HPF      Transitional Epithelial Cells, UA 3-6 (A) /HPF      Hyaline Casts, UA 3-6 /LPF      Coarse Granular Casts, UA 7-12 /LPF      Amorphous Crystals, UA Large/3+ /HPF      Methodology Manual Light Microscopy    Urinalysis With / Culture If Indicated - Urine, Clean Catch [[id number]]  (Abnormal) Collected:  05/02/18 0818    Specimen:  Urine from Urine, Clean Catch Updated:  05/02/18 0857     Color, UA Dark Yellow (A)     Appearance, UA Turbid (A)     pH, UA <=5.0     Specific Gravity, UA 1.023     Glucose, UA Negative     Ketones, UA Trace (A)     Bilirubin, UA Negative     Blood, UA Large (3+) (A)     Protein, UA >=300 mg/dL (3+) (A)     Leuk Esterase, UA Small (1+) (A)     Nitrite, UA Negative     Urobilinogen, UA 1.0 E.U./dL    POC Glucose Once [[id number]]  (Abnormal) Collected:  05/02/18 0753    Specimen:  Blood Updated:  05/02/18 0813     Glucose 148 (H) mg/dL     Narrative:       Meter: [id number] : [id number] Abraham MURILLO    Manual Differential [[id number]]  (Abnormal) Collected:  05/02/18 0457    Specimen:  Blood Updated:  05/02/18 0623     Neutrophil % 81.0 (H) %      Lymphocyte % 7.0 (L) %      Monocyte % 7.0 %      Metamyelocyte % 3.0 (H) %      Myelocyte % 2.0 (H) %      Neutrophils Absolute 28.71 (H) 10*3/mm3      Lymphocytes Absolute 2.48 10*3/mm3      Monocytes Absolute 2.48 (H) 10*3/mm3      nRBC 5.0 (H) /100 WBC      Anisocytosis Mod/2+     Dacrocytes Slight/1+      Polychromasia Slight/1+     WBC Morphology Normal     Platelet Morphology Normal    CBC Auto Differential [[id number]]  (Abnormal) Collected:  05/02/18 0457    Specimen:  Blood Updated:  05/02/18 0623     WBC 35.45 (C) 10*3/mm3      Comment: WBC corrected for presence of NRBC's        RBC 2.39 (L) 10*6/mm3      Hemoglobin 7.3 (L) g/dL      Hematocrit 25.2 (L) %      .4 (H) fL      MCH 30.5 pg      MCHC 29.0 (L) g/dL      RDW 17.9 (H) %      RDW-SD 68.1 (H) fl      MPV 11.8 fL      Platelets 171 10*3/mm3     Scan Slide [[id number]] Collected:  05/02/18 0457    Specimen:  Blood Updated:  05/02/18 0623     Scan Slide --     Comment: See Manual Differential Results       Protime-INR [[id number]]  (Abnormal) Collected:  05/02/18 0457    Specimen:  Blood Updated:  05/02/18 0553     Protime 40.7 (H) Seconds      INR 4.30 (C)    Lactic Acid, Reflex [[id number]]  (Abnormal) Collected:  05/02/18 0457    Specimen:  Blood Updated:  05/02/18 0550     Lactate 21.8 (C) mmol/L     Basic Metabolic Panel [[id number]]  (Abnormal) Collected:  05/02/18 0457    Specimen:  Blood Updated:  05/02/18 0545     Glucose 171 (H) mg/dL      BUN 86 (H) mg/dL      Creatinine 3.87 (H) mg/dL      Sodium 137 mmol/L      Potassium 5.4 (H) mmol/L      Chloride 87 (L) mmol/L      CO2 7.9 (L) mmol/L      Calcium 7.2 (L) mg/dL      eGFR Non African Amer 16 (L) mL/min/1.73      BUN/Creatinine Ratio 22.2     Anion Gap 42.1 mmol/L     Narrative:       GFR Normal >60  Chronic Kidney Disease <60  Kidney Failure <15    Lactic Acid, Reflex Timer (This will reflex a repeat order 3-3:15 hours after ordered.) [[id number]] Collected:  05/01/18 6594    Specimen:  Blood Updated:  05/02/18 3243     Extra Tube Hold for add-ons.     Comment: Auto resulted.       Magnesium [[id number]]  (Abnormal) Collected:  05/01/18 3072    Specimen:  Blood Updated:  05/02/18 0130     Magnesium 3.2 (H) mg/dL     Comprehensive Metabolic Panel [[id number]]  (Abnormal) Collected:  05/01/18  2355    Specimen:  Blood Updated:  05/02/18 0114     Glucose 96 mg/dL      BUN 91 (H) mg/dL      Creatinine 3.85 (H) mg/dL      Sodium 137 mmol/L      Potassium 6.7 (C) mmol/L      Chloride 89 (L) mmol/L      CO2 7.5 (L) mmol/L      Calcium 7.5 (L) mg/dL      Total Protein 5.2 (L) g/dL      Albumin 2.60 (L) g/dL      ALT (SGPT) 3,829 (H) U/L      AST (SGOT) 6,560 (H) U/L      Alkaline Phosphatase 191 (H) U/L      Total Bilirubin 3.7 (H) mg/dL      eGFR Non African Amer 16 (L) mL/min/1.73      Globulin 2.6 gm/dL      A/G Ratio 1.0 g/dL      BUN/Creatinine Ratio 23.6     Anion Gap 40.5 mmol/L     CBC & Differential [[id number]] Collected:  05/01/18 2355    Specimen:  Blood Updated:  05/02/18 0049    Narrative:       The following orders were created for panel order CBC & Differential.  Procedure                               Abnormality         Status                     ---------                               -----------         ------                     Manual Differential[[id number]]          Abnormal            Final result               Scan Slide[[id number]]                                       Final result               CBC Auto Differential[[id number]]        Abnormal            Final result                 Please view results for these tests on the individual orders.    CBC Auto Differential [[id number]]  (Abnormal) Collected:  05/01/18 2355    Specimen:  Blood Updated:  05/02/18 0049     WBC 38.53 (C) 10*3/mm3      Comment: WBC corrected for presence of NRBC's        RBC 2.61 (L) 10*6/mm3      Hemoglobin 8.0 (L) g/dL      Hematocrit 27.8 (L) %      .5 (H) fL      MCH 30.7 pg      MCHC 28.8 (L) g/dL      RDW 17.8 (H) %      RDW-SD 69.1 (H) fl      MPV 11.7 fL      Platelets 200 10*3/mm3     Scan Slide [[id number]] Collected:  05/01/18 2355    Specimen:  Blood Updated:  05/02/18 0049     Scan Slide --     Comment: See Manual Differential Results       Manual Differential [[id number]]  (Abnormal) Collected:   05/01/18 2355    Specimen:  Blood Updated:  05/02/18 0049     Neutrophil % 72.0 %      Lymphocyte % 6.0 (L) %      Monocyte % 7.0 %      Metamyelocyte % 13.0 (H) %      Myelocyte % 2.0 (H) %      Neutrophils Absolute 27.74 (H) 10*3/mm3      Lymphocytes Absolute 2.31 10*3/mm3      Monocytes Absolute 2.70 (H) 10*3/mm3      nRBC 5.0 (H) /100 WBC      Acanthocytes Slight/1+     Anisocytosis Mod/2+     Spherocytes Slight/1+     WBC Morphology Normal     Large Platelets Slight/1+    Blood Gas, Arterial [[id number]]  (Abnormal) Collected:  05/02/18 0040    Specimen:  Arterial Blood Updated:  05/02/18 0044     Site Arterial: right femoral     Hermes's Test N/A     pH, Arterial 6.901 (C) pH units      Comment: Critical:Notify Dr DR CORREIA (02-May-18 00:42:21)Read back ok        pCO2, Arterial 19.2 (C) mm Hg      Comment: Critical:Notify Dr DR CORREIA (02-May-18 00:42:21)Read back ok        pO2, Arterial 74.4 (L) mm Hg      HCO3, Arterial 3.8 (L) mmol/L      Base Excess, Arterial -27.2 (L) mmol/L      O2 Saturation Calculated 81.4 (L) %      Barometric Pressure for Blood Gas 754.3 mmHg      Modality HFNC     Flow Rate 7 lpm      Rate 24 Breaths/minute     Narrative:       SATS 98% DRAWN BY DR CORREIA Meter: [id number] : [id number] Shandra Miguel    Lactic Acid, Plasma [[id number]]  (Abnormal) Collected:  05/01/18 2355    Specimen:  Blood Updated:  05/02/18 0037     Lactate 21.8 (C) mmol/L     Procalcitonin [[id number]]  (Abnormal) Collected:  05/01/18 2355    Specimen:  Blood Updated:  05/02/18 0037     Procalcitonin 1.31 (C) ng/mL     Narrative:       As a Marker for Sepsis (Non-Neonates):   1. <0.5 ng/mL represents a low risk of severe sepsis and/or septic shock.  1. >2 ng/mL represents a high risk of severe sepsis and/or septic shock.    As a Marker for Lower Respiratory Tract Infections that require antibiotic therapy:  PCT on Admission     Antibiotic Therapy             6-12 Hrs later  > 0.5                Strongly  "Recommended            >0.25 - <0.5         Recommended  0.1 - 0.25           Discouraged                   Remeasure/reassess PCT  <0.1                 Strongly Discouraged          Remeasure/reassess PCT      As 28 day mortality risk marker: \"Change in Procalcitonin Result\" (> 80 % or <=80 %) if Day 0 (or Day 1) and Day 4 values are available. Refer to [email or web address]/   Change in PCT <=80 %   A decrease of PCT levels below or equal to 80 % defines a positive change in PCT test result representing a higher risk for 28-day all-cause mortality of patients diagnosed with severe sepsis or septic shock.  Change in PCT > 80 %   A decrease of PCT levels of more than 80 % defines a negative change in PCT result representing a lower risk for 28-day all-cause mortality of patients diagnosed with severe sepsis or septic shock.                POC Glucose Once [[id number]]  (Normal) Collected:  05/02/18 0022    Specimen:  Blood Updated:  05/02/18 0023     Glucose 97 mg/dL     Narrative:       Meter: [id number] : [id number] Mariusz Crane RN    POC Glucose Once [[id number]]  (Abnormal) Collected:  05/01/18 2358    Specimen:  Blood Updated:  05/02/18 0020     Glucose 179 (H) mg/dL     Narrative:       Meter: [id number] : [id number] Mariusz Crane RN    Blood Gas, Arterial [[id number]]  (Abnormal) Collected:  05/01/18 2325    Specimen:  Arterial Blood Updated:  05/01/18 2330     Site Arterial: left brachial     Hermes's Test N/A     pH, Arterial 6.845 (C) pH units      Comment: Critical:Notify BILLIE armijo rn (01-May-18 23:29:17)Read back ok        pCO2, Arterial 22.7 (L) mm Hg      pO2, Arterial 88.3 mm Hg      HCO3, Arterial 3.9 (L) mmol/L      Base Excess, Arterial -28.2 (L) mmol/L      O2 Saturation Calculated 85.6 (L) %      Barometric Pressure for Blood Gas 755.1 mmHg      Modality Cannula     Flow Rate 4 lpm      Rate 28 Breaths/minute     Narrative:       unable to  sat " Meter: [id number] : [id number] Darian Santos    Hemoglobin & Hematocrit, Blood [[id number]]  (Abnormal) Collected:  05/01/18 1805    Specimen:  Blood Updated:  05/01/18 1845     Hemoglobin 8.8 (L) g/dL      Hematocrit 29.8 (L) %     CK-MB [[id number]]  (Normal) Collected:  05/01/18 0346    Specimen:  Blood Updated:  05/01/18 1037     CKMB 3.55 ng/mL     CBC Auto Differential [[id number]]  (Abnormal) Collected:  05/01/18 0346    Specimen:  Blood Updated:  05/01/18 0529     WBC 31.07 (C) 10*3/mm3      RBC 2.75 (L) 10*6/mm3      Hemoglobin 8.3 (L) g/dL      Hematocrit 27.3 (L) %      MCV 99.3 (H) fL      MCH 30.2 pg      MCHC 30.4 (L) g/dL      RDW 17.1 (H) %      RDW-SD 60.7 (H) fl      MPV 10.5 fL      Platelets 344 10*3/mm3     Scan Slide [[id number]] Collected:  05/01/18 0346    Specimen:  Blood Updated:  05/01/18 0529     Scan Slide --     Comment: See Manual Differential Results       Manual Differential [[id number]]  (Abnormal) Collected:  05/01/18 0346    Specimen:  Blood Updated:  05/01/18 0529     Neutrophil % 93.0 (H) %      Lymphocyte % 1.0 (L) %      Monocyte % 3.0 (L) %      Metamyelocyte % 3.0 (H) %      Neutrophils Absolute 28.90 (H) 10*3/mm3      Lymphocytes Absolute 0.31 (L) 10*3/mm3      Monocytes Absolute 0.93 10*3/mm3      nRBC 1.0 (H) /100 WBC      Anisocytosis Mod/2+     Dacrocytes Slight/1+     Polychromasia Slight/1+     WBC Morphology Normal     Platelet Morphology Normal    Troponin [[id number]]  (Abnormal) Collected:  05/01/18 0346    Specimen:  Blood Updated:  05/01/18 0505     Troponin T 8.290 (C) ng/mL     Narrative:       Troponin T Reference Ranges:  Less than 0.03 ng/mL:    Negative for AMI  0.03 to 0.09 ng/mL:      Indeterminant for AMI  Greater than 0.09 ng/mL: Positive for AMI    Basic Metabolic Panel [[id number]]  (Abnormal) Collected:  05/01/18 0346    Specimen:  Blood Updated:  05/01/18 0435     Glucose 111 (H) mg/dL      BUN 79 (H) mg/dL      Creatinine 2.50 (H) mg/dL       Sodium 135 (L) mmol/L      Potassium 4.7 mmol/L      Chloride 95 (L) mmol/L      CO2 14.6 (L) mmol/L      Calcium 7.5 (L) mg/dL      eGFR Non African Amer 26 (L) mL/min/1.73      BUN/Creatinine Ratio 31.6 (H)     Anion Gap 25.4 mmol/L     Narrative:       GFR Normal >60  Chronic Kidney Disease <60  Kidney Failure <15    Protime-INR [[id number]]  (Abnormal) Collected:  05/01/18 0346    Specimen:  Blood Updated:  05/01/18 0409     Protime 27.1 (H) Seconds      INR 2.55 (H)    Troponin [[id number]]  (Abnormal) Collected:  04/30/18 1854    Specimen:  Blood Updated:  04/30/18 1956     Troponin T 5.350 (C) ng/mL     Narrative:       Troponin T Reference Ranges:  Less than 0.03 ng/mL:    Negative for AMI  0.03 to 0.09 ng/mL:      Indeterminant for AMI  Greater than 0.09 ng/mL: Positive for AMI    Protime-INR [[id number]]  (Abnormal) Collected:  04/30/18 1854    Specimen:  Blood Updated:  04/30/18 1933     Protime 25.2 (H) Seconds      INR 2.33 (H)    Hemoglobin & Hematocrit, Blood [[id number]]  (Abnormal) Collected:  04/30/18 1854    Specimen:  Blood Updated:  04/30/18 1917     Hemoglobin 8.3 (L) g/dL      Hematocrit 27.1 (L) %     Troponin [[id number]]  (Abnormal) Collected:  04/30/18 1153    Specimen:  Blood Updated:  04/30/18 1338     Troponin T 6.100 (C) ng/mL     Narrative:       Troponin T Reference Ranges:  Less than 0.03 ng/mL:    Negative for AMI  0.03 to 0.09 ng/mL:      Indeterminant for AMI  Greater than 0.09 ng/mL: Positive for AMI    [place] [facility] [[id number]] Collected:  04/30/18 1153    Specimen:  Blood Updated:  04/30/18 1300    Narrative:       The following orders were created for panel order [place] [facility].  Procedure                               Abnormality         Status                     ---------                               -----------         ------                     [place] [facility][[id number]]                                   Final result                 Please view results for these tests on the  individual orders.    [facility] [[id number]] Collected:  04/30/18 1153    Specimen:  Blood Updated:  04/30/18 1300     Extra Tube Hold for add-ons.     Comment: Auto resulted.       Comprehensive Metabolic Panel [[id number]]  (Abnormal) Collected:  04/30/18 1153    Specimen:  Blood Updated:  04/30/18 1230     Glucose 155 (H) mg/dL      BUN 67 (H) mg/dL      Creatinine 1.89 (H) mg/dL      Sodium 137 mmol/L      Potassium 4.0 mmol/L      Chloride 97 (L) mmol/L      CO2 22.1 mmol/L      Calcium 7.9 (L) mg/dL      Total Protein 6.5 g/dL      Albumin 3.10 (L) g/dL      ALT (SGPT) 153 (H) U/L      AST (SGOT) 128 (H) U/L      Alkaline Phosphatase 196 (H) U/L      Total Bilirubin 1.6 (H) mg/dL      eGFR Non African Amer 36 (L) mL/min/1.73      Globulin 3.4 gm/dL      A/G Ratio 0.9 g/dL      BUN/Creatinine Ratio 35.4 (H)     Anion Gap 17.9 mmol/L     Lipase [[id number]]  (Abnormal) Collected:  04/30/18 1153    Specimen:  Blood Updated:  04/30/18 1225     Lipase 77 (H) U/L     Protime-INR [[id number]]  (Abnormal) Collected:  04/30/18 1153    Specimen:  Blood Updated:  04/30/18 1223     Protime 82.5 (C) Seconds      INR >10.00 (C)    CBC & Differential [[id number]] Collected:  04/30/18 1153    Specimen:  Blood Updated:  04/30/18 1210    Narrative:       The following orders were created for panel order CBC & Differential.  Procedure                               Abnormality         Status                     ---------                               -----------         ------                     Scan Slide[[id number]]                                                                  CBC Auto Differential[[id number]]        Abnormal            Final result                 Please view results for these tests on the individual orders.    CBC Auto Differential [[id number]]  (Abnormal) Collected:  04/30/18 1153    Specimen:  Blood Updated:  04/30/18 1210     WBC 18.12 (H) 10*3/mm3      RBC 2.67 (L) 10*6/mm3      Hemoglobin 8.3 (L) g/dL   "    Hematocrit 26.6 (L) %      MCV 99.6 (H) fL      MCH 31.1 pg      MCHC 31.2 (L) g/dL      RDW 15.8 (H) %      RDW-SD 56.3 (H) fl      MPV 9.8 fL      Platelets 517 (H) 10*3/mm3      Neutrophil % 87.9 (H) %      Lymphocyte % 5.4 (L) %      Monocyte % 6.3 %      Eosinophil % 0.2 (L) %      Basophil % 0.2 %      Immature Grans % 2.2 (H) %      Neutrophils, Absolute 15.94 (H) 10*3/mm3      Lymphocytes, Absolute 0.97 10*3/mm3      Monocytes, Absolute 1.15 10*3/mm3      Eosinophils, Absolute 0.03 10*3/mm3      Basophils, Absolute 0.03 10*3/mm3      Immature Grans, Absolute 0.39 (H) 10*3/mm3      nRBC 0.6 (H) /100 WBC         BP 97/48   Pulse (!) 0   Temp 97.4 °F (36.3 °C) (Axillary)   Resp (!) 0   Ht 170.2 cm (67.01\")   Wt 64.5 kg (142 lb 3.2 oz)   SpO2 (!) 0%   BMI 22.27 kg/m²     Discharge Exam:     Disposition:        Discharge Order     None          Total time spent discharging patient including evaluation,post hospitalization follow up,  medication and post hospitalization instructions and education total time exceeds 30 minutes.    Signed:  Enoch Collins MD  2018  5:30 PM    "

## 2018-12-11 NOTE — PROGRESS NOTES
"[place]  1955 62 y.o.  [id number]      Patient Care Team:  No Known Provider as PCP - General    CC: Anterior STEMI, cardiac arrest    Interval History: Doing better, more hemodynamically stable    Objective   Vital Signs  Temp:  [97.7 °F (36.5 °C)-98.7 °F (37.1 °C)] 97.7 °F (36.5 °C)  Heart Rate:  [] 84  Resp:  [16-20] 20  BP: ()/() 138/93    Intake/Output Summary (Last 24 hours) at 04/18/18 1531  Last data filed at 04/18/18 0900   Gross per 24 hour   Intake             2508 ml   Output             3950 ml   Net            -1442 ml     Flowsheet Rows    Flowsheet Row First Filed Value   Admission Height 170.2 cm (67\") Documented at 04/12/2018 2153   Admission Weight 62.6 kg (138 lb) Documented at 04/12/2018 2153          Physical Exam:   General Appearance:    Alert,oriented, in no acute distress   Lungs:     Clear to auscultation,BS are equal    Heart:    Normal S1 and S2, RRR without murmur, gallop or rub   HEENT:    Sclera are clear, no JVD or adenopathy   Abdomen:     Normal bowel sounds, soft non-tender, non-distended, no HSM   Extremities:   Moves all extremities well, no edema, no cyanosis, no             Redness, no rash     Medication Review:        aspirin 81 mg Oral Daily   [START ON 4/19/2018] ceftriaxone 1 g Intravenous Q24H   clopidogrel 75 mg Oral Daily   famotidine 20 mg Nasogastric Daily   ipratropium-albuterol 3 mL Nebulization 4x Daily - RT   phosphorus 250 mg Oral BID   predniSONE 40 mg Oral Daily With Breakfast   torsemide 20 mg Oral Daily   warfarin 5 mg Oral Daily       dextrose 50 mL/hr Last Rate: 50 mL/hr (04/18/18 1309)   heparin (porcine) 12 Units/kg/hr Last Rate: 17 Units/kg/hr (04/17/18 1320)         I reviewed the patient's new clinical results.  I personally viewed and interpreted the patient's EKG/Telemetry data    Assessment/Plan  Active Hospital Problems (** Indicates Principal Problem)    Diagnosis Date Noted   • Cardiac arrest [I46.9] 04/13/2018    "   Resolved Hospital Problems    Diagnosis Date Noted Date Resolved   No resolved problems to display.       Doing better and like him increase his activity his electrolytes are improved we'll hold off on statin until his LFTs normalize pleased without is doing overall LV function is improved up to 35%    He will be hospitalized for several more days but is making progress in the unit his sodium straightened out and then try and diurese him some      Lennox Max MD  04/18/18  3:31 PM             no chest pain and no edema.

## 2019-01-31 NOTE — PLAN OF CARE
Problem: Patient Care Overview  Goal: Plan of Care Review   04/20/18 4724   Coping/Psychosocial   Plan of Care Reviewed With patient   Plan of Care Review   Progress improving   OTHER   Outcome Summary RD requested re eval of swallowing d/t poor intake. Pt improving. SLP recs upgrade to regular. Will follow for diet tolerance.           0 = independent 2 = assistive person

## 2020-01-01 NOTE — PLAN OF CARE
Problem: Restraint, Nonbehavioral (Nonviolent)  Goal: Rationale and Justification  Outcome: Ongoing (interventions implemented as appropriate)    Goal: Nonbehavioral (Nonviolent) Restraint: Absence of Injury/Harm  Outcome: Outcome(s) achieved Date Met: 05/02/18    Goal: Nonbehavioral (Nonviolent) Restraint: Achievement of Discontinuation Criteria  Outcome: Ongoing (interventions implemented as appropriate)    Goal: Nonbehavioral (Nonviolent) Restraint: Preservation of Dignity and Wellbeing  Outcome: Ongoing (interventions implemented as appropriate)    Goal: Rationale and Justification  Outcome: Ongoing (interventions implemented as appropriate)    Goal: Nonbehavioral (Nonviolent) Restraint: Absence of Injury/Harm  Outcome: Ongoing (interventions implemented as appropriate)    Goal: Nonbehavioral (Nonviolent) Restraint: Achievement of Discontinuation Criteria  Outcome: Ongoing (interventions implemented as appropriate)    Goal: Nonbehavioral (Nonviolent) Restraint: Preservation of Dignity and Wellbeing  Outcome: Ongoing (interventions implemented as appropriate)         Statement Selected

## 2020-09-20 NOTE — PROGRESS NOTES
Exercise Oximetry    Patient Name:Negro Hall   MRN: [id number]   Date: 04/21/18             ROOM AIR BASELINE   SpO2% 98   Heart Rate 102     Blood Pressure      EXERCISE ON ROOM AIR SpO2% EXERCISE ON O2 @  LPM SpO2%   1 MINUTE 99 1 MINUTE    2 MINUTES 96 2 MINUTES    3 MINUTES 98 3 MINUTES    4 MINUTES 98 4 MINUTES    5 MINUTES 97 5 MINUTES    6 MINUTES 98 6 MINUTES               Distance Walked  Distance Walked   Dyspnea (Hawa Scale) Dyspnea (Hawa Scale)   Fatigue (Hawa Scale)   Fatigue (Hawa Scale)   SpO2% Post Exercise  100 SpO2% Post Exercise   HR Post Exercise  95 HR Post Exercise    Time to Recovery     Comments: PT was able to walk with support of RT arm, some wobbliness/weakness observed. PT had no need for O2 as noted above.    within normal limits/except 4th and 5th digit NT due to splint

## 2024-10-01 NOTE — NURSING NOTE
History: f/u low platelets: Denies any constipation, nausea, vomiting, diarrhea, GERD, bleeding from mouth, nares, urine or easy bruising, denies ETOH use, denies fatigue, wt loss, night sweats, chest pain, palpitations, SOB, ROWE, leg swelling, no black tarry stools  Assessment: established condition   Plan: no symptoms. Will recheck and then give further recommendations.   Orders:    CBC with Automated Differential; Future     Talked with Dr Alex and him and Dr Max want goal temp to be 36 degrees.  Therapy changed.  Notified of rhythm changes and b/p dropping with rhythm.  Order received

## (undated) DEVICE — GUIDE CATHETER: Brand: MACH1™

## (undated) DEVICE — HI-TORQUE WHISPER MS GUIDE WIRE .014 STRAIGHT TIP 3.0 CM X 190 CM: Brand: HI-TORQUE WHISPER

## (undated) DEVICE — GW HITORQUE/BAL MID/WT J W/HCOAT .014 3X190CM

## (undated) DEVICE — TREK CORONARY DILATATION CATHETER 2.50 MM X 15 MM / RAPID-EXCHANGE: Brand: TREK

## (undated) DEVICE — TREK CORONARY DILATATION CATHETER 3.0 MM X 15 MM / RAPID-EXCHANGE: Brand: TREK

## (undated) DEVICE — DEV INDEFLATOR

## (undated) DEVICE — GW EMR FIX EXCHG J STD .035 3MM 260CM

## (undated) DEVICE — NC TREK CORONARY DILATATION CATHETER 3.5 MM X 15 MM / RAPID-EXCHANGE: Brand: NC TREK

## (undated) DEVICE — MINI TREK CORONARY DILATATION CATHETER 2.0 MM X 15 MM / RAPID-EXCHANGE: Brand: MINI TREK

## (undated) DEVICE — PK CATH CARD 40

## (undated) DEVICE — CATH VENT MIV RADL PIG ST TIP 5F 110CM

## (undated) DEVICE — GLIDESHEATH BASIC HYDROPHILIC COATED INTRODUCER SHEATH: Brand: GLIDESHEATH

## (undated) DEVICE — NC TREK CORONARY DILATATION CATHETER 3.5 MM X 20 MM / RAPID-EXCHANGE: Brand: NC TREK

## (undated) DEVICE — KT MANIFLD CARDIAC

## (undated) DEVICE — CATH DIAG IMPULSE FL3.5 5F 100CM

## (undated) DEVICE — CATH DIAG IMPULSE FR4 5F 100CM